# Patient Record
Sex: FEMALE | Race: WHITE | NOT HISPANIC OR LATINO | Employment: OTHER | URBAN - METROPOLITAN AREA
[De-identification: names, ages, dates, MRNs, and addresses within clinical notes are randomized per-mention and may not be internally consistent; named-entity substitution may affect disease eponyms.]

---

## 2017-10-10 ENCOUNTER — CONVERSION ENCOUNTER (OUTPATIENT)
Dept: OTHER | Facility: HOSPITAL | Age: 77
End: 2017-10-10

## 2018-01-18 ENCOUNTER — OFFICE VISIT CONVERTED (OUTPATIENT)
Dept: FAMILY MEDICINE CLINIC | Age: 78
End: 2018-01-18
Attending: FAMILY MEDICINE

## 2018-02-20 ENCOUNTER — OFFICE VISIT CONVERTED (OUTPATIENT)
Dept: FAMILY MEDICINE CLINIC | Age: 78
End: 2018-02-20
Attending: FAMILY MEDICINE

## 2018-06-21 ENCOUNTER — OFFICE VISIT CONVERTED (OUTPATIENT)
Dept: FAMILY MEDICINE CLINIC | Age: 78
End: 2018-06-21
Attending: FAMILY MEDICINE

## 2018-10-24 ENCOUNTER — CONVERSION ENCOUNTER (OUTPATIENT)
Dept: OTHER | Facility: HOSPITAL | Age: 78
End: 2018-10-24

## 2018-11-29 ENCOUNTER — OFFICE VISIT CONVERTED (OUTPATIENT)
Dept: FAMILY MEDICINE CLINIC | Age: 78
End: 2018-11-29
Attending: FAMILY MEDICINE

## 2019-04-19 ENCOUNTER — OFFICE VISIT CONVERTED (OUTPATIENT)
Dept: FAMILY MEDICINE CLINIC | Age: 79
End: 2019-04-19
Attending: FAMILY MEDICINE

## 2019-04-23 ENCOUNTER — OFFICE VISIT CONVERTED (OUTPATIENT)
Dept: FAMILY MEDICINE CLINIC | Age: 79
End: 2019-04-23
Attending: FAMILY MEDICINE

## 2019-04-25 ENCOUNTER — HOSPITAL ENCOUNTER (OUTPATIENT)
Dept: OTHER | Facility: HOSPITAL | Age: 79
Discharge: HOME | End: 2019-04-25
Attending: FAMILY MEDICINE

## 2019-04-25 LAB
ALBUMIN SERPL-MCNC: 4.1 G/DL (ref 3.5–5)
ALBUMIN/GLOB SERPL: 1.4 {RATIO} (ref 1.4–2.6)
ALP SERPL-CCNC: 66 U/L (ref 43–160)
ALT SERPL-CCNC: 11 U/L (ref 10–40)
ANION GAP SERPL CALC-SCNC: 13 MMOL/L (ref 8–19)
AST SERPL-CCNC: 25 U/L (ref 15–50)
BILIRUB SERPL-MCNC: 0.45 MG/DL (ref 0.2–1.3)
BUN SERPL-MCNC: 28 MG/DL (ref 5–25)
BUN/CREAT SERPL: 33 {RATIO} (ref 6–20)
CALCIUM SERPL-MCNC: 9.3 MG/DL (ref 8.7–10.4)
CHLORIDE SERPL-SCNC: 104 MMOL/L (ref 99–111)
CHOLEST SERPL-MCNC: 156 MG/DL (ref 107–200)
CHOLEST/HDLC SERPL: 2.4 {RATIO} (ref 3–6)
CONV CO2: 29 MMOL/L (ref 22–32)
CONV TOTAL PROTEIN: 7 G/DL (ref 6.3–8.2)
CREAT UR-MCNC: 0.84 MG/DL (ref 0.5–0.9)
GFR SERPLBLD BASED ON 1.73 SQ M-ARVRAT: >60 ML/MIN/{1.73_M2}
GLOBULIN UR ELPH-MCNC: 2.9 G/DL (ref 2–3.5)
GLUCOSE SERPL-MCNC: 98 MG/DL (ref 65–99)
HDLC SERPL-MCNC: 66 MG/DL (ref 40–60)
LDLC SERPL CALC-MCNC: 74 MG/DL (ref 70–100)
OSMOLALITY SERPL CALC.SUM OF ELEC: 299 MOSM/KG (ref 273–304)
POTASSIUM SERPL-SCNC: 4.1 MMOL/L (ref 3.5–5.3)
SODIUM SERPL-SCNC: 142 MMOL/L (ref 135–147)
TRIGL SERPL-MCNC: 80 MG/DL (ref 40–150)
VLDLC SERPL-MCNC: 16 MG/DL (ref 5–37)

## 2019-04-30 ENCOUNTER — HOSPITAL ENCOUNTER (OUTPATIENT)
Dept: OTHER | Facility: HOSPITAL | Age: 79
Discharge: HOME | End: 2019-04-30
Attending: FAMILY MEDICINE

## 2019-09-17 ENCOUNTER — OFFICE VISIT CONVERTED (OUTPATIENT)
Dept: FAMILY MEDICINE CLINIC | Age: 79
End: 2019-09-17
Attending: FAMILY MEDICINE

## 2019-09-17 ENCOUNTER — HOSPITAL ENCOUNTER (OUTPATIENT)
Dept: OTHER | Facility: HOSPITAL | Age: 79
Discharge: HOME | End: 2019-09-17
Attending: FAMILY MEDICINE

## 2019-09-17 LAB
ALBUMIN SERPL-MCNC: 4.6 G/DL (ref 3.5–5)
ALBUMIN/GLOB SERPL: 1.6 {RATIO} (ref 1.4–2.6)
ALP SERPL-CCNC: 70 U/L (ref 43–160)
ALT SERPL-CCNC: 15 U/L (ref 10–40)
AMYLASE SERPL-CCNC: 95 U/L (ref 30–150)
ANION GAP SERPL CALC-SCNC: 18 MMOL/L (ref 8–19)
AST SERPL-CCNC: 27 U/L (ref 15–50)
BILIRUB SERPL-MCNC: 0.37 MG/DL (ref 0.2–1.3)
BUN SERPL-MCNC: 29 MG/DL (ref 5–25)
BUN/CREAT SERPL: 31 {RATIO} (ref 6–20)
CALCIUM SERPL-MCNC: 9.4 MG/DL (ref 8.7–10.4)
CHLORIDE SERPL-SCNC: 104 MMOL/L (ref 99–111)
CK SERPL-CCNC: 53 U/L (ref 35–230)
CONV CO2: 26 MMOL/L (ref 22–32)
CONV TOTAL PROTEIN: 7.5 G/DL (ref 6.3–8.2)
CREAT UR-MCNC: 0.93 MG/DL (ref 0.5–0.9)
ERYTHROCYTE [DISTWIDTH] IN BLOOD BY AUTOMATED COUNT: 12.9 % (ref 11.5–14.5)
FOLATE SERPL-MCNC: >20 NG/ML (ref 4.8–20)
GFR SERPLBLD BASED ON 1.73 SQ M-ARVRAT: 59 ML/MIN/{1.73_M2}
GLOBULIN UR ELPH-MCNC: 2.9 G/DL (ref 2–3.5)
GLUCOSE SERPL-MCNC: 101 MG/DL (ref 65–99)
HBA1C MFR BLD: 12.7 G/DL (ref 12–16)
HCT VFR BLD AUTO: 39.8 % (ref 37–47)
LIPASE SERPL-CCNC: 51 U/L (ref 5–51)
MCH RBC QN AUTO: 27.5 PG (ref 27–31)
MCHC RBC AUTO-ENTMCNC: 31.9 G/DL (ref 33–37)
MCV RBC AUTO: 86.3 FL (ref 81–99)
OSMOLALITY SERPL CALC.SUM OF ELEC: 304 MOSM/KG (ref 273–304)
PLATELET # BLD AUTO: 209 10*3/UL (ref 130–400)
PMV BLD AUTO: 11.5 FL (ref 7.4–10.4)
POTASSIUM SERPL-SCNC: 4.2 MMOL/L (ref 3.5–5.3)
RBC # BLD AUTO: 4.61 10*6/UL (ref 4.2–5.4)
SODIUM SERPL-SCNC: 144 MMOL/L (ref 135–147)
TSH SERPL-ACNC: 1.59 M[IU]/L (ref 0.27–4.2)
VIT B12 SERPL-MCNC: 516 PG/ML (ref 211–911)
WBC # BLD AUTO: 5.1 10*3/UL (ref 4.8–10.8)

## 2019-09-19 LAB
ASO AB SERPL-ACNC: 79 [IU]/ML (ref 0–200)
B BURGDOR IGG+IGM SER-ACNC: <0.91 ISR (ref 0–0.9)
CONV ANTI NUCLEAR ANTIBODY WITH REFLEX: NEGATIVE
CONV RHEUMATOID FACTOR IGM: <10 [IU]/ML (ref 0–14)
CRP SERPL-MCNC: 1.5 MG/L (ref 0–5)
ERYTHROCYTE [SEDIMENTATION RATE] IN BLOOD: 8 MM/H (ref 0–30)
PHOSPHATE SERPL-MCNC: 3.2 MG/DL (ref 2.4–4.5)
URATE SERPL-MCNC: 3.7 MG/DL (ref 2.5–7.5)

## 2019-09-20 LAB — CCP IGA+IGG SERPL IA-ACNC: 5 UNITS (ref 0–19)

## 2019-10-08 ENCOUNTER — OFFICE VISIT CONVERTED (OUTPATIENT)
Dept: FAMILY MEDICINE CLINIC | Age: 79
End: 2019-10-08
Attending: FAMILY MEDICINE

## 2019-10-30 ENCOUNTER — HOSPITAL ENCOUNTER (OUTPATIENT)
Dept: OTHER | Facility: HOSPITAL | Age: 79
Discharge: HOME | End: 2019-10-30
Attending: FAMILY MEDICINE

## 2019-11-07 ENCOUNTER — HOSPITAL ENCOUNTER (OUTPATIENT)
Dept: OTHER | Facility: HOSPITAL | Age: 79
Discharge: HOME | End: 2019-11-07
Attending: FAMILY MEDICINE

## 2020-01-06 ENCOUNTER — OFFICE VISIT CONVERTED (OUTPATIENT)
Dept: FAMILY MEDICINE CLINIC | Age: 80
End: 2020-01-06
Attending: FAMILY MEDICINE

## 2020-01-30 ENCOUNTER — OFFICE VISIT CONVERTED (OUTPATIENT)
Dept: FAMILY MEDICINE CLINIC | Age: 80
End: 2020-01-30
Attending: FAMILY MEDICINE

## 2020-02-08 ENCOUNTER — OFFICE VISIT CONVERTED (OUTPATIENT)
Dept: FAMILY MEDICINE CLINIC | Age: 80
End: 2020-02-08
Attending: NURSE PRACTITIONER

## 2020-02-10 ENCOUNTER — HOSPITAL ENCOUNTER (OUTPATIENT)
Dept: OTHER | Facility: HOSPITAL | Age: 80
Discharge: HOME | End: 2020-02-10
Attending: NURSE PRACTITIONER

## 2020-02-26 ENCOUNTER — OFFICE VISIT CONVERTED (OUTPATIENT)
Dept: FAMILY MEDICINE CLINIC | Age: 80
End: 2020-02-26
Attending: NURSE PRACTITIONER

## 2020-03-02 ENCOUNTER — HOSPITAL ENCOUNTER (OUTPATIENT)
Dept: OTHER | Facility: HOSPITAL | Age: 80
Discharge: HOME | End: 2020-03-02
Attending: NURSE PRACTITIONER

## 2020-03-10 ENCOUNTER — HOSPITAL ENCOUNTER (OUTPATIENT)
Dept: OTHER | Facility: HOSPITAL | Age: 80
Discharge: HOME | End: 2020-03-10
Attending: FAMILY MEDICINE

## 2020-03-10 ENCOUNTER — OFFICE VISIT CONVERTED (OUTPATIENT)
Dept: FAMILY MEDICINE CLINIC | Age: 80
End: 2020-03-10
Attending: FAMILY MEDICINE

## 2020-03-10 LAB
ALBUMIN SERPL-MCNC: 4.2 G/DL (ref 3.5–5)
ALBUMIN/GLOB SERPL: 1.4 {RATIO} (ref 1.4–2.6)
ALP SERPL-CCNC: 110 U/L (ref 43–160)
ALT SERPL-CCNC: 20 U/L (ref 10–40)
ANION GAP SERPL CALC-SCNC: 18 MMOL/L (ref 8–19)
AST SERPL-CCNC: 34 U/L (ref 15–50)
BILIRUB SERPL-MCNC: <0.15 MG/DL (ref 0.2–1.3)
BUN SERPL-MCNC: 26 MG/DL (ref 5–25)
BUN/CREAT SERPL: 34 {RATIO} (ref 6–20)
CALCIUM SERPL-MCNC: 9.3 MG/DL (ref 8.7–10.4)
CHLORIDE SERPL-SCNC: 106 MMOL/L (ref 99–111)
CHOLEST SERPL-MCNC: 157 MG/DL (ref 107–200)
CHOLEST/HDLC SERPL: 2.4 {RATIO} (ref 3–6)
CONV CO2: 23 MMOL/L (ref 22–32)
CONV TOTAL PROTEIN: 7.2 G/DL (ref 6.3–8.2)
CREAT UR-MCNC: 0.76 MG/DL (ref 0.5–0.9)
ERYTHROCYTE [DISTWIDTH] IN BLOOD BY AUTOMATED COUNT: 12.9 % (ref 11.5–14.5)
GFR SERPLBLD BASED ON 1.73 SQ M-ARVRAT: >60 ML/MIN/{1.73_M2}
GLOBULIN UR ELPH-MCNC: 3 G/DL (ref 2–3.5)
GLUCOSE SERPL-MCNC: 126 MG/DL (ref 65–99)
HBA1C MFR BLD: 12.6 G/DL (ref 12–16)
HCT VFR BLD AUTO: 38.9 % (ref 37–47)
HDLC SERPL-MCNC: 65 MG/DL (ref 40–60)
LDLC SERPL CALC-MCNC: 71 MG/DL (ref 70–100)
MCH RBC QN AUTO: 27.8 PG (ref 27–31)
MCHC RBC AUTO-ENTMCNC: 32.4 G/DL (ref 33–37)
MCV RBC AUTO: 85.7 FL (ref 81–99)
OSMOLALITY SERPL CALC.SUM OF ELEC: 302 MOSM/KG (ref 273–304)
PLATELET # BLD AUTO: 256 10*3/UL (ref 130–400)
PMV BLD AUTO: 11 FL (ref 7.4–10.4)
POTASSIUM SERPL-SCNC: 3.8 MMOL/L (ref 3.5–5.3)
RBC # BLD AUTO: 4.54 10*6/UL (ref 4.2–5.4)
SODIUM SERPL-SCNC: 143 MMOL/L (ref 135–147)
TRIGL SERPL-MCNC: 107 MG/DL (ref 40–150)
TSH SERPL-ACNC: 1.56 M[IU]/L (ref 0.27–4.2)
VLDLC SERPL-MCNC: 21 MG/DL (ref 5–37)
WBC # BLD AUTO: 9.1 10*3/UL (ref 4.8–10.8)

## 2020-03-12 LAB — BACTERIA SPEC AEROBE CULT: NORMAL

## 2020-03-13 ENCOUNTER — OFFICE VISIT CONVERTED (OUTPATIENT)
Dept: FAMILY MEDICINE CLINIC | Age: 80
End: 2020-03-13
Attending: FAMILY MEDICINE

## 2020-03-14 ENCOUNTER — HOSPITAL ENCOUNTER (OUTPATIENT)
Dept: OTHER | Facility: HOSPITAL | Age: 80
Discharge: HOME | End: 2020-03-14
Attending: FAMILY MEDICINE

## 2020-03-16 LAB — BACTERIA SPEC AEROBE CULT: NORMAL

## 2020-03-18 ENCOUNTER — OFFICE VISIT CONVERTED (OUTPATIENT)
Dept: PULMONOLOGY | Facility: CLINIC | Age: 80
End: 2020-03-18
Attending: INTERNAL MEDICINE

## 2020-04-30 ENCOUNTER — OFFICE VISIT CONVERTED (OUTPATIENT)
Dept: PULMONOLOGY | Facility: CLINIC | Age: 80
End: 2020-04-30
Attending: PHYSICIAN ASSISTANT

## 2020-05-01 ENCOUNTER — HOSPITAL ENCOUNTER (OUTPATIENT)
Dept: OTHER | Facility: HOSPITAL | Age: 80
Discharge: HOME | End: 2020-05-01
Attending: PHYSICIAN ASSISTANT

## 2020-05-01 LAB
BASOPHILS # BLD MANUAL: 0.02 10*3/UL (ref 0–0.2)
BASOPHILS NFR BLD MANUAL: 0.4 % (ref 0–3)
DEPRECATED RDW RBC AUTO: 42.4 FL
EOSINOPHIL # BLD MANUAL: 0.13 10*3/UL (ref 0–0.7)
EOSINOPHIL NFR BLD MANUAL: 2.4 % (ref 0–7)
ERYTHROCYTE [DISTWIDTH] IN BLOOD BY AUTOMATED COUNT: 13.4 % (ref 11.5–14.5)
GRANS (ABSOLUTE): 3.62 10*3/UL (ref 2–8)
GRANS: 66 % (ref 30–85)
HBA1C MFR BLD: 12.4 G/DL (ref 12–16)
HCT VFR BLD AUTO: 38.3 % (ref 37–47)
IMM GRANULOCYTES # BLD: 0.02 10*3/UL (ref 0–0.54)
IMM GRANULOCYTES NFR BLD: 0.4 % (ref 0–0.43)
IMMUNOCAP RESULT: NORMAL (ref 0–0)
LYMPHOCYTES # BLD MANUAL: 1.23 10*3/UL (ref 1–5)
LYMPHOCYTES NFR BLD MANUAL: 8.4 % (ref 3–10)
MCH RBC QN AUTO: 27.7 PG (ref 27–31)
MCHC RBC AUTO-ENTMCNC: 32.4 G/DL (ref 33–37)
MCV RBC AUTO: 85.7 FL (ref 81–99)
MONOCYTES # BLD AUTO: 0.46 10*3/UL (ref 0.2–1.2)
PLATELET # BLD AUTO: 227 10*3/UL (ref 130–400)
PMV BLD AUTO: 10.9 FL (ref 7.4–10.4)
RBC # BLD AUTO: 4.47 10*6/UL (ref 4.2–5.4)
VARIANT LYMPHS NFR BLD MANUAL: 22.4 % (ref 20–45)
WBC # BLD AUTO: 5.48 10*3/UL (ref 4.8–10.8)

## 2020-05-03 LAB
A FUMIGATUS AB SER QL ID: <0.1 K[IU]/ML
IGE SERPL-ACNC: 2 K[IU]/ML (ref 0–24)

## 2020-05-04 ENCOUNTER — HOSPITAL ENCOUNTER (OUTPATIENT)
Dept: OTHER | Facility: HOSPITAL | Age: 80
Discharge: HOME | End: 2020-05-04
Attending: FAMILY MEDICINE

## 2020-05-04 ENCOUNTER — OFFICE VISIT CONVERTED (OUTPATIENT)
Dept: FAMILY MEDICINE CLINIC | Age: 80
End: 2020-05-04
Attending: FAMILY MEDICINE

## 2020-05-04 LAB
CONV 1,3-BETA-D-GLUCAN INTERPRETATION: NEGATIVE
CONV 1,3-BETA-D-GLUCAN: <31 PG/ML
H CAPSUL AG UR-ACNC: <0.5

## 2020-05-14 ENCOUNTER — CONVERSION ENCOUNTER (OUTPATIENT)
Dept: ORTHOPEDIC SURGERY | Facility: CLINIC | Age: 80
End: 2020-05-14

## 2020-05-14 ENCOUNTER — OFFICE VISIT CONVERTED (OUTPATIENT)
Dept: ORTHOPEDIC SURGERY | Facility: CLINIC | Age: 80
End: 2020-05-14
Attending: ORTHOPAEDIC SURGERY

## 2020-05-26 ENCOUNTER — HOSPITAL ENCOUNTER (OUTPATIENT)
Dept: PHYSICAL THERAPY | Facility: CLINIC | Age: 80
Setting detail: RECURRING SERIES
Discharge: HOME | End: 2020-06-23
Attending: ORTHOPAEDIC SURGERY

## 2020-06-15 ENCOUNTER — HOSPITAL ENCOUNTER (OUTPATIENT)
Dept: PREADMISSION TESTING | Facility: HOSPITAL | Age: 80
Discharge: HOME | End: 2020-06-15
Attending: INTERNAL MEDICINE

## 2020-06-16 LAB — SARS-COV-2 RNA SPEC QL NAA+PROBE: NOT DETECTED

## 2020-06-17 ENCOUNTER — HOSPITAL ENCOUNTER (OUTPATIENT)
Dept: CARDIOLOGY | Facility: HOSPITAL | Age: 80
Discharge: HOME | End: 2020-06-17
Attending: INTERNAL MEDICINE

## 2020-07-01 ENCOUNTER — OFFICE VISIT CONVERTED (OUTPATIENT)
Dept: PULMONOLOGY | Facility: CLINIC | Age: 80
End: 2020-07-01
Attending: NURSE PRACTITIONER

## 2020-07-13 ENCOUNTER — CONVERSION ENCOUNTER (OUTPATIENT)
Dept: FAMILY MEDICINE CLINIC | Age: 80
End: 2020-07-13

## 2020-07-13 ENCOUNTER — HOSPITAL ENCOUNTER (OUTPATIENT)
Dept: OTHER | Facility: HOSPITAL | Age: 80
Discharge: HOME | End: 2020-07-13
Attending: NURSE PRACTITIONER

## 2020-07-15 ENCOUNTER — HOSPITAL ENCOUNTER (OUTPATIENT)
Dept: GASTROENTEROLOGY | Facility: HOSPITAL | Age: 80
Setting detail: HOSPITAL OUTPATIENT SURGERY
Discharge: HOME | End: 2020-07-15
Attending: INTERNAL MEDICINE

## 2020-07-15 LAB
EPI CELLS NFR FLD: 8 %
LYMPHOCYTES NFR FLD MANUAL: 1 %
MACROPHAGE FLUID: 7 /100{WBCS}
NEUTROPHILS NFR FLD MANUAL: 84 %
SARS-COV-2 RNA SPEC QL NAA+PROBE: NOT DETECTED
VISUAL PRESENCE OF BLOOD: NEGATIVE

## 2020-07-18 LAB
BACTERIA SPEC AEROBE CULT: NORMAL
CONV BRONCHIAL WASH CULTURE: NORMAL

## 2020-07-19 LAB — CONV NOCARDIA STAIN (PARTIAL,MODIFIED ACID FAST): NORMAL

## 2020-07-26 LAB
CONV ADENOVIRUS  (BAL OR WASH): NEGATIVE
FLUAV RNA SPEC QL NAA+PROBE: NEGATIVE
FLUBV RNA ISLT QL NAA+PROBE: NEGATIVE
HMPV RNA SPEC QL NAA+PROBE: NEGATIVE
HPIV1 RNA ISLT QL NAA+PROBE: NEGATIVE
HPIV2 SPEC QL CULT: NEGATIVE
HPIV3 SPEC QL CULT: NEGATIVE
RHINOVIRUS RNA SPEC QL NAA+PROBE: NEGATIVE
RSV A: NEGATIVE
RSV B RNA SPEC QL NAA+PROBE: NEGATIVE

## 2020-07-27 LAB — CONV LEGIONELLA CULTURE: NORMAL

## 2020-07-30 ENCOUNTER — HOSPITAL ENCOUNTER (OUTPATIENT)
Dept: OTHER | Facility: HOSPITAL | Age: 80
Discharge: HOME | End: 2020-07-30
Attending: NURSE PRACTITIONER

## 2020-07-30 ENCOUNTER — OFFICE VISIT CONVERTED (OUTPATIENT)
Dept: PULMONOLOGY | Facility: CLINIC | Age: 80
End: 2020-07-30
Attending: NURSE PRACTITIONER

## 2020-07-30 LAB
ALBUMIN SERPL-MCNC: 4.3 G/DL (ref 3.5–5)
ALBUMIN/GLOB SERPL: 1.6 {RATIO} (ref 1.4–2.6)
ALP SERPL-CCNC: 80 U/L (ref 43–160)
ALT SERPL-CCNC: 23 U/L (ref 10–40)
ANION GAP SERPL CALC-SCNC: 16 MMOL/L (ref 8–19)
AST SERPL-CCNC: 42 U/L (ref 15–50)
BILIRUB SERPL-MCNC: 0.52 MG/DL (ref 0.2–1.3)
BUN SERPL-MCNC: 18 MG/DL (ref 5–25)
BUN/CREAT SERPL: 21 {RATIO} (ref 6–20)
CALCIUM SERPL-MCNC: 10.3 MG/DL (ref 8.7–10.4)
CHLORIDE SERPL-SCNC: 101 MMOL/L (ref 99–111)
CONV CO2: 27 MMOL/L (ref 22–32)
CONV TOTAL PROTEIN: 7 G/DL (ref 6.3–8.2)
CREAT UR-MCNC: 0.84 MG/DL (ref 0.5–0.9)
GFR SERPLBLD BASED ON 1.73 SQ M-ARVRAT: >60 ML/MIN/{1.73_M2}
GLOBULIN UR ELPH-MCNC: 2.7 G/DL (ref 2–3.5)
GLUCOSE SERPL-MCNC: 95 MG/DL (ref 65–99)
OSMOLALITY SERPL CALC.SUM OF ELEC: 292 MOSM/KG (ref 273–304)
POTASSIUM SERPL-SCNC: 4 MMOL/L (ref 3.5–5.3)
SODIUM SERPL-SCNC: 140 MMOL/L (ref 135–147)

## 2020-08-26 ENCOUNTER — HOSPITAL ENCOUNTER (OUTPATIENT)
Dept: OTHER | Facility: HOSPITAL | Age: 80
Discharge: HOME | End: 2020-08-26
Attending: NURSE PRACTITIONER

## 2020-08-26 LAB
ALBUMIN SERPL-MCNC: 4 G/DL (ref 3.5–5)
ALBUMIN/GLOB SERPL: 1.6 {RATIO} (ref 1.4–2.6)
ALP SERPL-CCNC: 71 U/L (ref 43–160)
ALT SERPL-CCNC: 11 U/L (ref 10–40)
ANION GAP SERPL CALC-SCNC: 12 MMOL/L (ref 8–19)
AST SERPL-CCNC: 22 U/L (ref 15–50)
BILIRUB SERPL-MCNC: 0.45 MG/DL (ref 0.2–1.3)
BUN SERPL-MCNC: 25 MG/DL (ref 5–25)
BUN/CREAT SERPL: 28 {RATIO} (ref 6–20)
CALCIUM SERPL-MCNC: 9.6 MG/DL (ref 8.7–10.4)
CHLORIDE SERPL-SCNC: 103 MMOL/L (ref 99–111)
CONV CO2: 28 MMOL/L (ref 22–32)
CONV TOTAL PROTEIN: 6.5 G/DL (ref 6.3–8.2)
CREAT UR-MCNC: 0.9 MG/DL (ref 0.5–0.9)
GFR SERPLBLD BASED ON 1.73 SQ M-ARVRAT: >60 ML/MIN/{1.73_M2}
GLOBULIN UR ELPH-MCNC: 2.5 G/DL (ref 2–3.5)
GLUCOSE SERPL-MCNC: 107 MG/DL (ref 65–99)
OSMOLALITY SERPL CALC.SUM OF ELEC: 293 MOSM/KG (ref 273–304)
POTASSIUM SERPL-SCNC: 4 MMOL/L (ref 3.5–5.3)
SODIUM SERPL-SCNC: 139 MMOL/L (ref 135–147)

## 2020-08-31 ENCOUNTER — OFFICE VISIT CONVERTED (OUTPATIENT)
Dept: PULMONOLOGY | Facility: CLINIC | Age: 80
End: 2020-08-31
Attending: INTERNAL MEDICINE

## 2020-09-14 ENCOUNTER — CONVERSION ENCOUNTER (OUTPATIENT)
Dept: PULMONOLOGY | Facility: CLINIC | Age: 80
End: 2020-09-14

## 2020-09-24 ENCOUNTER — OFFICE VISIT CONVERTED (OUTPATIENT)
Dept: PULMONOLOGY | Facility: CLINIC | Age: 80
End: 2020-09-24
Attending: INTERNAL MEDICINE

## 2020-10-06 ENCOUNTER — OFFICE VISIT CONVERTED (OUTPATIENT)
Dept: FAMILY MEDICINE CLINIC | Age: 80
End: 2020-10-06
Attending: FAMILY MEDICINE

## 2020-11-24 ENCOUNTER — HOSPITAL ENCOUNTER (OUTPATIENT)
Dept: OTHER | Facility: HOSPITAL | Age: 80
Discharge: HOME | End: 2020-11-24
Attending: FAMILY MEDICINE

## 2020-12-02 ENCOUNTER — HOSPITAL ENCOUNTER (OUTPATIENT)
Dept: OTHER | Facility: HOSPITAL | Age: 80
Discharge: HOME | End: 2020-12-02
Attending: FAMILY MEDICINE

## 2020-12-03 LAB — SARS-COV-2 AB SERPL QL IA: NEGATIVE

## 2021-03-11 ENCOUNTER — OFFICE VISIT CONVERTED (OUTPATIENT)
Dept: PULMONOLOGY | Facility: CLINIC | Age: 81
End: 2021-03-11
Attending: NURSE PRACTITIONER

## 2021-05-03 ENCOUNTER — HOSPITAL ENCOUNTER (OUTPATIENT)
Dept: OTHER | Facility: HOSPITAL | Age: 81
Discharge: HOME | End: 2021-05-03
Attending: FAMILY MEDICINE

## 2021-05-10 NOTE — H&P
"   History and Physical      Patient Name: Luma Cruz   Patient ID: [id number]   Sex: Female   YOB: 1940        Visit Date: May 14, 2020    Provider: Gil Castro MD   Location: [facility]   Location Address: [address] [place] [address]  [id number]   Location Phone: [phone]          Chief Complaint  · Left shoulder pain      History Of Present Illness  Luma Cruz is a 79 year old /White female who presents today to [facility].      The patient presents today for left shoulder pain. The pain has been present for a few months. The patient has no new injuries. She reports pain in the upper arm and shoulder area. She has a lot of pain at night and decreased ROM as well as strength in the shoulder. The patient has tried some anti-inflammatory medication without relief for this.       Allergy List  PENICILLINS         Social History  Alcohol Use (Never); .; Recreational Drug Use (Never); Tobacco (Never)         Review of Systems  · Constitutional  o Denies  o : fever, chills, weight loss  · Cardiovascular  o Denies  o : chest pain, shortness of breath  · Gastrointestinal  o Denies  o : liver disease, heartburn, nausea, blood in stools  · Genitourinary  o Denies  o : painful urination, blood in urine  · Integument  o Denies  o : rash, itching  · Neurologic  o Denies  o : headache, weakness, loss of consciousness  · Musculoskeletal  o Denies  o : painful, swollen joints  · Psychiatric  o Denies  o : drug/alcohol addiction, anxiety, depression      Vitals  Date Time BP Position Site L\R Cuff Size HR RR TEMP (F) WT  HT  BMI kg/m2 BSA m2 O2 Sat        05/14/2020 02:21 PM         115lbs 0oz 5'  1\" 21.73 1.5           Physical Examination  · Constitutional  o Appearance  o : well developed, well-nourished, no obvious deformities present  · Head and Face  o Head  o :   § Inspection  § : normocephalic  o Face  o :   § Inspection  § : no facial " lesions  · Eyes  o Conjunctivae  o : conjunctivae normal  o Sclerae  o : sclerae white  · Ears, Nose, Mouth and Throat  o Ears  o :   § External Ears  § : appearance within normal limits  § Hearing  § : intact  o Nose  o :   § External Nose  § : appearance normal  · Neck  o Inspection/Palpation  o : normal appearance  o Range of Motion  o : full range of motion  · Respiratory  o Respiratory Effort  o : breathing unlabored  o Inspection of Chest  o : normal appearance  o Auscultation of Lungs  o : no audible wheezing or rales  · Cardiovascular  o Heart  o : regular rate  · Gastrointestinal  o Abdominal Examination  o : soft and non-tender  · Skin and Subcutaneous Tissue  o General Inspection  o : intact, no rashes  · Psychiatric  o General  o : Alert and oriented x3  o Judgement and Insight  o : judgment and insight intact  o Mood and Affect  o : mood normal, affect appropriate  · Left Shoulder  o Inspection  o : Forward elevation 130. Abduction 125. internal rotation to L3 external rotation is 65. Strength is 4+ out of 5. Palpable tenderness and impingement signs. Negative labral and biceps testing.   · Injection Note/Aspiration Note  o Site  o : left shoulder  o Procedure  o : Procedure: After educating the patient, patient gave consent for procedure. After using Chloraprep, the joint space was injected. The patient tolerated the procedure well.  o Medication  o : 80 mg of DepoMedrol with 9cc of 1% Lidocaine  · Imaging  o Imaging  o : 1. Enthesopathy of the greater tuberosity, which may be associated with rotator cuff pathology. 2. Mild degenerative changes at the glenohumeral and acromioclavicular joints. 3. Probable 8 mm cyst in the posterior-lateral humeral head which may be related to enthesopathy or degenerative changes.           Assessment  · Primary osteoarthritis of left shoulder     715.11/M19.019  · Left shoulder pain, unspecified chronicity     719.41/M25.512  · Rotator cuff tear, left  shoulder     840.4/M75.100      Plan  · Orders  o Depo-Medrol injection 80mg () - - 05/14/2020   Lot [id number] Exp 05 2021 [employer] Administered by KULDEEP CESPEDES MD  o Shoulder Intra-articular Injection without US Guidance [facility] (85803) - - 05/14/2020   Lot [id number] Exp 07 01 2021 [employer] Administered by KULDEEP CESPEDES MD  · Medications  o Medications have been Reconciled  o Transition of Care or Provider Policy  · Instructions  o Dr. Cespedes saw and examined the patient and agrees with plan.   o X-rays reviewed by Dr. Cespedes.  o Reviewed the patient's Past Medical, Social, and Family history as well as the ROS at today's visit, no changes.  o Call or return if worsening symptoms.  o This note was transcribed by Mariusz Bourgeois. justice.  o Discussed plan and treatment options with patient. Plan for PT for evaluation and treatment. Plan for left shoulder injection. Follow up in 6 to 8 weeks to re-evaluate. We may consider an MRI if symptoms do not improve.             Electronically Signed by: Shiv Bourgeois - , Other -Author on May 20, 2020 02:56:36 PM  Electronically Co-signed by: Kuldeep Cespedes MD -Reviewer on May 20, 2020 08:22:49 PM

## 2021-05-11 ENCOUNTER — HOSPITAL ENCOUNTER (OUTPATIENT)
Dept: OTHER | Facility: HOSPITAL | Age: 81
Discharge: HOME | End: 2021-05-11
Attending: NURSE PRACTITIONER

## 2021-05-15 VITALS — WEIGHT: 115 LBS | HEIGHT: 61 IN | BODY MASS INDEX: 21.71 KG/M2

## 2021-05-18 NOTE — PROGRESS NOTES
Luma Cruz. 1940     Office/Outpatient Visit    Visit Date: Tue, Sep 17, 2019 01:13 pm    Provider: Joseph Walker MD (Assistant: Ankita Escobedo RN)    Location: [facility]        Electronically signed by Joseph Walker MD on  09/18/2019 07:49:55 AM                             SUBJECTIVE:        CC: 'I'm tired all the time'         HPI:     Luma Grey has been feeling fatigued over the last month.  She is not sure what may have caused this.  She says that she just feels exhausted.  She does sleep relatively well.  She does not feel all that rested when she gets up.  She thinks she may snore some.  She has been tested for sleep apnea, and she did not need treatment.  She is able to do what she needs to do without stopping.         She has also noted some hand and shoulder pain as well.  She thinks this is arthritis.  However, she is seeing some progression in this.         In addition, Luma Grey has chronic anxiety for which she takes Klonopin at bedtime.  She has been on this for a long time and feels it is helpful and necessary.  She denies side effects from it - no sedation or confusion.  Davi is reviewed and consistent.  She denies abuse, diversion, or doctor shopping.         With regard to the essential hypertension, her current cardiac medication regimen includes an angiotensin receptor blocker ( Cozaar ).  She is tolerating the medication well without side effects.  Compliance with treatment has been good; she takes her medication as directed and follows up as directed.          In regard to the hypercholesterolemia, current treatment includes Pravachol.  Compliance with treatment has been good; she takes her medication as directed and follows up as directed.  She denies experiencing any hypercholesterolemia related symptoms.      ROS:     CONSTITUTIONAL:  Negative for chills and fever.      CARDIOVASCULAR:  Negative for chest pain and palpitations.      RESPIRATORY:  Negative for  recent cough and dyspnea.      INTEGUMENTARY/BREAST:  Negative for atypical mole(s) and rash.          PMH/FMH/SH:     Last Reviewed on 9/17/2019 01:44 PM by Joseph Walker    Past Medical History:                 PAST MEDICAL HISTORY             CURRENT MEDICAL PROVIDERS:    E/N/T: Dr. Chandler             ADVANCE DIRECTIVES: Living will - She says that her daughter, Carla, would make decisions for her if needed.          PREVENTIVE HEALTH MAINTENANCE             BONE DENSITY: was last done 04/2019 with the following abnormality noted-- Osteopenia     COLORECTAL CANCER SCREENING: Up to date (colonoscopy q10y; sigmoidoscopy q5y; Cologuard q3y) was last done 06/2011, Results are in chart; colonoscopy with normal results     MAMMOGRAM: Done within last 2 years and results in are chart was last done 10/25/18 with normal results     PAP SMEAR: was last done 09/28/2006         Surgical History:         Biopsy of breast; benign    Hysterectomy: Complete;     Tonsillectomy/Adenoidectomy Procedures: colonoscopy 2002, NEG         Family History:         Positive for Cerebrovascular Accident ( mother ), Coronary Artery Disease ( father ) and Hypertension ( mother ).          Social History:     Occupation: Employed at [employer]     Marital Status:      Children: 2 children         Tobacco/Alcohol/Supplements:     Last Reviewed on 9/17/2019 01:44 PM by Joseph Walker    Tobacco: She has never smoked.  Non-drinker         Substance Abuse History:     Last Reviewed on 9/17/2019 01:44 PM by Joseph Walker    NEGATIVE         Mental Health History:     Last Reviewed on 9/17/2019 01:44 PM by Joseph Walker        Communicable Diseases (eg STDs):     Last Reviewed on 9/17/2019 01:44 PM by Joseph Walker            Current Problems:     Last Reviewed on 9/17/2019 01:44 PM by Joseph Walker    Joint pain, hand     Malaise and Fatigue     Vertigo     Essential hypertension      Irritable bowel syndrome     Use of high risk medications     Hypercholesterolemia     Allergic rhinitis     Post-menopausal HRT     Anxiety     Shingles         Immunizations:     Td adult 12/24/2007     Hep A, adult dose 1/1/2019     Hep A, adult dose 6/12/2019     zzPrevnar-13 10/29/2015     Fluzone (3 + years dose) 10/28/2008     Fluzone (3 + years dose) 9/11/2009     Fluzone (3 + years dose) 9/16/2010     Fluzone (3 + years dose) 10/20/2011     Influenza A (H1N1), IM (3+ years) Monovalent 12/26/2009     Fluzone High-Dose pf (>=65 yr) 9/25/2013     Fluzone High-Dose pf (>=65 yr) 10/16/2012     Fluzone High-Dose pf (>=65 yr) 10/1/2014     Fluzone High-Dose pf (>=65 yr) 10/14/2015     Fluzone High-Dose pf (>=65 yr) 10/19/2016     Fluzone High-Dose pf (>=65 yr) 9/28/2017     Fluzone High-Dose pf (>=65 yr) 10/11/2018     Pneumococcal, 23-valent IM/SC (adult and pt >=2yr) 2/7/2008         Allergies:     Last Reviewed on 9/17/2019 01:44 PM by Joseph Walker    Penicillins:    Fosamax: abdominal pain (Adverse Reaction)        Current Medications:     Last Reviewed on 9/17/2019 01:44 PM by Joseph Walker    Clonazepam 0.5mg Tablet Take 1 tablet(s) by mouth bid as needed for anxiety     Omeprazole 20mg Capsules, Extended Release 1 capsule daily     Estradiol 1mg Tablet Take 1 tablet(s) by mouth daily     Levocetirizine Dihydrochloride 5mg Tablet Take 1 tablet(s) by mouth each evening     Losartan 50mg Tablet Take 1 tablet(s) by mouth daily     Pravastatin 20mg Tablet Take 1 tablet(s) by mouth at bedtime     Famciclovir 500mg Tablet Take 1 tablet(s) by mouth q8h for 7 days     allergy shot once weekly     Azelastine HCl 0.1% Nasal Spray     Fluticasone Propionate 50mcg/1actuation Nasal Spray         OBJECTIVE:        Vitals:         Current: 9/17/2019 1:20:07 PM    Ht:  5 ft, 0.25 in;  Wt: 117 lbs;  BMI: 22.7    T: 98.5 F (oral);  BP: 129/59 mm Hg (right arm, sitting);  P: 72 bpm (right arm (BP Cuff),  sitting);  sCr: 0.84 mg/dL;  GFR: 49.12        Exams:     PHYSICAL EXAM:     GENERAL: vital signs recorded - well developed, well nourished;  no apparent distress;     EYES: extraocular movements intact; conjunctiva and cornea are normal; PERRL;     E/N/T:  normal EACs, TMs, nasal/oral mucosa, teeth, gingiva, and oropharynx;     NECK: range of motion is decreased with rotation;  thyroid is non-palpable;     RESPIRATORY: normal respiratory rate and pattern with no distress; normal breath sounds with no rales, rhonchi, wheezes or rubs;     CARDIOVASCULAR: normal rate; rhythm is regular;  no systolic murmur; no edema;     GASTROINTESTINAL: nontender; normal bowel sounds; no organomegaly;     LYMPHATIC: no enlargement of cervical or facial nodes; no supraclavicular nodes;     BREAST/INTEGUMENT: SKIN: no significant rashes or lesions; no suspicious moles;     MUSCULOSKELETAL: there are some joint changes in the hands - the second PIP joint is thickened with some limited ROM - no obvious involvement of the MCP joint - there is some tenderness in the L trapezius as well;     PSYCHIATRIC: appropriate affect and demeanor; normal psychomotor function;         ASSESSMENT           780.79   R53.83  Malaise and Fatigue              DDx:     719.44   M79.642   M79.641  Joint pain, hand              DDx:     V58.69   Z79.899  Use of high risk medications              DDx:     300.02   F41.8  Anxiety              DDx:     401.1   I10  Essential hypertension              DDx:     272.0   E78.2  Hypercholesterolemia              DDx:     530.81   K21.9  Gastroesophageal reflux disease              DDx:         ORDERS:         Meds Prescribed:       Refill of: Omeprazole 20mg Capsules, Extended Release Take 1 capsule(s) by mouth daily  #90 (Ninety) capsule(s) Refills: 1       Refill of: Estradiol 1mg Tablet Take 1 tablet(s) by mouth daily  #90 (Ninety) tablet(s) Refills: 1       Refill of: Levocetirizine Dihydrochloride 5mg Tablet  Take 1 tablet(s) by mouth each evening  #90 (Ninety) tablet(s) Refills: 1       Refill of: Losartan 50mg Tablet Take 1 tablet(s) by mouth daily  #90 (Ninety) tablet(s) Refills: 1       Refill of: Pravastatin 20mg Tablet Take 1 tablet(s) by mouth at bedtime  #90 (Ninety) tablet(s) Refills: 1         Radiology/Test Orders:       34573  Xray Hand, 3 Views; Bilateral  (Send-Out)         3014F  Screening mammography results documented and reviewed (PV)1  (In-House)         3017F  Colorectal CA screen results documented and reviewed (PV)  (In-House)           Lab Orders:       82458  BDCB2 - [facility] CBC w/o diff  (Send-Out)         07071  COMP - [facility] Comp. Metabolic Panel  (Send-Out)         52261  LYSCR - [facility] Lyme Ab/Western Blot Reflex  (Send-Out)         54294  B12FO - [facility] Vitamin B12 with Folate  (Send-Out)         48889  TSH - [facility] TSH  (Send-Out)         25516  RAPII - [facility] Arthritis Profile  (Send-Out)         82869  CCPA - [facility]- CCP Antibody  (Send-Out)         73954  AMYS - [facility] Amylase, Serum  (Send-Out)         18287  HPUBT - [facility] H.pylori Breath test  (Send-Out)         17408  LIP - [facility] Lipase, Serum  (Send-Out)         62506  CK - [facility]- CK total  (Send-Out)           Other Orders:       1100F  Pt screen for fall risk; document 2+ falls in the past yr or any fall w/injury in past year (ALEX)  (In-House)                   PLAN:          Malaise and Fatigue     LABORATORY:  Labs ordered to be performed today include B12 with Folate, CBC W/O DIFF, Comprehensive metabolic panel, Lyme Ab/Western Blot Reflex, and TSH.      RECOMMENDATIONS given include: Today, we have reviewed Luma Grey's care.  I'm going to refill her medications as noted below including Klonopin when needed. I am concerned by the multiple complaints that she has.  We will assess further as noted and then be back in touch with her..  LEÓN Has fallen 2+ times in the past year or had one fall with an injury in the past year Vaccines Flu and Pneumonia updated in  Shot record Screening mammomgram done within last 2 years and results in are chart Colorectal Cancer Screening is up to date and the results are in the chart           Orders:       39033  BDCB2 - [facility] CBC w/o diff  (Send-Out)         10622  COMP - [facility] Comp. Metabolic Panel  (Send-Out)         80123  LYSCR - [facility] Lyme Ab/Western Blot Reflex  (Send-Out)         39353  B12FO - [facility] Vitamin B12 with Folate  (Send-Out)         49373  TSH - [facility] TSH  (Send-Out)         1100F  Pt screen for fall risk; document 2+ falls in the past yr or any fall w/injury in past year (ALEX)  (In-House)         3014F  Screening mammography results documented and reviewed (PV)1  (In-House)         3017F  Colorectal CA screen results documented and reviewed (PV)  (In-House)            Joint pain, hand     LABORATORY:  Labs ordered to be performed today include Arthritis Profile and CCP Antibody.      RADIOLOGY:  I have ordered a Bilateral hand hand x-ray to be done today.            Orders:       17225  RAPII - [facility] Arthritis Profile  (Send-Out)         78457  CCPA - [facility]- CCP Antibody  (Send-Out)         60370  Xray Hand, 3 Views; Bilateral  (Send-Out)            Use of high risk medications As above.          Anxiety As above.          Essential hypertension As above.           Prescriptions:       Refill of: Losartan 50mg Tablet Take 1 tablet(s) by mouth daily  #90 (Ninety) tablet(s) Refills: 1          Hypercholesterolemia As above.     LABORATORY:  Labs ordered to be performed today include CK, total.            Prescriptions:       Refill of: Pravastatin 20mg Tablet Take 1 tablet(s) by mouth at bedtime  #90 (Ninety) tablet(s) Refills: 1           Orders:       96800  CK - [facility]- CK total  (Send-Out)            Gastroesophageal reflux disease As above.     LABORATORY:  Labs ordered to be performed today include amylase, H. pylori breath test, and Lipase.            Prescriptions:       Refill of: Omeprazole 20mg Capsules, Extended Release Take 1  capsule(s) by mouth daily  #90 (Ninety) capsule(s) Refills: 1           Orders:       04238  AMYS - [facility] Amylase, Serum  (Send-Out)         57183  HPUBT - [facility] H.pylori Breath test  (Send-Out)         58447  LIP - [facility] Lipase, Serum  (Send-Out)               Other Prescriptions:       Refill of: Estradiol 1mg Tablet Take 1 tablet(s) by mouth daily  #90 (Ninety) tablet(s) Refills: 1       Refill of: Levocetirizine Dihydrochloride 5mg Tablet Take 1 tablet(s) by mouth each evening  #90 (Ninety) tablet(s) Refills: 1         Patient Recommendations:        For  Joint pain, hand:     I also recommend ^.              CHARGE CAPTURE           **Please note: ICD descriptions below are intended for billing purposes only and may not represent clinical diagnoses**        Primary Diagnosis:         780.79 Malaise and Fatigue            R53.83    Other fatigue              Orders:          45932   Office/outpatient visit; established patient, level 4  (In-House)             1100F   Pt screen for fall risk; document 2+ falls in the past yr or any fall w/injury in past year (ALEX)  (In-House)             3014F   Screening mammography results documented and reviewed (PV)1  (In-House)             3017F   Colorectal CA screen results documented and reviewed (PV)  (In-House)           719.44 Joint pain, hand            M79.642    Pain in left hand           M79.641    Pain in right hand    V58.69 Use of high risk medications            Z79.899    Other long term (current) drug therapy    300.02 Anxiety            F41.8    Other specified anxiety disorders    401.1 Essential hypertension            I10    Essential (primary) hypertension    272.0 Hypercholesterolemia            E78.2    Mixed hyperlipidemia    530.81 Gastroesophageal reflux disease            K21.9    Gastro-esophageal reflux disease without esophagitis

## 2021-05-18 NOTE — PROGRESS NOTES
"Luma Cruz  1940     Office/Outpatient Visit    Visit Date: Wed, Feb 26, 2020 04:04 pm    Provider: Geraldine Baez N.P. (Assistant: Ankita Escobedo RN)    Location: [facility]        Electronically signed by Geraldine Baez N.P. on  02/27/2020 08:30:40 AM                             Subjective:        CC: Luma Grey is a 79 year old White female.  \"I still have a persistant cough\";         HPI: Patient to be evaluated for cough. Has been present for the past 8-10 weeks.  Additional symptoms include headache, sinus pain/pressure and purulent sputum production.  She has already tried to relieve the symptoms with Promethazine DM, Prednisone steroid course, Cefdinir. Normal chest xray on 2/10/20. Completed course of Doxycycline. Notes that sinus symptoms have improved. Cough is worse in AM. Hurting under shoulder blades. Feels like she is rattling. She is on allergy shots, Flonase, azestaline nasal spray daily. Also using Albuterol neb treatments.    ROS:     CONSTITUTIONAL:  Negative for chills and fever.      EYES:  Negative for eye drainage and eye pain.      E/N/T:  Positive for sinus pressure improved.   Negative for ear pain.      CARDIOVASCULAR:  Negative for chest pain and palpitations.      RESPIRATORY:  Positive for chronic cough.   Negative for frequent wheezing.      PSYCHIATRIC:  Negative for depression and suicidal thoughts.          Past Medical History / Family History / Social History:         Last Reviewed on 1/30/2020 12:59 PM by Joseph Walker    Past Medical History:                 PAST MEDICAL HISTORY             CURRENT MEDICAL PROVIDERS:    E/N/T: Dr. Chandler             ADVANCE DIRECTIVES: Living will - She says that her daughter, Carla, would make decisions for her if needed.          PREVENTIVE HEALTH MAINTENANCE             BONE DENSITY: was last done 04/2019 with the following abnormality noted-- Osteopenia     COLORECTAL CANCER SCREENING: Up to date (colonoscopy " q10y; sigmoidoscopy q5y; Cologuard q3y) was last done 06/2011, Results are in chart; colonoscopy with normal results     MAMMOGRAM: Done within last 2 years and results in are chart was last done 10/31/19 which was abnormal     PAP SMEAR: was last done 09/28/2006         Surgical History:         Biopsy of breast; benign    Hysterectomy: Complete;     Tonsillectomy/Adenoidectomy Procedures: colonoscopy 2002, NEG         Family History:         Positive for Cerebrovascular Accident ( mother ), Coronary Artery Disease ( father ) and Hypertension ( mother ).          Social History:     Occupation: Employed at [employer]     Marital Status:      Children: 2 children         Tobacco/Alcohol/Supplements:     Last Reviewed on 2/08/2020 11:44 AM by Robin Chavez    Tobacco: She has never smoked.  Non-drinker         Substance Abuse History:     Last Reviewed on 1/30/2020 12:59 PM by Joseph Walker    NEGATIVE         Mental Health History:     Last Reviewed on 1/30/2020 12:59 PM by Joseph Walker        Communicable Diseases (eg STDs):     Last Reviewed on 1/30/2020 12:59 PM by Joseph Walker        Current Problems:     Last Reviewed on 1/30/2020 12:59 PM by Joseph Walker    Allergic rhinitis, unspecified    Other specified anxiety disorders    Hormone replacement therapy (postmenopausal)    Mixed hyperlipidemia    Other long term (current) drug therapy    Essential (primary) hypertension    Dizziness and giddiness    Zoster without complications    Gastro-esophageal reflux disease without esophagitis    Pain in right hand    Pain in left hand    Other fatigue    Encounter for screening for depression    Acute bronchitis, unspecified    Cough    Allergic rhinitis    Pneumonia, unspecified organism        Immunizations:     Td adult 12/24/2007    Hep A, adult dose 1/1/2019    Hep A, adult dose 6/12/2019    zzPrevnar-13 10/29/2015    Fluzone (3 + years dose) 10/28/2008     Fluzone (3 + years dose) 9/11/2009    Fluzone (3 + years dose) 9/16/2010    Fluzone (3 + years dose) 10/20/2011    Influenza A (H1N1), IM (3+ years) Monovalent 12/26/2009    Fluzone High-Dose pf (>=65 yr) 9/25/2013    Fluzone High-Dose pf (>=65 yr) 10/16/2012    Fluzone High-Dose pf (>=65 yr) 10/1/2014    Fluzone High-Dose pf (>=65 yr) 10/14/2015    Fluzone High-Dose pf (>=65 yr) 10/19/2016    Fluzone High-Dose pf (>=65 yr) 9/28/2017    Fluzone High-Dose pf (>=65 yr) 10/11/2018    Fluzone High-Dose pf (>=65 yr) 9/30/2019    Pneumococcal, 23-valent IM/SC (adult and pt >=2yr) 2/7/2008    PNEUMOVAX 23 (Pneumococcal PPV23) 10/8/2019        Allergies:     Last Reviewed on 2/08/2020 11:44 AM by Robin Chavez    Penicillins:      Fosamax: abdominal pain  (Adverse Reaction)        Current Medications:     Last Reviewed on 2/08/2020 11:46 AM by Robin Chavez    multivitamin     Estradiol 1 mg oral tablet [Take 1 tablet(s) by mouth daily]    Fluticasone Propionate 50 mcg/actuation Intranasal Spray, Suspension    Pravastatin 20 mg oral tablet [Take 1 tablet(s) by mouth at bedtime]    clonazePAM 0.5 mg oral tablet [TAKE ONE TABLET BY MOUTH TWICE A DAY AS NEEDED FOR ANXIETY]    Losartan 50 mg oral tablet [Take 1 tablet(s) by mouth daily]    promethazine 25 mg oral tablet [Take 1/2 to 1 tablet(s) by mouth q  6 hr PRN nausea/vomiting]    levocetirizine 5 mg oral tablet [Take 1 tablet(s) by mouth each evening]    allergy shot once weekly     azelastine 137 mcg (0.1 %) Intranasal Aerosol, Spray    Omeprazole 20 mg oral capsule,delayed release (enteric coated) [Take 1 capsule(s) by mouth daily]    promethazine-DM 6.25-15 mg/5 mL oral Syrup [take 5-10 milliliters by oral route every 6 hours as needed, not to exceed 30 mL in 24 hours]    cefdinir 300 mg oral capsule [take 1 capsule (300 mg) by oral route every 12 hours]    doxycycline hyclate 100 mg oral tablet [take 1 tablet (100 mg) by oral route 2 times per day for 10 days]         Objective:        Vitals:         Historical:     2/8/2020  BP:   133/61 mm Hg ( (left arm, , sitting, );) 2/8/2020  P:   81bpm ( (left arm (BP Cuff), , sitting, );) 2/8/2020  Wt:   117.2lbs1/30/2020  Wt:   117.2lbs    Current: 2/26/2020 4:11:08 PM    Ht:  5 ft, 0.25 in;  Wt: 118.8 lbs;  BMI: 23.0T: 97.8 F (oral);  BP: 128/61 mm Hg (left arm, sitting);  P: 88 bpm (left arm (BP Cuff), sitting);  sCr: 0.93 mg/dL;  GFR: 43.98        Exams:     PHYSICAL EXAM:     GENERAL: well developed, well nourished;  no apparent distress;     EYES: PERRL, EOMI     E/N/T: EARS: external auditory canal normal;  both TMs are dull;  NOSE: normal turbinates; no sinus tenderness; OROPHARYNX: oral mucosa is normal; posterior pharynx shows no exudate;     NECK: range of motion is normal; trachea is midline;     RESPIRATORY: normal appearance and symmetric expansion of chest wall; normal respiratory rate and pattern with no distress; normal breath sounds with no rales, rhonchi, wheezes or rubs;     CARDIOVASCULAR: normal rate; rhythm is regular;     NEUROLOGIC: mental status: alert and oriented x 3; GROSSLY INTACT     PSYCHIATRIC: appropriate affect and demeanor;         Assessment:         R05   Cough           ORDERS:         Meds Prescribed:       [New Rx] Tessalon Perles 100 mg oral capsule [take 1 capsule (100 mg) by oral route every 8 hours as needed for cough], #30 (thirty) capsules, Refills: 0 (zero)         Radiology/Test Orders:       [id number]  Computed tomography, thorax; with contrast material(s)  (Send-Out)                      Plan:         Cough        RADIOLOGY:  I have ordered CT chest w/ contrast to be done today.      RECOMMENDATIONS given include: Push Fluids, Rest, Follow up if no improvement or worsening symptoms like high fevers, vomiting, weakness, or increasing shortness of air.    .      FOLLOW-UP: Chronic visit follow up           Prescriptions:       [New Rx] Tessalon Perles 100 mg oral capsule [take 1 capsule  (100 mg) by oral route every 8 hours as needed for cough], #30 (thirty) capsules, Refills: 0 (zero)           Orders:       [id number]  Computed tomography, thorax; with contrast material(s)  (Send-Out)                  Charge Capture:         Primary Diagnosis:     R05  Cough           Orders:      [id number]  Office/outpatient visit; established patient, level 3  (In-House)                  ADDENDUMS:      ____________________________________    Addendum: 02/28/2020 12:51 PM - Geraldine Baez        Change CT chest order to without contrast. AC

## 2021-05-18 NOTE — PROGRESS NOTES
Luma Cruz. 1940     Office/Outpatient Visit    Visit Date: Thu, Nov 29, 2018 03:12 pm    Provider: Joseph Walker MD (Assistant: Ankita Escobedo RN)    Location: [facility]        Electronically signed by Joseph Walker MD on  12/01/2018 08:56:43 AM                             SUBJECTIVE:        CC: blood pressure, cholesterol, anxiety, postmenopausal symptoms         HPI:         Patient presents with essential hypertension.  Her current cardiac medication regimen includes an angiotensin receptor blocker ( Cozaar ).  She is tolerating the medication well without side effects.  Compliance with treatment has been good; she takes her medication as directed and follows up as directed.          With regard to the hypercholesterolemia, current treatment includes Pravachol.  Compliance with treatment has been good; she takes her medication as directed and follows up as directed.  She denies experiencing any hypercholesterolemia related symptoms.          Luma Grey also has history of significant postmenopausal symptoms.  She says that she has been on estradiol for many years, and she does not feel she can stop it.  She is aware of the potential risks of the medication, but she feels the benefits outweigh the risks.  She does not smoke.  She is up to date on mammogram.        Luma Grey also has chronic anxiety for which she takes Klonopin at bedtime.  She has been on this for several years as well and feels it is helpful and necessary.  She denies side effects from it - no sedation or confusion.  Davi is reviewed and consistent.  She denies abuse, diversion, or doctor shopping.      ROS:     CONSTITUTIONAL:  Negative for chills and fever.      CARDIOVASCULAR:  Negative for chest pain and palpitations.      RESPIRATORY:  Negative for recent cough and dyspnea.      GASTROINTESTINAL:  Negative for abdominal pain, nausea and vomiting.          PMH/FMH/SH:     Last Reviewed on 2/20/2018 04:07 PM by  Joseph Walker    Past Medical History:                 PAST MEDICAL HISTORY             CURRENT MEDICAL PROVIDERS:    E/N/T: Dr. Chandler         PREVENTIVE HEALTH MAINTENANCE             BONE DENSITY: was last done 06/17/2010 with the following abnormality noted-- Moderate Osteopenia     COLORECTAL CANCER SCREENING: Up to date (colonoscopy q10y; sigmoidoscopy q5y; Cologuard q3y) was last done 06/2011, Results are in chart; colonoscopy with normal results     MAMMOGRAM: Done within last 2 years and results in are chart was last done 10/25/18 with normal results     PAP SMEAR: was last done 09/28/2006         Surgical History:         Biopsy of breast; benign    Hysterectomy: Complete;     Tonsillectomy/Adenoidectomy Procedures: colonoscopy 2002, NEG         Family History:         Positive for Cerebrovascular Accident ( mother ), Coronary Artery Disease ( father ) and Hypertension ( mother ).          Social History:     Occupation: Employed at [employer]     Marital Status:      Children: 2 children         Tobacco/Alcohol/Supplements:     Last Reviewed on 6/21/2018 01:47 PM by Marely Rapp    Tobacco: She has never smoked.  Non-drinker         Substance Abuse History:     Last Reviewed on 2/20/2018 04:07 PM by Joseph Walker    NEGATIVE         Mental Health History:     Last Reviewed on 2/20/2018 04:07 PM by Joseph Walker        Communicable Diseases (eg STDs):     Last Reviewed on 2/20/2018 04:07 PM by Joseph Walker            Current Problems:     Last Reviewed on 2/20/2018 04:07 PM by Joseph Walker    Vertigo     Essential hypertension     Irritable bowel syndrome     Use of high risk medications     Hypercholesterolemia     Allergic rhinitis     Post-menopausal HRT     Anxiety         Immunizations:     Td adult 12/24/2007     zzPrevnar-13 10/29/2015     Fluzone (3 + years dose) 10/28/2008     Fluzone (3 + years dose) 9/11/2009     Fluzone (3 + years  dose) 9/16/2010     Fluzone (3 + years dose) 10/20/2011     Influenza A (H1N1), IM (3+ years) Monovalent 12/26/2009     Fluzone High-Dose pf (>=65 yr) 9/25/2013     Fluzone High-Dose pf (>=65 yr) 10/16/2012     Fluzone High-Dose pf (>=65 yr) 10/1/2014     Fluzone High-Dose pf (>=65 yr) 10/14/2015     Fluzone High-Dose pf (>=65 yr) 10/19/2016     Fluzone High-Dose pf (>=65 yr) 9/28/2017     Fluzone High-Dose pf (>=65 yr) 10/11/2018     Pneumococcal, 23-valent IM/SC (adult and pt >=2yr) 2/7/2008         Allergies:     Last Reviewed on 11/29/2018 03:13 PM by Ankita Escobedo    Penicillins:    Fosamax: abdominal pain (Adverse Reaction)        Current Medications:     Last Reviewed on 11/29/2018 03:14 PM by Ankita Escobedo    Clonazepam 0.5mg Tablet Take 1 tablet(s) by mouth bid as needed for anxiety     Colestipol HCl 1gm Tablet Take 1 tablet(s) by mouth daily with plenty of water     Estradiol 1mg Tablet Take 1 tablet(s) by mouth daily     Levocetirizine Dihydrochloride 5mg Tablet Take 1 tablet(s) by mouth each evening     Losartan 50mg Tablet Take 1 tablet(s) by mouth daily     Pravastatin 20mg Tablet Take 1 tablet(s) by mouth at bedtime     Hydrocortisone 2.5% Rectal Cream Apply twice daily to anus     Nexium 40mg Capsules, Delayed Release Take 1 capsule(s) by mouth daily     allergy shot once weekly     Azelastine HCl 0.1% Nasal Spray     Fluticasone Propionate 50mcg/1actuation Nasal Spray         OBJECTIVE:        Vitals:         Current: 11/29/2018 3:16:03 PM    Ht:  5 ft, 0.25 in;  Wt: 116 lbs;  BMI: 22.5    T: 97.8 F (oral);  BP: 138/54 mm Hg (right arm, sitting);  P: 78 bpm (right arm (BP Cuff), sitting);  sCr: 0.69 mg/dL;  GFR: 60.47        Exams:     PHYSICAL EXAM:     GENERAL: vital signs recorded - well developed, well nourished;  no apparent distress;     EYES: extraocular movements intact; conjunctiva and cornea are normal; PERRLA;     E/N/T:  normal EACs, TMs, nasal/oral mucosa, teeth, gingiva, and  oropharynx;     NECK: range of motion is normal; thyroid is non-palpable;     RESPIRATORY: normal respiratory rate and pattern with no distress; normal breath sounds with no rales, rhonchi, wheezes or rubs;     CARDIOVASCULAR: normal rate; rhythm is regular;  no systolic murmur; no edema;     GASTROINTESTINAL: nontender; normal bowel sounds; no organomegaly;     LYMPHATIC: no enlargement of cervical or facial nodes; no supraclavicular nodes;     BREAST/INTEGUMENT: SKIN: no significant rashes or lesions; no suspicious moles;     PSYCHIATRIC: affect/demeanor: anxious;  normal psychomotor function;         Lab/Test Results:             Urine temperature:  confirmed (11/29/2018),     All urine drug screen levels confirmed negative:  yes (11/29/2018),     Date and time of last pill:  clonazepam 11/29/18 @ 8am/amyh (11/29/2018),     Performed by:  kg (11/29/2018),     Collection Time:  1458 (11/29/2018),             ASSESSMENT           401.1   I10  Essential hypertension              DDx:     272.0   E78.2  Hypercholesterolemia              DDx:     V07.4   Z79.890  Post-menopausal HRT              DDx:     V58.69   Z79.899  Use of high risk medications              DDx:     300.02   F41.8  Anxiety              DDx:         ORDERS:         Meds Prescribed:       Refill of: Clonazepam 0.5mg Tablet Take 1 tablet(s) by mouth bid as needed for anxiety  #90 (Ninety) tablet(s) Refills: 1       Refill of: Estradiol 1mg Tablet Take 1 tablet(s) by mouth daily  #90 (Ninety) tablet(s) Refills: 1       Refill of: Levocetirizine Dihydrochloride 5mg Tablet Take 1 tablet(s) by mouth each evening  #90 (Ninety) tablet(s) Refills: 1       Refill of: Losartan 50mg Tablet Take 1 tablet(s) by mouth daily  #90 (Ninety) tablet(s) Refills: 1       Refill of: Pravastatin 20mg Tablet Take 1 tablet(s) by mouth at bedtime  #90 (Ninety) tablet(s) Refills: 1         Radiology/Test Orders:       3014F  Screening mammography results documented and  reviewed (PV)1  (In-House)         3017F  Colorectal CA screen results documented and reviewed (PV)  (In-House)           Lab Orders:       [id number]  [facility] Comp. Metabolic Panel  (Send-Out)         84565  Drug test prsmv qual dir optical obs per day  (In-House)                   PLAN:          Essential hypertension         RECOMMENDATIONS given include: Today, we have reviewed Luma Grey's care.  I'm going to refill her medications as noted and arrange labs.  We have again reviewed the cautions with the medications - estradiol and clonazepam.  However, they are helpful for her and will be continued.  No other near term change is anticipated..  MIPS Vaccines Flu and Pneumonia updated in Shot record     MAMMOGRAM: Done within last 2 years and results in are chart     COLORECTAL CANCER SCREENING: Results are in chart           Prescriptions:       Refill of: Losartan 50mg Tablet Take 1 tablet(s) by mouth daily  #90 (Ninety) tablet(s) Refills: 1           Orders:       3014F  Screening mammography results documented and reviewed (PV)1  (In-House)         3017F  Colorectal CA screen results documented and reviewed (PV)  (In-House)             Patient Education Handouts:       [facility] Medication Compliance           Hypercholesterolemia     LABORATORY:  Labs ordered to be performed today include Comprehensive metabolic panel.            Prescriptions:       Refill of: Pravastatin 20mg Tablet Take 1 tablet(s) by mouth at bedtime  #90 (Ninety) tablet(s) Refills: 1           Orders:       [id number]  [facility] Comp. Metabolic Panel  (Send-Out)            Post-menopausal HRT           Prescriptions:       Refill of: Estradiol 1mg Tablet Take 1 tablet(s) by mouth daily  #90 (Ninety) tablet(s) Refills: 1          Use of high risk medications     LABORATORY:  Labs ordered to be performed today include Drug screen.            Orders:       [id number]  Drug test prsmv qual dir optical obs per day  (In-House)            Anxiety            Prescriptions:       Refill of: Clonazepam 0.5mg Tablet Take 1 tablet(s) by mouth bid as needed for anxiety  #90 (Ninety) tablet(s) Refills: 1             Other Prescriptions:       Refill of: Levocetirizine Dihydrochloride 5mg Tablet Take 1 tablet(s) by mouth each evening  #90 (Ninety) tablet(s) Refills: 1         CHARGE CAPTURE           **Please note: ICD descriptions below are intended for billing purposes only and may not represent clinical diagnoses**        Primary Diagnosis:         401.1 Essential hypertension            I10    Essential (primary) hypertension              Orders:          [id number]   Office/outpatient visit; established patient, level 4  (In-House)             3014F   Screening mammography results documented and reviewed (PV)1  (In-House)             3017F   Colorectal CA screen results documented and reviewed (PV)  (In-House)           272.0 Hypercholesterolemia            E78.2    Mixed hyperlipidemia    V07.4 Post-menopausal HRT            Z79.890    Hormone replacement therapy (postmenopausal)    V58.69 Use of high risk medications            Z79.899    Other long term (current) drug therapy              Orders:          [id number]   Drug test prsmv qual dir optical obs per day  (In-House)           300.02 Anxiety            F41.8    Other specified anxiety disorders

## 2021-05-18 NOTE — PROGRESS NOTES
Luma Cruz  1940     Office/Outpatient Visit    Visit Date: Fri, Mar 13, 2020 02:29 pm    Provider: Joseph Walker MD (Assistant: Ankita Escobedo RN)    Location: [facility]        Electronically signed by Joseph Walker MD on  03/14/2020 10:04:36 AM                             Subjective:        CC: bronchiectasis    HPI:       Luma Zapata is in today for follow up on bronchiectasis.  Please see her last visit.  However, she has been dealing with this produtive cough now for the last 2 months.  She initially had a more typical bronchitis.  However, at some point, she started having a more productive cough.  She did have sinus X-ray last week that showed an acute L maxillary sinusitis.  She is not having fever, but she continues to have productive cough.  She was treated with Levaquin after the last visit 3 days ago.  She has not seen much change in regard to sputum production or breathing.  She is using the nebulizer regularly at this time.  She denies significant prior breathing related issues.  She did have CT scan of the chest about 10 days ago that confirmed bronchiectasis.  She has had two previous courses of antibiotics as noted on the last visit prior to the Levaquin.    ROS:     CONSTITUTIONAL:  Negative for chills and fever.      CARDIOVASCULAR:  Negative for chest pain and palpitations.      RESPIRATORY:  Positive for chronic cough.   Negative for recent cough or dyspnea.      GASTROINTESTINAL:  Negative for abdominal pain, nausea and vomiting.      INTEGUMENTARY/BREAST:  Negative for atypical mole(s) and rash.          Past Medical History / Family History / Social History:         Last Reviewed on 3/10/2020 01:06 PM by Joseph Walker    Past Medical History:                 PAST MEDICAL HISTORY             CURRENT MEDICAL PROVIDERS:    E/N/T: Dr. Chandler             ADVANCE DIRECTIVES: Living will - She says that her daughter, Carla, would make decisions for her if  needed.          PREVENTIVE HEALTH MAINTENANCE             BONE DENSITY: was last done 04/2019 with the following abnormality noted-- Osteopenia     COLORECTAL CANCER SCREENING: Up to date (colonoscopy q10y; sigmoidoscopy q5y; Cologuard q3y) was last done 06/2011, Results are in chart; colonoscopy with normal results     MAMMOGRAM: Done within last 2 years and results in are chart was last done 10/31/19 which was abnormal     PAP SMEAR: was last done 09/28/2006         Surgical History:         Biopsy of breast; benign    Hysterectomy: Complete;     Tonsillectomy/Adenoidectomy Procedures: colonoscopy 2002, NEG         Family History:         Positive for Cerebrovascular Accident ( mother ), Coronary Artery Disease ( father ) and Hypertension ( mother ).          Social History:     Occupation: Employed at [employer]     Marital Status:      Children: 2 children         Tobacco/Alcohol/Supplements:     Last Reviewed on 3/10/2020 01:06 PM by Joseph Walker    Tobacco: She has never smoked.  Non-drinker         Substance Abuse History:     Last Reviewed on 3/10/2020 01:06 PM by Joseph Walker    NEGATIVE         Mental Health History:     Last Reviewed on 3/10/2020 01:06 PM by Joseph Walker        Communicable Diseases (eg STDs):     Last Reviewed on 3/10/2020 01:06 PM by Joseph Walker        Current Problems:     Last Reviewed on 3/10/2020 01:06 PM by Joseph Walker    Allergic rhinitis, unspecified    Other specified anxiety disorders    Hormone replacement therapy (postmenopausal)    Mixed hyperlipidemia    Other long term (current) drug therapy    Essential (primary) hypertension    Dizziness and giddiness    Zoster without complications    Gastro-esophageal reflux disease without esophagitis    Pain in right hand    Pain in left hand    Other fatigue    Encounter for screening for depression    Acute bronchitis, unspecified    Cough    Pneumonia,  unspecified organism    Headache        Immunizations:     Td adult 12/24/2007    Hep A, adult dose 1/1/2019    Hep A, adult dose 6/12/2019    zzPrevnar-13 10/29/2015    Fluzone (3 + years dose) 10/28/2008    Fluzone (3 + years dose) 9/11/2009    Fluzone (3 + years dose) 9/16/2010    Fluzone (3 + years dose) 10/20/2011    Influenza A (H1N1), IM (3+ years) Monovalent 12/26/2009    Fluzone High-Dose pf (>=65 yr) 9/25/2013    Fluzone High-Dose pf (>=65 yr) 10/16/2012    Fluzone High-Dose pf (>=65 yr) 10/1/2014    Fluzone High-Dose pf (>=65 yr) 10/14/2015    Fluzone High-Dose pf (>=65 yr) 10/19/2016    Fluzone High-Dose pf (>=65 yr) 9/28/2017    Fluzone High-Dose pf (>=65 yr) 10/11/2018    Fluzone High-Dose pf (>=65 yr) 9/30/2019    Pneumococcal, 23-valent IM/SC (adult and pt >=2yr) 2/7/2008    PNEUMOVAX 23 (Pneumococcal PPV23) 10/8/2019        Allergies:     Last Reviewed on 3/10/2020 01:06 PM by Joseph Walker    Penicillins:      Fosamax: abdominal pain  (Adverse Reaction)        Current Medications:     Last Reviewed on 3/10/2020 01:06 PM by Joseph Walker    multivitamin     Estradiol 1 mg oral tablet [Take 1 tablet(s) by mouth daily]    Fluticasone Propionate 50 mcg/actuation Intranasal Spray, Suspension    Pravastatin 20 mg oral tablet [Take 1 tablet(s) by mouth at bedtime]    clonazePAM 0.5 mg oral tablet [TAKE ONE TABLET BY MOUTH TWICE A DAY AS NEEDED FOR ANXIETY]    Losartan 50 mg oral tablet [Take 1 tablet(s) by mouth daily]    levocetirizine 5 mg oral tablet [Take 1 tablet(s) by mouth each evening]    allergy shot once weekly     azelastine 137 mcg (0.1 %) Intranasal Aerosol, Spray    Omeprazole 20 mg oral capsule,delayed release (enteric coated) [Take 1 capsule(s) by mouth daily]    Tessalon Perles 100 mg oral capsule [take 1 capsule (100 mg) by oral route every 8 hours as needed for cough]    levoFLOXacin 500 mg oral tablet [take 1 tablet (500 mg) by oral route every 24 hours]         Objective:        Vitals:         Current: 3/13/2020 2:33:52 PM    Ht:  5 ft, 0.25 in;  Wt: 117.6 lbs;  BMI: 22.8T: 97.9 F (oral);  BP: 147/72 mm Hg (left arm, sitting);  P: 103 bpm (left arm (BP Cuff), sitting);  sCr: 0.76 mg/dL;  GFR: 53.59        Repeat:     3:6:18 PM  BP:   140/77mm Hg (right arm, sitting, pulse-95)     Exams:     PHYSICAL EXAM:     GENERAL: vital signs recorded - well developed, well nourished;  no apparent distress;     EYES: extraocular movements intact; conjunctiva and cornea are normal; PERRL;     E/N/T: EARS:  normal external auditory canals and tympanic membranes;  grossly normal hearing; NOSE: bilateral maxillary sinus tenderness present; OROPHARYNX:  normal mucosa, dentition, gingiva, and posterior pharynx;     NECK: range of motion is normal; thyroid is non-palpable;     RESPIRATORY: normal respiratory rate and pattern with no distress; rales heard throughout; these are less prominent that they were on the last exam    CARDIOVASCULAR: normal rate; rhythm is regular;  no systolic murmur; no edema;     GASTROINTESTINAL: nontender; normal bowel sounds; no organomegaly;     LYMPHATIC: no enlargement of cervical or facial nodes; no supraclavicular nodes;     BREAST/INTEGUMENT: SKIN: no significant rashes or lesions; no suspicious moles;     NEUROLOGIC: mental status: alert and oriented x 3; cranial nerves II-XII grossly intact;     PSYCHIATRIC: appropriate affect and demeanor; normal psychomotor function;         Assessment:         J47.0   Bronchiectasis with acute lower respiratory infection           ORDERS:         Lab Orders:       [id number]  SPUTC - [facility] Sputum Culture  (Send-Out)            [id number]  AFBCS - [facility] Culture, tubercle or other acid-fast bacilli any source  (Send-Out)            [id number]  FUNCS - [facility] Fungal curlture with smear  (Send-Out)            [id number]  GRAM - [facility] GRAM STAIN  (Send-Out)              Procedures Ordered:       REFER  Referral to Specialist or Other Facility   (Send-Out)                      Plan:         Bronchiectasis with acute lower respiratory infection    LABORATORY:  Labs ordered to be performed today include fungal, gram stain, and sputum culture.      REFERRALS:  Referral initiated to a pulmonologist ( Dr. Enmanuel Forbes, , [facility] ).      RECOMMENDATIONS given include: She remains concerned about how she is feeling.  I am encouraged that the sinuses are feeling a little better.  She also seems to be moving air better.  We will ask her to continue the levofloxacin pending these results.  We will also see if we can help move up the pulmonary referral.  She ha not really had these kinds of respiratory issues previously and is rightly concerned.  We had some discussion about the need to be appropriately cautious during this present coronavirus outbreak.  We will follow closely.  We will tentatively have her return to work on 3/30/20..            Orders:       [id number]  SPUTC - [facility] Sputum Culture  (Send-Out)            [id number]  AFBCS - [facility] Culture, tubercle or other acid-fast bacilli any source  (Send-Out)            [id number]  FUNCS - [facility] Fungal curlture with smear  (Send-Out)            [id number]  GRAM - [facility] GRAM STAIN  (Send-Out)            REFER  Referral to Specialist or Other Facility  (Send-Out)                  Charge Capture:         Primary Diagnosis:     J47.0  Bronchiectasis with acute lower respiratory infection           Orders:      [id number]  Office/outpatient visit; established patient, level 4  (In-House)

## 2021-05-18 NOTE — PROGRESS NOTES
Luma Cruz. 1940     Office/Outpatient Visit    Visit Date: Thu, Jan 18, 2018 11:00 am    Provider: Joseph Walker MD (Assistant: Luz Maria Samson MA)    Location: [facility]        Electronically signed by Joseph Walker MD on  01/19/2018 05:29:30 AM                             SUBJECTIVE:        CC: vertigo         HPI:     Luma Grey is in today for follow up on presumed vertigo.  She says that this developed about 3 weeks ago.  She says that she felt a bit dizzy on a Friday morning.  However, she went to bed that night and 'everything went round and round and round'.  The same thing happened the next morning.  She says that this is more of a problem at night when she lays back.  She finds that it is better when she gets up.  It is very much a spinning sensation.  She denies difficulty with speech or swallowing.  She has had some nausea.  She denies lateralizing weakness.  She was seen here and was prescribed some meclizine that did not help that much.         Hypercholesterolemia details; current treatment includes Pravachol.  Compliance with treatment has been good; she takes her medication as directed and follows up as directed.  She denies experiencing any hypercholesterolemia related symptoms.          Additionally, she presents with history of essential hypertension.  her current cardiac medication regimen includes an angiotensin receptor blocker ( Cozaar ).  She is tolerating the medication well without side effects.  Compliance with treatment has been good; she takes her medication as directed and follows up as directed.          She also has anxiety for which she takes Klonopin at bedtime.  She has been on this for several years and feels it is helpful and necessary.  She denies side effects from it - no sedation or confusion.  Davi is okay.  She denies abuse, diversion, or doctor shopping.     ROS:     CONSTITUTIONAL:  Negative for chills and fever.      CARDIOVASCULAR:  Negative  for chest pain and palpitations.      RESPIRATORY:  Negative for recent cough and dyspnea.      GASTROINTESTINAL:  Positive for nausea.   Negative for abdominal pain or vomiting.          PMH/FMH/SH:     Last Reviewed on 1/18/2018 11:11 AM by Joseph Walker    Past Medical History:                 PAST MEDICAL HISTORY             CURRENT MEDICAL PROVIDERS:    E/N/T: Dr. Chandler         PREVENTIVE HEALTH MAINTENANCE             BONE DENSITY: was last done 06/17/2010 with the following abnormality noted-- Moderate Osteopenia     COLORECTAL CANCER SCREENING: Up to date (colonoscopy q10y; sigmoidoscopy q5y; Cologuard q3y) was last done 06/2011, Results are in chart; colonoscopy with normal results     MAMMOGRAM: Done within last 2 years and results in are chart was last done 10/10/17 with normal results     PAP SMEAR: was last done 09/28/2006         Surgical History:         Biopsy of breast; benign    Hysterectomy: Complete;     Tonsillectomy/Adenoidectomy Procedures: colonoscopy 2002, NEG         Family History:         Positive for Cerebrovascular Accident ( mother ), Coronary Artery Disease ( father ) and Hypertension ( mother ).          Social History:     Occupation: Employed at [employer] [place]     Marital Status:      Children: 2 children         Tobacco/Alcohol/Supplements:     Last Reviewed on 1/18/2018 11:11 AM by Joseph Walker    Tobacco: She has never smoked.  Non-drinker         Substance Abuse History:     Last Reviewed on 1/18/2018 11:11 AM by Joseph Walker    NEGATIVE         Mental Health History:     Last Reviewed on 1/18/2018 11:11 AM by Joseph Walker        Communicable Diseases (eg STDs):     Last Reviewed on 1/18/2018 11:11 AM by Joseph Walker            Current Problems:     Last Reviewed on 1/18/2018 11:11 AM by Joseph Walker    Vertigo     Essential hypertension     Irritable bowel syndrome     Use of high risk medications      Hypercholesterolemia     Allergic rhinitis     Post-menopausal HRT     Anxiety         Immunizations:     Td adult 12/24/2007     Prevnar-13 10/29/2015     Fluzone (3 + years dose) 10/28/2008     Fluzone (3 + years dose) 9/11/2009     Fluzone (3 + years dose) 9/16/2010     Fluzone (3 + years dose) 10/20/2011     Influenza A (H1N1), IM (3+ years) Monovalent 12/26/2009     Fluzone High-Dose pf (>=65 yr) 9/25/2013     Fluzone High-Dose pf (>=65 yr) 10/16/2012     Fluzone High-Dose pf (>=65 yr) 10/1/2014     Fluzone High-Dose pf (>=65 yr) 10/14/2015     Fluzone High-Dose pf (>=65 yr) 10/19/2016     Fluzone High-Dose pf (>=65 yr) 9/28/2017     Pneumococcal, 23-valent IM/SC (adult and pt >=2yr) 2/7/2008         Allergies:     Last Reviewed on 1/18/2018 11:11 AM by Joseph Walker    Penicillins:    Fosamax: abdominal pain (Adverse Reaction)        Current Medications:     Last Reviewed on 1/18/2018 11:11 AM by Joseph Walker    Clonazepam 0.5mg Tablet Take 1 tablet(s) by mouth bid as needed for anxiety     Colestipol HCl 1gm Tablet Take 1 tablet(s) by mouth daily with plenty of water     Estradiol 1mg Tablet Take 1 tablet(s) by mouth daily     Levocetirizine Dihydrochloride 5mg Tablet Take 1 tablet(s) by mouth each evening     Losartan 50mg Tablet Take 1 tablet(s) by mouth daily     Pravastatin 20mg Tablet Take 1 tablet(s) by mouth at bedtime     Hydrocortisone 2.5% Rectal Cream Apply twice daily to anus     Nexium 40mg Capsules, Delayed Release Take 1 capsule(s) by mouth daily     Meclizine HCl 25mg Tablet Take 1 to 2 tabs twice daily as needed for dizziness.     allergy shots once weekly     Azelastine HCl 0.1% Nasal Spray     Fluticasone Propionate 50mcg/1actuation Nasal Spray         OBJECTIVE:        Vitals:         Current: 1/18/2018 11:02:57 AM    Ht:  5 ft, 0.25 in;  Wt: 117.7 lbs;  BMI: 22.8    T: 97.2 F (oral);  BP: 144/73 mm Hg (left arm, standing);  P: 80 bpm (left arm (BP Cuff), standing);   sCr: 0.76 mg/dL;  GFR: 55.24        Exams:     PHYSICAL EXAM:     GENERAL: vital signs recorded - well developed, well nourished;  no apparent distress;     EYES: extraocular movements intact; conjunctiva and cornea are normal; PERRLA;     E/N/T:  normal EACs, TMs, nasal/oral mucosa, teeth, gingiva, and oropharynx;     NECK: range of motion is normal; thyroid is non-palpable;     RESPIRATORY: normal respiratory rate and pattern with no distress; normal breath sounds with no rales, rhonchi, wheezes or rubs;     CARDIOVASCULAR: normal rate; rhythm is regular;  no systolic murmur; no edema;     GASTROINTESTINAL: nontender; normal bowel sounds; no organomegaly;     BREAST/INTEGUMENT: SKIN: no significant rashes or lesions; no suspicious moles;     NEUROLOGIC: mental status: alert and oriented x 3; cranial nerves II-XII grossly intact; there is a very strong response to [place] Hallpike maneuver;         ASSESSMENT           780.4   R42  Vertigo              DDx:     272.0   E78.4  Hypercholesterolemia              DDx:     V07.4   Z79.890  Post-menopausal HRT              DDx:     401.1   I10  Essential hypertension              DDx:     V58.69   Z79.899  Use of high risk medications              DDx:     300.02   F41.8  Anxiety              DDx:         ORDERS:         Lab Orders:       [id number]  HTN - [facility] CMP AND LIPID: [id number]  (Send-Out)                   PLAN:          Vertigo         RECOMMENDATIONS given include: Luma Grey has a very classic positional vertigo.  We will arrange some follow up labs as a precaution, but there is not any finding to suggest something ominous going on.  We will ask her to do some Epley maneuvers for now.  If this does not correct, then consider ENT evaluation as a precaution.  Her medications are reviewed and will be filled when needed including Klonopin.  Black box warnings are reviewed..            Patient Education Handouts:       Benign Paroxysmal Positional Vertigo (Bppv)            Hypercholesterolemia     LABORATORY:  Labs ordered to be performed today include HTN/Lipid Panel: CMP, Lipid.            Orders:       [id number]  HTN - [facility] CMP AND LIPID: [id number]  (Send-Out)            Post-menopausal HRT As above.          Essential hypertension As above.          Use of high risk medications As above.          Anxiety As above.             CHARGE CAPTURE           **Please note: ICD descriptions below are intended for billing purposes only and may not represent clinical diagnoses**        Primary Diagnosis:         780.4 Vertigo            R42    Dizziness and giddiness              Orders:          [id number]   Office/outpatient visit; established patient, level 4  (In-House)           272.0 Hypercholesterolemia            E78.4    Other hyperlipidemia    V07.4 Post-menopausal HRT            Z79.890    Hormone replacement therapy (postmenopausal)    401.1 Essential hypertension            I10    Essential (primary) hypertension    V58.69 Use of high risk medications            Z79.899    Other long term (current) drug therapy    300.02 Anxiety            F41.8    Other specified anxiety disorders

## 2021-05-18 NOTE — PROGRESS NOTES
Luma Cruz. 1940     Office/Outpatient Visit    Visit Date: Thu, Jun 21, 2018 01:45 pm    Provider: Joseph Walker MD (Assistant: Marely Rapp MA)    Location: [facility]        Electronically signed by Joseph Walker MD on  06/21/2018 02:15:29 PM                             SUBJECTIVE:        CC: blood pressure, cholesterol, anxiety         HPI:         Patient presents with hypercholesterolemia.  Current treatment includes Pravachol.  Compliance with treatment has been good; she takes her medication as directed and follows up as directed.  She denies experiencing any hypercholesterolemia related symptoms.          Luma Grey also has history of significant postmenopausal symptoms.  She says that she has been on estradiol for some time, and she does not feel she can stop it.  She is aware of the potential risks of the medication, but she feels the benefits outweigh the risks.  She does not smoke.         With regard to the essential hypertension, her current cardiac medication regimen includes an angiotensin receptor blocker ( Cozaar ).  She is tolerating the medication well without side effects.  Compliance with treatment has been good; she takes her medication as directed and follows up as directed.          Luma Grey is also in for anxiety for which she takes Klonopin at bedtime.  She has been on this for several years and feels it is helpful and necessary.  She denies side effects from it - no sedation or confusion.  Davi is okay.  She denies abuse, diversion, or doctor shopping.      ROS:     CONSTITUTIONAL:  Negative for chills and fever.      CARDIOVASCULAR:  Negative for chest pain and palpitations.      RESPIRATORY:  Negative for recent cough and dyspnea.      GASTROINTESTINAL:  Negative for abdominal pain, nausea and vomiting.          PM/FM/:     Last Reviewed on 2/20/2018 04:07 PM by Joseph Walker    Past Medical History:                 PAST MEDICAL HISTORY              CURRENT MEDICAL PROVIDERS:    E/N/T: Dr. Chandler         PREVENTIVE HEALTH MAINTENANCE             BONE DENSITY: was last done 06/17/2010 with the following abnormality noted-- Moderate Osteopenia     COLORECTAL CANCER SCREENING: Up to date (colonoscopy q10y; sigmoidoscopy q5y; Cologuard q3y) was last done 06/2011, Results are in chart; colonoscopy with normal results     MAMMOGRAM: Done within last 2 years and results in are chart was last done 10/10/17 with normal results     PAP SMEAR: was last done 09/28/2006         Surgical History:         Biopsy of breast; benign    Hysterectomy: Complete;     Tonsillectomy/Adenoidectomy Procedures: colonoscopy 2002, NEG         Family History:         Positive for Cerebrovascular Accident ( mother ), Coronary Artery Disease ( father ) and Hypertension ( mother ).          Social History:     Occupation: Employed at [employer]     Marital Status:      Children: 2 children         Tobacco/Alcohol/Supplements:     Last Reviewed on 2/20/2018 04:07 PM by Joseph Walker    Tobacco: She has never smoked.  Non-drinker         Substance Abuse History:     Last Reviewed on 2/20/2018 04:07 PM by Joseph Walker    NEGATIVE         Mental Health History:     Last Reviewed on 2/20/2018 04:07 PM by Joseph Walker        Communicable Diseases (eg STDs):     Last Reviewed on 2/20/2018 04:07 PM by Joseph Walker            Current Problems:     Last Reviewed on 2/20/2018 04:07 PM by Joseph Walker    Vertigo     Essential hypertension     Irritable bowel syndrome     Use of high risk medications     Hypercholesterolemia     Allergic rhinitis     Post-menopausal HRT     Anxiety         Immunizations:     Td adult 12/24/2007     zzPrevnar-13 10/29/2015     Fluzone (3 + years dose) 10/28/2008     Fluzone (3 + years dose) 9/11/2009     Fluzone (3 + years dose) 9/16/2010     Fluzone (3 + years dose) 10/20/2011     Influenza A (H1N1), IM  (3+ years) Monovalent 12/26/2009     Fluzone High-Dose pf (>=65 yr) 9/25/2013     Fluzone High-Dose pf (>=65 yr) 10/16/2012     Fluzone High-Dose pf (>=65 yr) 10/1/2014     Fluzone High-Dose pf (>=65 yr) 10/14/2015     Fluzone High-Dose pf (>=65 yr) 10/19/2016     Fluzone High-Dose pf (>=65 yr) 9/28/2017     Pneumococcal, 23-valent IM/SC (adult and pt >=2yr) 2/7/2008         Allergies:     Last Reviewed on 2/20/2018 04:07 PM by Joseph Walker    Penicillins:    Fosamax: abdominal pain (Adverse Reaction)        Current Medications:     Last Reviewed on 2/20/2018 04:07 PM by Joseph Walker    Clonazepam 0.5mg Tablet Take 1 tablet(s) by mouth bid as needed for anxiety     Pravastatin 20mg Tablet Take 1 tablet(s) by mouth at bedtime     Levocetirizine Dihydrochloride 5mg Tablet Take 1 tablet(s) by mouth each evening     Colestipol HCl 1gm Tablet Take 1 tablet(s) by mouth daily with plenty of water     Estradiol 1mg Tablet Take 1 tablet(s) by mouth daily     Losartan 50mg Tablet Take 1 tablet(s) by mouth daily     Hydrocortisone 2.5% Rectal Cream Apply twice daily to anus     Nexium 40mg Capsules, Delayed Release Take 1 capsule(s) by mouth daily     allergy shot once weekly     Azelastine HCl 0.1% Nasal Spray     Fluticasone Propionate 50mcg/1actuation Nasal Spray         OBJECTIVE:        Vitals:         Current: 6/21/2018 1:46:46 PM    Ht:  5 ft, 0.25 in;  Wt: 115.5 lbs;  BMI: 22.4    T: 99 F (oral);  BP: 137/77 mm Hg (left arm, standing);  P: 76 bpm (left arm (BP Cuff), standing);  sCr: 0.72 mg/dL;  GFR: 57.85        ASSESSMENT           272.0   E78.4  Hypercholesterolemia              DDx:     V07.4   Z79.890  Post-menopausal HRT              DDx:     401.1   I10  Essential hypertension              DDx:     V58.69   Z79.899  Use of high risk medications              DDx:     300.02   F41.8  Anxiety              DDx:     786.6   D38.1  Lung mass              DDx:         ORDERS:         Meds  Prescribed:       Refill of: Clonazepam 0.5mg Tablet Take 1 tablet(s) by mouth bid as needed for anxiety  #90 (Ninety) tablet(s) Refills: 0       Refill of: Pravastatin 20mg Tablet Take 1 tablet(s) by mouth at bedtime  #90 (Ninety) tablet(s) Refills: 1       Refill of: Levocetirizine Dihydrochloride 5mg Tablet Take 1 tablet(s) by mouth each evening  #90 (Ninety) tablet(s) Refills: 1       Refill of: Colestipol HCl 1gm Tablet Take 1 tablet(s) by mouth daily with plenty of water  #90 (Ninety) tablet(s) Refills: 1       Refill of: Estradiol 1mg Tablet Take 1 tablet(s) by mouth daily  #90 (Ninety) tablet(s) Refills: 1       Refill of: Losartan 50mg Tablet Take 1 tablet(s) by mouth daily  #90 (Ninety) tablet(s) Refills: 1         Radiology/Test Orders:       [id number]  Computed tomography, thorax; with contrast material(s)  (Send-Out)           Lab Orders:       [id number]  HTN - [facility] CMP AND LIPID: [id number], [id number]  (Send-Out)                   PLAN:          Hypercholesterolemia     LABORATORY:  Labs ordered to be performed today include HTN/Lipid Panel: CMP, Lipid.      RECOMMENDATIONS given include: Today, we have reviewed Luma Grey's history.  I'm going to refill meds and arrange labs as noted.  She did ask for 90 day supply on Klonopin, and I think that is reasonable.  We will get follow up CT chest given findings from CT abdomen in the ER in February..            Prescriptions:       Refill of: Pravastatin 20mg Tablet Take 1 tablet(s) by mouth at bedtime  #90 (Ninety) tablet(s) Refills: 1           Orders:       [id number]  HTN - [facility] CMP AND LIPID: [id number], [id number]  (Send-Out)             Patient Education Handouts:       [facility] [place]           Post-menopausal HRT           Prescriptions:       Refill of: Estradiol 1mg Tablet Take 1 tablet(s) by mouth daily  #90 (Ninety) tablet(s) Refills: 1          Essential hypertension           Prescriptions:       Refill of: Losartan 50mg Tablet Take 1 tablet(s) by mouth daily   #90 (Ninety) tablet(s) Refills: 1          Use of high risk medications As above.          Anxiety           Prescriptions:       Refill of: Clonazepam 0.5mg Tablet Take 1 tablet(s) by mouth bid as needed for anxiety  #90 (Ninety) tablet(s) Refills: 0          Lung mass         RADIOLOGY:  I have ordered a chest CT to be done today.            Orders:       [id number]  Computed tomography, thorax; with contrast material(s)  (Send-Out)               Other Prescriptions:       Refill of: Levocetirizine Dihydrochloride 5mg Tablet Take 1 tablet(s) by mouth each evening  #90 (Ninety) tablet(s) Refills: 1       Refill of: Colestipol HCl 1gm Tablet Take 1 tablet(s) by mouth daily with plenty of water  #90 (Ninety) tablet(s) Refills: 1         CHARGE CAPTURE           **Please note: ICD descriptions below are intended for billing purposes only and may not represent clinical diagnoses**        Primary Diagnosis:         272.0 Hypercholesterolemia            E78.4    Other hyperlipidemia              Orders:          [id number]   Office/outpatient visit; established patient, level 4  (In-House)           V07.4 Post-menopausal HRT            Z79.890    Hormone replacement therapy (postmenopausal)    401.1 Essential hypertension            I10    Essential (primary) hypertension    V58.69 Use of high risk medications            Z79.899    Other long term (current) drug therapy    300.02 Anxiety            F41.8    Other specified anxiety disorders    786.6 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

## 2021-05-18 NOTE — PROGRESS NOTES
Luma Cruz  1940     Office/Outpatient Visit    Visit Date: Mon, Jan 6, 2020 09:53 am    Provider: Tamika Sabillon MD (Assistant: Liliya Leonard MA)    Location: [facility]        Electronically signed by Tamika Sabillon MD on  01/09/2020 02:42:23 PM                             Subjective:        CC: URI symptoms    HPI:           Acute nasopharyngitis [common cold] details; these have been present for the past 5 days.  The symptoms include productive, deep cough, ear pain,  headache, nasal congestion and colored-yellow and thick sputum production.  She denies body aches, Chills, dizziness, fever or wheezing.  She denies exposure to ill contacts.  She has already tried to relieve the symptoms with viccs and flonase NS and antihistamine allergy spray.  Medical history is significant for allergies.      ROS:     CONSTITUTIONAL:  Negative for chills and fever.      EYES:  Negative for blurred vision and eye pain.      CARDIOVASCULAR:  Negative for chest pain and palpitations.      RESPIRATORY:  Positive for recent cough.   Negative for dyspnea, pleuritic chest pain or frequent wheezing.      GASTROINTESTINAL:  Negative for abdominal pain, heartburn, constipation, diarrhea, and stool changes.      INTEGUMENTARY/BREAST:  Negative for atypical mole(s) and rash.          Past Medical History / Family History / Social History:         Last Reviewed on 1/06/2020 10:16 AM by Tamika Sabillon    Past Medical History:                 PAST MEDICAL HISTORY             CURRENT MEDICAL PROVIDERS:    E/N/T: Dr. Chandler             ADVANCE DIRECTIVES: Living will - She says that her daughter, Carla, would make decisions for her if needed.          PREVENTIVE HEALTH MAINTENANCE             BONE DENSITY: was last done 04/2019 with the following abnormality noted-- Osteopenia     COLORECTAL CANCER SCREENING: Up to date (colonoscopy q10y; sigmoidoscopy q5y; Cologuard q3y) was last done 06/2011, Results are  in chart; colonoscopy with normal results     MAMMOGRAM: Done within last 2 years and results in are chart was last done 10/31/19 which was abnormal     PAP SMEAR: was last done 09/28/2006         Surgical History:         Biopsy of breast; benign    Hysterectomy: Complete;     Tonsillectomy/Adenoidectomy Procedures: colonoscopy 2002, NEG         Family History:         Positive for Cerebrovascular Accident ( mother ), Coronary Artery Disease ( father ) and Hypertension ( mother ).          Social History:     Occupation: Employed at [employer]     Marital Status:      Children: 2 children         Tobacco/Alcohol/Supplements:     Last Reviewed on 1/06/2020 09:58 AM by Liliya Lenoard    Tobacco: She has never smoked.  Non-drinker         Substance Abuse History:     Last Reviewed on 9/17/2019 01:44 PM by Joseph Walker    NEGATIVE         Mental Health History:     Last Reviewed on 9/17/2019 01:44 PM by Joseph Walker        Communicable Diseases (eg STDs):     Last Reviewed on 9/17/2019 01:44 PM by Joseph Walker        Allergies:     Last Reviewed on 10/08/2019 09:44 AM by Neeru Henry    Penicillins:      Fosamax: abdominal pain  (Adverse Reaction)        Current Medications:     Last Reviewed on 1/06/2020 10:00 AM by Liliya Leonard    Estradiol 1 mg oral tablet [Take 1 tablet(s) by mouth daily]    Fluticasone Propionate 50 mcg/actuation Intranasal Spray, Suspension    Pravastatin 20 mg oral tablet [Take 1 tablet(s) by mouth at bedtime]    clonazePAM 0.5 mg oral tablet [TAKE ONE TABLET BY MOUTH TWICE A DAY AS NEEDED FOR ANXIETY]    Losartan 50 mg oral tablet [Take 1 tablet(s) by mouth daily]    promethazine 25 mg oral tablet [Take 1/2 to 1 tablet(s) by mouth q  6 hr PRN nausea/vomiting]    levocetirizine 5 mg oral tablet [Take 1 tablet(s) by mouth each evening]    azelastine 137 mcg (0.1 %) Intranasal Aerosol, Spray    allergy shot once weekly     Omeprazole 20 mg  oral capsule,delayed release (enteric coated) [Take 1 capsule(s) by mouth daily]        Objective:        Vitals:         Current: 1/6/2020 10:02:24 AM    Ht:  5 ft, 0.25 in;  Wt: 116 lbs;  BMI: 22.5T: 98.3 F (oral);  BP: 148/67 mm Hg (right arm, sitting);  P: 77 bpm (right arm (BP Cuff), sitting);  sCr: 0.93 mg/dL;  GFR: 43.54O2 Sat: 98 % (room air)        Exams:     PHYSICAL EXAM:     GENERAL: vital signs recorded - well developed, well nourished;  no apparent distress;     EYES: extraocular movements intact; conjunctiva and cornea are normal; PERRL;     E/N/T: EARS:  normal external auditory canals and tympanic membranes;  grossly normal hearing; NOSE: nasal mucosa is partially obscured by clear drainage, edematous, and erythematous;  no sinus tenderness; OROPHARYNX:  normal mucosa, dentition, gingiva, and posterior pharynx;     RESPIRATORY: normal respiratory rate and pattern with no distress; normal breath sounds with no rales, rhonchi, wheezes or rubs;     CARDIOVASCULAR: normal rate; rhythm is regular;  no systolic murmur; no edema;     GASTROINTESTINAL: nontender; normal bowel sounds; no organomegaly;     NEUROLOGICAL:  cranial nerves, motor and sensory function, reflexes, gait and coordination are all intact;     PSYCHIATRIC: appropriate affect and demeanor; normal psychomotor function;         Assessment:         Z13.31   Encounter for screening for depression       J20.9   Acute bronchitis, unspecified           ORDERS:         Meds Prescribed:       [Recorded] promethazine-DM 6.25-15 mg/5 mL oral Syrup [take 5-10 milliliters by oral route every 6 hours as needed, not to exceed 30 mL in 24 hours]       [Refilled] promethazine-DM 6.25-15 mg/5 mL oral Syrup [take 5-10 milliliters by oral route every 6 hours as needed, not to exceed 30 mL in 24 hours], #180 (one hundred and eighty) milliliters, Refills: 0 (zero)         Other Orders:         Depression screen negative  (In-House)            1101F  Pt  screen for fall risk; document no falls in past year or only 1 fall w/o injury in past year (ALEX)  (In-House)                      Plan:         Encounter for screening for depression    MIPS PHQ-9 Depression Screening: Completed form scanned and in chart; Total Score 1; Negative Depression Screen           Orders:         Depression screen negative  (In-House)            1101F  Pt screen for fall risk; document no falls in past year or only 1 fall w/o injury in past year (ALEX)  (In-House)              Acute bronchitis, unspecified        RECOMMENDATIONS given include: Push Fluids, Rest, Follow up if no improvement or worsening symptoms like high fevers, vomiting, weakness, or increasing shortness of air.    .            Prescriptions:       [Recorded] promethazine-DM 6.25-15 mg/5 mL oral Syrup [take 5-10 milliliters by oral route every 6 hours as needed, not to exceed 30 mL in 24 hours]       [Refilled] promethazine-DM 6.25-15 mg/5 mL oral Syrup [take 5-10 milliliters by oral route every 6 hours as needed, not to exceed 30 mL in 24 hours], #180 (one hundred and eighty) milliliters, Refills: 0 (zero)             Charge Capture:         Primary Diagnosis:     Z13.31  Encounter for screening for depression           Orders:      [id number]  Office/outpatient visit; established patient, level 3  (In-House)              Depression screen negative  (In-House)            1101F  Pt screen for fall risk; document no falls in past year or only 1 fall w/o injury in past year (ALEX)  (In-House)              J20.9  Acute bronchitis, unspecified

## 2021-05-18 NOTE — PROGRESS NOTES
Luma Cruz  1940     Office/Outpatient Visit    Visit Date: Mon, May 4, 2020 08:54 am    Provider: Joseph Walker MD (Assistant: Matilda Huffman, )    Location: [facility]        Electronically signed by Joseph Walker MD on  05/05/2020 01:25:27 PM                             Subjective:        CC: 'it's my left arm'        TELEMEDICINE VISIT:    - Luma Grey consented to this telemedicine visit.    - Persons present during the telemedicine consultation include:  Luma Grey - patient    - This visit is being conducted over FaceTime with audio and video.    HPI:       Luma Grey has been having issues with her left arm for the last 3-4 weeks.  She is not aware of a specific injury that may have caused this.  She does use her purse on that shoulder and it is sometimes kind of heavy.  She has some difficulty with sleeping on this.  She says that this will wake her up. She is currently using ibuprofen and Biofreeze for this.  The pain is worse with range of motion.    ROS:     CONSTITUTIONAL:  Negative for chills and fever.      CARDIOVASCULAR:  Negative for chest pain and palpitations.      RESPIRATORY:  Negative for recent cough and dyspnea.      GASTROINTESTINAL:  Negative for abdominal pain, nausea and vomiting.          Past Medical History / Family History / Social History:         Last Reviewed on 3/10/2020 01:06 PM by Joseph Walker    Past Medical History:                 PAST MEDICAL HISTORY             CURRENT MEDICAL PROVIDERS:    E/N/T: Dr. Chandler             ADVANCE DIRECTIVES: Living will - She says that her daughter, Carla, would make decisions for her if needed.          PREVENTIVE HEALTH MAINTENANCE             BONE DENSITY: was last done 04/2019 with the following abnormality noted-- Osteopenia     COLORECTAL CANCER SCREENING: Up to date (colonoscopy q10y; sigmoidoscopy q5y; Cologuard q3y) was last done 06/2011, Results are in chart; colonoscopy with normal results      MAMMOGRAM: Done within last 2 years and results in are chart was last done 10/31/19 which was abnormal     PAP SMEAR: was last done 09/28/2006         Surgical History:         Biopsy of breast; benign    Hysterectomy: Complete;     Tonsillectomy/Adenoidectomy Procedures: colonoscopy 2002, NEG         Family History:         Positive for Cerebrovascular Accident ( mother ), Coronary Artery Disease ( father ) and Hypertension ( mother ).          Social History:     Occupation: Employed at [employer] [place]     Marital Status:      Children: 2 children         Tobacco/Alcohol/Supplements:     Last Reviewed on 3/10/2020 01:06 PM by Joseph Walker    Tobacco: She has never smoked.  Non-drinker         Substance Abuse History:     Last Reviewed on 3/10/2020 01:06 PM by Joseph Walker    NEGATIVE         Mental Health History:     Last Reviewed on 3/10/2020 01:06 PM by Joseph Walker        Communicable Diseases (eg STDs):     Last Reviewed on 3/10/2020 01:06 PM by Joseph Walker        Current Problems:     Last Reviewed on 3/10/2020 01:06 PM by Joseph Walker    Allergic rhinitis, unspecified    Other specified anxiety disorders    Hormone replacement therapy (postmenopausal)    Mixed hyperlipidemia    Other long term (current) drug therapy    Essential (primary) hypertension    Dizziness and giddiness    Zoster without complications    Other fatigue    Gastro-esophageal reflux disease without esophagitis    Pain in right hand    Pain in left hand    Encounter for screening for depression    Acute bronchitis, unspecified    Cough    Pneumonia, unspecified organism    Headache    Bronchiectasis with acute lower respiratory infection    Allergic rhinitis        Immunizations:     Td adult 12/24/2007    Hep A, adult dose 1/1/2019    Hep A, adult dose 6/12/2019    zzPrevnar-13 10/29/2015    Fluzone (3 + years dose) 10/28/2008    Fluzone (3 + years dose) 9/11/2009    Fluzone  (3 + years dose) 9/16/2010    Fluzone (3 + years dose) 10/20/2011    Influenza A (H1N1), IM (3+ years) Monovalent 12/26/2009    Fluzone High-Dose pf (>=65 yr) 9/25/2013    Fluzone High-Dose pf (>=65 yr) 10/16/2012    Fluzone High-Dose pf (>=65 yr) 10/1/2014    Fluzone High-Dose pf (>=65 yr) 10/14/2015    Fluzone High-Dose pf (>=65 yr) 10/19/2016    Fluzone High-Dose pf (>=65 yr) 9/28/2017    Fluzone High-Dose pf (>=65 yr) 10/11/2018    Fluzone High-Dose pf (>=65 yr) 9/30/2019    Pneumococcal, 23-valent IM/SC (adult and pt >=2yr) 2/7/2008    PNEUMOVAX 23 (Pneumococcal PPV23) 10/8/2019        Allergies:     Last Reviewed on 5/04/2020 08:54 AM by Matilda Huffman    Penicillins:      Fosamax: abdominal pain  (Adverse Reaction)        Current Medications:     Last Reviewed on 3/10/2020 01:06 PM by Joseph Walker    multivitamin     albuterol sulfate 2.5 mg/0.5 mL Inhalation Solution for Nebulization [inhale 0.5 milliliter (2.5 mg) by nebulization route 3 times per day]    Estradiol 1 mg oral tablet [Take 1 tablet(s) by mouth daily]    Fluticasone Propionate 50 mcg/actuation Intranasal Spray, Suspension    Pravastatin 20 mg oral tablet [Take 1 tablet(s) by mouth at bedtime]    clonazePAM 0.5 mg oral tablet [TAKE ONE TABLET BY MOUTH TWICE A DAY AS NEEDED FOR ANXIETY]    Losartan 50 mg oral tablet [Take 1 tablet(s) by mouth daily]    levocetirizine 5 mg oral tablet [Take 1 tablet(s) by mouth each evening]    allergy shot once weekly     azelastine 137 mcg (0.1 %) Intranasal Aerosol, Spray    Omeprazole 20 mg oral capsule,delayed release (enteric coated) [Take 1 capsule(s) by mouth daily]    Advair Diskus 100-50 mcg/dose Inhalation Blister, With Inhalation Device [inhale 1 puff by inhalation route 2 times per day approximately 12 hours apart at the same times each day]        Objective:        Exams:     PHYSICAL EXAM:     GENERAL: well developed, well nourished;  no apparent distress;     EYES: extraocular movements  intact; conjunctiva and cornea are normal;     RESPIRATORY: normal respiratory rate and pattern with no distress;     MUSCULOSKELETAL: there is some diminished ROM in the L shoulder with reaching over her head and behind the back;     NEUROLOGIC: mental status: alert and oriented x 3;     PSYCHIATRIC: appropriate affect and demeanor; normal psychomotor function;         Procedures:     Allergic rhinitis, unspecified        Single Allergy Injection exp 09/30/2020             Assessment:         M79.602   Pain in left arm       M25.512   Pain in left shoulder       J30.9   Allergic rhinitis, unspecified           ORDERS:         Radiology/Test Orders:       [id number]  Left radiologic examination; humerus, minimum of two views  (Send-Out)            [id number]  Left Radiologic exam, shoulder; comp, 2 views  (Send-Out)            [id number]  Professional services for allergen immunotherapy not including provision of allergenic extracts; single injection  (In-House)              Lab Orders:       [id number]  CK - [facility]- CK total  (Send-Out)      I have taken order to do the add on for CK to Annabella in lab  and she is unable to do these as add on because they didn't draw the correct tube to do./pr      [id number]  SED - [facility] Sedimentation rate, non-automated ESR  (Send-Out)                      Plan:         Pain in left arm    LABORATORY:  Labs ordered to be performed today include CK, total and ESR.      RADIOLOGY:  I have ordered a Left Humerous x-ray to be done today.      RECOMMENDATIONS given include: Today, we have reviewed her care.  I'm going to try to add a couple of labs to her testing from Friday.  We will order X-rays. I have asked her to move forward with range of motion exercises.  If the X-rays and testing look good, I may refer her to physical therapy.  She had some steroids not that long ago. I would prefer to avoid if possible.  We will see what her testing looks like..            Orders:       [id number]  Left radiologic  examination; humerus, minimum of two views  (Send-Out)            [id number]  CK - [facility]- CK total  (Send-Out)            29461  SED - [facility] Sedimentation rate, non-automated ESR  (Send-Out)              Pain in left shoulderAs above.        RADIOLOGY:  I have ordered Shoulder x-ray: left shoulder to be done today.            Orders:       [id number]  Left Radiologic exam, shoulder; comp, 2 views  (Send-Out)              Allergic rhinitis, unspecified          Orders:       [id number]  Professional services for allergen immunotherapy not including provision of allergenic extracts; single injection  (In-House)                  Charge Capture:         Primary Diagnosis:     M79.602  Pain in left arm           Orders:      [id number]  Office/outpatient visit; established patient, level 3  (In-House)              M25.512  Pain in left shoulder     J30.9  Allergic rhinitis, unspecified           Orders:      [id number]  Professional services for allergen immunotherapy not including provision of allergenic extracts; single injection  (In-House)

## 2021-05-18 NOTE — PROGRESS NOTES
Luma CruzAncelmo 1940     Office/Outpatient Visit    Visit Date: Fri, Apr 19, 2019 09:21 am    Provider: Joseph Walker MD (Assistant: Ankita Escobedo RN)    Location: [facility]        Electronically signed by Joseph Walker MD on  04/23/2019 08:21:03 AM                             SUBJECTIVE:        CC: possible shingles         HPI:     Luma Grey is in today for possible shingles.  She was at [facility] last night and noted some itching and burning.  This morning, she noted some rash on the L shoulder anteriorly.  She has had shingles previously on the R side.  She is concerned and wants to get on some medication right away.  She has not had the shingles vaccine up to this time.  She says that the pain level is about 5/10 and is a burning pain.     ROS:     CONSTITUTIONAL:  Negative for chills and fever.      CARDIOVASCULAR:  Negative for chest pain and palpitations.      RESPIRATORY:  Negative for recent cough and dyspnea.      GASTROINTESTINAL:  Negative for abdominal pain, nausea and vomiting.          PMH/FMH/:     Last Reviewed on 4/19/2019 09:34 AM by Joseph Walker    Past Medical History:                 PAST MEDICAL HISTORY             CURRENT MEDICAL PROVIDERS:    E/N/T: Dr. Chandler         PREVENTIVE HEALTH MAINTENANCE             BONE DENSITY: was last done 06/17/2010 with the following abnormality noted-- Moderate Osteopenia     COLORECTAL CANCER SCREENING: Up to date (colonoscopy q10y; sigmoidoscopy q5y; Cologuard q3y) was last done 06/2011, Results are in chart; colonoscopy with normal results     MAMMOGRAM: Done within last 2 years and results in are chart was last done 10/25/18 with normal results     PAP SMEAR: was last done 09/28/2006         Surgical History:         Biopsy of breast; benign    Hysterectomy: Complete;     Tonsillectomy/Adenoidectomy Procedures: colonoscopy 2002, NEG         Family History:         Positive for Cerebrovascular Accident ( mother ), Coronary  Artery Disease ( father ) and Hypertension ( mother ).          Social History:     Occupation: Employed at [employer] [place]     Marital Status:      Children: 2 children         Tobacco/Alcohol/Supplements:     Last Reviewed on 4/19/2019 09:34 AM by Joseph Walker    Tobacco: She has never smoked.  Non-drinker         Substance Abuse History:     Last Reviewed on 4/19/2019 09:34 AM by Joseph Walker    NEGATIVE         Mental Health History:     Last Reviewed on 4/19/2019 09:34 AM by Joseph Walker        Communicable Diseases (eg STDs):     Last Reviewed on 4/19/2019 09:34 AM by Joseph Walker            Current Problems:     Last Reviewed on 4/19/2019 09:34 AM by Joseph Walker    Vertigo     Essential hypertension     Irritable bowel syndrome     Use of high risk medications     Hypercholesterolemia     Allergic rhinitis     Post-menopausal HRT     Anxiety     Shingles         Immunizations:     Td adult 12/24/2007     zzPrevnar-13 10/29/2015     Fluzone (3 + years dose) 10/28/2008     Fluzone (3 + years dose) 9/11/2009     Fluzone (3 + years dose) 9/16/2010     Fluzone (3 + years dose) 10/20/2011     Influenza A (H1N1), IM (3+ years) Monovalent 12/26/2009     Fluzone High-Dose pf (>=65 yr) 9/25/2013     Fluzone High-Dose pf (>=65 yr) 10/16/2012     Fluzone High-Dose pf (>=65 yr) 10/1/2014     Fluzone High-Dose pf (>=65 yr) 10/14/2015     Fluzone High-Dose pf (>=65 yr) 10/19/2016     Fluzone High-Dose pf (>=65 yr) 9/28/2017     Fluzone High-Dose pf (>=65 yr) 10/11/2018     Pneumococcal, 23-valent IM/SC (adult and pt >=2yr) 2/7/2008         Allergies:     Last Reviewed on 4/19/2019 09:34 AM by Joseph Walker    Penicillins:    Fosamax: abdominal pain (Adverse Reaction)        Current Medications:     Last Reviewed on 4/19/2019 09:34 AM by Joseph Walker    Omeprazole 20mg Capsules, Extended Release 1 capsule daily     Clonazepam 0.5mg Tablet Take  1 tablet(s) by mouth bid as needed for anxiety     Estradiol 1mg Tablet Take 1 tablet(s) by mouth daily     Levocetirizine Dihydrochloride 5mg Tablet Take 1 tablet(s) by mouth each evening     Losartan 50mg Tablet Take 1 tablet(s) by mouth daily     Pravastatin 20mg Tablet Take 1 tablet(s) by mouth at bedtime     allergy shot once weekly     Azelastine HCl 0.1% Nasal Spray     Fluticasone Propionate 50mcg/1actuation Nasal Spray         OBJECTIVE:        Vitals:         Current: 4/19/2019 9:26:29 AM    Ht:  5 ft, 0.25 in;  Wt: 118.4 lbs;  BMI: 22.9    T: 97.8 F (oral);  BP: 169/64 mm Hg (right arm, sitting);  P: 84 bpm (right arm (BP Cuff), sitting);  sCr: 0.73 mg/dL;  GFR: 56.80        Exams:     PHYSICAL EXAM:     GENERAL: vital signs recorded - well developed, well nourished;  no apparent distress;     NECK: range of motion is normal; thyroid is non-palpable;     RESPIRATORY: normal respiratory rate and pattern with no distress; normal breath sounds with no rales, rhonchi, wheezes or rubs;     CARDIOVASCULAR: normal rate; rhythm is regular;  no systolic murmur; no edema;     GASTROINTESTINAL: nontender; normal bowel sounds; no organomegaly;     BREAST/INTEGUMENT: SKIN: no significant rashes or lesions; no suspicious moles; there is a patch of mildly reddened skin on the front of the L shoulder over the clavicle - no obvious blisters are apparent;         ASSESSMENT           053.9   B02.9  Shingles              DDx:         ORDERS:         Meds Prescribed:       Famciclovir 500mg Tablet Take 1 tablet(s) by mouth q8h for 7 days  #21 (Twenty One) tablet(s) Refills: 0                 PLAN:          Shingles         RECOMMENDATIONS given include: We will cover her for presumed shingles as noted.  We will assess her progress with this at her upcoming follow up and wellness exam..            Prescriptions:       Famciclovir 500mg Tablet Take 1 tablet(s) by mouth q8h for 7 days  #21 (Twenty One) tablet(s) Refills: 0            Patient Education Handouts:       Shingles (Herpes Zoster)              CHARGE CAPTURE           **Please note: ICD descriptions below are intended for billing purposes only and may not represent clinical diagnoses**        Primary Diagnosis:         053.9 Shingles            B02.9    Zoster without complications              Orders:          [id number]   Office/outpatient visit; established patient, level 3  (In-House)

## 2021-05-18 NOTE — PROGRESS NOTES
Luma Cruz. 1940     Office/Outpatient Visit    Visit Date: Tue, Apr 23, 2019 01:51 pm    Provider: Joseph Walker MD (Assistant: Ankita Escobedo RN)    Location: [facility]        Electronically signed by Joseph Walker MD on  04/24/2019 11:44:51 AM                             SUBJECTIVE:        CC: wellness exam, shingles, other issues         HPI:         Luma Grey is here for a Medicare wellness visit.  The required HRA questions are integrated within this visit note. Family medical history and individual medical/surgical history were reviewed and updated.  A current height, weight, BMI, blood pressure, and pulse were recorded in the vitals section of the note and have been reviewed. Patient's medications, including supplements, were recorded in the chart and reviewed.  Current providers and suppliers were reviewed and updated.          Self-Assessment of Health: She rates her health as very good. She rates her confidence of being able to control/manage most of her health problems as very confident. Her physical/emotional health has limited her social activites not at all.  A review of cognitive impairment was performed, including ability to drive a car, manage finances, and any memory changes, and was found to be negative.  Falls Risk: Has fallen 2 or more times or had one fall with injury in the past year.  In regard to hearing, she reports having trouble hearing the TV/radio when others do not, having to strain to hear or understand conversations and wearing hearing aid(s).  Concerning home safety, she reports that at home she DOES have adequate lighting, a skid resistant shower/tub and functioning smoke alarms, but not grab bars in the bath or absence of throw rugs.          Immunization Status: Up to date; Age>60, no shingles vaccination; Physical Activity: She exercises but less than 20 minutes 3 days per week; Type of diet patient normally eats is described as well-balanced with  fruits and vegetables         Luma Grey is in today for follow up on shingles.  She says that she has noted some spread of the rash.  She also is in more pain which is particularly a problem at night.  She is taking famciclovir.         Dx with essential hypertension; her current cardiac medication regimen includes an angiotensin receptor blocker ( Cozaar ).  She is tolerating the medication well without side effects.  Compliance with treatment has been good; she takes her medication as directed and follows up as directed.          Dx with hypercholesterolemia; current treatment includes Pravachol.  Compliance with treatment has been good; she takes her medication as directed and follows up as directed.  She denies experiencing any hypercholesterolemia related symptoms.          Luma Grey also has history of significant postmenopausal symptoms.  She says that she has been on estradiol for many years, and she does not feel she can stop it.  She is aware of the potential risks of the medication, but she feels the benefits outweigh the risks.  She does not smoke.  She is up to date on mammogram.        Luma Grey also has chronic anxiety for which she takes Klonopin at bedtime.  She has been on this for a long time and feels it is helpful and necessary.  She denies side effects from it - no sedation or confusion.  Davi is reviewed and consistent.  She denies abuse, diversion, or doctor shopping.      ROS:     CONSTITUTIONAL:  Negative for chills and fever.      CARDIOVASCULAR:  Negative for chest pain and palpitations.      RESPIRATORY:  Negative for recent cough and dyspnea.      GASTROINTESTINAL:  Negative for abdominal pain, nausea and vomiting.          PMH/FMH/SH:     Last Reviewed on 4/23/2019 05:37 PM by Joseph Walker    Past Medical History:                 PAST MEDICAL HISTORY             CURRENT MEDICAL PROVIDERS:    E/N/T: Dr. Chandler             ADVANCE DIRECTIVES: Living will - She says that her  daughter, Carla, would make decisions for her if needed.          PREVENTIVE HEALTH MAINTENANCE             BONE DENSITY: was last done 04/2017 with the following abnormality noted-- Osteopenia     COLORECTAL CANCER SCREENING: Up to date (colonoscopy q10y; sigmoidoscopy q5y; Cologuard q3y) was last done 06/2011, Results are in chart; colonoscopy with normal results     MAMMOGRAM: Done within last 2 years and results in are chart was last done 10/25/18 with normal results     PAP SMEAR: was last done 09/28/2006         Surgical History:         Biopsy of breast; benign    Hysterectomy: Complete;     Tonsillectomy/Adenoidectomy Procedures: colonoscopy 2002, NEG         Family History:         Positive for Cerebrovascular Accident ( mother ), Coronary Artery Disease ( father ) and Hypertension ( mother ).          Social History:     Occupation: Employed at [employer]     Marital Status:      Children: 2 children         Tobacco/Alcohol/Supplements:     Last Reviewed on 4/23/2019 05:37 PM by Joseph Walker    Tobacco: She has never smoked.  Non-drinker         Substance Abuse History:     Last Reviewed on 4/23/2019 05:37 PM by Joseph Walker    NEGATIVE         Mental Health History:     Last Reviewed on 4/23/2019 05:37 PM by Joseph Walker        Communicable Diseases (eg STDs):     Last Reviewed on 4/23/2019 05:37 PM by Joseph Walker            Current Problems:     Last Reviewed on 4/23/2019 05:37 PM by Joseph Walker    Vertigo     Essential hypertension     Irritable bowel syndrome     Use of high risk medications     Hypercholesterolemia     Allergic rhinitis     Post-menopausal HRT     Anxiety     Shingles         Immunizations:     Td adult 12/24/2007     zzPrevnar-13 10/29/2015     Fluzone (3 + years dose) 10/28/2008     Fluzone (3 + years dose) 9/11/2009     Fluzone (3 + years dose) 9/16/2010     Fluzone (3 + years dose) 10/20/2011     Influenza A  (H1N1), IM (3+ years) Monovalent 12/26/2009     Fluzone High-Dose pf (>=65 yr) 9/25/2013     Fluzone High-Dose pf (>=65 yr) 10/16/2012     Fluzone High-Dose pf (>=65 yr) 10/1/2014     Fluzone High-Dose pf (>=65 yr) 10/14/2015     Fluzone High-Dose pf (>=65 yr) 10/19/2016     Fluzone High-Dose pf (>=65 yr) 9/28/2017     Fluzone High-Dose pf (>=65 yr) 10/11/2018     Pneumococcal, 23-valent IM/SC (adult and pt >=2yr) 2/7/2008         Allergies:     Last Reviewed on 4/23/2019 05:37 PM by Joseph Walker    Penicillins:    Fosamax: abdominal pain (Adverse Reaction)        Current Medications:     Last Reviewed on 4/23/2019 05:37 PM by Joseph Walker    Famciclovir 500mg Tablet Take 1 tablet(s) by mouth q8h for 7 days     Omeprazole 20mg Capsules, Extended Release 1 capsule daily     Clonazepam 0.5mg Tablet Take 1 tablet(s) by mouth bid as needed for anxiety     Estradiol 1mg Tablet Take 1 tablet(s) by mouth daily     Levocetirizine Dihydrochloride 5mg Tablet Take 1 tablet(s) by mouth each evening     Losartan 50mg Tablet Take 1 tablet(s) by mouth daily     Pravastatin 20mg Tablet Take 1 tablet(s) by mouth at bedtime     allergy shot once weekly     Azelastine HCl 0.1% Nasal Spray     Fluticasone Propionate 50mcg/1actuation Nasal Spray         OBJECTIVE:        Vitals:         Current: 4/23/2019 1:57:40 PM    Ht:  5 ft, 0.25 in;  Wt: 117.2 lbs;  BMI: 22.7    T: 98.5 F (oral);  BP: 138/56 mm Hg (right arm, sitting);  P: 92 bpm (right arm (BP Cuff), sitting);  sCr: 0.73 mg/dL;  GFR: 56.56        Exams:     PHYSICAL EXAM:     GENERAL: vital signs recorded - well developed, well nourished;  no apparent distress;     EYES: extraocular movements intact; conjunctiva and cornea are normal; PERRLA; vision is 20/20 - hearing is diminished bilaterally     E/N/T:  normal EACs, TMs, nasal/oral mucosa, teeth, gingiva, and oropharynx;     NECK: range of motion is normal; thyroid is non-palpable;     RESPIRATORY: normal  respiratory rate and pattern with no distress; normal breath sounds with no rales, rhonchi, wheezes or rubs;     CARDIOVASCULAR: normal rate; rhythm is regular;  no systolic murmur; no edema;     GASTROINTESTINAL: nontender; normal bowel sounds; no organomegaly;     LYMPHATIC: no enlargement of cervical or facial nodes; no supraclavicular nodes;     BREAST/INTEGUMENT: there is what appears to be herpetic rash over the L clavicle and L side of neck;     NEUROLOGIC: mental status: alert and oriented x 3; cranial nerves II-XII grossly intact;     PSYCHIATRIC: appropriate affect and demeanor; normal psychomotor function;         ASSESSMENT           V70.0   Z00.00  Yearly physical exam              DDx:     053.9   B02.9  Shingles              DDx:     401.1   I10  Essential hypertension              DDx:     272.0   E78.2  Hypercholesterolemia              DDx:     V07.4   Z79.890  Post-menopausal HRT              DDx:     V58.69   Z79.899  Use of high risk medications              DDx:     300.02   F41.8  Anxiety              DDx:         ORDERS:         Meds Prescribed:       Refill of: Omeprazole 20mg Capsules, Extended Release 1 capsule daily  #90 (Ninety) capsule(s) Refills: 1       Refill of: Clonazepam 0.5mg Tablet Take 1 tablet(s) by mouth bid as needed for anxiety  #90 (Ninety) tablet(s) Refills: 1       Refill of: Estradiol 1mg Tablet Take 1 tablet(s) by mouth daily  #90 (Ninety) tablet(s) Refills: 1       Refill of: Levocetirizine Dihydrochloride 5mg Tablet Take 1 tablet(s) by mouth each evening  #90 (Ninety) tablet(s) Refills: 1       Refill of: Losartan 50mg Tablet Take 1 tablet(s) by mouth daily  #90 (Ninety) tablet(s) Refills: 1       Refill of: Pravastatin 20mg Tablet Take 1 tablet(s) by mouth at bedtime  #90 (Ninety) tablet(s) Refills: 1       Gabapentin 300mg Capsules Take 1 capsule(s) by mouth qhs  #30 (Thirty) capsule(s) Refills: 0         Radiology/Test Orders:       3014F  Screening mammography  results documented and reviewed (PV)1  (In-House)         3017F  Colorectal CA screen results documented and reviewed (PV)  (In-House)           Lab Orders:       [id number]  HTNLP - [facility] CMP AND LIPID: [id number], [id number]  (Send-Out)           Other Orders:         Depression screen negative  (In-House)         1100F  Pt screen for fall risk; document 2+ falls in the past yr or any fall w/injury in past year (ALEX)  (In-House)           Negative EtOH screen  (In-House)           Subsequent Annual Well Visit Medicare (x1)                 PLAN:          Yearly physical exam         RECOMMENDATIONS given include: We have reviewed her care as noted.  See wellness recommendations.  I am going to recommend treating with gabapentin as noted below to help with rest.  We will get her consent for this.  Otherwise, her usual medications are filled as noted.  We will follow closely..  MIPS PHQ-9 Depression Screening Completed form scanned and in chart; Total Score 1 Negative alcohol screen     MAMMOGRAM: Done within last 2 years and results in are chart     COLORECTAL CANCER SCREENING: Results are in chart         ADDENDUM - Please let Luma Grey know that I did further review her chart.  I would recommend repeat DEXA scan at her convenience.  Please arrange if she is agreeable.  Additionally, I would recommend labs as below.  Forward her a copy of the preventive screening recommendations in my outbox.  Thanks. - Joseph Walker MD - 4/23/19 - 17:37           Orders:         Depression screen negative  (In-House)         1100F  Pt screen for fall risk; document 2+ falls in the past yr or any fall w/injury in past year (ALEX)  (In-House)           Negative EtOH screen  (In-House)         3014F  Screening mammography results documented and reviewed (PV)1  (In-House)         3017F  Colorectal CA screen results documented and reviewed (PV)  (In-House)             Patient Education Handouts:       Physical Exam 60+ year,  Female           Shingles           Prescriptions:       Gabapentin 300mg Capsules Take 1 capsule(s) by mouth qhs  #30 (Thirty) capsule(s) Refills: 0 - fall risk, sleepiness          Essential hypertension           Prescriptions:       Refill of: Losartan 50mg Tablet Take 1 tablet(s) by mouth daily  #90 (Ninety) tablet(s) Refills: 1          Hypercholesterolemia     LABORATORY:  Labs ordered to be performed today include HTN/Lipid Panel: CMP, Lipid.            Prescriptions:       Refill of: Pravastatin 20mg Tablet Take 1 tablet(s) by mouth at bedtime  #90 (Ninety) tablet(s) Refills: 1           Orders:       [id number]  HTN - [facility] CMP AND LIPID: [id number], [id number]  (Send-Out)            Post-menopausal HRT           Prescriptions:       Refill of: Estradiol 1mg Tablet Take 1 tablet(s) by mouth daily  #90 (Ninety) tablet(s) Refills: 1          Use of high risk medications As above.          Anxiety           Prescriptions:       Refill of: Clonazepam 0.5mg Tablet Take 1 tablet(s) by mouth bid as needed for anxiety  #90 (Ninety) tablet(s) Refills: 1             Other Prescriptions:       Refill of: Omeprazole 20mg Capsules, Extended Release 1 capsule daily  #90 (Ninety) capsule(s) Refills: 1       Refill of: Levocetirizine Dihydrochloride 5mg Tablet Take 1 tablet(s) by mouth each evening  #90 (Ninety) tablet(s) Refills: 1         CHARGE CAPTURE           **Please note: ICD descriptions below are intended for billing purposes only and may not represent clinical diagnoses**        Primary Diagnosis:         V70.0 Yearly physical exam            Z00.00    Encounter for general adult medical examination without abnormal findings              Orders:          [id number]   Preventive medicine, established patient, age 65+ years  (In-House)                                           Subsequent Annual Well Visit Medicare (x1)              Depression screen negative  (In-House)             1100F   Pt screen for fall risk; document  2+ falls in the past yr or any fall w/injury in past year (ALEX)  (In-House)                Negative EtOH screen  (In-House)             3014F   Screening mammography results documented and reviewed (PV)1  (In-House)             3017F   Colorectal CA screen results documented and reviewed (PV)  (In-House)           053.9 Shingles            B02.9    Zoster without complications              Orders:          [id number] -25  Office/outpatient visit; established patient, level 4  (In-House)           401.1 Essential hypertension            I10    Essential (primary) hypertension    272.0 Hypercholesterolemia            E78.2    Mixed hyperlipidemia    V07.4 Post-menopausal HRT            Z79.890    Hormone replacement therapy (postmenopausal)    V58.69 Use of high risk medications            Z79.899    Other long term (current) drug therapy    300.02 Anxiety            F41.8    Other specified anxiety disorders        ADDENDUMS:      ____________________________________    Addendum: 04/24/2019 12:11 PM - Four, Team        pt inf, order to lab, forwarded to [facility]/th

## 2021-05-18 NOTE — PROGRESS NOTES
Luma CruzAncelmo 1940     Office/Outpatient Visit    Visit Date: Tue, Oct 8, 2019 09:42 am    Provider: Joseph Walker MD (Assistant: Neeru Henry LPN)    Location: [facility]        Electronically signed by Joseph Walker MD on  10/08/2019 05:54:39 PM                             SUBJECTIVE:        CC: follow up on arthritis pain         HPI:     Luma Grey is in today for follow up.  She did receive a Kenalog injection after her last set of labs and testing.  She did see some improvement after about a week, and she has had some persistent effect.  She denies acute issue today.  Her testing and X-rays are again reviewed.  There was no finding to suggest RA or lupus or other inflammatory arthritis.  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  She is without other acute complaint today.     ROS:     CONSTITUTIONAL:  Negative for chills and fever.      CARDIOVASCULAR:  Negative for chest pain and palpitations.      RESPIRATORY:  Negative for recent cough and dyspnea.      GASTROINTESTINAL:  Negative for abdominal pain, nausea and vomiting.      INTEGUMENTARY/BREAST:  Negative for atypical mole(s) and rash.          PMH/FMH/SH:     Last Reviewed on 9/17/2019 01:44 PM by Joseph Walker    Past Medical History:                 PAST MEDICAL HISTORY             CURRENT MEDICAL PROVIDERS:    E/N/T: Dr. Chandler             ADVANCE DIRECTIVES: Living will - She says that her daughter, Carla, would make decisions for her if needed.          PREVENTIVE HEALTH MAINTENANCE             BONE DENSITY: was last done 04/2019 with the following abnormality noted-- Osteopenia     COLORECTAL CANCER SCREENING: Up to date (colonoscopy q10y; sigmoidoscopy q5y; Cologuard q3y) was last done 06/2011, Results are in chart; colonoscopy with normal results     MAMMOGRAM: Done within last 2 years and results in are chart was last done 10/25/18 with normal  results     PAP SMEAR: was last done 09/28/2006         Surgical History:         Biopsy of breast; benign    Hysterectomy: Complete;     Tonsillectomy/Adenoidectomy Procedures: colonoscopy 2002, NEG         Family History:         Positive for Cerebrovascular Accident ( mother ), Coronary Artery Disease ( father ) and Hypertension ( mother ).          Social History:     Occupation: Employed at [employer]     Marital Status:      Children: 2 children         Tobacco/Alcohol/Supplements:     Last Reviewed on 9/17/2019 01:44 PM by Joseph Walker    Tobacco: She has never smoked.  Non-drinker         Substance Abuse History:     Last Reviewed on 9/17/2019 01:44 PM by Joseph Walker    NEGATIVE         Mental Health History:     Last Reviewed on 9/17/2019 01:44 PM by Joseph Walker        Communicable Diseases (eg STDs):     Last Reviewed on 9/17/2019 01:44 PM by Joseph Walker            Current Problems:     Last Reviewed on 9/17/2019 01:44 PM by Joseph Walker    Gastroesophageal reflux disease     Joint pain, hand     Malaise and Fatigue     Vertigo     Essential hypertension     Irritable bowel syndrome     Use of high risk medications     Hypercholesterolemia     Allergic rhinitis     Post-menopausal HRT     Anxiety     Shingles         Immunizations:     Td adult 12/24/2007     Hep A, adult dose 1/1/2019     Hep A, adult dose 6/12/2019     zzPrevnar-13 10/29/2015     Fluzone (3 + years dose) 10/28/2008     Fluzone (3 + years dose) 9/11/2009     Fluzone (3 + years dose) 9/16/2010     Fluzone (3 + years dose) 10/20/2011     Influenza A (H1N1), IM (3+ years) Monovalent 12/26/2009     Fluzone High-Dose pf (>=65 yr) 9/25/2013     Fluzone High-Dose pf (>=65 yr) 10/16/2012     Fluzone High-Dose pf (>=65 yr) 10/1/2014     Fluzone High-Dose pf (>=65 yr) 10/14/2015     Fluzone High-Dose pf (>=65 yr) 10/19/2016     Fluzone High-Dose pf (>=65 yr) 9/28/2017     Fluzone  High-Dose pf (>=65 yr) 10/11/2018     Fluzone High-Dose pf (>=65 yr) 9/30/2019     Pneumococcal, 23-valent IM/SC (adult and pt >=2yr) 2/7/2008         Allergies:     Last Reviewed on 9/17/2019 01:44 PM by Joseph Walker    Penicillins:    Fosamax: abdominal pain (Adverse Reaction)        Current Medications:     Last Reviewed on 9/17/2019 01:44 PM by Joseph Walker    Estradiol 1mg Tablet Take 1 tablet(s) by mouth daily     Levocetirizine Dihydrochloride 5mg Tablet Take 1 tablet(s) by mouth each evening     Losartan 50mg Tablet Take 1 tablet(s) by mouth daily     Omeprazole 20mg Capsules, Extended Release Take 1 capsule(s) by mouth daily     Pravastatin 20mg Tablet Take 1 tablet(s) by mouth at bedtime     Clonazepam 0.5mg Tablet Take 1 tablet(s) by mouth bid as needed for anxiety     Famciclovir 500mg Tablet Take 1 tablet(s) by mouth q8h for 7 days     allergy shot once weekly     Azelastine HCl 0.1% Nasal Spray     Fluticasone Propionate 50mcg/1actuation Nasal Spray         OBJECTIVE:        Vitals:         Current: 10/8/2019 9:47:08 AM    Ht:  5 ft, 0.25 in;  Wt: 117.8 lbs;  BMI: 22.8    T: 98.1 F (oral);  BP: 128/62 mm Hg (right arm, sitting);  P: 69 bpm (right arm (BP Cuff), sitting);  sCr: 0.93 mg/dL;  GFR: 44.49        Exams:     PHYSICAL EXAM:     GENERAL: vital signs recorded - well developed, well nourished;  no apparent distress;     NECK: range of motion is normal; thyroid is non-palpable;     RESPIRATORY: normal respiratory rate and pattern with no distress; normal breath sounds with no rales, rhonchi, wheezes or rubs;     CARDIOVASCULAR: normal rate; rhythm is regular;  no systolic murmur; no edema;     GASTROINTESTINAL: nontender; normal bowel sounds; no organomegaly;     LYMPHATIC: no enlargement of cervical or facial nodes; no supraclavicular nodes;     BREAST/INTEGUMENT: SKIN: no significant rashes or lesions; no suspicious moles;     MUSCULOSKELETAL: stigmata of OA noted in hands  bilaterally - mostly IP joints;         Procedures:     Pneumovax administration     1. Pneumovax (pneumococcal PPSV23): 0.5 ml unit dose given IM in the right upper arm; administered by ad;  lot number [id number]; expires 6/2/21             ASSESSMENT           719.44   M79.642   M79.641  Joint pain, hand              DDx:     V03.82   Z23  Pneumovax administration              DDx:         ORDERS:         Procedures Ordered:       [id number]  PNEUMOVAX 23  (In-House)           Other Orders:         Administration of pneumococcal vaccine (x1)                 PLAN:          Joint pain, hand         RECOMMENDATIONS given include: Today, we have again reviewed her care.  There was not finding on her labs to suggest inflammatory arthritis, and she has seen some benefit from the steroid.  We will have her go ahead and use some Aleve cautiously as needed for pain.  Otherwise, no changes are anticipated.  Pneumovax to be repeated today..            Patient Education Handouts:       Arthritis           Pneumovax administration           Orders:       [id number]  PNEUMOVAX 23  (In-House)                     Administration of pneumococcal vaccine (x1)             CHARGE CAPTURE           **Please note: ICD descriptions below are intended for billing purposes only and may not represent clinical diagnoses**        Primary Diagnosis:         719.44 Joint pain, hand            M79.642    Pain in left hand           M79.641    Pain in right hand              Orders:          [id number]   Office/outpatient visit; established patient, level 3  (In-House)           V03.82 Pneumovax administration            Z23    Encounter for immunization              Orders:          [id number]   PNEUMOVAX 23  (In-House)                                           Administration of pneumococcal vaccine (x1)

## 2021-05-18 NOTE — PROGRESS NOTES
Luma Cruz  1940     Office/Outpatient Visit    Visit Date: Sat, Feb 8, 2020 11:41 am    Provider: Geraldine Baez N.P. (Assistant: Robin Chavez, )    Location: [facility]        Electronically signed by Geraldine Baez N.P. on  02/11/2020 08:55:28 AM                             Subjective:        CC: Luma Grey is a 79 year old White female.  presents today due to says she has had bronchitis for 6 weeks, headache, ears stopped up, cough (has used duoneb recently)        HPI:           Patient to be evaluated for acute upper respiratory infection, unspecified.  These have been present for the past 6 weeks.  The symptoms include cough, headache, sinus pain/pressure and purulent sputum production.  She has already tried to relieve the symptoms with Promethazine DM, Prednisone steroid course, Cefdinir.      ROS:     CONSTITUTIONAL:  Negative for chills and fever.      EYES:  Negative for eye drainage and eye pain.      E/N/T:  Positive for sinus pressure.   Negative for ear pain.      CARDIOVASCULAR:  Negative for chest pain and palpitations.      RESPIRATORY:  Positive for recent cough and frequent wheezing.          Past Medical History / Family History / Social History:         Last Reviewed on 1/30/2020 12:59 PM by Joseph Walker    Past Medical History:                 PAST MEDICAL HISTORY             CURRENT MEDICAL PROVIDERS:    E/N/T: Dr. Chandler             ADVANCE DIRECTIVES: Living will - She says that her daughter, Carla, would make decisions for her if needed.          PREVENTIVE HEALTH MAINTENANCE             BONE DENSITY: was last done 04/2019 with the following abnormality noted-- Osteopenia     COLORECTAL CANCER SCREENING: Up to date (colonoscopy q10y; sigmoidoscopy q5y; Cologuard q3y) was last done 06/2011, Results are in chart; colonoscopy with normal results     MAMMOGRAM: Done within last 2 years and results in are chart was last done 10/31/19 which was abnormal     PAP  SMEAR: was last done 09/28/2006         Surgical History:         Biopsy of breast; benign    Hysterectomy: Complete;     Tonsillectomy/Adenoidectomy Procedures: colonoscopy 2002, NEG         Family History:         Positive for Cerebrovascular Accident ( mother ), Coronary Artery Disease ( father ) and Hypertension ( mother ).          Social History:     Occupation: Employed at [employer]     Marital Status:      Children: 2 children         Tobacco/Alcohol/Supplements:     Last Reviewed on 1/30/2020 12:59 PM by Joseph Walker    Tobacco: She has never smoked.  Non-drinker         Substance Abuse History:     Last Reviewed on 1/30/2020 12:59 PM by Joseph Walker    NEGATIVE         Mental Health History:     Last Reviewed on 1/30/2020 12:59 PM by Joseph Walker        Communicable Diseases (eg STDs):     Last Reviewed on 1/30/2020 12:59 PM by Joseph Walker        Current Problems:     Last Reviewed on 1/30/2020 12:59 PM by Joseph Walker    Allergic rhinitis, unspecified    Other specified anxiety disorders    Hormone replacement therapy (postmenopausal)    Mixed hyperlipidemia    Other long term (current) drug therapy    Essential (primary) hypertension    Dizziness and giddiness    Zoster without complications    Gastro-esophageal reflux disease without esophagitis    Pain in right hand    Pain in left hand    Other fatigue    Encounter for screening for depression    Acute bronchitis, unspecified    Cough    Allergic rhinitis        Immunizations:     Td adult 12/24/2007    Hep A, adult dose 1/1/2019    Hep A, adult dose 6/12/2019    zzPrevnar-13 10/29/2015    Fluzone (3 + years dose) 10/28/2008    Fluzone (3 + years dose) 9/11/2009    Fluzone (3 + years dose) 9/16/2010    Fluzone (3 + years dose) 10/20/2011    Influenza A (H1N1), IM (3+ years) Monovalent 12/26/2009    Fluzone High-Dose pf (>=65 yr) 9/25/2013    Fluzone High-Dose pf (>=65 yr)  10/16/2012    Fluzone High-Dose pf (>=65 yr) 10/1/2014    Fluzone High-Dose pf (>=65 yr) 10/14/2015    Fluzone High-Dose pf (>=65 yr) 10/19/2016    Fluzone High-Dose pf (>=65 yr) 9/28/2017    Fluzone High-Dose pf (>=65 yr) 10/11/2018    Fluzone High-Dose pf (>=65 yr) 9/30/2019    Pneumococcal, 23-valent IM/SC (adult and pt >=2yr) 2/7/2008    PNEUMOVAX 23 (Pneumococcal PPV23) 10/8/2019        Allergies:     Last Reviewed on 1/30/2020 12:59 PM by Joseph Walker    Penicillins:      Fosamax: abdominal pain  (Adverse Reaction)        Current Medications:     Last Reviewed on 1/30/2020 12:59 PM by Joseph Wlaker    Estradiol 1 mg oral tablet [Take 1 tablet(s) by mouth daily]    Fluticasone Propionate 50 mcg/actuation Intranasal Spray, Suspension    Pravastatin 20 mg oral tablet [Take 1 tablet(s) by mouth at bedtime]    clonazePAM 0.5 mg oral tablet [TAKE ONE TABLET BY MOUTH TWICE A DAY AS NEEDED FOR ANXIETY]    Losartan 50 mg oral tablet [Take 1 tablet(s) by mouth daily]    promethazine 25 mg oral tablet [Take 1/2 to 1 tablet(s) by mouth q  6 hr PRN nausea/vomiting]    levocetirizine 5 mg oral tablet [Take 1 tablet(s) by mouth each evening]    allergy shot once weekly     azelastine 137 mcg (0.1 %) Intranasal Aerosol, Spray    Omeprazole 20 mg oral capsule,delayed release (enteric coated) [Take 1 capsule(s) by mouth daily]    promethazine-DM 6.25-15 mg/5 mL oral Syrup [take 5-10 milliliters by oral route every 6 hours as needed, not to exceed 30 mL in 24 hours]    cefdinir 300 mg oral capsule [take 1 capsule (300 mg) by oral route every 12 hours]    predniSONE 20 mg oral tablet [take 2 tablets (40 mg) by oral route once daily]        Objective:        Vitals:         Historical:     1/30/2020  BP:   133/63 mm Hg ( (right arm, , sitting, );) 1/30/2020  P:   75bpm ( (right arm (BP Cuff), , sitting, );) 1/30/2020  Wt:   117.2lbs    Current: 2/8/2020 11:48:00 AM    Ht:  5 ft, 0.25 in;  Wt: 117.2 lbs;  BMI:  22.7T: 98.3 F (oral);  BP: 133/61 mm Hg (left arm, sitting);  P: 81 bpm (left arm (BP Cuff), sitting);  sCr: 0.93 mg/dL;  GFR: 43.73O2 Sat: 98 % (room air)        Exams:     PHYSICAL EXAM:     GENERAL: well developed, well nourished;  no apparent distress;     EYES: PERRL, EOMI     E/N/T: EARS: external auditory canal normal;  both TMs are dull;  NOSE: normal turbinates; no sinus tenderness; OROPHARYNX: oral mucosa is normal; posterior pharynx shows no exudate and post nasal drip;     NECK: range of motion is normal; trachea is midline;     RESPIRATORY: normal appearance and symmetric expansion of chest wall; normal respiratory rate and pattern with no distress; rhonchi heard in the RLL;     CARDIOVASCULAR: normal rate; rhythm is regular;     MUSCULOSKELETAL: normal gait;     NEUROLOGIC: mental status: alert and oriented x 3; GROSSLY INTACT     PSYCHIATRIC: appropriate affect and demeanor;         Assessment:         J18.9   Pneumonia, unspecified organism           ORDERS:         Meds Prescribed:       [New Rx] doxycycline hyclate 100 mg oral tablet [take 1 tablet (100 mg) by oral route 2 times per day for 10 days], #20 (twenty) tablets, Refills: 0 (zero)         Radiology/Test Orders:       [id number]  Radiologic exam chest 2 views  (Send-Out)                      Plan:         Pneumonia, unspecified organismWill treat for PNA based on history and exam findings. Radiology closed today. To return Monday for chest xray.         RADIOLOGY:  I have ordered a chest x-ray (PA and lateral) to be done today.      RECOMMENDATIONS given include: Push Fluids, Rest, Follow up if no improvement or worsening symptoms like high fevers, vomiting, weakness, or increasing shortness of air.    .      FOLLOW-UP: Chronic visit follow up           Prescriptions:       [New Rx] doxycycline hyclate 100 mg oral tablet [take 1 tablet (100 mg) by oral route 2 times per day for 10 days], #20 (twenty) tablets, Refills: 0 (zero)           Orders:        [id number]  Radiologic exam chest 2 views  (Send-Out)                  Charge Capture:         Primary Diagnosis:     J18.9  Pneumonia, unspecified organism           Orders:      [id number]  Office/outpatient visit; established patient, level 3  (In-House)

## 2021-05-18 NOTE — PROGRESS NOTES
Luma Cruz  1940     Office/Outpatient Visit    Visit Date: Tue, Oct 6, 2020 12:37 pm    Provider: Joseph Walker MD (Assistant: Ankita Escobedo RN)    Location: [facility]        Electronically signed by Joseph Walker MD on  10/08/2020 11:40:22 AM                             Subjective:        CC: anxiety, blood pressure, cholesterol    HPI:       Luma Grey is in today for follow up on chronic anxiety.  This has been a long term issue for which she takes Klonopin at bedtime.  She feels it is helpful and necessary.  She denies side effects from it - no sedation or confusion.  Davi is reviewed and consistent.  She denies abuse, diversion, or doctor shopping.          With regard to the mixed hyperlipidemia, current treatment includes Pravachol.  Compliance with treatment has been good; she takes her medication as directed and follows up as directed.  She denies experiencing any hypercholesterolemia related symptoms.            Additionally, she presents with history of essential (primary) hypertension.  her current cardiac medication regimen includes an angiotensin receptor blocker ( Cozaar ).  She is tolerating the medication well without side effects.  Compliance with treatment has been good; she takes her medication as directed and follows up as directed.      ROS:     CONSTITUTIONAL:  Negative for chills and fever.      CARDIOVASCULAR:  Negative for chest pain and palpitations.      RESPIRATORY:  Positive for chronic cough.   Negative for recent cough or dyspnea.      GASTROINTESTINAL:  Negative for abdominal pain, nausea and vomiting.      INTEGUMENTARY/BREAST:  Negative for atypical mole(s) and rash.          Past Medical History / Family History / Social History:         Last Reviewed on 3/10/2020 01:06 PM by Joseph Walker    Past Medical History:                 PAST MEDICAL HISTORY             CURRENT MEDICAL PROVIDERS:    E/N/T: Dr. Geraldine MONTE  DIRECTIVES: Living will - She says that her daughter, Carla, would make decisions for her if needed.          PREVENTIVE HEALTH MAINTENANCE             BONE DENSITY: was last done 04/2019 with the following abnormality noted-- Osteopenia     COLORECTAL CANCER SCREENING: Up to date (colonoscopy q10y; sigmoidoscopy q5y; Cologuard q3y) was last done 06/2011, Results are in chart; colonoscopy with normal results     MAMMOGRAM: Done within last 2 years and results in are chart was last done 10/31/19 which was abnormal     PAP SMEAR: was last done 09/28/2006         Surgical History:         Biopsy of breast; benign    Hysterectomy: Complete;     Tonsillectomy/Adenoidectomy Procedures: colonoscopy 2002, NEG         Family History:         Positive for Cerebrovascular Accident ( mother ), Coronary Artery Disease ( father ) and Hypertension ( mother ).          Social History:     Occupation: Employed at [employer]     Marital Status:      Children: 2 children         Tobacco/Alcohol/Supplements:     Last Reviewed on 5/04/2020 08:56 AM by Matilda Huffman    Tobacco: She has never smoked.  Non-drinker         Substance Abuse History:     Last Reviewed on 3/10/2020 01:06 PM by Joseph Walker    NEGATIVE         Mental Health History:     Last Reviewed on 3/10/2020 01:06 PM by Joseph Walker        Communicable Diseases (eg STDs):     Last Reviewed on 3/10/2020 01:06 PM by Joseph Walker        Current Problems:     Last Reviewed on 3/10/2020 01:06 PM by Joseph Walker    Allergic rhinitis, unspecified    Other specified anxiety disorders    Hormone replacement therapy (postmenopausal)    Mixed hyperlipidemia    Other long term (current) drug therapy    Essential (primary) hypertension    Dizziness and giddiness    Zoster without complications    Other fatigue    Gastro-esophageal reflux disease without esophagitis    Pain in right hand    Pain in left hand    Acute bronchitis,  unspecified    Encounter for screening for depression    Cough    Pneumonia, unspecified organism    Headache    Bronchiectasis with acute lower respiratory infection    Allergic rhinitis    Pain in left shoulder    Pain in left arm    Encounter for screening for other viral diseases        Immunizations:     influenza, high-dose, quadrivalent 9/24/2020    Td adult 12/24/2007    Hep A, adult dose 1/1/2019    Hep A, adult dose 6/12/2019    zzPrevnar-13 10/29/2015    Fluzone (3 + years dose) 10/28/2008    Fluzone (3 + years dose) 9/11/2009    Fluzone (3 + years dose) 9/16/2010    Fluzone (3 + years dose) 10/20/2011    Influenza A (H1N1), IM (3+ years) Monovalent 12/26/2009    Fluzone High-Dose pf (>=65 yr) 9/25/2013    Fluzone High-Dose pf (>=65 yr) 10/16/2012    Fluzone High-Dose pf (>=65 yr) 10/1/2014    Fluzone High-Dose pf (>=65 yr) 10/14/2015    Fluzone High-Dose pf (>=65 yr) 10/19/2016    Fluzone High-Dose pf (>=65 yr) 9/28/2017    Fluzone High-Dose pf (>=65 yr) 10/11/2018    Fluzone High-Dose pf (>=65 yr) 9/30/2019    Pneumococcal, 23-valent IM/SC (adult and pt >=2yr) 2/7/2008    PNEUMOVAX 23 (Pneumococcal PPV23) 10/8/2019        Allergies:     Last Reviewed on 5/04/2020 08:54 AM by Matilda Huffman    Penicillins:      Fosamax: abdominal pain  (Adverse Reaction)        Current Medications:     Last Reviewed on 5/04/2020 08:56 AM by Matilda Huffman    multivitamin     albuterol sulfate 2.5 mg/0.5 mL Inhalation Solution for Nebulization [inhale 0.5 milliliter (2.5 mg) by nebulization route 3 times per day]    Advair Diskus 100-50 mcg/dose Inhalation Blister, With Inhalation Device [inhale 1 puff by inhalation route 2 times per day approximately 12 hours apart at the same times each day]    Estradiol 1 mg oral tablet [Take 1 tablet(s) by mouth daily]    Fluticasone Propionate 50 mcg/actuation Intranasal Spray, Suspension    Pravastatin 20 mg oral tablet [Take 1 tablet(s) by mouth at bedtime]    clonazePAM 0.5 mg oral  tablet [TAKE ONE TABLET BY MOUTH TWICE A DAY AS NEEDED FOR ANXIETY ]    losartan 50 mg oral tablet [TAKE ONE TABLET BY MOUTH ONCE DAILY ]    levocetirizine 5 mg oral tablet [Take 1 tablet(s) by mouth each evening]    azelastine 137 mcg (0.1 %) Intranasal Aerosol, Spray    allergy shot once weekly     omeprazole 20 mg oral capsule,delayed release (enteric coated) [TAKE ONE CAPSULE BY MOUTH ONCE DAILY ]        Objective:        Vitals:         Current: 10/6/2020 12:51:36 PM    Ht:  5 ft, 0.25 in;  Wt: 115.2 lbs;  BMI: 22.3T: 98 F (temporal);  BP: 168/80 mm Hg (left arm, sitting);  P: 82 bpm (left arm (BP Cuff), sitting);  sCr: 0.76 mg/dL;  GFR: 53.12        Repeat:     12:52:56 PM  BP:   160/79mm Hg (left arm, sitting, pulse-84)     Exams:     PHYSICAL EXAM:     GENERAL: vital signs recorded - well developed, well nourished;  no apparent distress;     EYES: extraocular movements intact; conjunctiva and cornea are normal; PERRL;     NECK: range of motion is normal; thyroid is non-palpable;     RESPIRATORY: normal respiratory rate and pattern with no distress; normal breath sounds with no rales, rhonchi, wheezes or rubs;     CARDIOVASCULAR: normal rate; rhythm is regular;  no systolic murmur; no edema;     GASTROINTESTINAL: nontender; normal bowel sounds; no organomegaly;     NEUROLOGIC: mental status: alert and oriented x 3; cranial nerves II-XII grossly intact;     PSYCHIATRIC: appropriate affect and demeanor; normal psychomotor function;         Lab/Test Results:         Urine temperature: confirmed (10/06/2020),     All urine drug screen levels confirmed negative: yes (10/06/2020),     Date and time of last pill: clonazepam-10/5/20 @ 9pm/radha (10/06/2020),     Performed by: [facility] (10/06/2020),     Collection Time: 1330 (10/06/2020),             Procedures:     Allergic rhinitis    1. Single Allergy Injection given SQ; administered by: Other (enter in)         Allergic rhinitis, unspecified        Single Allergy  Injection Vial 1: exp 3/10/21 Allergy shot given in the right upper arm.              Assessment:         F41.8   Other specified anxiety disorders       Z79.899   Other long term (current) drug therapy       E78.2   Mixed hyperlipidemia       I10   Essential (primary) hypertension       477.9   Allergic rhinitis       J30.9   Allergic rhinitis, unspecified       Z12.31   Encounter for screening mammogram for malignant neoplasm of breast           ORDERS:         Meds Prescribed:       [Refilled] Estradiol 1 mg oral tablet [Take 1 tablet(s) by mouth daily], #90 (ninety) tablets, Refills: 1 (one)       [Refilled] levocetirizine 5 mg oral tablet [Take 1 tablet(s) by mouth each evening], #90 (ninety) tablets, Refills: 1 (one)       [Refilled] Pravastatin 20 mg oral tablet [Take 1 tablet(s) by mouth at bedtime], #90 (ninety) tablets, Refills: 1 (one)       [Refilled] omeprazole 20 mg oral capsule,delayed release (enteric coated) [TAKE ONE CAPSULE BY MOUTH ONCE DAILY ], #90 (ninety) each, Refills: 1 (one)       [Refilled] losartan 50 mg oral tablet [TAKE ONE TABLET BY MOUTH ONCE DAILY ], #90 (ninety) each, Refills: 1 (one)       [Refilled] clonazePAM 0.5 mg oral tablet [TAKE ONE TABLET BY MOUTH TWICE A DAY AS NEEDED FOR ANXIETY ], #30 (thirty) tablets, Refills: 2 (two)         Radiology/Test Orders:       [id number]  Pro services for allergen immunotherapy not including provision of allergenic extracts; 1 injection  (In-House)            79557  Screening digital breast tomosynthesis bi  (Send-Out)              Lab Orders:       [id number]  Drug test prsmv qual dir optical obs per day  (In-House)              Other Orders:       1100F  Pt screen for fall risk; document 2+ falls in the past yr or any fall w/injury in past year (ALEX)  (In-House)              Screening mammogram results documented  (Send-Out)            1123F  Advance Care Planning discussed and doc; advance care plan or surrogate decision maker doc. in    (Send-Out)                      Plan:         Other specified anxiety disorders        RECOMMENDATIONS given include: Luma Grey is doing well at this time.  We have reviewed her care and will update refills as noted below.  I did ask her to have her blood pressure checked several times when she has her allergy injections.  We will move forward from there.  Drug screen today.  I do think ongoing use of Klonopin is reasonable..  MIPS Has fallen 2+ times in the past year or had one fall with an injury in the past year Vaccines Flu and Pneumonia updated in Shot record Screening mammomgram done within last 2 years and results in are chart ACP discussion: Advance Directive/Surrogate Decision Maker discussed and scanned into chart           Prescriptions:       [Refilled] Estradiol 1 mg oral tablet [Take 1 tablet(s) by mouth daily], #90 (ninety) tablets, Refills: 1 (one)       [Refilled] levocetirizine 5 mg oral tablet [Take 1 tablet(s) by mouth each evening], #90 (ninety) tablets, Refills: 1 (one)       [Refilled] Pravastatin 20 mg oral tablet [Take 1 tablet(s) by mouth at bedtime], #90 (ninety) tablets, Refills: 1 (one)       [Refilled] omeprazole 20 mg oral capsule,delayed release (enteric coated) [TAKE ONE CAPSULE BY MOUTH ONCE DAILY ], #90 (ninety) each, Refills: 1 (one)       [Refilled] losartan 50 mg oral tablet [TAKE ONE TABLET BY MOUTH ONCE DAILY ], #90 (ninety) each, Refills: 1 (one)       [Refilled] clonazePAM 0.5 mg oral tablet [TAKE ONE TABLET BY MOUTH TWICE A DAY AS NEEDED FOR ANXIETY ], #30 (thirty) tablets, Refills: 2 (two)           Orders:       1100F  Pt screen for fall risk; document 2+ falls in the past yr or any fall w/injury in past year (ALEX)  (In-House)              Screening mammogram results documented  (Send-Out)            1123F  Advance Care Planning discussed and doc; advance care plan or surrogate decision maker doc. in MR  (Send-Out)              Other long term (current) drug  therapyAs above.    LABORATORY:  Labs ordered to be performed today include Drug screen.            Orders:       [id number]  Drug test prsmv qual dir optical obs per day  (In-House)              Mixed hyperlipidemiaAs above.        Allergic rhinitis        MEDICATIONS: Cardiac medication reviewed and patient is not on a beta blocker.            Orders:       [id number]  Pro services for allergen immunotherapy not including provision of allergenic extracts; 1 injection  (In-House)              Allergic rhinitis, unspecifiedAs above.        Encounter for screening mammogram for malignant neoplasm of breast        RADIOLOGY:  I have ordered Mammogram Bilateral Screening 3D to be done today.            Orders:       [id number]  Screening digital breast tomosynthesis bi  (Send-Out)                  Charge Capture:         Primary Diagnosis:     F41.8  Other specified anxiety disorders           Orders:      [id number]  Office/outpatient visit; established patient, level 4  (In-House)            1100F  Pt screen for fall risk; document 2+ falls in the past yr or any fall w/injury in past year (ALEX)  (In-House)              Z79.899  Other long term (current) drug therapy           Orders:      [id number]  Drug test prsmv qual dir optical obs per day  (In-House)              E78.2  Mixed hyperlipidemia     I10  Essential (primary) hypertension     477.9  Allergic rhinitis           Orders:      [id number]  Pro services for allergen immunotherapy not including provision of allergenic extracts; 1 injection  (In-House)              J30.9  Allergic rhinitis, unspecified     Z12.31  Encounter for screening mammogram for malignant neoplasm of breast

## 2021-05-18 NOTE — PROGRESS NOTES
Luma Cruz  1940     Office/Outpatient Visit    Visit Date: Tue, Mar 10, 2020 12:46 pm    Provider: Joseph Walker MD (Assistant: Rosalba Johnson MA)    Location: [facility]        Electronically signed by Joseph Walker MD on  03/11/2020 06:37:19 AM                             Subjective:        CC: blood pressure, cholesterol, cough    HPI:           Patient to be evaluated for mixed hyperlipidemia.  Current treatment includes Pravachol.  Compliance with treatment has been good; she takes her medication as directed and follows up as directed.  She denies experiencing any hypercholesterolemia related symptoms.            In regard to the essential (primary) hypertension, her current cardiac medication regimen includes an angiotensin receptor blocker ( Cozaar ).  She is tolerating the medication well without side effects.  Compliance with treatment has been good; she takes her medication as directed and follows up as directed.        Luma Grey has been struggling with cough for about 3 months now.  She says that this is the 5th time that she has been in.  So far, she has had some antibiotics and steroids that did not seem to help.  She actually has had two different courses of antibiotics.  Recent CT scan is again reviewed as well.          Luma Grey is also on for follow up on chronic anxiety.  This has been a long term issue for which she takes Klonopin at bedtime.  She has been on this for a long time and feels it is helpful and necessary.  She denies side effects from it - no sedation or confusion.  Davi is reviewed and consistent.  She denies abuse, diversion, or doctor shopping.    ROS:     CONSTITUTIONAL:  Negative for chills and fever.      E/N/T:  Positive for sinus pressure.   Negative for sore throat.      CARDIOVASCULAR:  Negative for chest pain and palpitations.      RESPIRATORY:  Positive for chronic cough.   Negative for recent cough or dyspnea.       GASTROINTESTINAL:  Negative for abdominal pain, nausea and vomiting.      INTEGUMENTARY/BREAST:  Negative for atypical mole(s) and rash.          Past Medical History / Family History / Social History:         Last Reviewed on 3/10/2020 01:06 PM by Joseph Walker    Past Medical History:                 PAST MEDICAL HISTORY             CURRENT MEDICAL PROVIDERS:    E/N/T: Dr. Chandler             ADVANCE DIRECTIVES: Living will - She says that her daughter, Carla, would make decisions for her if needed.          PREVENTIVE HEALTH MAINTENANCE             BONE DENSITY: was last done 04/2019 with the following abnormality noted-- Osteopenia     COLORECTAL CANCER SCREENING: Up to date (colonoscopy q10y; sigmoidoscopy q5y; Cologuard q3y) was last done 06/2011, Results are in chart; colonoscopy with normal results     MAMMOGRAM: Done within last 2 years and results in are chart was last done 10/31/19 which was abnormal     PAP SMEAR: was last done 09/28/2006         Surgical History:         Biopsy of breast; benign    Hysterectomy: Complete;     Tonsillectomy/Adenoidectomy Procedures: colonoscopy 2002, NEG         Family History:         Positive for Cerebrovascular Accident ( mother ), Coronary Artery Disease ( father ) and Hypertension ( mother ).          Social History:     Occupation: Employed at [employer]     Marital Status:      Children: 2 children         Tobacco/Alcohol/Supplements:     Last Reviewed on 3/10/2020 01:06 PM by Joseph Walker    Tobacco: She has never smoked.  Non-drinker         Substance Abuse History:     Last Reviewed on 3/10/2020 01:06 PM by Joseph Walker    NEGATIVE         Mental Health History:     Last Reviewed on 3/10/2020 01:06 PM by Joseph Walker        Communicable Diseases (eg STDs):     Last Reviewed on 3/10/2020 01:06 PM by Joseph Walker        Current Problems:     Last Reviewed on 3/10/2020 01:06 PM by Joseph Walker  Pollo    Allergic rhinitis, unspecified    Other specified anxiety disorders    Hormone replacement therapy (postmenopausal)    Mixed hyperlipidemia    Other long term (current) drug therapy    Essential (primary) hypertension    Dizziness and giddiness    Zoster without complications    Gastro-esophageal reflux disease without esophagitis    Pain in right hand    Pain in left hand    Other fatigue    Encounter for screening for depression    Acute bronchitis, unspecified    Cough    Allergic rhinitis    Pneumonia, unspecified organism        Immunizations:     Td adult 12/24/2007    Hep A, adult dose 1/1/2019    Hep A, adult dose 6/12/2019    zzPrevnar-13 10/29/2015    Fluzone (3 + years dose) 10/28/2008    Fluzone (3 + years dose) 9/11/2009    Fluzone (3 + years dose) 9/16/2010    Fluzone (3 + years dose) 10/20/2011    Influenza A (H1N1), IM (3+ years) Monovalent 12/26/2009    Fluzone High-Dose pf (>=65 yr) 9/25/2013    Fluzone High-Dose pf (>=65 yr) 10/16/2012    Fluzone High-Dose pf (>=65 yr) 10/1/2014    Fluzone High-Dose pf (>=65 yr) 10/14/2015    Fluzone High-Dose pf (>=65 yr) 10/19/2016    Fluzone High-Dose pf (>=65 yr) 9/28/2017    Fluzone High-Dose pf (>=65 yr) 10/11/2018    Fluzone High-Dose pf (>=65 yr) 9/30/2019    Pneumococcal, 23-valent IM/SC (adult and pt >=2yr) 2/7/2008    PNEUMOVAX 23 (Pneumococcal PPV23) 10/8/2019        Allergies:     Last Reviewed on 3/10/2020 01:06 PM by Joseph Walker    Penicillins:      Fosamax: abdominal pain  (Adverse Reaction)        Current Medications:     Last Reviewed on 3/10/2020 01:06 PM by Joseph Walker    multivitamin     Estradiol 1 mg oral tablet [Take 1 tablet(s) by mouth daily]    Fluticasone Propionate 50 mcg/actuation Intranasal Spray, Suspension    Pravastatin 20 mg oral tablet [Take 1 tablet(s) by mouth at bedtime]    clonazePAM 0.5 mg oral tablet [TAKE ONE TABLET BY MOUTH TWICE A DAY AS NEEDED FOR ANXIETY]    Losartan 50 mg oral tablet  [Take 1 tablet(s) by mouth daily]    levocetirizine 5 mg oral tablet [Take 1 tablet(s) by mouth each evening]    allergy shot once weekly     azelastine 137 mcg (0.1 %) Intranasal Aerosol, Spray    Omeprazole 20 mg oral capsule,delayed release (enteric coated) [Take 1 capsule(s) by mouth daily]    Tessalon Perles 100 mg oral capsule [take 1 capsule (100 mg) by oral route every 8 hours as needed for cough]        Objective:        Vitals:         Current: 3/10/2020 12:50:43 PM    Ht:  5 ft, 0.25 in;  Wt: 119.4 lbs;  BMI: 23.1T: 98.5 F (oral);  BP: 148/66 mm Hg (left arm, sitting);  P: 89 bpm (left arm (BP Cuff), sitting);  sCr: 0.93 mg/dL;  GFR: 44.08        Exams:     PHYSICAL EXAM:     GENERAL: vital signs recorded - well developed, well nourished;  no apparent distress;     EYES: extraocular movements intact; conjunctiva and cornea are normal; PERRL;     E/N/T: EARS:  normal external auditory canals and tympanic membranes;  grossly normal hearing; NOSE: bilateral maxillary sinus tenderness present; OROPHARYNX:  normal mucosa, dentition, gingiva, and posterior pharynx;     NECK: range of motion is normal; thyroid is non-palpable;     RESPIRATORY: normal respiratory rate and pattern with no distress; rales heard throughout;     CARDIOVASCULAR: normal rate; rhythm is regular;  no systolic murmur; no edema;     GASTROINTESTINAL: nontender; normal bowel sounds; no organomegaly;     LYMPHATIC: no enlargement of cervical or facial nodes; no supraclavicular nodes;     BREAST/INTEGUMENT: SKIN: no significant rashes or lesions; no suspicious moles;     NEUROLOGIC: mental status: alert and oriented x 3; cranial nerves II-XII grossly intact;     PSYCHIATRIC: appropriate affect and demeanor; normal psychomotor function;         Procedures:     Cough        Nebulizer: albuterol/ipratropium 1 treatments were performed on patient. Pre-treatment Pulse: 97; O2 Sat: 95% Post-treatment Pulse: 100; O2 Sat: 98% Lot#[id number]  Expiration:2/21 Was treatment tolerated? yes; Administered by:georgina         Allergic rhinitis, unspecified        Single Allergy Injection exp 7/7/20/ pdr             Assessment:         E78.2   Mixed hyperlipidemia       I10   Essential (primary) hypertension       R05   Cough       F41.8   Other specified anxiety disorders       Z79.899   Other long term (current) drug therapy       R51   Headache       J30.9   Allergic rhinitis, unspecified           ORDERS:         Meds Prescribed:       [Refilled] Omeprazole 20 mg oral capsule,delayed release (enteric coated) [Take 1 capsule(s) by mouth daily], #90 (ninety) capsules, Refills: 1 (one)       [Refilled] Estradiol 1 mg oral tablet [Take 1 tablet(s) by mouth daily], #90 (ninety) tablets, Refills: 1 (one)       [Refilled] levocetirizine 5 mg oral tablet [Take 1 tablet(s) by mouth each evening], #90 (ninety) tablets, Refills: 1 (one)       [Refilled] Losartan 50 mg oral tablet [Take 1 tablet(s) by mouth daily], #90 (ninety) tablets, Refills: 1 (one)       [Refilled] Pravastatin 20 mg oral tablet [Take 1 tablet(s) by mouth at bedtime], #90 (ninety) tablets, Refills: 1 (one)       [Refilled] clonazePAM 0.5 mg oral tablet [TAKE ONE TABLET BY MOUTH TWICE A DAY AS NEEDED FOR ANXIETY], #90 (ninety) unspecified, Refills: 0 (zero)       [New Rx] levoFLOXacin 500 mg oral tablet [take 1 tablet (500 mg) by oral route every 24 hours], #10 (ten) tablets, Refills: 0 (zero)         Radiology/Test Orders:       [id number]  Radiologic examination, sinuses, paranasal, complete, minimum of three views  (Send-Out)            14616  Professional services for allergen immunotherapy not including provision of allergenic extracts; single injection  (In-House)              Lab Orders:       [id number]  HTNLP - [facility] CMP AND LIPID: [id number]  (Send-Out)            56281  TSH - [facility] TSH  (Send-Out)            47300  SPUTC - [facility] Sputum Culture  (Send-Out)            50228  BDCB2 - [facility] CBC w/o diff   (Send-Out)              Procedures Ordered:       [id number]  inhalation treatment for acute airway obstruction or for sputum induction for diagnostic purposes; eg, with an aerosol generator nebulizer, metered dose inhaler, or intermittent positive pressure breathing (IPPB) device  (In-House)              Other Orders:       65260  Noninvasive ear or pulse oximetry for oxygen saturation; multiple determinations   (In-House)              Albuterol, 2.5mg & ipratropium bromide, 0.5 mg, FDA final, non-compound admin DME  (x1)                  Plan:         Mixed hyperlipidemia    LABORATORY:  Labs ordered to be performed today include HTN/Lipid Panel: CMP, Lipid and TSH.      RECOMMENDATIONS given include: We will refill needed medications including Klonopin.  However, I am concerned about the findings on her exam as it pertains to cough.  We will give her a neb treatment and obtain sputum culture.  I am leaning toward covering her with Levaquin in the near term given the finding of bronchiectasis and the possibility of infection being present.  I don't have a clear handle though of what is going on.  We will also recommend she see pulmonology as scheduled.  She should continue nebulizer treatments at home for the near term as well..            Prescriptions:       [Refilled] Omeprazole 20 mg oral capsule,delayed release (enteric coated) [Take 1 capsule(s) by mouth daily], #90 (ninety) capsules, Refills: 1 (one)       [Refilled] Estradiol 1 mg oral tablet [Take 1 tablet(s) by mouth daily], #90 (ninety) tablets, Refills: 1 (one)       [Refilled] levocetirizine 5 mg oral tablet [Take 1 tablet(s) by mouth each evening], #90 (ninety) tablets, Refills: 1 (one)       [Refilled] Losartan 50 mg oral tablet [Take 1 tablet(s) by mouth daily], #90 (ninety) tablets, Refills: 1 (one)       [Refilled] Pravastatin 20 mg oral tablet [Take 1 tablet(s) by mouth at bedtime], #90 (ninety) tablets, Refills: 1 (one)       [Refilled]  clonazePAM 0.5 mg oral tablet [TAKE ONE TABLET BY MOUTH TWICE A DAY AS NEEDED FOR ANXIETY], #90 (ninety) unspecified, Refills: 0 (zero)           Orders:       75241  HTNLP - [facility] CMP AND LIPID: [id number]  (Send-Out)            11197  TSH - [facility] TSH  (Send-Out)              Essential (primary) hypertensionAs above.        Cough    LABORATORY:  Labs ordered to be performed today include CBC W/O DIFF and sputum culture.      TESTS/PROCEDURES:  Will proceed with Inhalation Treatment Albuterol 3 mg and Ipratropium Bromide 0.5mg to be performed/scheduled now.            Prescriptions:       [New Rx] levoFLOXacin 500 mg oral tablet [take 1 tablet (500 mg) by oral route every 24 hours], #10 (ten) tablets, Refills: 0 (zero)           Orders:       65278  SPUTC - [facility] Sputum Culture  (Send-Out)            84861  BDCB2 - [facility] CBC w/o diff  (Send-Out)            55234  inhalation treatment for acute airway obstruction or for sputum induction for diagnostic purposes; eg, with an aerosol generator nebulizer, metered dose inhaler, or intermittent positive pressure breathing (IPPB) device  (In-House)              Albuterol, 2.5mg & ipratropium bromide, 0.5 mg, FDA final, non-compound admin DME  (x1)        01555  Noninvasive ear or pulse oximetry for oxygen saturation; multiple determinations   (In-House)              Other specified anxiety disordersAs above.        Other long term (current) drug therapyAs above.        Headache        RADIOLOGY:  I have ordered Sinus series xray to be done today.            Orders:       98642  Radiologic examination, sinuses, paranasal, complete, minimum of three views  (Send-Out)              Allergic rhinitis, unspecified          Orders:       68623  Professional services for allergen immunotherapy not including provision of allergenic extracts; single injection  (In-House)                  Patient Recommendations:        For  Headache:    I also recommend Sinus series xray.               Charge Capture:         Primary Diagnosis:     E78.2  Mixed hyperlipidemia           Orders:      89191  Office/outpatient visit; established patient, level 4  (In-House)              I10  Essential (primary) hypertension     R05  Cough           Orders:      96495  inhalation treatment for acute airway obstruction or for sputum induction for diagnostic purposes; eg, with an aerosol generator nebulizer, metered dose inhaler, or intermittent positive pressure breathing (IPPB) device  (In-House)              Albuterol, 2.5mg & ipratropium bromide, 0.5 mg, FDA final, non-compound admin DME  (x1)        26730  Noninvasive ear or pulse oximetry for oxygen saturation; multiple determinations   (In-House)              F41.8  Other specified anxiety disorders     Z79.899  Other long term (current) drug therapy     R51  Headache     J30.9  Allergic rhinitis, unspecified           Orders:      73426  Professional services for allergen immunotherapy not including provision of allergenic extracts; single injection  (In-House)

## 2021-05-18 NOTE — PROGRESS NOTES
Luma CruzAncelmo 1940     Office/Outpatient Visit    Visit Date: Tue, Feb 20, 2018 03:55 pm    Provider: Joseph Walker MD (Assistant: Luz Maria Samson MA)    Location: [facility]        Electronically signed by Joseph Walker MD on  02/21/2018 09:02:14 AM                             SUBJECTIVE:        CC: follow up on colitis, nausea, possible pneumonia         HPI:     Luma Grey is in today for follow up on how she feels.  She has been sick for over a week now with some stomach related issues.  She started feeling bad about a week ago.  She developed nausea and vomiting.  She did this for about 6 hours and also had diarrhea.  She was weak the following day and then 'kind of got over it'.  She says that she started getting sick again on Sunday - two days ago.  She actually went to the ER then and was treated with promethazine and Flagyl.  She says that she stayed in bed most of the day yesterday.  She was given some injections.  She has continued to feel sick today.  She did have labs and imaging done in the ER.  The labs looked good overall.  The CT of the abdomen was negative.  She CXR though suggested pneumonia in the R lung.  She is coughing up some yellow phlegm.         Dx with allergic rhinitis; exp date 7/18/18     ROS:     CONSTITUTIONAL:  Negative for chills and fever.      CARDIOVASCULAR:  Negative for chest pain and palpitations.      RESPIRATORY:  Negative for recent cough and dyspnea.      INTEGUMENTARY/BREAST:  Negative for atypical mole(s) and rash.          PMH/FMH/SH:     Last Reviewed on 2/20/2018 04:07 PM by Joseph Walker    Past Medical History:                 PAST MEDICAL HISTORY             CURRENT MEDICAL PROVIDERS:    E/N/T: Dr. Chandler         PREVENTIVE HEALTH MAINTENANCE             BONE DENSITY: was last done 06/17/2010 with the following abnormality noted-- Moderate Osteopenia     COLORECTAL CANCER SCREENING: Up to date (colonoscopy q10y; sigmoidoscopy q5y;  Cologuard q3y) was last done 06/2011, Results are in chart; colonoscopy with normal results     MAMMOGRAM: Done within last 2 years and results in are chart was last done 10/10/17 with normal results     PAP SMEAR: was last done 09/28/2006         Surgical History:         Biopsy of breast; benign    Hysterectomy: Complete;     Tonsillectomy/Adenoidectomy Procedures: colonoscopy 2002, NEG         Family History:         Positive for Cerebrovascular Accident ( mother ), Coronary Artery Disease ( father ) and Hypertension ( mother ).          Social History:     Occupation: Employed at [employer]     Marital Status:      Children: 2 children         Tobacco/Alcohol/Supplements:     Last Reviewed on 2/20/2018 04:07 PM by Joseph Walker    Tobacco: She has never smoked.  Non-drinker         Substance Abuse History:     Last Reviewed on 2/20/2018 04:07 PM by Joseph Walker    NEGATIVE         Mental Health History:     Last Reviewed on 2/20/2018 04:07 PM by Joseph Walker        Communicable Diseases (eg STDs):     Last Reviewed on 2/20/2018 04:07 PM by Joseph Walker            Current Problems:     Last Reviewed on 2/20/2018 04:07 PM by Joseph Walker    Vertigo     Essential hypertension     Irritable bowel syndrome     Use of high risk medications     Hypercholesterolemia     Allergic rhinitis     Post-menopausal HRT     Anxiety     Bacterial pneumonia, NEC     colitis NOS, dietetic, or noninfectious         Immunizations:     Td adult 12/24/2007     Prevnar-13 10/29/2015     Fluzone (3 + years dose) 10/28/2008     Fluzone (3 + years dose) 9/11/2009     Fluzone (3 + years dose) 9/16/2010     Fluzone (3 + years dose) 10/20/2011     Influenza A (H1N1), IM (3+ years) Monovalent 12/26/2009     Fluzone High-Dose pf (>=65 yr) 9/25/2013     Fluzone High-Dose pf (>=65 yr) 10/16/2012     Fluzone High-Dose pf (>=65 yr) 10/1/2014     Fluzone High-Dose pf (>=65 yr)  10/14/2015     Fluzone High-Dose pf (>=65 yr) 10/19/2016     Fluzone High-Dose pf (>=65 yr) 9/28/2017     Pneumococcal, 23-valent IM/SC (adult and pt >=2yr) 2/7/2008         Allergies:     Last Reviewed on 2/20/2018 04:07 PM by Joseph Walker    Penicillins:    Fosamax: abdominal pain (Adverse Reaction)        Current Medications:     Last Reviewed on 2/20/2018 04:07 PM by Joseph Walker    Clonazepam 0.5mg Tablet Take 1 tablet(s) by mouth bid as needed for anxiety     Colestipol HCl 1gm Tablet Take 1 tablet(s) by mouth daily with plenty of water     Estradiol 1mg Tablet Take 1 tablet(s) by mouth daily     Levocetirizine Dihydrochloride 5mg Tablet Take 1 tablet(s) by mouth each evening     Losartan 50mg Tablet Take 1 tablet(s) by mouth daily     Pravastatin 20mg Tablet Take 1 tablet(s) by mouth at bedtime     Hydrocortisone 2.5% Rectal Cream Apply twice daily to anus     Nexium 40mg Capsules, Delayed Release Take 1 capsule(s) by mouth daily     Meclizine HCl 25mg Tablet Take 1 to 2 tabs twice daily as needed for dizziness.     allergy shots once weekly     Azelastine HCl 0.1% Nasal Spray     Fluticasone Propionate 50mcg/1actuation Nasal Spray         OBJECTIVE:        Vitals:         Current: 2/20/2018 3:59:16 PM    Ht:  5 ft, 0.25 in;  Wt: 115.4 lbs;  BMI: 22.4    T: 99.5 F (oral);  BP: 169/88 mm Hg (right arm, standing);  P: 100 bpm (right arm (BP Cuff), standing);  sCr: 0.75 mg/dL;  GFR: 55.51        Exams:     PHYSICAL EXAM:     GENERAL: vital signs recorded - well developed, well nourished;  no apparent distress, appears moderately ill;     EYES: extraocular movements intact; conjunctiva and cornea are normal; PERRLA;     E/N/T:  normal EACs, TMs, nasal/oral mucosa, teeth, gingiva, and oropharynx;     NECK: range of motion is normal; thyroid is non-palpable;     RESPIRATORY: normal respiratory rate and pattern with no distress; normal breath sounds with no rales, rhonchi, wheezes or rubs;      CARDIOVASCULAR: normal rate; rhythm is regular;  no systolic murmur; no edema;     GASTROINTESTINAL: nontender; normal bowel sounds; no organomegaly;     BREAST/INTEGUMENT: SKIN: no significant rashes or lesions; no suspicious moles;     PSYCHIATRIC:  appropriate affect and demeanor; normal speech pattern; grossly normal memory;         ASSESSMENT           558.9   K52.89  colitis NOS, dietetic, or noninfectious              DDx:     786.2   R05  Cough              DDx:     477.9   J30.9  Allergic rhinitis              DDx:         ORDERS:         Meds Prescribed:       Zofran (Ondansetron HCl) 4mg Tablet Take 1 tablet(s) by mouth qid prn nausea  #30 (Thirty) tablet(s) Refills: 1         Radiology/Test Orders:       44826  Radiologic exam chest 2 views  (Send-Out; Stat)  (exam is normal)       28731  Pro services for allergen immunotherapy not including provision of allergenic extracts; 1 injection  (In-House)           Lab Orders:       99684  AMYS - [facility] Amylase, Serum  (Send-Out)         32877  BDCB2 - [facility] CBC w/o diff  (Send-Out)         13021  COMP - [facility] Comp. Metabolic Panel  (Send-Out)         61281  LIP - [facility] Lipase, Serum  (Send-Out)         63643  BDUAM - [facility] Urinalysis, automated, with micro  (Send-Out)                   PLAN:          colitis NOS, dietetic, or noninfectious     LABORATORY:  Labs ordered to be performed today include amylase, CBC W/O DIFF, Comprehensive metabolic panel, Lipase, and urinalysis with micro.      RECOMMENDATIONS given include: Today, we have reviewed her care.  I'm going to recommend she take some Zofran as needed for nausea.  She will get the suppository filled tomorrow.  I have asked her to NOT take the antibiotic that she was given (metronidazole - Flagyl).  Otherwise, we will see what the testing shows and go from there.  If she feels worse, she will go back to ER for fluids and additional evaluation if needed..          ADDENDUM - CXR shows likely R upper lobe pneumonia  which was also suggested on X-ray from ER.  I have sent Levaquin to [facility] for Luma Zapata and discussed these findings with her daughter.  She should also take in ample fluids.  If she worsens in the next 24-48 hours - more dehydrated, high fever over 100.5 degrees, worsening weakness, she should go to ER.  Otherwise, I would recommend she be seen again on Friday of this week to make sure she is improving.  Please touch base with Luma Zapata tomorrow (Wednesday) to see how she is feeling.  Also, I asked them to NOT take Zofran (ondansetron) with this.  Please cancel this prescription at [facility].  She should take Pepto (liquid or tablets) as needed for nausea.  Thanks.  - Joseph Walker MD - 2/20/18 - 17:35           Prescriptions:       Zofran (Ondansetron HCl) 4mg Tablet Take 1 tablet(s) by mouth qid prn nausea  #30 (Thirty) tablet(s) Refills: 1           Orders:       [id number]  AMYS - H Amylase, Serum  (Send-Out)         96337  BDCB2 - [facility] CBC w/o diff  (Send-Out)         99797  COMP - [facility] Comp. Metabolic Panel  (Send-Out)         73900  LIP - [facility] Lipase, Serum  (Send-Out)         35285  BDUAM - H Urinalysis, automated, with micro  (Send-Out)             Patient Education Handouts:       [facility]           Cough         RADIOLOGY:  I have ordered a chest x-ray (PA and lateral) to be done today.            Orders:       31782  Radiologic exam chest 2 views  (Send-Out; Stat)  (exam is normal)          Allergic rhinitis           Orders:       59898  Pro services for allergen immunotherapy not including provision of allergenic extracts; 1 injection  (In-House)               CHARGE CAPTURE           **Please note: ICD descriptions below are intended for billing purposes only and may not represent clinical diagnoses**        Primary Diagnosis:         558.9 colitis NOS, dietetic, or noninfectious            K52.89    Other specified noninfective gastroenteritis and colitis              Orders:           82310   Office/outpatient visit; established patient, level 4  (In-House)           786.2 Cough            R05    Cough    477.9 Allergic rhinitis            J30.9    Allergic rhinitis, unspecified              Orders:          97789   Pro services for allergen immunotherapy not including provision of allergenic extracts; 1 injection  (In-House)               ADDENDUMS:      ____________________________________    Addendum: 02/21/2018 09:39 AM - Four, Team        pt states she is feeling about the same, did  atbx and took second one this am, hopeful she will start feeling better, sched for f/u-th

## 2021-05-18 NOTE — PROGRESS NOTES
Luma Cruz  1940     Office/Outpatient Visit    Visit Date: Thu, Jan 30, 2020 12:48 pm    Provider: Joseph Walker MD (Assistant: Rosalba Johnson MA)    Location: [facility]        Electronically signed by Joseph Walker MD on  01/31/2020 12:45:34 PM                             Subjective:        CC: cough    HPI:           Cough noted.  It has been present for the past 4 weeks.  Respiratory symptoms include chest congestion, productive cough and shortness of breath.  Other symptoms include chest congestion and nasal congestion.  Sputum is described as moderate in volume and yellow.      ROS:     CONSTITUTIONAL:  Negative for chills and fever.      CARDIOVASCULAR:  Negative for chest pain and palpitations.      GASTROINTESTINAL:  Negative for abdominal pain, nausea and vomiting.      INTEGUMENTARY/BREAST:  Negative for atypical mole(s) and rash.          Past Medical History / Family History / Social History:         Last Reviewed on 1/30/2020 12:59 PM by Joseph Walker    Past Medical History:                 PAST MEDICAL HISTORY             CURRENT MEDICAL PROVIDERS:    E/N/T: Dr. Chandler             ADVANCE DIRECTIVES: Living will - She says that her daughter, Carla, would make decisions for her if needed.          PREVENTIVE HEALTH MAINTENANCE             BONE DENSITY: was last done 04/2019 with the following abnormality noted-- Osteopenia     COLORECTAL CANCER SCREENING: Up to date (colonoscopy q10y; sigmoidoscopy q5y; Cologuard q3y) was last done 06/2011, Results are in chart; colonoscopy with normal results     MAMMOGRAM: Done within last 2 years and results in are chart was last done 10/31/19 which was abnormal     PAP SMEAR: was last done 09/28/2006         Surgical History:         Biopsy of breast; benign    Hysterectomy: Complete;     Tonsillectomy/Adenoidectomy Procedures: colonoscopy 2002, NEG         Family History:         Positive for Cerebrovascular Accident (  mother ), Coronary Artery Disease ( father ) and Hypertension ( mother ).          Social History:     Occupation: Employed at [employer]     Marital Status:      Children: 2 children         Tobacco/Alcohol/Supplements:     Last Reviewed on 1/30/2020 12:59 PM by Joseph Walker    Tobacco: She has never smoked.  Non-drinker         Substance Abuse History:     Last Reviewed on 1/30/2020 12:59 PM by Joseph Walker    NEGATIVE         Mental Health History:     Last Reviewed on 1/30/2020 12:59 PM by Joseph Walker        Communicable Diseases (eg STDs):     Last Reviewed on 1/30/2020 12:59 PM by Joseph Walker        Current Problems:     Last Reviewed on 1/30/2020 12:59 PM by Joseph Walker    Allergic rhinitis, unspecified    Other specified anxiety disorders    Hormone replacement therapy (postmenopausal)    Mixed hyperlipidemia    Other long term (current) drug therapy    Essential (primary) hypertension    Dizziness and giddiness    Zoster without complications    Other fatigue    Gastro-esophageal reflux disease without esophagitis    Pain in right hand    Pain in left hand    Encounter for screening for depression    Acute bronchitis, unspecified        Immunizations:     Td adult 12/24/2007    Hep A, adult dose 1/1/2019    Hep A, adult dose 6/12/2019    zzPrevnar-13 10/29/2015    Fluzone (3 + years dose) 10/28/2008    Fluzone (3 + years dose) 9/11/2009    Fluzone (3 + years dose) 9/16/2010    Fluzone (3 + years dose) 10/20/2011    Influenza A (H1N1), IM (3+ years) Monovalent 12/26/2009    Fluzone High-Dose pf (>=65 yr) 9/25/2013    Fluzone High-Dose pf (>=65 yr) 10/16/2012    Fluzone High-Dose pf (>=65 yr) 10/1/2014    Fluzone High-Dose pf (>=65 yr) 10/14/2015    Fluzone High-Dose pf (>=65 yr) 10/19/2016    Fluzone High-Dose pf (>=65 yr) 9/28/2017    Fluzone High-Dose pf (>=65 yr) 10/11/2018    Fluzone High-Dose pf (>=65 yr) 9/30/2019    Pneumococcal,  23-valent IM/SC (adult and pt >=2yr) 2/7/2008    PNEUMOVAX 23 (Pneumococcal PPV23) 10/8/2019        Allergies:     Last Reviewed on 1/30/2020 12:59 PM by Joseph Walker    Penicillins:      Fosamax: abdominal pain  (Adverse Reaction)        Current Medications:     Last Reviewed on 1/30/2020 12:59 PM by Joseph Walker    Estradiol 1 mg oral tablet [Take 1 tablet(s) by mouth daily]    Fluticasone Propionate 50 mcg/actuation Intranasal Spray, Suspension    Pravastatin 20 mg oral tablet [Take 1 tablet(s) by mouth at bedtime]    clonazePAM 0.5 mg oral tablet [TAKE ONE TABLET BY MOUTH TWICE A DAY AS NEEDED FOR ANXIETY]    Losartan 50 mg oral tablet [Take 1 tablet(s) by mouth daily]    promethazine 25 mg oral tablet [Take 1/2 to 1 tablet(s) by mouth q  6 hr PRN nausea/vomiting]    levocetirizine 5 mg oral tablet [Take 1 tablet(s) by mouth each evening]    allergy shot once weekly     azelastine 137 mcg (0.1 %) Intranasal Aerosol, Spray    Omeprazole 20 mg oral capsule,delayed release (enteric coated) [Take 1 capsule(s) by mouth daily]    promethazine-DM 6.25-15 mg/5 mL oral Syrup [take 5-10 milliliters by oral route every 6 hours as needed, not to exceed 30 mL in 24 hours]        Objective:        Vitals:         Current: 1/30/2020 12:52:32 PM    Ht:  5 ft, 0.25 in;  Wt: 117.2 lbs;  BMI: 22.7T: 98.3 F (oral);  BP: 133/63 mm Hg (right arm, sitting);  P: 75 bpm (right arm (BP Cuff), sitting);  sCr: 0.93 mg/dL;  GFR: 43.73        Exams:     PHYSICAL EXAM:     GENERAL: vital signs recorded - well developed, well nourished;  no apparent distress;     EYES: extraocular movements intact; conjunctiva and cornea are normal; PERRL;     E/N/T:  normal EACs, TMs, nasal/oral mucosa, teeth, gingiva, and oropharynx;     NECK: range of motion is normal; thyroid is non-palpable;     RESPIRATORY: normal respiratory rate and pattern with no distress; normal breath sounds with no rales, rhonchi, wheezes or rubs;      CARDIOVASCULAR: normal rate; rhythm is regular;  no systolic murmur; no edema;     GASTROINTESTINAL: nontender; normal bowel sounds; no organomegaly;     LYMPHATIC: no enlargement of cervical or facial nodes; no supraclavicular nodes;     BREAST/INTEGUMENT: SKIN: no significant rashes or lesions; no suspicious moles;     NEUROLOGIC: mental status: alert and oriented x 3; cranial nerves II-XII grossly intact;     PSYCHIATRIC: appropriate affect and demeanor; normal psychomotor function;         Assessment:         R05   Cough           ORDERS:         Radiology/Test Orders:       3017F  Colorectal CA screen results documented and reviewed (PV)  (In-House)              Other Orders:         Screening mammogram results documented  (Send-Out)            1123F  Advance Care Planning discussed and doc; advance care plan or surrogate decision maker doc. in MR  (Send-Out)                      Plan:         CoughWe will cover Luma Grey as noted in medication list.  She has been struggling for a few weeks, and I think a prednisone burst and antibiotic are reasonable options at this time.  If she does not see improvement, she will return.  No other changes today.    MIPS Vaccines Flu and Pneumonia updated in Shot record Screening mammomgram done within last 2 years and results in are chart Colorectal Cancer Screening is up to date and the results are in the chart ACP discussion: Advance Directive/Surrogate Decision Maker discussed and scanned into chart           Orders:         Screening mammogram results documented  (Send-Out)            3017F  Colorectal CA screen results documented and reviewed (PV)  (In-House)            1123F  Advance Care Planning discussed and doc; advance care plan or surrogate decision maker doc. in MR  (Send-Out)                  Charge Capture:         Primary Diagnosis:     R05  Cough           Orders:      26816  Office/outpatient visit; established patient, level 3  (In-House)             3017F  Colorectal CA screen results documented and reviewed (PV)  (In-House)

## 2021-05-28 VITALS
HEIGHT: 62 IN | DIASTOLIC BLOOD PRESSURE: 67 MMHG | SYSTOLIC BLOOD PRESSURE: 140 MMHG | OXYGEN SATURATION: 95 % | WEIGHT: 118 LBS | TEMPERATURE: 97 F | RESPIRATION RATE: 14 BRPM | BODY MASS INDEX: 21.71 KG/M2 | HEART RATE: 76 BPM

## 2021-05-28 VITALS
HEIGHT: 62 IN | RESPIRATION RATE: 16 BRPM | OXYGEN SATURATION: 99 % | SYSTOLIC BLOOD PRESSURE: 130 MMHG | WEIGHT: 120 LBS | BODY MASS INDEX: 22.08 KG/M2 | HEART RATE: 70 BPM | DIASTOLIC BLOOD PRESSURE: 57 MMHG | TEMPERATURE: 96.5 F

## 2021-05-28 VITALS
HEART RATE: 99 BPM | TEMPERATURE: 96.9 F | BODY MASS INDEX: 21.54 KG/M2 | SYSTOLIC BLOOD PRESSURE: 166 MMHG | RESPIRATION RATE: 14 BRPM | DIASTOLIC BLOOD PRESSURE: 64 MMHG | RESPIRATION RATE: 18 BRPM | OXYGEN SATURATION: 98 % | TEMPERATURE: 98.1 F | WEIGHT: 117.06 LBS | HEIGHT: 62 IN | HEART RATE: 75 BPM | DIASTOLIC BLOOD PRESSURE: 81 MMHG | WEIGHT: 115 LBS | SYSTOLIC BLOOD PRESSURE: 168 MMHG | BODY MASS INDEX: 21.16 KG/M2 | HEIGHT: 62 IN | OXYGEN SATURATION: 96 %

## 2021-05-28 VITALS
RESPIRATION RATE: 12 BRPM | HEART RATE: 90 BPM | SYSTOLIC BLOOD PRESSURE: 143 MMHG | WEIGHT: 114.37 LBS | BODY MASS INDEX: 21.05 KG/M2 | DIASTOLIC BLOOD PRESSURE: 67 MMHG | OXYGEN SATURATION: 98 % | HEIGHT: 62 IN | TEMPERATURE: 97.4 F

## 2021-05-28 VITALS
DIASTOLIC BLOOD PRESSURE: 55 MMHG | HEIGHT: 62 IN | HEART RATE: 94 BPM | WEIGHT: 114.37 LBS | TEMPERATURE: 97.2 F | RESPIRATION RATE: 16 BRPM | BODY MASS INDEX: 21.05 KG/M2 | OXYGEN SATURATION: 98 % | SYSTOLIC BLOOD PRESSURE: 127 MMHG

## 2021-05-28 NOTE — PROGRESS NOTES
Patient: ALON PENNINGTON     Acct: [id number]     Report: #[id number]  UNIT #: [id number]     : 1940    Encounter Date:2020  PRIMARY CARE: BETH LIRA  ***Signed***  --------------------------------------------------------------------------------------------------------------------  Chief Complaint      Encounter Date      2020            Primary Care Provider      BETH LIRA            Referring Provider      BETH LIRA            Patient Complaint      Patient is complaining of      Patient here today for F/U, Post Bronch, Bronchiectasis            VITALS      Height 62 in / 157.48 cm      Weight 118 lbs  / 53.646753 kg      BSA 1.53 m2      BMI 21.6 kg/m2      Temperature 97 F / 36.11 C - Temporal      Pulse 76      Respirations 14      Blood Pressure 140/67 Sitting, Right Arm      Pulse Oximetry 95%, room air            HPI      The patient is a 79 year old female patient of Dr. Forbes's who was having     symptoms consistent with chronic bronchitis since 2019. The patient     states she has been having a productive cough with yellow sputum and was treated    with multiple rounds of antibiotics by her primary care provider however was not    improving. The patient was taken for bronchoscopy with bronchalveolar lavage,     brushings and washings by Dr. Forbes on 07/15/2020. Findings showed some     diffuse erythema and friability of airways and some bibasilar mucous plugging     with thick viscus, copious purulent secretions. Pathology results came back     showing negative malignant cells and no viral inclusions or acute inflammation.     The patient's bronchalveolar lavage washings came back showing mold type fungus.    The patient states she is still having productive cough with copious amounts of     yellow sputum and has not improved after having bronchoscopy. The patient states    she does get short of air that is mild to moderate in severity, worse with      exertion, improved with rest. The patient states she is taking advair inhaler     every day and is using her albuterol nebulizer and albuterol inhaler as needed.     The patient states she is also using her flutter valve twice daily with saline     nebulizer treatments. The patient states the flutter valve is helping her be     able to cough up secretions. The patient is using flonase and  ipratropium nasal    spray for her allergies.  The patient has a history of severe allergies and is     on allergy immunotherapy. Of note, the patient did have a CT scan of the chest     completed in March 2020 that showed mild diffuse bronchiectasis as well as     patchy ground glass opacities, micronodular in the lower lobes felt to be     consistent with infection or inflammatory process. The patient states she is     able to perform her activities of daily living. The patient had pulmonary     function test completed on 06/17/2020 and there was no evidence of obstruction     or restriction and no bronchodilator response noted. Diffusion capacity was     mildly reduced.             I reviewed the Review of Systems, medical, surgical and family history and agree    with those as entered.      Copies To:   DELIA RUTHERFORD      Constitutional:  Denies: Fatigue, Fever, Weight gain, Weight loss, Chills,     Insomnia, Other      Respiratory/Breathing:  Complains of: Shortness of air, Cough; Denies: Wheezing,    Hemoptysis, Pleuritic pain, Other      Endocrine:  Denies: Polydipsia, Polyuria, Heat/cold intolerance, Abnorml     menstrual pattern, Diabetes, Other      Eyes:  Denies: Blurred vision, Vision Changes, Other      Ears, nose, mouth, throat:  Denies: Mouth lesions, Thrush, Throat pain,     Hoarseness, Allergies/Hay Fever, Post Nasal Drip, Headaches, Recent Head Injury,    Nose Bleeding, Neck Stiffness, Thyroid Mass, Hearing Loss, Ear Fullness, Dry     Mouth, Nasal or Sinus Pain, Dry Lips, Nasal discharge, Nasal  congestion, Other      Cardiovascular:  Denies: Palpitations, Syncope, Claudication, Chest Pain, Wake     up Gasping for air, Leg Swelling, Irregular Heart Rate, Cyanosis, Dyspnea on     Exertion, Other      Gastrointestinal:  Denies: Nausea, Constipation, Diarrhea, Abdominal pain,     Vomiting, Difficulty Swallowing, Reflux/Heartburn, Dysphagia, Jaundice,     Bloating, Melena, Bloody stools, Other      Genitourinary:  Denies: Urinary frequency, Incontinence, Hematuria, Urgency,     Nocturia, Dysuria, Testicular problems, Other      Musculoskeletal:  Denies: Joint Pain, Joint Stiffness, Joint Swelling, Myalgias,    Other      Hematologic/lymphatic:  DENIES: Lymphadenopathy, Bruising, Bleeding tendencies,     Other      Neurological:  Denies: Headache, Numbness, Weakness, Seizures, Other      Psychiatric:  Denies: Anxiety, Appropriate Effect, Depression, Other      Sleep:  No: Excessive daytime sleep, Morning Headache?, Snoring, Insomnia?, Stop    breathing at sleep?, Other      Integumentary:  Denies: Rash, Dry skin, Skin Warm to Touch, Other      Immunologic/Allergic:  Denies: Latex allergy, Seasonal allergies, Asthma,     Urticaria, Eczema, Other      Immunization status:  No: Up to date            FAMILY/SOCIAL/MEDICAL HX      Surgical History:  Yes: Bowel Surgery (COLONOSCOPY), Cholecystectomy      Stroke - Family Hx:  Mother      Is Father Still Living?:  No      Is Mother Still Living?:  No       Family History:  Yes      Social History:  No Tobacco Use, No Alcohol Use, No Recreational Drug use      Smoking status:  Never smoker      Anticoagulation Therapy:  No      Antibiotic Prophylaxis:  No      Medical History:  Yes: Arthritis, Deafness or Ringing Ears (EDIL), Hemorr    hoids/Rectal Prob (REFLUX), High Blood Pressure (ON MEDS), Shortness Of Breath     (COUGH); No: Blood Disease, Chemotherapy/Cancer, Seizures, Sinus Trouble,     Miscellaneous Medical/oth      Psychiatric History      none             PREVENTION      Hx Influenza Vaccination:  Yes      Date Influenza Vaccine Given:  Nov 1, 2019      2 or More Falls in Past Year?:  No      Fall Past Year with Injury?:  No      Hx Pneumococcal Vaccination:  Yes      Encouraged to follow-up with:  PCP regarding preventative exams.      Chart initiated by      Marzena Aleman CMA            ALLERGIES/MEDICATIONS      Allergies:        Coded Allergies:             ALENDRONATE SODIUM (Verified  Allergy, Intermediate, 7/30/20)           PENICILLINS (Verified  Allergy, Unknown, 7/30/20)      Medications    Last Reconciled on 7/30/20 09:53 by Rolando GRIER-NaCl 3% (Sodium Chloride 3% Neb) 4 Ml Vial.neb      4 ML INH RTBID for 30 Days, #60 NEB 2 Refills         Prov: SHRUTHI BARGER [facility]         7/30/20       Fluticasone/Salmeterol 230/21 (Advair /21 MCG) 12 Gm Hfa.aer.ad      2 PUFF INH RTBID, #1 INH 3 Refills         [facility]         7/30/20       Isavuconazonium Sulfate (Cresemba) 186 Mg Capsule      372 MG PO QDAY, #30 CAPSULE 2 Refills         Prov: SHRUTHI BARGER [facility]         7/30/20       Isavuconazonium Sulfate (Cresemba) 186 Mg Capsule      372 MG PO Q8HR, #12 CAPSULE         Prov: SHRUTHI BARGER [facility]         7/30/20       Levocetirizine (Levocetirizine) 5 Mg Tablet      10 MG PO QDAY, TAB         Reported         7/14/20       NEB-Albuterol Sulf (Albuterol) 2.5 Mg/3 Ml Vial.neb      2.5 MG INH Q6H PRN for DYSPNEA/WHEEZING, #120 NEB 3 Refills         Prov: DELIA RUTHERFORD         3/18/20       NEB-Albuterol Sulf (Albuterol) 2.5 Mg/3 Ml Vial.neb      2.5 MG INH Q6H PRN for SHORTNESS OF BREATH, #120 NEB 0 Refills         Reported         3/18/20       Omeprazole (Omeprazole*) 20 Mg Tablet.dr      20 MG PO HS, #30 CAP 0 Refills         Reported         3/18/20       Losartan Potassium (Cozaar) 50 Mg Tablet      50 MG PO QDAY, #30 TAB 0 Refills         Reported         3/18/20       Multivitamin/Iron/Folic Acid (CENTRUM  COMPLETE MULTIVIT TAB) 1 Tab Tablet      1 TAB PO QDAY, #30 TAB 0 Refills         Reported         6/23/16       Frankie-Fluticasone (Fluticasone 50 mcg) 16 Gm Naspr      1 PUFFS NARE EACH QDAY, #1 BOTTLE 0 Refills         Reported         6/23/16       clonazePAM (clonazePAM) 0.5 Mg Tablet      0.5 MG PO HS, #30 TAB 0 Refills         Reported         6/23/16       Estradiol (Estrace) 1 Mg Tablet      1 MG PO QDAY, TAB         Reported         6/23/16       Pravastatin Sodium (Pravastatin Sodium) 20 Mg Tablet      20 MG PO HS, #30 TAB 0 Refills         Reported         6/23/16      Current Medications      Current Medications Reviewed 7/30/20            EXAM      Vital Signs Reviewed      Gen: WDWN, Alert, NAD.        HEENT:  PERRL, EOMI.  OP, nares clear, no sinus tenderness.      Neck:  Supple, no JVD, no thyromegaly.      Lymph: No axillary, cervical, supraclavicular lymphadenopathy noted bilaterally.      Chest:  Good aeration, coarse rhonchi and wheezing throughout, tympanic to     percussion bilaterally normal work of breathing noted. The patient is able to     speak full sentences without difficulty.        CV:  RRR, no MGR, pulses 2+, equal.      Abd:  Soft, NT, ND, + BS, no HSM.      EXT:  No clubbing, no cyanosis, no edema, no joint tenderness.       Neuro:  A  Skin: No rashes or lesions.      Vtials      Vitals:             Height 62 in / 157.48 cm           Weight 118 lbs  / 53.753856 kg           BSA 1.53 m2           BMI 21.6 kg/m2           Temperature 97 F / 36.11 C - Temporal           Pulse 76           Respirations 14           Blood Pressure 140/67 Sitting, Right Arm           Pulse Oximetry 95%, room air            REVIEW      Results Reviewed      [facility]S Results Reviewed?:  Yes Prev Lab Results, Yes Prev Radiology Results, Yes     Previous [facility] Records      Lab Results      I personally reviewed the patient bronchalveolar lavage, washings and pathology     results from recent bronchoscopy. I also  reviewed Dr. Rutherford's last Telehealth     note.      PFT Results      Patient: ALON PENNINGTON   Acct: #[id number]   Report: #[id number]            UNIT #: [id number]    DOS:       LOCATION:[facility]     : 1940            PROVIDERS      ADMITTING:     FAMILY:  BETH LIRA         OTHER:       DICTATING:  DELIA RUTHERFORD MD            REASON FOR VISIT:  DYSPNEA            *******Signed******                                    [facility]                          [facility] Information Management Services                            [place], [place]  [address]               __________________________________________________________________________             Patient Name:                   Attending Physician:      Alon Pennington M.D.             Patient Visit # MR #            Admit Date  Disch Date     Location      [id number]    [id number]      2020                 [facility]- -             Date of Birth      1940      __________________________________________________________________________      0821 - DIAGNOSTIC REPORT             PULMONARY FUNCTION TEST:             SPIROMETRY:      No obstructive lung defect noted. No bronchodilator response was noted.      Flow volume loop was normal.             LUNG VOLUMES:      No evidence of restriction, hyperinflation or air trapping.             DIFFUSION CAPACITY:      Diffusion capacity is mildly reduced at 69% of predicted.             OVERALL IMPRESSION:      No evidence of obstruction or restriction.  No bronchodilator response noted.      Diffusion capacity is mildly reduced.             To be electronically signed in [employer]      [id number] DELIA RUTHERFORD M.D.             AM:griselda      D:  2020 16:22      T:  2020 16:40      #[id number]             Until signed, this is an unconfirmed preliminary report that may contain      errors and is subject to change.                   Until signed, this  is an unconfirmed preliminary report that may contain      errors and is subject to change.                     <Electronically signed by DELIA RUTHERFORD MD>                06/22/20 1123               DELIA RUTHERFORD MD                                                                  [facility]:TRS      D:06/19/20 1622            Assessment      Pneumonia due to fungus - B49, J17            Medication monitoring encounter - Z51.81            Bronchiectasis - J47.9            Unspecified mycosis - B49            Notes      New Medications      * ISAVUCONAZONIUM SULFATE (Cresemba) 186 MG CAPSULE: 372 MG PO Q8HR #12         Dx: Bronchiectasis - J47.9      * ISAVUCONAZONIUM SULFATE (Cresemba) 186 MG CAPSULE: 372 MG PO QDAY 90 Days #180         Dx: Bronchiectasis - J47.9      Renewed Medications      * Fluticasone/Salmeterol 230/21 (Advair /21 MCG) 12 GM HFA.AER.AD: 2 PUFF      INH RTBID #1      * Neb-NaCl 3% (Sodium Chloride 3% Neb) 4 ML VIAL.NEB: 4 ML INH RTBID 30 Days #60         Dx: Bronchiectasis - J47.9      New Diagnostics      * Comp Metabolic Panel, 1 DAY         Dx: Medication monitoring encounter - Z51.81      * Comp Metabolic Panel, Month         Dx: Medication monitoring encounter - Z51.81      * Comp Metabolic Panel, Month         Dx: Medication monitoring encounter - Z51.81      New Office Procedures      * Specialty Drug CRESEMBA, Routine         Dx: Pneumonia due to fungus - B49, J17      ASSESSMENT:      1. Chronic cough.       2. Bronchalveolar lavage growing mold type fungus, not yet speciated.       3. Mild bronchiectasis on CT scan of the chest.       4. Possible asthma.       5. Seasonal allergies on allergy immunotherapy.       6. Postnasal drip.        7. Never smoker.       8. Difficulty with airway clearance. Patient now on saline nebulizer and flutter    valve.              PLAN:      1. The patient's bronchalveolar lavage washings are growing a mold type fungus     so I will start the  patient on Cresemba. Expectations and course of treatment     were discussed with patient.  How to take medications and possible side effects     of medication discussed with patient.  The patient verbalized understanding and     compliance.        2. I will order a CMP to be done today and repeat CMP in 1 month.       3.  Continue Advair everyday as prescribed and rinse her mouth after each use.     Continue albuterol inhaler and albuterol nebulizer as needed.       4. Continue flutter valve with saline nebulizer treatments twice daily to assist    with airway clearance.       5. The patient's pulmonary function test done on 06/17/2020 shows no evidence of    obstruction or restriction and no bronchodilator response noted. Diffusion     capacity is mildly reduced.       6. For seasonal allergies, continue with allergy immunotherapy and  ipratropium     and flonase nasal spray twice daily.       7. The patient is advised to call the office, call 911 or go to the ER for any     new or worsening symptoms.       8. Alpha 1 antitrypsin testing was performed in the office today.       9. The patient reports she is up to date with flu and pneumonia vaccines. The     patient is advised to get the new flu vaccine when it comes out this August. The    patient verbalized understanding and compliance.        10. Follow up with Dr. Forbes in 4-6 weeks sooner if needed.            Patient Education      Patient Education Provided:  Acute Bronchitis      Time Spent:  > 50% /Coord Care            Electronically signed by SHRUTHI BARGER [facility]  07/31/2020 15:27       Disclaimer: Converted document may not contain table formatting or lab diagrams. Please see [employer] for the authenticated document.

## 2021-05-28 NOTE — PROGRESS NOTES
Patient: ALON PENNINGTON     Acct: [id number]     Report: #[id number]  UNIT #: [id number]     : 1940    Encounter Date:2020  PRIMARY CARE: BETH LIRA  ***Signed***  --------------------------------------------------------------------------------------------------------------------  Chief Complaint      Encounter Date      Aug 31, 2020            Primary Care Provider      BETH LIRA            Referring Provider      BETH LIRA            Patient Complaint      Patient is complaining of      6 week f/u, lab results            VITALS      Height 5 ft 2 in / 157.48 cm      Weight 117 lbs 1 oz / 53.21821 kg      BSA 1.52 m2      BMI 21.4 kg/m2      Temperature 96.9 F / 36.06 C - Tympanic      Pulse 75      Respirations 18      Blood Pressure 166/64 Sitting, Right Arm      Pulse Oximetry 98%, room air            HPI      The patient is a very pleasant 79 year old  female with chronic     bronchitis and bronchiectasis here for follow up today.             Since having her bronchoscopy she is growing mold type fungus with a couple     different mold species, she has taking 1 month of itraconazole and is having     trouble affording it so we will try another agent. She also has positive acid     fast bacilli around week 3 and both the washings and bronchalveolar lavage have     yet since speciated. She notes that since her bronchoscopy she has had zero     changes in her cough. She is coughing nonstop and coughing up copious amounts of    thick yellow and clear blood tinged sputum. She state she does not feel bad     other than being fatigued. She does get short of air and has these coughing fits    where she coughs up thick sticky sputum despite airway clearance. She is on 3%     saline and DuoNeb as well as advair. Overall she is not feeling any better and     she is very tearful today and is tired of having this cough. She denies any     nausea or vomiting, fever or chills, headaches  and hemoptysis or chest pain. She    denies any night sweats. She states she feels like the mucous is always rattling    in her chest. She is able to perform her ADL's without difficulty and denies any    swollen glands in her lymph nodes, head or neck.            I personally reviewed Review of Systems, family, social, surgical and medical     history and agree with their findings.            ROS      Constitutional:  Denies: Fatigue, Fever, Weight gain, Weight loss, Chills,     Insomnia, Other      Respiratory/Breathing:  Complains of: Cough; Denies: Shortness of air, Wheezing,    Hemoptysis, Pleuritic pain, Other      Endocrine:  Denies: Polydipsia, Polyuria, Heat/cold intolerance, Abnorml     menstrual pattern, Diabetes, Other      Eyes:  Denies: Blurred vision, Vision Changes, Other      Ears, nose, mouth, throat:  Denies: Mouth lesions, Thrush, Throat pain,     Hoarseness, Allergies/Hay Fever, Post Nasal Drip, Headaches, Recent Head Injury,    Nose Bleeding, Neck Stiffness, Thyroid Mass, Hearing Loss, Ear Fullness, Dry     Mouth, Nasal or Sinus Pain, Dry Lips, Nasal discharge, Nasal congestion, Other      Cardiovascular:  Denies: Palpitations, Syncope, Claudication, Chest Pain, Wake     up Gasping for air, Leg Swelling, Irregular Heart Rate, Cyanosis, Dyspnea on     Exertion, Other      Gastrointestinal:  Denies: Nausea, Constipation, Diarrhea, Abdominal pain,     Vomiting, Difficulty Swallowing, Reflux/Heartburn, Dysphagia, Jaundice,     Bloating, Melena, Bloody stools, Other      Genitourinary:  Denies: Urinary frequency, Incontinence, Hematuria, Urgency,     Nocturia, Dysuria, Testicular problems, Other      Musculoskeletal:  Denies: Joint Pain, Joint Stiffness, Joint Swelling, Myalgias,    Other      Hematologic/lymphatic:  DENIES: Lymphadenopathy, Bruising, Bleeding tendencies,     Other      Neurological:  Denies: Headache, Numbness, Weakness, Seizures, Other      Psychiatric:  Denies: Anxiety,  Appropriate Effect, Depression, Other      Sleep:  No: Excessive daytime sleep, Morning Headache?, Snoring, Insomnia?, Stop    breathing at sleep?, Other      Integumentary:  Denies: Rash, Dry skin, Skin Warm to Touch, Other      Immunologic/Allergic:  Denies: Latex allergy, Seasonal allergies, Asthma,     Urticaria, Eczema, Other      Immunization status:  No: Up to date            FAMILY/SOCIAL/MEDICAL HX      Surgical History:  Yes: Bowel Surgery (COLONOSCOPY), Cholecystectomy      Stroke - Family Hx:  Mother      Is Father Still Living?:  No      Is Mother Still Living?:  No       Family History:  Yes      Social History:  No Tobacco Use, No Alcohol Use, No Recreational Drug use      Smoking status:  Never smoker      Anticoagulation Therapy:  No      Antibiotic Prophylaxis:  No      Medical History:  Yes: Arthritis, Deafness or Ringing Ears (EDIL),     Hemorrhoids/Rectal Prob (REFLUX), High Blood Pressure (ON MEDS), Shortness Of     Breath (COUGH); No: Blood Disease, Chemotherapy/Cancer, Seizures, Sinus Trouble,    Miscellaneous Medical/oth      Psychiatric History      none            PREVENTION      Hx Influenza Vaccination:  Yes      Date Influenza Vaccine Given:  Nov 1, 2019      2 or More Falls in Past Year?:  No      Fall Past Year with Injury?:  No      Hx Pneumococcal Vaccination:  Yes      Encouraged to follow-up with:  PCP regarding preventative exams.      Chart initiated by      Michelle Gage CMA            ALLERGIES/MEDICATIONS      Allergies:        Coded Allergies:             ALENDRONATE SODIUM (Verified  Allergy, Intermediate, 8/31/20)           PENICILLINS (Verified  Allergy, Unknown, 8/31/20)      Medications    Last Reconciled on 8/31/20 10:19 by DELIA RUTHERFORD MD      Itraconazole (Tolsura) 65 Mg Cap.sd.dsp      65 MG PO BID, #60 CAP 1 Refill         Prov: DELIA RUTHERFORD         8/31/20       Ethambutol HCl (Myambutol *) 400 Mg Tab      1200 MG PO MOWEFR, #30 TAB 11 Refills         Prov:  DELIA RUTHERFORD         8/31/20       rifAMPin (rifAMPin) 300 Mg Cap      600 MG PO MOWEFR, #30 CAP 11 Refills         Prov: DELIA RUTHERFORD         8/31/20       Azithromycin (Azithromycin) 500 Mg Tablet      500 MG PO MOWEFR, #15 TAB 11 Refills         Prov: DELIA RUTHERFORD         8/31/20       predniSONE (Deltasone) 10 Mg Tablet      10 MG PO ASDIR, #45 TAB 0 Refills         Prov: DELIA RUTHERFORD         8/31/20       Neb-Budesonide (Pulmicort) 0.5 Mg/2 Ml Ampul.neb      0.5 MG INH RTBID, #60 NEB 6 Refills         Prov: DELIA RUTHERFORD         8/31/20       Arformoterol Tartrate (Brovana) 15 Mcg/2 Ml Vial.neb      15 MCG INH Q12HR, #60 NEB 6 Refills         Prov: DELIA RUTHERFORD         8/31/20       Neb-NaCl 3% (Sodium Chloride 3% Neb) 4 Ml Vial.neb      4 ML INH RTBID for 30 Days, #60 NEB 2 Refills         Prov: SHRUTHI BARGER [facility]         7/30/20       Levocetirizine (Levocetirizine) 5 Mg Tablet      10 MG PO QDAY, TAB         Reported         7/14/20       NEB-Albuterol Sulf (Albuterol) 2.5 Mg/3 Ml Vial.neb      2.5 MG INH Q6H PRN for DYSPNEA/WHEEZING, #120 NEB 3 Refills         Prov: DELIA RUTHERFORD         3/18/20       NEB-Albuterol Sulf (Albuterol) 2.5 Mg/3 Ml Vial.neb      2.5 MG INH Q6H PRN for SHORTNESS OF BREATH, #120 NEB 0 Refills         Reported         3/18/20       Omeprazole (Omeprazole*) 20 Mg Tablet.dr      20 MG PO HS, #30 CAP 0 Refills         Reported         3/18/20       Losartan Potassium (Cozaar) 50 Mg Tablet      50 MG PO QDAY, #30 TAB 0 Refills         Reported         3/18/20       Multivitamin/Iron/Folic Acid (CENTRUM COMPLETE MULTIVIT TAB) 1 Tab Tablet      1 TAB PO QDAY, #30 TAB 0 Refills         Reported         6/23/16       Frankie-Fluticasone (Fluticasone 50 mcg) 16 Gm Naspr      1 PUFFS NARE EACH QDAY, #1 BOTTLE 0 Refills         Reported         6/23/16       clonazePAM (clonazePAM) 0.5 Mg Tablet      0.5 MG PO HS, #30 TAB 0 Refills         Reported         6/23/16       Estradiol  (Estrace) 1 Mg Tablet      1 MG PO QDAY, TAB         Reported         16       Pravastatin Sodium (Pravastatin Sodium) 20 Mg Tablet      20 MG PO HS, #30 TAB 0 Refills         Reported         16      Current Medications      Current Medications Reviewed 20            EXAM      Vital Signs Reviewed      Gen: WDWN, Alert, NAD.        HEENT:  PERRL, EOMI.  OP, nares clear, no sinus tenderness.      Neck:  Supple, no JVD, no thyromegaly.      Lymph: No axillary, cervical, supraclavicular lymphadenopathy noted bilaterally.      Chest:  Good aeration, coarse rhonchi and wheezing throughout with rattling     breath sounds, tympanic to percussion bilaterally, no work of breathing noted.      CV:  RRR, no MGR, pulses 2+, equal.      Abd:  Soft, NT, ND, + BS, no HSM.      EXT:  No clubbing, no cyanosis, no edema, no joint tenderness.       Neuro:  A  Skin: No rashes or lesions.      Vtials      Vitals:             Height 5 ft 2 in / 157.48 cm           Weight 117 lbs 1 oz / 53.33610 kg           BSA 1.52 m2           BMI 21.4 kg/m2           Temperature 96.9 F / 36.06 C - Tympanic           Pulse 75           Respirations 18           Blood Pressure 166/64 Sitting, Right Arm           Pulse Oximetry 98%, room air            REVIEW      Results Reviewed      [facility] Results Reviewed?:  Yes Prev Lab Results, Yes Prev Radiology Results, Yes     Previous Mecial Records      Lab Results      I personally reviewed TOSHIA Good last office note.  Overnight     oximetry showed no desaturations on submaximal exertion.  Bronchalveolar lavage     was neutrophil predominant and grew alternaria species and another mold called     paecilomyces. Of note, washings were positive on week 3 for acid fast bacilli as    well as bronchalveolar lavage acid fast bacilli on week 4.      Micro Results      Patient: ALON PENNINGTON   Acct #: [id number]         : 1940   Report #: [id number]      MR #: [id number]    Location: ENDO                                ***Signed***      Procedure Note      Procedure:      Bronchoscopy with clearance of airways, bronchoalveolar lavage, bronchial     washings            Pre-Operative Diagnosis: Chronic cough, mucus plugging            Post-Operative Diagnosis: Same            Anesthesia: MAC anesthesia            Procedure Details: The patient was consented for the procedure with all risk and    benefit of the procedure explained in detail.  She was given the opportunity to     ask questions and all concerns were answered. The bronchoscope was inserted into    the main airway via the oral cavity. An anatomical survey was done of the main     airways and the subsegmental bronchus.             Findings: The vocal cords were normal in appearance and movement with abduction     and adduction without difficulty. The trachea was normal in caliber and had no     mucosal abnormalities. The left tracheobronchial tree appeared anatomically     normal with diffuse erythema and friability. The right tracheobronchial tree     appeared anatomically normal with diffuse erythema and friability.  There was     copious amounts of thick viscous purulent secretions obstructing left and right     lower lobe bronchi as well as right upper lobe bronchus and lingular bronchus.      All secretions were cleared and removed after saline irrigation.  A     bronchoalveolar lavage was performed using 2Ã—60 cc aliquots of normal saline      instilled into the left lower lobe bronchus then aspirated back. There was 30 cc    grossly purulent fluid in return.  Bronchial washings were collected.            Findings:      Diffuse erythema and friability of all airways      Bibasilar mucous plugging with thick viscous copious purulent secretions            Estimated Blood Loss: Less than 5 cc            Specimens:      Bronchoalveolar lavage left lower lobe bronchus      Bronchial washings            Complications: None;  patient tolerated the procedure well.            Disposition: Discharge home.  Follow-up test results.            Patient tolerated the procedure well.            Procedure(s) Performed      Bronchoscopy:  37104 Dx Bronch/Wash, 51563 Dx Bronch/Lavage, 26226 Bronch Clear     Airway            DELIA RUTHERFORD                  Jul 15, 2020 15:14               <Electronically signed by DELIA RUTHERFORD MD>  07/15/20 1514      Radiographic Results               [facility]                PACS RADIOLOGY REPORT            Patient: ALON PENNINGTON   Acct: #[id number]   Report: #[id number]            UNIT #: [id number]    DOS: 20 1610      INSURANCE:MEDICARE PART A   LOCATION:[facility]     : 1940            PROVIDERS      ADMITTING:     ATTENDING: NAVEEN CROW      FAMILY:  NONE,MD   ORDERING:  NAVEEN CROW         OTHER:    DICTATING:  CHRIS TONG MD            REQ #:[id number]   EXAM:CHWO - CT CHEST without CONTRAST      REASON FOR EXAM:  COUGH      REASON FOR VISIT:  COUGH            *******Signed******         PROCEDURE:   CT CHEST WITHOUT CONTRAST             COMPARISON:   None.             INDICATIONS:   PRODUCTIVE COUGH             TECHNIQUE:   CT images were created without the administration of contrast     material.               PROTOCOL:     Standard imaging protocol performed                RADIATION:     DLP: 240.1 mGy*cm          Automated exposure control was utilized to minimize radiation dose.              FINDINGS:         No dense consolidation.  Mild diffuse bronchiectasis.  There is mild bronchial     wall thickening in       the lower lobes with patchy areas of ground-glass opacity and scattered micro     nodularity.  Some       linear opacities in the lung bases may represent scarring or atelectasis.  No     adenopathy in the       chest.  No acute findings in the included upper abdomen.  No aggressive     appearing bone change.              CONCLUSION:   Mild diffuse bronchiectasis.  Mild bronchial wall thickening in     the lower lobes       suggesting bronchitis.             Patchy areas of ground-glass opacity and micro nodularity in the lower lobes,     favored to represent       infectious or inflammatory small airways disease.             No dense consolidation              CHRIS TONG MD             Electronically Signed and Approved By: CHRIS TONG MD on 3/02/2020 at 17:42                        Until signed, this is an unconfirmed preliminary report that may contain      errors and is subject to change.                                              DOWER:      D:20      PFT Results      Patient: ALON PENNINGTON   Acct: #[id number]   Report: #[id number]            UNIT #: [id number]    DOS:       LOCATION:[facility]     : 1940            PROVIDERS      ADMITTING:     FAMILY:  BETH LIRA         OTHER:       DICTATING:  DELIA RUTHERFORD MD            REASON FOR VISIT:  DYSPNEA            *******Signed******                                    [facility]                          [employer] Information Management Services                            [place], [place]  [address]               __________________________________________________________________________             Patient Name:                   Attending Physician:      Alon Pennington M.D.             Patient Visit # MR #            Admit Date  Disch Date     Location      [id number]    [id number]      2020                 [facility]- -             Date of Birth      1940      __________________________________________________________________________      0821 - DIAGNOSTIC REPORT             PULMONARY FUNCTION TEST:             SPIROMETRY:      No obstructive lung defect noted. No bronchodilator response was noted.      Flow volume loop was normal.             LUNG VOLUMES:      No evidence of  restriction, hyperinflation or air trapping.             DIFFUSION CAPACITY:      Diffusion capacity is mildly reduced at 69% of predicted.             OVERALL IMPRESSION:      No evidence of obstruction or restriction.  No bronchodilator response noted.      Diffusion capacity is mildly reduced.             To be electronically signed in [employer]      [id number] DELIA RUTHERFORD M.D.             AM:griselda      D:  06/19/2020 16:22      T:  06/19/2020 16:40      #[id number]             Until signed, this is an unconfirmed preliminary report that may contain      errors and is subject to change.                   Until signed, this is an unconfirmed preliminary report that may contain      errors and is subject to change.                     <Electronically signed by DELIA RUTHERFORD MD>                06/22/20 1123               DELIA RUTHERFORD MD:TRS      D:06/19/20 1622            Assessment      Notes      New Medications      * ARFORMOTEROL TARTRATE (Brovana) 15 MCG/2 ML VIAL.NEB: 15 MCG INH Q12HR #60         Instructions: Submit to Medicare B if applicable. Call for Diagnosis if        needed.      * Neb-Budesonide (Pulmicort) 0.5 MG/2 ML AMPUL.NEB: 0.5 MG INH RTBID #60         Instructions: DIAGNOSIS CODE REQUIRED PRIOR TO PRESCRIBING.      * predniSONE (Deltasone) 10 MG TABLET: 10 MG PO ASDIR #45         Instructions: [id number],94dux0e,68czo8k,41seq5k,05ids4y      * Azithromycin 500 MG TABLET: 500 MG PO MOWEFR #15      * rifAMPin 300 MG CAP: 600 MG PO MOWEFR #30      * Ethambutol HCl (Myambutol *) 400 MG TAB: 1,200 MG PO MOWEFR #30      * Itraconazole (Tolsura) 65 MG CAP.SD.DSP: 65 MG PO BID #60      * ARFORMOTEROL TARTRATE (Brovana) 15 MCG/2 ML VIAL.NEB          Sample - Qty 6      Discontinued Medications      * Fluticasone/Salmeterol 230/21 (Advair /21 MCG) 12 GM HFA.AER.AD: 2 PUFF      INH RTBID #1      * Itraconazole 100 MG CAPSULE: 200 MG PO  QDAY 28 Days #56         Dx: Unspecified mycosis - 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 Bronchiectasis with exacerbation.      2. 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 Also has concomitant fungal infection.       3. Fungal pneumonia.      4. Chronic cough unchanged.       5. Mucous plugging/ issues with airway clearance.       6. Chronic dyspnea.       7. Seasonal allergies on allergy immunotherapy.       8. Never smoker.             PLAN:       1. We will change itraconazole to Tolsura given cost difference. 65 mg twice     daily for the next 2 months to complete 3 months of therapy based off     bronchalveolar lavage sensitivities. We will give samples for 1 month of Tolsura    today.       2. Discontinue advair and start Brovana and Pulmicort nebulizer twice daily.     Continue DuoNeb and 3% saline with flutter valve.       3. We will start therapy for moderate nodular bronchiectasis secondary to     atypical mycobacterium infection. We will do thrice weekly Ethambutol 1200 mg,     Rifampin 600 mg and Azithromycin 500 mg.   Side effects were discussed with the     patient and she is willing to proceed.       4. Therapeutic  drug monitoring will be obtained at the next office visit with     CBC and CMP for multiple antibiotics and antifungal therapies.       5. We will get a sputum acid fast bacilli at the next office visit. She will     need 12 months of clearance from first negative culture prior to completing     antibiotics.       6. Continue allergy immunotherapy and flonase  ipratropium nasal spray.       7. Alpha 1 antitrypsin testing was unremarkable.       8. She is up to date with Prevnar and Pneumovax, she will get her flu vaccine by    her primary care provider in the next couple weeks as we do not have any     available today.       9. Follow up with me in 1 month.            Patient Education      Patient Education Provided:  How to use a Nebulizer      Other  Education:  NORMAN infection and prolonged antibiotic course            Electronically signed by DELIA RUTHERFORD  09/01/2020 08:05       Disclaimer: Converted document may not contain table formatting or lab diagrams. Please see [employer] for the authenticated document.

## 2021-05-28 NOTE — PROGRESS NOTES
Patient: ALON PENNINGTON     Acct: [id number]     Report: #[id number]  UNIT #: [id number]     : 1940    Encounter Date:2020  PRIMARY CARE: BETH LIRA  ***Signed***  --------------------------------------------------------------------------------------------------------------------  Chief Complaint      Encounter Date      Sep 24, 2020            Primary Care Provider      BETH LIRA            Referring Provider      BETH LIRA            Patient Complaint      Patient is complaining of      1 month f/u            VITALS      Height 5 ft 2 in / 157.48 cm      Weight 114 lbs 6 oz / 51.626228 kg      BSA 1.51 m2      BMI 20.9 kg/m2      Temperature 97.2 F / 36.22 C - Tympanic      Pulse 94      Respirations 16      Blood Pressure 127/55 Sitting, Right Arm      Pulse Oximetry 98%, room air            HPI      The patient is a very pleasant 79 year old  female with bronchiectasis     here for follow up.  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 She states today that she has lost 106 pounds and     she refuses to take all of these medications. She would prefer to stop all of     these and just try to clear her cough with nebulizers.  Cough is better, but is     productive of thick clear sputum.  No dyspnea, wheezing, headaches, chest pain     or hemoptysis.  Denies any nausea, vomiting, fevers or chills.  She has lost six    pounds since last office visit.  She has antibiotic associated diarrhea as well.     She is able to perform her ADLs without difficulty.  Denies any swollen glands     or lymph nodes of the head and neck.            I have personally reviewed the review of systems, past family, social, surgical     and medical histories and I agree with the findings.             ROS      Constitutional:  Complains of: Fatigue, Weight loss, Other (loss of appetite);     Denies: Fever, Weight gain, Chills, Insomnia      Respiratory/Breathing:  Complains of: Cough; Denies: Shortness of air, Wheezing,    Hemoptysis, Pleuritic pain, Other      Endocrine:  Denies: Polydipsia, Polyuria, Heat/cold intolerance, Abnorml     menstrual pattern, Diabetes, Other      Eyes:  Denies: Blurred vision, Vision Changes, Other      Ears, nose, mouth, throat:  Denies: Mouth lesions, Thrush, Throat pain,     Hoarseness, Allergies/Hay Fever, Post Nasal Drip, Headaches, Recent Head Injury,    Nose Bleeding, Neck Stiffness, Thyroid Mass, Hearing Loss, Ear Fullness, Dry     Mouth, Nasal or Sinus Pain, Dry Lips, Nasal discharge, Nasal congestion, Other      Cardiovascular:  Denies: Palpitations, Syncope, Claudication, Chest Pain, Wake     up Gasping for air, Leg Swelling, Irregular Heart Rate, Cyanosis, Dyspnea on     Exertion, Other      Gastrointestinal:  Complains of: Diarrhea, Reflux/Heartburn; Denies: Nausea,     Constipation, Abdominal pain, Vomiting, Difficulty Swallowing, Dysphagia,     Jaundice, Bloating, Melena, Bloody stools, Other      Genitourinary:  Denies: Urinary frequency, Incontinence, Hematuria, Urgency,     Nocturia, Dysuria, Testicular problems, Other      Musculoskeletal:  Denies: Joint Pain, Joint Stiffness, Joint Swelling, Myalgias,    Other      Hematologic/lymphatic:  DENIES: Lymphadenopathy, Bruising, Bleeding tendencies,     Other      Neurological:  Complains of: Weakness; Denies: Headache, Numbness, Seizures,     Other      Psychiatric:  Denies: Anxiety, Appropriate Effect, Depression, Other      Sleep:  No: Excessive daytime sleep, Morning Headache?, Snoring, Insomnia?, Stop    breathing at sleep?, Other      Integumentary:  Denies: Rash, Dry skin, Skin Warm to Touch, Other      Immunologic/Allergic:  Denies: Latex allergy, Seasonal allergies, Asthma,     Urticaria, Eczema, Other       Immunization status:  No: Up to date            FAMILY/SOCIAL/MEDICAL HX      Surgical History:  Yes: Bowel Surgery (COLONOSCOPY), Cholecystectomy      Stroke - Family Hx:  Mother      Is Father Still Living?:  No      Is Mother Still Living?:  No       Family History:  Yes      Social History:  No Tobacco Use, No Alcohol Use, No Recreational Drug use      Smoking status:  Never smoker       Section:  No      Tubal Ligation:  No      Hysterectomy:  Yes      Anticoagulation Therapy:  No      Antibiotic Prophylaxis:  No      Medical History:  Yes: Arthritis, Deafness or Ringing Ears (EDIL), Anxiety,     Hemorrhoids/Rectal Prob (REFLUX), High Blood Pressure (ON MEDS), Shortness Of     Breath (COUGH); No: Blood Disease, Chemotherapy/Cancer, Seizures, Sinus Trouble,    Miscellaneous Medical/oth      Psychiatric History      anxiety            PREVENTION      Hx Influenza Vaccination:  Yes      Date Influenza Vaccine Given:  Sep 1, 2020      Influenza Vaccine Declined:  No      2 or More Falls in Past Year?:  No      Fall Past Year with Injury?:  No      Hx Pneumococcal Vaccination:  Yes      Encouraged to follow-up with:  PCP regarding preventative exams.      Chart initiated by      Michelle Gage CMA            ALLERGIES/MEDICATIONS      Allergies:        Coded Allergies:             ALENDRONATE SODIUM (Verified  Allergy, Intermediate, 20)           PENICILLINS (Verified  Allergy, Unknown, 20)      Medications    Last Reconciled on 20 11:10 by DELIA RUTHERFORD MD      Ondansetron Hcl (ONDANSETRON HCL) 4 Mg Tablet      4 MG PO Q6H PRN for NAUSEA for 30 Days, #120 TAB 0 Refills         Prov: DELIA RUTHERFORD         20       Loperamide HCl (Imodium) 2 Mg Capsule      2 MG PO Q6H PRN for DIARRHEA for 30 Days, #120 CAP         Prov: DELIA RUTHERFORD         20       Arformoterol Tartrate (Brovana) 15 Mcg/2 Ml Vial.neb      15 MCG INH Q12HR, #60 NEB 6 Refills         Prov: DELIA RUTHERFORD          9/2/20       Neb-Budesonide (Pulmicort) 0.5 Mg/2 Ml Ampul.neb      0.5 MG INH RTBID, #60 NEB 6 Refills         Prov: DELIA RUTHERFORD         8/31/20       Neb-NaCl 3% (Sodium Chloride 3% Neb) 4 Ml Vial.neb      4 ML INH RTBID for 30 Days, #60 NEB 2 Refills         Prov: [facility]         7/30/20       Levocetirizine (Levocetirizine) 5 Mg Tablet      10 MG PO QDAY, TAB         Reported         7/14/20       NEB-Albuterol Sulf (Albuterol) 2.5 Mg/3 Ml Vial.neb      2.5 MG INH Q6H PRN for DYSPNEA/WHEEZING, #120 NEB 3 Refills         Prov: DELIA RUTHERFORD         3/18/20       NEB-Albuterol Sulf (Albuterol) 2.5 Mg/3 Ml Vial.neb      2.5 MG INH Q6H PRN for SHORTNESS OF BREATH, #120 NEB 0 Refills         Reported         3/18/20       Omeprazole (Omeprazole*) 20 Mg Tablet.dr      20 MG PO HS, #30 CAP 0 Refills         Reported         3/18/20       Losartan Potassium (Cozaar) 50 Mg Tablet      50 MG PO QDAY, #30 TAB 0 Refills         Reported         3/18/20       Multivitamin/Iron/Folic Acid (CENTRUM COMPLETE MULTIVIT TAB) 1 Tab Tablet      1 TAB PO QDAY, #30 TAB 0 Refills         Reported         6/23/16       Frankie-Fluticasone (Fluticasone 50 mcg) 16 Gm Naspr      1 PUFFS NARE EACH QDAY, #1 BOTTLE 0 Refills         Reported         6/23/16       clonazePAM (clonazePAM) 0.5 Mg Tablet      0.5 MG PO HS, #30 TAB 0 Refills         Reported         6/23/16       Estradiol (Estrace) 1 Mg Tablet      1 MG PO QDAY, TAB         Reported         6/23/16       Pravastatin Sodium (Pravastatin Sodium) 20 Mg Tablet      20 MG PO HS, #30 TAB 0 Refills         Reported         6/23/16      Current Medications      Current Medications Reviewed 9/24/20            EXAM      Vital Signs Reviewed.      General:  WDWN, Alert, NAD.      HEENT: PERRL, EOMI.  OP, nares clear, no sinus tenderness.      Lymph: No axillary, cervical, supraclavicular lymphadenopathy noted bilaterally.      Chest: Good aeration, trace bibasilar rhonchi,  tympanic to percussion     bilaterally, no work of breathing noted.      CV: RRR, no MGR, pulses 2+, equal.        Abd: Soft, NT, ND, +BS, no HSM.      EXT: No clubbing, no cyanosis, no edema.        Neuro:  A  Skin: No rashes or lesions.      Vtials      Vitals:             Height 5 ft 2 in / 157.48 cm           Weight 114 lbs 6 oz / 51.893347 kg           BSA 1.51 m2           BMI 20.9 kg/m2           Temperature 97.2 F / 36.22 C - Tympanic           Pulse 94           Respirations 16           Blood Pressure 127/55 Sitting, Right Arm           Pulse Oximetry 98%, room air            REVIEW      Results Reviewed      PCCS Results Reviewed?:  Yes Prev Lab Results, Yes Prev Radiology Results, Yes     Previous [facility] Records      Lab Results      I reviewed my last office note. I also reviewed Arden Cespedes and Henry Good last office visit notes. I reviewed labs from 08/2020 including     CBC and CMP which were unremarkable.            Assessment      Bronchiectasis         Bronchiectasis without complication - J47.9         Bronchiectasis type: uncomplicated            Notes      Discontinued Medications      * predniSONE (Deltasone) 10 MG TABLET: 10 MG PO ASDIR #45         Instructions: 08bnp1j,89wmp8s,36vcu9p,25nfv2r,06gkf9b      * Azithromycin 500 MG TABLET: 500 MG PO MOWEFR #15      * rifAMPin 300 MG CAP: 600 MG PO MOWEFR #30      * Ethambutol HCl (Myambutol *) 400 MG TAB: 1,200 MG PO MOWEFR #30      * Itraconazole (Tolsura) 65 MG CAP.SD.DSP: 65 MG PO BID #60      * ARFORMOTEROL TARTRATE (Brovana) 15 MCG/2 ML VIAL.NEB          Sample - Qty 6      * Itraconazole (Tolsura) 65 MG CAP.SD.DSP          Sample - Qty 12         Instructions: 2 tablets QD         Dx: Unspecified mycosis - B49      New Office Procedures      * Fluzone Vaccine High-Dose, Stat         Flu Vacc (65Yr Up)/Pf (Fluzone High-Dose Syr) 180 MCG/0.5 ML SYRINGE: 180        MICROGRAM INTRAMUSCULARLY Qty 1 SYRINGE         Dx:  Bronchiectasis - J47.9      IMPRESSION:      1.  Bronchiectasis without exacerbation.      2.  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      3. Fungal pneumonia, status post two months of Tulsora with antibiotic     associated issues.      4. Acid reflux.      5. Antibiotic associated diarrhea.      6. Chronic cough, improved.      7. Mucus plugging.        8.  Seasonal allergies on allergy immunotherapy.      9.  Never smoker.               PLAN:      1.  Per patient preference, I have discontinued the Tolsura, ethambutol,     Rifampin and azithromycin.      2.  We will check a noncontrast chest CT in six months.      3. Continue allergy immunotherapy with Flonase, ipratropium and nasal spray.      4. Continue Brovana, Pulmicort and albuterol.      5. Airway clearance with flutter valve and 3% saline.        6.  She is up-to-date with flu and Pneumovax.  I have given her a Fluzone today.      7. The patient can follow up with me in six months.            Electronically signed by DELIA RUTHERFORD  09/25/2020 12:57       Disclaimer: Converted document may not contain table formatting or lab diagrams. Please see [email or web address] for the authenticated document.

## 2021-05-28 NOTE — PROGRESS NOTES
Patient: ALON PENNINGTON     Acct: [id number]     Report: #[id number]  UNIT #: [id number]     : 1940    Encounter Date:2020  PRIMARY CARE: BETH LIRA  ***Signed***  --------------------------------------------------------------------------------------------------------------------  TELEHEALTH NOTE      History of Present Illness            Chief Complaint: Cough, fatigue            Alon Pennington is presenting for evaluation via Telehealth visit. Verbal consent    obtained before beginning visit.            Provider spent (12) minutes with the patient during telehealth visit.            The following staff were present during the visit: Pema Schmitz PA-C, Michelle Gage MA            The patient is pleasant 79 year old female who was seen by Dr. Forbes as new     patient about six weeks ago.  She had been having symptoms consistent with     chronic bronchitis sense about 2019.  She tells me that really everyday since     then she has been coughing up light to medium yellow sputum. She denies     dyspnea, denies wheezing, denies hemoptysis, fever or chills. She typically uses     albuterol nebulizers in the morning and then is able to cough up the yellow     phlegm. She has taken multiple rounds of antibiotics and steroids, but tells me     that none of them have really changed or affected her sputum.  She did also have     a sputum culture including AFB and fungal culture done just prior to seeing Dr. Forbes and there has been no growth to date on this with the exception of     Candida, likely mouth contaminant.  She most recently had a seven day course of     doxycycline and prednisone about one month ago by Dr. Forbes and it did not     seem to change or effect her cough and sputum.  She does have mild postnasal dr    ip despite using Flonase and ipratropium nasal sprays. She does have a history     of severe seasonal allergies and is on allergy immunotherapy. She did take     Advair  HFA as was prescribed by Dr. Forbes after Symbicort was not covered and     states she has used this for about six weeks with no improvement of her     symptoms.  She really does not feel badly, she is just wondering if it is normal     for her to cough up yellow phlegm. Of note, patient did have a chest CT done in     early 2020 that showed mild diffuse bronchiectasis as well as mild patchy     ground glass opacity and micronodularity in the lower lobes felt to be     consistent with infection or inflammatory process.              I have reviewed ROS, medical, surgical and family history and agree with those     as entered in the chart.  In reviewed most recent chest CT.                       [facility]                PACS RADIOLOGY REPORT            Patient: ALON PENNINGTON   Acct: #[id number]   Report: #[id number]            UNIT #: [id number]    DOS: 20 1610      INSURANCE:MEDICARE PART A   LOCATION:[facility]     : 1940            PROVIDERS      ADMITTING:     ATTENDING: NAVEEN CROW      FAMILY:  NONE,MD   ORDERING:  NAVEEN CROW         OTHER:    DICTATING:  CHRIS TONG MD            REQ #:[id number]   [facility]      REASON FOR EXAM:  COUGH      REASON FOR VISIT:  COUGH            *******Signed******         PROCEDURE:   CT CHEST WITHOUT CONTRAST             COMPARISON:   None.             INDICATIONS:   PRODUCTIVE COUGH             TECHNIQUE:   CT images were created without the administration of contrast     material.               PROTOCOL:     Standard imaging protocol performed                RADIATION:     DLP: 240.1 mGy*cm          Automated exposure control was utilized to minimize radiation dose.              FINDINGS:         No dense consolidation.  Mild diffuse bronchiectasis.  There is mild bronchial     wall thickening in       the lower lobes with patchy areas of ground-glass opacity and  scattered micro     nodularity.  Some       linear opacities in the lung bases may represent scarring or atelectasis.  No     adenopathy in the       chest.  No acute findings in the included upper abdomen.  No aggressive appea    ring bone change.             CONCLUSION:   Mild diffuse bronchiectasis.  Mild bronchial wall thickening in     the lower lobes       suggesting bronchitis.             Patchy areas of ground-glass opacity and micro nodularity in the lower lobes,     favored to represent       infectious or inflammatory small airways disease.             No dense consolidation              CHRIS TONG MD             Electronically Signed and Approved By: CHRIS TONG MD on 3/02/2020 at 17:42                        Until signed, this is an unconfirmed preliminary report that may contain      errors and is subject to change.                                              MAGDAER:      D:03/02/20 1740                         Past Med History      Patient has past illness of Chronic Bronchitis      She has never been a smoker      Flu-current      Pneumonia-current      Overview of Symptoms      patient is complaining of SOA, Coughing up yellow sputum, fatigue. Denies fever,     chills, headaches, body aches            Allergies/Medications      Allergies:        Coded Allergies:             PENICILLINS (Verified  Allergy, Unknown, 3/18/20)      Medications    Last Reconciled on 4/30/20 14:01 by SAVANNA CALIX      Fluticasone/Salmeterol 230/21 (Advair /21 MCG) 12 Gm Hfa.aer.ad      2 PUFF INH RTBID, #1 INH 3 Refills         Prov: DELIA RUTHERFORD         3/18/20       NEB-Albuterol Sulf (Albuterol) 2.5 Mg/3 Ml Vial.neb      2.5 MG INH Q6H PRN for DYSPNEA/WHEEZING, #120 NEB 3 Refills         Prov: DELIA RUTHERFORD         3/18/20       NEB-Albuterol Sulf (Albuterol) 2.5 Mg/3 Ml Vial.neb      2.5 MG INH Q6H PRN for SHORTNESS OF BREATH, #120 NEB 0 Refills         Reported         3/18/20       Omeprazole  (Omeprazole*) 20 Mg Tablet.dr      20 MG PO HS, #30 CAP 0 Refills         Reported         3/18/20       Losartan Potassium (Cozaar) 50 Mg Tablet      50 MG PO QDAY, #30 TAB 0 Refills         Reported         3/18/20       Ipratropium Bromide Nasal (Atrovent 0.03% Nasal) 21 Mcg [place]      2 PUFFS NARE EACH BID, #1 BOTTLE         Reported         6/23/16       Multivitamin/Iron/Folic Acid (CENTRUM COMPLETE MULTIVIT TAB) 1 Tab Tablet      1 TAB PO QDAY, #30 TAB 0 Refills         Reported         6/23/16       Frankie-Fluticasone (Fluticasone 50 mcg) 16 Gm Naspr      1 PUFFS NARE EACH QDAY, #1 BOTTLE 0 Refills         Reported         6/23/16       clonazePAM (clonazePAM) 0.5 Mg Tablet      0.5 MG PO QDAY, #30 TAB 0 Refills         Reported         6/23/16       Estradiol (Estrace) 1 Mg Tablet      1 MG PO QDAY, TAB         Reported         6/23/16       Pravastatin Sodium (Pravastatin Sodium) 20 Mg Tablet      20 MG PO QDAY, #30 TAB 0 Refills         Reported         6/23/16            Plan/Instructions      Ambulatory Assessment/Plan:        Bronchiectasis - J47.9            Cough - R05            Notes      Discontinued Medications      * Budesonide/Formoterol Fumarate (Symbicort 160/4.5 Mcg) 10.2 GM INH          Sample - Qty 1         Instructions: 2 puffs BID         Dx: Cough - R05      * Doxycycline Hyclate (Doxycycline Hyclate*) 100 MG CAPSULE: 100 MG PO BID 7       Days #14      * predniSONE 20 MG TABLET: 40 MG PO QDAY 7 Days #14      New Diagnostics      * Immunoglobulin  E (I, Week         Dx: Bronchiectasis - J47.9      * CBC, Week         Dx: Bronchiectasis - J47.9      * Rast Aspergillus Fum IGE, Week         Dx: Bronchiectasis - J47.9      * Histoplasma Antigen Urine, Routine         Dx: Bronchiectasis - J47.9      * 1 3 BETA D GLUCAN FUNGIT BDGLU, Routine         Dx: Bronchiectasis - J47.9      Plan/Instructions            * Plan Of Care: ()            * Chronic conditions reviewed and taken into  consideration for today's treatment       plan.      * Patient instructed to seek medical attention urgently for new or worsening       symptoms.      * Patient was educated/instructed on their diagnosis, treatment and medications       prior to discharge from the clinic today.            ASSESSMENT:       1. Chronic cough.      2. Mild bronchiectasis on chest CT.      3. Possible asthma.      4.  Seasonal allergies on allergy immunotherapy.      5. Postnasal drip.      6. Never smoker.            PLAN:      1. At this time, I have discussed with patient regarding current symptoms and     plans and recommendations going forward.  I have discussed with her regarding     recent chest CT and discussed that she may have chronic cough due to her mild     bronchiectasis.  I will do additional workup for this including checking CBC and     IgE level now as well as workup for possible asthma.  I will also check fungal     serologies.       2. I did discuss with patient the recent sputum culture including fungal and AFB     culture and had no growth at six weeks other than Candida, likely mouth     contaminant.      3. I will have her continue Advair as prescribed and continue albuterol for     airway clearance.        4. She is already scheduled for PFTs in 06/2020 and hopefully can have those     done then given COVID19 pandemic.      5. I have also advised patient that I would recommend doing alpha 1 antitrypsin     testing when she presents for her next in personal follow up appointment for     further workup of bronchiectasis noted on chest CT.      6. I have also discussed with patient that allergies and postnasal drip can also     be contributing to her chronic cough.      7.  I have advised her to continue Flonase and ipratropium nasal spray and     increase these to twice daily if her postnasal drip continues.      8. She verbalized understanding.        9. I will have her keep her next follow up appointment with  Dr. Forbes to     review test results and call sooner if needed.       10.  I have specifically instructed patient to call 911 or go to ER for any     worsening dyspnea, cough, wheezing or respiratory distress and she verbalized     understanding.      Codes:  Phone Eval 11-20 mi [phone]            Electronically signed by JOSIAH CONKLIN PA-C  05/19/2020 15:34       Disclaimer: Converted document may not contain table formatting or lab diagrams. Please see [employer] for the authenticated document.

## 2021-05-28 NOTE — PROGRESS NOTES
Patient: ALON PENNINGTON     Acct: [id number]     Report: #[id number]  UNIT #: [id number]     : 1940    Encounter Date:2020  PRIMARY CARE: BETH LIRA  ***Signed***  --------------------------------------------------------------------------------------------------------------------  Chief Complaint      Encounter Date      Mar 18, 2020            Primary Care Provider      BETH LIRA            Referring Provider      BETH LIRA            Patient Complaint      Patient is complaining of      bronchitis            VITALS      Height 5 ft 2 in / 157.48 cm      Weight 115 lbs 0 oz / 52.940985 kg      BSA 1.51 m2      BMI 21.0 kg/m2      Temperature 98.1 F / 36.72 C - Oral      Pulse 99      Respirations 14      Blood Pressure 168/81 Sitting, Right Arm      Pulse Oximetry 96%, roomair            HPI      The patient is a very pleasant 79 year old  female never smoker here     for three months for bronchitis symptoms. She states she was doing well prior to     and then got sick with an infection.  She states for the last 3-4     months she has had recurrent bronchitis x4 and sinus infections. She has     received four courses of antibiotics and steroids which have helped somewhat.     She was given an albuterol nebulizer which she uses four times a day.  When she     uses her nebulizer, she coughs up copious amounts of thick yellow sputum.  She     has significant improvement in respiratory symptoms as well with this.  She gets    short of breath walking about 400-500 feet, moderate in severity, worse with     exertion and relieved with rest. She denies any chest pain.  She denies any     hemoptysis.  She does have some wheezing with exertion.  Denies any nausea,     vomiting, fevers, chills, headaches or weight loss.  She does have seasonal     allergies and is on allergy injections. She denies any recent itchy-scratchy     throat, watery eyes or nasal congestion.  She is  "able to perform her ADLs     without difficulty.  Denies any swollen glands or lymph nodes of the head and     neck.            I have personally reviewed the review of systems, past family, social, surgical     and medical histories and I agree with the findings.            ROS      Constitutional:  Denies: Fatigue, Fever, Weight gain, Weight loss, Chills,     Insomnia, Other      Respiratory/Breathing:  Complains of: Shortness of air, Cough (\" thick yellow\");    Denies: Wheezing, Hemoptysis, Pleuritic pain, Other      Endocrine:  Denies: Polydipsia, Polyuria, Heat/cold intolerance, Abnorml     menstrual pattern, Diabetes, Other      Eyes:  Denies: Blurred vision, Vision Changes, Other      Ears, nose, mouth, throat:  Denies: Mouth lesions, Thrush, Throat pain,     Hoarseness, Allergies/Hay Fever, Post Nasal Drip, Headaches, Recent Head Injury,    Nose Bleeding, Neck Stiffness, Thyroid Mass, Hearing Loss, Ear Fullness, Dry     Mouth, Nasal or Sinus Pain, Dry Lips, Nasal discharge, Nasal congestion, Other      Cardiovascular:  Denies: Palpitations, Syncope, Claudication, Chest Pain, Wake     up Gasping for air, Leg Swelling, Irregular Heart Rate, Cyanosis, Dyspnea on     Exertion, Other      Gastrointestinal:  Denies: Nausea, Constipation, Diarrhea, Abdominal pain,     Vomiting, Difficulty Swallowing, Reflux/Heartburn, Dysphagia, Jaundice,     Bloating, Melena, Bloody stools, Other      Genitourinary:  Denies: Urinary frequency, Incontinence, Hematuria, Urgency,     Nocturia, Dysuria, Testicular problems, Other      Musculoskeletal:  Denies: Joint Pain, Joint Stiffness, Joint Swelling, Myalgias,    Other      Hematologic/lymphatic:  DENIES: Lymphadenopathy, Bruising, Bleeding tendencies,     Other      Neurological:  Denies: Headache, Numbness, Weakness, Seizures, Other      Psychiatric:  Denies: Anxiety, Appropriate Effect, Depression, Other      Sleep:  No: Excessive daytime sleep, Morning Headache?, Snoring, " Insomnia?, Stop    breathing at sleep?, Other      Integumentary:  Denies: Rash, Dry skin, Skin Warm to Touch, Other      Immunologic/Allergic:  Denies: Latex allergy, Seasonal allergies, Asthma,     Urticaria, Eczema, Other      Immunization status:  No: Up to date            FAMILY/SOCIAL/MEDICAL HX      Surgical History:  Yes: Cholecystectomy      Stroke - Family Hx:  Mother      Is Father Still Living?:  No      Is Mother Still Living?:  No       Family History:  Yes      Social History:  No Tobacco Use, No Alcohol Use, No Recreational Drug use      Smoking status:  Never smoker      Anticoagulation Therapy:  No      Antibiotic Prophylaxis:  No      Medical History:  Yes: Deafness or Ringing Ears (EDIL), High Blood Pressure; No:     Blood Disease, Chemotherapy/Cancer, Hemorrhoids/Rectal Prob, Shortness Of Breath      Psychiatric History      none            PREVENTION      Hx Influenza Vaccination:  Yes      Date Influenza Vaccine Given:  Nov 1, 2019      2 or More Falls Past Year?:  No      Fall Past Year with Injury?:  No      Hx Pneumococcal Vaccination:  Yes      Encouraged to follow-up with:  PCP regarding preventative exams.      Chart initiated by      brina dela cruz/ ma            ALLERGIES/MEDICATIONS      Allergies:        Coded Allergies:             PENICILLINS (Verified  Allergy, Unknown, 3/18/20)      Medications    Last Reconciled on 3/18/20 08:58 by DELIA RUTHERFORD MD      Budesonide/Formoterol Fumarate (Symbicort 160/4.5 Mcg) 10.2 Gm Inh      2 PUFF INH BID, #1 INH 5 Refills         Prov: DELIA RUTHERFORD         3/18/20       NEB-Albuterol Sulf (Albuterol) 2.5 Mg/3 Ml Vial.neb      2.5 MG INH Q6H PRN for DYSPNEA/WHEEZING, #120 NEB 0 Refills         Prov: DELIA RUTHERFORD         3/18/20       NEB-Albuterol Sulf (Albuterol) 2.5 Mg/3 Ml Vial.neb      2.5 MG INH Q6H PRN for SHORTNESS OF BREATH, #120 NEB 0 Refills         Reported         3/18/20       Omeprazole (Omeprazole*) 20 Mg Tablet.      20 MG  PO HS, #30 CAP 0 Refills         Reported         3/18/20       Losartan Potassium (Cozaar) 50 Mg Tablet      50 MG PO QDAY, #30 TAB 0 Refills         Reported         3/18/20       Ipratropium Bromide Nasal (Atrovent 0.03% Nasal) 21 Mcg [place]      2 PUFFS NARE EACH BID, #1 BOTTLE         Reported         6/23/16       Multivitamin/Iron/Folic Acid (CENTRUM COMPLETE MULTIVIT TAB) 1 Tab Tablet      1 TAB PO QDAY, #30 TAB 0 Refills         Reported         6/23/16       Frankie-Fluticasone (Fluticasone 50 mcg) 16 Gm Naspr      1 PUFFS NARE EACH QDAY, #1 BOTTLE 0 Refills         Reported         6/23/16       clonazePAM (clonazePAM) 0.5 Mg Tablet      0.5 MG PO QDAY, #30 TAB 0 Refills         Reported         6/23/16       Estradiol (Estrace) 1 Mg Tablet      1 MG PO QDAY, TAB         Reported         6/23/16       Pravastatin Sodium (Pravastatin Sodium) 20 Mg Tablet      20 MG PO QDAY, #30 TAB 0 Refills         Reported         6/23/16      Current Medications      Current Medications Reviewed 3/18/20            EXAM      Vital Signs Reviewed.      General:  WDWN, Alert, NAD.      HEENT: PERRL, EOMI.  OP, nares clear, no sinus tenderness.      Neck: Supple, no JVD, no thyromegaly.      Lymph: No axillary, cervical, supraclavicular lymphadenopathy noted bilaterally.      Chest: Good aeration, coarse rhonchi and wheezing throughout all lung fields,     tympanic to percussion bilaterally, no work of breathing noted.      CV: RRR, no MGR, pulses 2+, equal.        Abd: Soft, NT, ND, +BS, no HSM.      EXT: No clubbing, no cyanosis, no edema, no joint tenderness.        Neuro:  A  Skin: No rashes or lesions.      Vtials      Vitals:             Height 5 ft 2 in / 157.48 cm           Weight 115 lbs 0 oz / 52.622763 kg           BSA 1.51 m2           BMI 21.0 kg/m2           Temperature 98.1 F / 36.72 C - Oral           Pulse 99           Respirations 14           Blood Pressure 168/81 Sitting, Right Arm           Pulse Oximetry  96%, roomair            REVIEW      Results Reviewed      [facility]  Yes Prev Lab Results, Yes Prev Radiology Results, Yes     Previous [facility] Records      Lab Results      I reviewed office notes from referring provider. I personally reviewed chest x-    ray and chest CT both done in 2020.  Labs were reviewed showing CBC with no     peripheral eosinophils and no evidence of chronic hypercapnic respiratory ryann    lure.  Multiple sputum cultures, AFB and fungal cultures are unremarkable.      Radiographic Results               [facility] Diagnostic Imaging                PACS RADIOLOGY REPORT            Patient: ALON PENNINGTON   Acct: #[id number]   Report: #[id number]            UNIT #: [id number]    DOS: 02/10/20       INSURANCE:MEDICARE PART A   LOCATION:[facility]     : 1940            PROVIDERS      ADMITTING:     ATTENDING: NAVEEN CROW      FAMILY:  NAVEEN CROW   ORDERING:  NAVEEN CROW         OTHER: [facility]   DICTATING:  Yuriy Saavedra MD, IV            REQ #:[id number]   EXAM:CXR2 - CHEST 2V AP PA LAT      REASON FOR EXAM:  PNEUMONIA      REASON FOR VISIT:  PNEUMONIA            *******Signed******         PROCEDURE:   CHEST AP/PA AND LATERAL             COMPARISON:   [facility], , CHEST PA/AP   6:12.             INDICATIONS:   PNEUMONIA, PRODUCTIVE COUGH AND HEADACHE FOR 5 WEEKS             FINDINGS:                The lungs are well-expanded. The heart and pulmonary vasculature are within     normal limits. No       pleural effusions are identified. There are no active appearing infiltrates.             IMPRESSION: No active disease.             YURIY SAAVEDRA MD             Electronically Signed and Approved By: YURIY SAAVEDRA MD on 2/10/2020 at 10:44                           Until signed, this is an unconfirmed preliminary report that may contain      errors and is subject to change.                                               BRYJE:      D:02/10/20 1044               [facility] Diagnostic Imaging                PACS RADIOLOGY REPORT            Patient: ALON PENNINGTON   Acct: #[id number]   Report: #[id number]            UNIT #: [id number]    DOS: 20 1610      INSURANCE:MEDICARE PART A   LOCATION:[facility]     : 1940            PROVIDERS      ADMITTING:     ATTENDING: NAVEEN CROW      FAMILY:  NONE,MD   ORDERING:  NAVEEN CROW         OTHER:    DICTATING:  CHRIS TONG MD            REQ #:[id number]   EXAM:[facility] - CT CHEST without CONTRAST      REASON FOR EXAM:  COUGH      REASON FOR VISIT:  COUGH            *******Signed******         PROCEDURE:   CT CHEST WITHOUT CONTRAST             COMPARISON:   None.             INDICATIONS:   PRODUCTIVE COUGH             TECHNIQUE:   CT images were created without the administration of contrast     material.               PROTOCOL:     Standard imaging protocol performed                RADIATION:     DLP: 240.1 mGy*cm          Automated exposure control was utilized to minimize radiation dose.              FINDINGS:         No dense consolidation.  Mild diffuse bronchiectasis.  There is mild bronchial     wall thickening in       the lower lobes with patchy areas of ground-glass opacity and scattered micro     nodularity.  Some       linear opacities in the lung bases may represent scarring or atelectasis.  No     adenopathy in the       chest.  No acute findings in the included upper abdomen.  No aggressive     appearing bone change.             CONCLUSION:   Mild diffuse bronchiectasis.  Mild bronchial wall thickening in     the lower lobes       suggesting bronchitis.             Patchy areas of ground-glass opacity and micro nodularity in the lower lobes,     favored to represent       infectious or inflammatory small airways disease.             No dense consolidation              CHRIS TONG MD              Electronically Signed and Approved By: CHRIS TONG MD on 3/02/2020 at 17:42                        Until signed, this is an unconfirmed preliminary report that may contain      errors and is subject to change.                                              TERA:      D:03/02/20 1742            Assessment      ATWOOD (dyspnea on exertion) - R06.09            Cough - R05            Wheeze - R06.2            Bronchitis, mucopurulent recurrent - J41.1            Notes      New Medications      * Losartan Potassium (Cozaar) 50 MG TABLET: 50 MG PO QDAY #30      * Omeprazole (Omeprazole*) 20 MG TABLET.DR: 20 MG PO HS #30      * NEB-Albuterol Sulf (Albuterol) 2.5 MG/3 ML VIAL.NEB: 2.5 MG INH Q6H PRN       SHORTNESS OF BREATH #120         Instructions: DIAGNOSIS CODE REQUIRED PRIOR TO PRESCRIBING.      * Budesonide/Formoterol Fumarate (Symbicort 160/4.5 Mcg) 10.2 GM INH: 2 PUFF INH      BID #1         Dx: ATWOOD (dyspnea on exertion) - R06.09      * Budesonide/Formoterol Fumarate (Symbicort 160/4.5 Mcg) 10.2 GM INH          Sample - Qty 1         Instructions: 2 puffs BID         Dx: Cough - R05      * NEB-Albuterol Sulf (Albuterol) 2.5 MG/3 ML VIAL.NEB: 2.5 MG INH Q6H PRN       DYSPNEA/WHEEZING #120         Instructions: dx: copd/ j44.9 npi: [id number]         Dx: ATWOOD (dyspnea on exertion) - R06.09      * Fluticasone/Vilanterol 100-25 Mcg Inh (Breo Ellipta 100-25 Mcg Inh) 1 EACH       BLST.W.DEV: 1 PUFF INH QDAY #1         Instructions: to replace symbicort      * Fluticasone/Salmeterol 230/21 (Advair /21 MCG) 12 GM HFA.AER.AD: 2 PUFF      INH RTBID #1      New Diagnostics      * PFT-Comp, PrePost,DLCO,BodyBox, 3 Months         Dx: ATWOOD (dyspnea on exertion) - R06.09      IMPRESSION:      1.  Subacute dyspnea.      2. Subacute cough.      3.  Subacute wheezing.      4.  Question of asthma versus post infectious reactive airway disease.      5. Seasonal allergies, well-controlled.      6. Never smoker.              PLAN:       1.  I performed exhale nitric oxide testing in the office today, despite     multiple attempts, she cannot have a number to interpret degree of eosinophilic     airway inflammation.      2.  The patient likely has underlying allergies and possible asthma as well as     likely reactive airway disease. I think steroids would help her out more than     anything.      3. We will do a trial of Symbicort 160/4.5 two puffs twice a day.  Inhaler     education provided today.      4. Continue albuterol as needed.  Hopefully the Symbicort use will help this     decrease.  We will give nebulizer device today.      5. We will get PFTs in a couple of months when it is safe to have it done in a     health care setting due to COVID.      6.  Up-to-date with flu, Prevnar and Pneumovax.      7.  Follow up with us in four months.            Patient Education      Patient Education Provided:  How to use an Inhaler            Electronically signed by DELIA RUTHERFORD  04/02/2020 14:15       Disclaimer: Converted document may not contain table formatting or lab diagrams. Please see [employer] for the authenticated document.

## 2021-05-28 NOTE — PROGRESS NOTES
Patient: ALON PENNINGTON     Acct: [id number]     Report: #[id number]  UNIT #: [id number]     : 1940    Encounter Date:2020  PRIMARY CARE: BETH LIRA  ***Signed***  --------------------------------------------------------------------------------------------------------------------  TELEHEALTH NOTE      History of Present Illness      Chief Complaint: Follow up/PFT results            Alon Pennington is presenting for evaluation via Telehealth visit by phone.     Verbal consent obtained before beginning visit.            Provider spent 21 minutes with the patient during telehealth visit.            The following staff were present during the visit: Suma Copeland MA, Michelle POPE            The patient is a 79 year old female patient of Dr. Forbes's who has had     episodes of bronchitis since 2019 and bronchiectasis on CT scan of the     chest. The patient presents for Telehealth visit today. The patient states that     she is still coughing up thick amounts of yellow sputum and has had multiple     rounds of antibiotics and is short of breath. The patient denies any wheezing,     fever or chills, night sweats, hemoptysis, swollen glands in head and neck,     unintentional weight loss, chest pain or chest tightness, abdominal pain, nausea    or vomiting or diarrhea. The patient denies  any headaches, myalgias, changes in    sense of taste and smell any coronavirus or flu like symptoms. The patient had     an IgE level done on 2020 and it came with a level of 2, fungal testing     came back negative. The patient had a pulmonary function test done on 2020    and it showed no evidence of obstruction or restriction, no bronchodilator     response noted and diffusion capacity was mildly reduced. The patient states she    is currently taking advair everyday as prescribed and uses albuterol nebulizer     as needed. The patient states she is also taking flonase and Atrovent  nasal     spray for her allergies. The patient states she is able to perform her     activities of daily living. The patient had a CT scan of the chest done in 2020 that showed mild diffuse bronchiectasis as well as mild patchy ground glass    opacities and micronodularity in the lower lobes felt to be consistent with     infection or inflammatory process. The patient was treated with doxycycline and     prednisone.             I reviewed the Review of Systems, medical, surgical and family history and agree    with those as entered.               Physical exam is deferred due to Telehealth visit.            I personally reviewed SAVANNA Cespedes last office visit note.              Patient: ALON PENNINGTON   Acct: #[id number]   Report: #[id number]            UNIT #: [id number]    DOS:       LOCATION:[facility]     : 1940            PROVIDERS      ADMITTING:     FAMILY:  BETH LIRA         OTHER:       DICTATING:  DELIA RUTHERFORD MD            REASON FOR VISIT:  DYSPNEA            *******Signed******                                    [facility] [place] [facility]                          Health Information Management Services                            [place], [place]  [address]               __________________________________________________________________________             Patient Name:                   Attending Physician:      Alon Pennington M.D.             Patient Visit # MR #            Admit Date  Disch Date     Location      [id number]    [id number]      2020                 [facility]- -             Date of Birth      1940      __________________________________________________________________________      0821 - DIAGNOSTIC REPORT             PULMONARY FUNCTION TEST:             SPIROMETRY:      No obstructive lung defect noted. No bronchodilator response was noted.      Flow volume loop was normal.             LUNG VOLUMES:      No evidence  of restriction, hyperinflation or air trapping.             DIFFUSION CAPACITY:      Diffusion capacity is mildly reduced at 69% of predicted.             OVERALL IMPRESSION:      No evidence of obstruction or restriction.  No bronchodilator response noted.      Diffusion capacity is mildly reduced.             To be electronically signed in [employer]      [id number] DELIA RUTHERFORD M.D.             AM:griselda      D:  06/19/2020 16:22      T:  06/19/2020 16:40      #[id number]             Until signed, this is an unconfirmed preliminary report that may contain      errors and is subject to change.                   Until signed, this is an unconfirmed preliminary report that may contain      errors and is subject to change.                     <Electronically signed by DELIA RUTHERFORD MD>                06/22/20 1123               DELIA RUTHERFORD MD                                                                  [facility]:TRS      D:06/19/20 1622                         Past Med History      Past Med History: Cough            Former Smoker: Pt started smoking at 20 years old, pt only smoked 6 months then     quit            Flu and Pneumonia Vaccines: Current      Overview of Symptoms      Pt complains of: Cough, yellow Sputum, SOB upon exertion, fatigue            Pt denies: SOA, wheezing,fever, chills,fever,chills, nausea, vomiting            Allergies/Medications      Allergies:        Coded Allergies:             ALENDRONATE SODIUM (Verified  Allergy, Intermediate, 7/1/20)           PENICILLINS (Verified  Allergy, Unknown, 7/1/20)      Medications    Last Reconciled on 7/1/20 12:05 by Rolando GRIER-NaCl 3% (Sodium Chloride 3% Neb) 4 Ml Vial.neb      4 ML INH RTBID for 30 Days, #60 NEB 2 Refills         [facility]         7/1/20       Fluticasone/Salmeterol 230/21 (Advair /21 MCG) 12 Gm Hfa.aer.ad      2 PUFF INH RTBID, #1 INH 3 Refills         Prov: DELIA RUTHERFORD         3/18/20        NEB-Albuterol Sulf (Albuterol) 2.5 Mg/3 Ml Vial.neb      2.5 MG INH Q6H PRN for DYSPNEA/WHEEZING, #120 NEB 3 Refills         Prov: DELIA RUTHERFORD         3/18/20       NEB-Albuterol Sulf (Albuterol) 2.5 Mg/3 Ml Vial.neb      2.5 MG INH Q6H PRN for SHORTNESS OF BREATH, #120 NEB 0 Refills         Reported         3/18/20       Omeprazole (Omeprazole*) 20 Mg Tablet.dr      20 MG PO HS, #30 CAP 0 Refills         Reported         3/18/20       Losartan Potassium (Cozaar) 50 Mg Tablet      50 MG PO QDAY, #30 TAB 0 Refills         Reported         3/18/20       Ipratropium Bromide Nasal (Atrovent 0.03% Nasal) 21 Mcg [place]      2 PUFFS NARE EACH BID, #1 BOTTLE         Reported         6/23/16       Multivitamin/Iron/Folic Acid (CENTRUM COMPLETE MULTIVIT TAB) 1 Tab Tablet      1 TAB PO QDAY, #30 TAB 0 Refills         Reported         6/23/16       Frankie-Fluticasone (Fluticasone 50 mcg) 16 Gm Naspr      1 PUFFS NARE EACH QDAY, #1 BOTTLE 0 Refills         Reported         6/23/16       clonazePAM (clonazePAM) 0.5 Mg Tablet      0.5 MG PO QDAY, #30 TAB 0 Refills         Reported         6/23/16       Estradiol (Estrace) 1 Mg Tablet      1 MG PO QDAY, TAB         Reported         6/23/16       Pravastatin Sodium (Pravastatin Sodium) 20 Mg Tablet      20 MG PO QDAY, #30 TAB 0 Refills         Reported         6/23/16            Plan/Instructions      Ambulatory Assessment/Plan:        Bronchiectasis - J47.9            Notes      New Medications      * Neb-NaCl 3% (Sodium Chloride 3% Neb) 4 ML VIAL.NEB: 4 ML INH RTBID 30 Days #60         Dx: Bronchiectasis - J47.9      New Diagnostics      * [facility] Pre-Op Covid Screening, Routine         Dx: Bronchiectasis - J47.9      Plan/Instructions      * Plan Of Care: ()            * Chronic conditions reviewed and taken into consideration for today's treatment       plan.      * Patient instructed to seek medical attention urgently for new or worsening       symptoms.      * Patient was  educated/instructed on their diagnosis, treatment and medications       prior to discharge from the clinic today.            ASSESSMENT:      1. Chronic cough.       2. Mild bronchiectasis seen on CT scan of the chest.       3. Seasonal allergies on allergy immunotherapy.       4. Postnasal drip.       5. Dyspnea.       6. Never smoker.       7. Episodes of recurrent bronchitis, the patient has been on multiple rounds of     antibiotics and steroids.       8. Difficulty with airway clearance.             PLAN:      1. The patient is having bouts of recurrent bronchitis and has been on multiple     rounds of antibiotics and steroids. The patient states mucous is now thick and     has difficulty coughing up. I will send the patient for bronchoscopy with     bronchalveolar lavage, brushings and biopsies. Risks and benefits were discussed     with the patient and the patient is willing to under go the procedure. Dr. Forbes has agreed to perform bronchoscopy.       2. The patient's IgE level came back with a normal level of 2 and fungal     serologies came back negative.       3. I will start the patient on flutter valve twice daily. The patient is advised     to use albuterol nebulizer and I will start the patient on 3% sodium chloride     nebulizer treatments twice daily.       4. I recommend the patient have alpha 1 antitrypsin testing when she follows up     at her next face to face visit in the office.       5. Continue flonase and  ipratropium nasal spray as prescribed.       6. Continue advair everyday as prescribed and rinse her mouth after each use.       7. Continue albuterol inhaler and nebulizer as needed.       8. The patient is advised to call the office, call 911 or go to the ER for any     new or worsening symptoms.       9. I discussed with patient pulmonary function test results. Pulmonary function     test came back normal with just a mildly reduced diffusion capacity.       10. The patient  reports she is up to date with flu and pneumonia vaccines.       11. Follow up with Dr. Forbes in 1-2 weeks after bronchoscopy, sooner if     needed.             CONSENT:      Bronchoscopy with bronchalveolar lavage, brushings and biopsies.             I have discussed the risks of the procedure with the patient including     pneumothorax, hemothorax, bleeding, hypoxia, required mechanical ventilation and     death.  The patient recognizes these findings, acknowledges these findings and     is agreeable to the procedure.      Codes:  Phone Eval 21-30 mi [id number]            Electronically signed by SHRUTHI BARGER [facility]  07/06/2020 16:07       Disclaimer: Converted document may not contain table formatting or lab diagrams. Please see [employer] for the authenticated document.

## 2021-05-28 NOTE — PROGRESS NOTES
Patient: ALON PENNINGTON     Acct: [id number]     Report: #[id number]  UNIT #: [id number]     : 1940    Encounter Date:2021  PRIMARY CARE: BETH LIRA  ***Signed***  --------------------------------------------------------------------------------------------------------------------  Chief Complaint      Encounter Date      Mar 11, 2021            Primary Care Provider      BETH LIRA            Referring Provider      BETH LIRA            Patient Complaint      Patient is complaining of      PT here today for F/U, Bronchiectasis            VITALS      Height 62 in / 157.48 cm      Weight 120 lbs  / 54.249959 kg      BSA 1.54 m2      BMI 21.9 kg/m2      Temperature 96.5 F / 35.83 C - Temporal      Pulse 70      Respirations 16      Blood Pressure 130/57 Sitting, Left Arm      Pulse Oximetry 99%, room air            HPI      The patient is a 80 year old female patient of Dr. Forbes's with bronchiectasis    who presents for follow up visit today. The patient states that since last visit    her breathing is doing well.  The patient states that she is feeling so much     better since stopping antibiotics and starting on nebulizer treatments.  The     patient currently denies any coughing or wheezing. The patient denies any fever,    chills, night sweats, hemoptysis, purulent sputum production, chest pain, chest     tightness, swollen glands in the head and neck, abdominal pain, nausea, vomiting    or diarrhea.   The patient denies any headaches, myalgias, sore throat, changes     in sense of taste and/or smell or any other coronavirus or flu-like symptoms.      The patient states at times she has some mid back pain underneath her shoulder     blades that is worse with twisting movements and worse when standing for     prolonged periods of time.  The patient states she is going to discuss this with    her PCP.  The patient states that since stopping antibiotic therapy she has     gained 6  pounds, she was 114 and now she is 120 pounds.  The patient states she     is able to perform her ADLs without difficulty.  The patient is needing a refill    on her sodium chloride nebulizers. The patient states she is using those twice a    day with a flutter valve to assist with airway clearance.              I have personally reviewed the review of systems, past family, social, surgical     and medical histories and I agree with those as entered in the chart.      Copies To:   DELIA RUTHERFORD      Constitutional:  Denies: Fatigue, Fever, Weight gain, Weight loss, Chills,     Insomnia, Other      Respiratory/Breathing:  Denies: Shortness of air, Wheezing, Cough, Hemoptysis,     Pleuritic pain, Other      Endocrine:  Denies: Polydipsia, Polyuria, Heat/cold intolerance, Abnorml     menstrual pattern, Diabetes, Other      Eyes:  Denies: Blurred vision, Vision Changes, Other      Ears, nose, mouth, throat:  Denies: Mouth lesions, Thrush, Throat pain,     Hoarseness, Allergies/Hay Fever, Post Nasal Drip, Headaches, Recent Head Injury,    Nose Bleeding, Neck Stiffness, Thyroid Mass, Hearing Loss, Ear Fullness, Dry     Mouth, Nasal or Sinus Pain, Dry Lips, Nasal discharge, Nasal congestion, Other      Cardiovascular:  Denies: Palpitations, Syncope, Claudication, Chest Pain, Wake     up Gasping for air, Leg Swelling, Irregular Heart Rate, Cyanosis, Dyspnea on     Exertion, Other      Gastrointestinal:  Denies: Nausea, Constipation, Diarrhea, Abdominal pain,     Vomiting, Difficulty Swallowing, Reflux/Heartburn, Dysphagia, Jaundice,     Bloating, Melena, Bloody stools, Other      Genitourinary:  Denies: Urinary frequency, Incontinence, Hematuria, Urgency,     Nocturia, Dysuria, Testicular problems, Other      Musculoskeletal:  Denies: Joint Pain, Joint Stiffness, Joint Swelling, Myalgias,    Other      Hematologic/lymphatic:  DENIES: Lymphadenopathy, Bruising, Bleeding tendencies,     Other      Neurological:   Denies: Headache, Numbness, Weakness, Seizures, Other      Psychiatric:  Denies: Anxiety, Appropriate Effect, Depression, Other      Sleep:  No: Excessive daytime sleep, Morning Headache?, Snoring, Insomnia?, Stop    breathing at sleep?, Other      Integumentary:  Denies: Rash, Dry skin, Skin Warm to Touch, Other      Immunologic/Allergic:  Denies: Latex allergy, Seasonal allergies, Asthma,     Urticaria, Eczema, Other      Immunization status:  No: Up to date            FAMILY/SOCIAL/MEDICAL HX      Surgical History:  Yes: Bowel Surgery (COLONOSCOPY), Cholecystectomy      Stroke - Family Hx:  Mother      Is Father Still Living?:  No      Is Mother Still Living?:  No       Family History:  Yes      Social History:  No Tobacco Use, No Alcohol Use, No Recreational Drug use      Smoking status:  Never smoker       Section:  No      Tubal Ligation:  No      Hysterectomy:  Yes      Anticoagulation Therapy:  No      Antibiotic Prophylaxis:  No      Medical History:  Yes: Arthritis, Deafness or Ringing Ears (EDIL), Anxiety,     Hemorrhoids/Rectal Prob (REFLUX), High Blood Pressure (ON MEDS), Shortness Of     Breath (COUGH); No: Blood Disease, Chemotherapy/Cancer, Seizures, Sinus Trouble,    Miscellaneous Medical/oth      Psychiatric History      ANXIETY            PREVENTION      Hx Influenza Vaccination:  Yes      Date Influenza Vaccine Given:  Sep 1, 2020      Influenza Vaccine Declined:  No      2 or More Falls in Past Year?:  No      Fall Past Year with Injury?:  No      Hx Pneumococcal Vaccination:  Yes      Encouraged to follow-up with:  PCP regarding preventative exams.      Chart initiated by      Marzena Aleman CMA            ALLERGIES/MEDICATIONS      Allergies:        Coded Allergies:             ALENDRONATE SODIUM (Verified  Allergy, Intermediate, 3/11/21)           PENICILLINS (Verified  Allergy, Unknown, 3/11/21)      Medications    Last Reconciled on 3/11/21 09:46 by SHRUTHI BARGER,       [facility]-NaCl  3% (Sodium Chloride 3% Neb) 4 Ml Vial.neb      4 ML INH BID for 30 Days, #60 NEB 11 Refills         [facility]         3/11/21       Ondansetron Hcl (ONDANSETRON HCL) 4 Mg Tablet      4 MG PO Q6H PRN for NAUSEA for 30 Days, #120 TAB 0 Refills         Prov: ECHOZACARIASDELIA ESPARZA         9/14/20       Arformoterol Tartrate (Brovana) 15 Mcg/2 Ml Vial.neb      15 MCG INH Q12HR, #60 NEB 6 Refills         Prov: PATSYVILMADELIA         9/2/20       Neb-Budesonide (Pulmicort) 0.5 Mg/2 Ml Ampul.neb      0.5 MG INH RTBID, #60 NEB 6 Refills         Prov: ECHOJAMESDELIA         8/31/20       Levocetirizine (Levocetirizine) 5 Mg Tablet      10 MG PO QDAY, TAB         Reported         7/14/20       NEB-Albuterol Sulf (Albuterol) 2.5 Mg/3 Ml Vial.neb      2.5 MG INH Q6H PRN for DYSPNEA/WHEEZING, #120 NEB 3 Refills         Prov: DELIA RUTHERFORD         3/18/20       NEB-Albuterol Sulf (Albuterol) 2.5 Mg/3 Ml Vial.neb      2.5 MG INH Q6H PRN for SHORTNESS OF BREATH, #120 NEB 0 Refills         Reported         3/18/20       Omeprazole (Omeprazole*) 20 Mg Tablet.dr      20 MG PO HS, #30 CAP 0 Refills         Reported         3/18/20       Losartan Potassium (Cozaar) 50 Mg Tablet      50 MG PO QDAY, #30 TAB 0 Refills         Reported         3/18/20       Multivitamin/Iron/Folic Acid (CENTRUM COMPLETE MULTIVIT TAB) 1 Tab Tablet      1 TAB PO QDAY, #30 TAB 0 Refills         Reported         6/23/16       Frankie-Fluticasone (Fluticasone 50 mcg) 16 Gm Naspr      1 PUFFS NARE EACH QDAY, #1 BOTTLE 0 Refills         Reported         6/23/16       clonazePAM (clonazePAM) 0.5 Mg Tablet      0.5 MG PO HS, #30 TAB 0 Refills         Reported         6/23/16       Estradiol (Estrace) 1 Mg Tablet      1 MG PO QDAY, TAB         Reported         6/23/16       Pravastatin Sodium (Pravastatin Sodium) 20 Mg Tablet      20 MG PO HS, #30 TAB 0 Refills         Reported         6/23/16      Current Medications      Current Medications Reviewed 3/11/21             EXAM      Vital Signs Reviewed.      General:  WDWN, Alert, NAD.      HEENT: PERRL, EOMI.  OP, nares clear, no sinus tenderness.      Neck: Supple, no JVD, no thyromegaly.      Lymph: No axillary, cervical, supraclavicular lymphadenopathy noted bilaterally.      Chest: Good aeration, lungs are clear to auscultation bilaterally, no wheezes,     rales or rhonchi appreciated, normal work of breathing noted.  The patient is     able to speak full sentences without difficulty.        CV: RRR, no MGR, pulses 2+, equal.        Abd: Soft, NT, ND, +BS, no HSM.      EXT: No clubbing, no cyanosis, no edema, no joint tenderness.        Neuro:  A  Skin: No rashes or lesions.      Vtials      Vitals:             Height 62 in / 157.48 cm           Weight 120 lbs  / 54.682549 kg           BSA 1.54 m2           BMI 21.9 kg/m2           Temperature 96.5 F / 35.83 C - Temporal           Pulse 70           Respirations 16           Blood Pressure 130/57 Sitting, Left Arm           Pulse Oximetry 99%, room air            REVIEW      Results Reviewed      PCCS Results Reviewed?:  Yes Prev Lab Results, Yes Prev Radiology Results, Yes     Previous [facility] Records      Lab Results      I reviewed Dr. Forbes's last office visit note.            Assessment      Notes      Renewed Medications      * Neb-NaCl 3% (Sodium Chloride 3% Neb) 4 ML VIAL.NEB: 4 ML INH BID 30 Days #60         Dx: Bronchiectasis - J47.9      Discontinued Medications      * Loperamide HCl (Imodium) 2 MG CAPSULE: 2 MG PO Q6H PRN DIARRHEA 30 Days #120      New Diagnostics      * Chest W/O Cont CT, 2 Months         Dx: Cough - R05      ASSESSMENT:       1. Bronchiectasis without acute exacerbation.      2.  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  The patient reports she is feeling better.      3. Fungal pneumonia, status post two months of  [place] with antibiotic     associated issues.      4. Acid reflux, patient on proton pump inhibitor.      5. Antibiotic associated diarrhea, resolved per patient report.      6. Chronic cough, improved.      7. Mucus plugging, patient on flutter valve and chloride nebulizers twice a day.      8. Seasonal allergies on allergy immunotherapy.      9. Never smoker.      10. Back pain.            PLAN:      1. The patient to continue Brovana and Pulmicort everyday as prescribed and     rinse her mouth out after each use.      2. The patient to cojntinue albuterol inhaler as needed.       3. We will recheck a noncontrast chest CT scan.      4. The patient to continue allergy immunotherapy along with Flonase and to     follow up with allergist as scheduled.      5. The patient to continue airway clearance with flutter valve and 3% saline     nebulizers.      6. Patient is advised to call the office, 911 or go to the ER with any new or     worsening symptoms.      7.  Up-to-date with flu, Prevnar and Pneumovax and has also received both     COVID19 vaccines.  The patient is advised to continue to follow CDC     recommendations of social distancing, wearing a mask and washing hands for at     least 20 seconds.        8. Follow up with PCP for back pain.      9. Follow up with Dr. Forbes in 2-3 months, sooner if needed.            Patient Education      Patient Education Provided:  Acute Bronchitis      Time Spent:  > 50% /Coord Care            Electronically signed by SHRUTHI BARGER [facility]  03/12/2021 12:58       Disclaimer: Converted document may not contain table formatting or lab diagrams. Please see [employer] for the authenticated document.

## 2021-06-07 ENCOUNTER — CLINICAL SUPPORT (OUTPATIENT)
Dept: FAMILY MEDICINE CLINIC | Age: 81
End: 2021-06-07

## 2021-06-07 DIAGNOSIS — J30.9 ALLERGIC RHINITIS, UNSPECIFIED SEASONALITY, UNSPECIFIED TRIGGER: Primary | ICD-10-CM

## 2021-06-07 PROCEDURE — 95115 IMMUNOTHERAPY ONE INJECTION: CPT | Performed by: FAMILY MEDICINE

## 2021-06-14 PROBLEM — I10 HYPERTENSION: Status: ACTIVE | Noted: 2021-06-14

## 2021-06-14 PROBLEM — J30.2 SEASONAL ALLERGIC RHINITIS: Status: ACTIVE | Noted: 2021-06-14

## 2021-06-14 RX ORDER — CLONAZEPAM 0.5 MG/1
0.5 TABLET ORAL 2 TIMES DAILY PRN
COMMUNITY
End: 2021-06-15 | Stop reason: SDUPTHER

## 2021-06-14 RX ORDER — ESTRADIOL 1 MG/1
1 TABLET ORAL DAILY
COMMUNITY
End: 2021-06-15 | Stop reason: SDUPTHER

## 2021-06-14 RX ORDER — OMEPRAZOLE 20 MG/1
20 CAPSULE, DELAYED RELEASE ORAL DAILY
COMMUNITY
End: 2021-06-15

## 2021-06-14 RX ORDER — FLUTICASONE PROPIONATE 50 MCG
2 SPRAY, SUSPENSION (ML) NASAL DAILY
COMMUNITY

## 2021-06-14 RX ORDER — LOSARTAN POTASSIUM 50 MG/1
50 TABLET ORAL DAILY
COMMUNITY
End: 2021-06-15 | Stop reason: SDUPTHER

## 2021-06-14 RX ORDER — LEVOCETIRIZINE DIHYDROCHLORIDE 5 MG/1
5 TABLET, FILM COATED ORAL EVERY EVENING
COMMUNITY
End: 2021-06-18 | Stop reason: SDUPTHER

## 2021-06-14 RX ORDER — PRAVASTATIN SODIUM 20 MG
20 TABLET ORAL DAILY
COMMUNITY
End: 2021-06-15 | Stop reason: SDUPTHER

## 2021-06-15 ENCOUNTER — LAB (OUTPATIENT)
Dept: LAB | Facility: HOSPITAL | Age: 81
End: 2021-06-15

## 2021-06-15 ENCOUNTER — OFFICE VISIT (OUTPATIENT)
Dept: FAMILY MEDICINE CLINIC | Age: 81
End: 2021-06-15

## 2021-06-15 VITALS
DIASTOLIC BLOOD PRESSURE: 69 MMHG | BODY MASS INDEX: 21.6 KG/M2 | WEIGHT: 117.4 LBS | SYSTOLIC BLOOD PRESSURE: 133 MMHG | HEART RATE: 76 BPM | TEMPERATURE: 98.3 F | HEIGHT: 62 IN

## 2021-06-15 DIAGNOSIS — F41.1 GENERALIZED ANXIETY DISORDER: ICD-10-CM

## 2021-06-15 DIAGNOSIS — Z00.00 PHYSICAL EXAM: Primary | ICD-10-CM

## 2021-06-15 DIAGNOSIS — N95.9 MENOPAUSAL AND POSTMENOPAUSAL DISORDER: ICD-10-CM

## 2021-06-15 DIAGNOSIS — R05.9 COUGH: ICD-10-CM

## 2021-06-15 DIAGNOSIS — I10 ESSENTIAL HYPERTENSION: ICD-10-CM

## 2021-06-15 DIAGNOSIS — K21.9 GERD WITHOUT ESOPHAGITIS: ICD-10-CM

## 2021-06-15 DIAGNOSIS — E78.2 MIXED HYPERLIPIDEMIA: ICD-10-CM

## 2021-06-15 LAB
ALBUMIN SERPL-MCNC: 4.3 G/DL (ref 3.5–5.2)
ALBUMIN/GLOB SERPL: 1.7 G/DL
ALP SERPL-CCNC: 108 U/L (ref 39–117)
ALT SERPL W P-5'-P-CCNC: 26 U/L (ref 1–33)
ANION GAP SERPL CALCULATED.3IONS-SCNC: 11.7 MMOL/L (ref 5–15)
AST SERPL-CCNC: 40 U/L (ref 1–32)
BILIRUB SERPL-MCNC: 0.3 MG/DL (ref 0–1.2)
BUN SERPL-MCNC: 21 MG/DL (ref 8–23)
BUN/CREAT SERPL: 25.6 (ref 7–25)
CALCIUM SPEC-SCNC: 10.3 MG/DL (ref 8.6–10.5)
CHLORIDE SERPL-SCNC: 102 MMOL/L (ref 98–107)
CHOLEST SERPL-MCNC: 158 MG/DL (ref 0–200)
CO2 SERPL-SCNC: 26.3 MMOL/L (ref 22–29)
CREAT SERPL-MCNC: 0.82 MG/DL (ref 0.57–1)
EXPIRATION DATE: NORMAL
GFR SERPL CREATININE-BSD FRML MDRD: 67 ML/MIN/1.73
GLOBULIN UR ELPH-MCNC: 2.6 GM/DL
GLUCOSE SERPL-MCNC: 86 MG/DL (ref 65–99)
HDLC SERPL-MCNC: 65 MG/DL (ref 40–60)
HOLD SPECIMEN: NORMAL
INTERNAL CONTROL: NORMAL
LDLC SERPL CALC-MCNC: 75 MG/DL (ref 0–100)
LDLC/HDLC SERPL: 1.12 {RATIO}
Lab: NORMAL
POTASSIUM SERPL-SCNC: 4.3 MMOL/L (ref 3.5–5.2)
PROT SERPL-MCNC: 6.9 G/DL (ref 6–8.5)
SARS-COV-2 AG UPPER RESP QL IA.RAPID: NOT DETECTED
SODIUM SERPL-SCNC: 140 MMOL/L (ref 136–145)
TRIGL SERPL-MCNC: 101 MG/DL (ref 0–150)
TSH SERPL DL<=0.05 MIU/L-ACNC: 2.04 UIU/ML (ref 0.27–4.2)
VLDLC SERPL-MCNC: 18 MG/DL (ref 5–40)

## 2021-06-15 PROCEDURE — G0439 PPPS, SUBSEQ VISIT: HCPCS | Performed by: FAMILY MEDICINE

## 2021-06-15 PROCEDURE — 36415 COLL VENOUS BLD VENIPUNCTURE: CPT

## 2021-06-15 PROCEDURE — 84443 ASSAY THYROID STIM HORMONE: CPT

## 2021-06-15 PROCEDURE — 80061 LIPID PANEL: CPT

## 2021-06-15 PROCEDURE — 87426 SARSCOV CORONAVIRUS AG IA: CPT | Performed by: FAMILY MEDICINE

## 2021-06-15 PROCEDURE — 99214 OFFICE O/P EST MOD 30 MIN: CPT | Performed by: FAMILY MEDICINE

## 2021-06-15 PROCEDURE — 80053 COMPREHEN METABOLIC PANEL: CPT

## 2021-06-15 RX ORDER — PANTOPRAZOLE SODIUM 40 MG/1
40 TABLET, DELAYED RELEASE ORAL DAILY
Qty: 90 TABLET | Refills: 1 | Status: SHIPPED | OUTPATIENT
Start: 2021-06-15 | End: 2021-11-30

## 2021-06-15 RX ORDER — CLONAZEPAM 0.5 MG/1
0.5 TABLET ORAL 2 TIMES DAILY PRN
Qty: 90 TABLET | Refills: 0 | Status: SHIPPED | OUTPATIENT
Start: 2021-06-15 | End: 2021-10-19

## 2021-06-15 RX ORDER — ESTRADIOL 1 MG/1
1 TABLET ORAL DAILY
Qty: 90 TABLET | Refills: 1 | Status: SHIPPED | OUTPATIENT
Start: 2021-06-15 | End: 2021-12-02

## 2021-06-15 RX ORDER — SODIUM CHLORIDE FOR INHALATION 3 %
VIAL, NEBULIZER (ML) INHALATION
COMMUNITY
Start: 2021-05-30 | End: 2022-01-13 | Stop reason: SDUPTHER

## 2021-06-15 RX ORDER — PANTOPRAZOLE SODIUM 40 MG/1
TABLET, DELAYED RELEASE ORAL
COMMUNITY
Start: 2021-05-18 | End: 2021-06-15 | Stop reason: SDUPTHER

## 2021-06-15 RX ORDER — ARFORMOTEROL TARTRATE 15 UG/2ML
15 SOLUTION RESPIRATORY (INHALATION)
COMMUNITY
End: 2021-08-17 | Stop reason: ALTCHOICE

## 2021-06-15 RX ORDER — AZELASTINE 1 MG/ML
1 SPRAY, METERED NASAL 2 TIMES DAILY
COMMUNITY
Start: 2021-05-17

## 2021-06-15 RX ORDER — BUDESONIDE 0.25 MG/2ML
0.25 INHALANT ORAL
COMMUNITY
End: 2022-06-24

## 2021-06-15 RX ORDER — LOSARTAN POTASSIUM 50 MG/1
50 TABLET ORAL DAILY
Qty: 90 TABLET | Refills: 1 | Status: SHIPPED | OUTPATIENT
Start: 2021-06-15 | End: 2021-12-02

## 2021-06-15 RX ORDER — PRAVASTATIN SODIUM 20 MG
20 TABLET ORAL DAILY
Qty: 90 TABLET | Refills: 1 | Status: SHIPPED | OUTPATIENT
Start: 2021-06-15 | End: 2021-12-02

## 2021-06-15 NOTE — PATIENT INSTRUCTIONS

## 2021-06-15 NOTE — PROGRESS NOTES
Subsequent Medicare Wellness Visit  The ABC's of Medicare Wellness Visit    Chief Complaint:  Medicare wellness exam, follow-up unusual problems    Subjective   History of Present Illness      Luma Cruz is a 80 y.o. female who presents   for a Subsequent Medicare Wellness Visit.    Does the patient have evidence of cognitive impairment?   No    Asprin use counseling:Does not need ASA (and currently is not on it)    Recent Hospitalizations:  No hospitalization(s) within the last year.    Advanced Care Planning:  ACP discussion was held with the patient during this visit. Patient has an advance directive (not in EMR), copy requested.    The following portions of the patient's history were reviewed   and updated as appropriate: allergies, current medications, past family history, past medical history, past social history, past surgical history and problem list.    Compared to one year ago, the patient feels her   physical health is better.  Compared to one year ago, the patient feels her   mental health is better.    Reviewed chart for potential of high risk medication in the elderly:  yes  Reviewed chart for potential of harmful drug interactions in the elderly:  yes    Patient's Body mass index is 21.47 kg/m². indicating that she is within normal range (BMI 18.5-24.9). No BMI management plan needed..       HEALTH RISK ASSESSMENT BEGIN  Fall Risk Screen:  STEADI Fall Risk Assessment was completed, and patient is at HIGH risk for falls. Assessment completed on:6/15/2021    Depression Screen:   PHQ-2/PHQ-9 Depression Screening 6/15/2021   Little interest or pleasure in doing things 0   Feeling down, depressed, or hopeless 0   Total Score 0       Current Medical Providers:  Patient Care Team:  Joseph Walker MD as PCP - General (Family Medicine)    Smoking Status:  Social History     Tobacco Use   Smoking Status Never Smoker       Alcohol Consumption:  Social History     Substance and Sexual Activity    Alcohol Use Not Currently       Health Habits and Functional and Cognitive Screening:  Functional & Cognitive Status 6/15/2021   Do you have difficulty preparing food and eating? No   Do you have difficulty bathing yourself, getting dressed or grooming yourself? No   Do you have difficulty using the toilet? No   Do you have difficulty moving around from place to place? No   Do you have trouble with steps or getting out of a bed or a chair? Yes   Current Diet Well Balanced Diet   Dental Exam Up to date        Dental Exam Comment 6/18/21   Eye Exam Not up to date        Eye Exam Comment 3-4 years   Exercise (times per week) 0 times per week   Current Exercise Activities Include None   Do you need help using the phone?  No   Are you deaf or do you have serious difficulty hearing?  Yes   Do you need help with transportation? No   Do you need help shopping? No   Do you need help preparing meals?  No   Do you need help with housework?  No   Do you need help with laundry? No   Do you need help taking your medications? No   Do you need help managing money? No   Do you ever drive or ride in a car without wearing a seat belt? No   Have you felt unusual stress, anger or loneliness in the last month? No   Who do you live with? Spouse   If you need help, do you have trouble finding someone available to you? Yes   Have you been bothered in the last four weeks by sexual problems? No   Do you have difficulty concentrating, remembering or making decisions? No       Age-appropriate Screening Schedule:  Refer to the list below for future screening recommendations based on patient's age, sex and/or medical conditions. Orders for these recommended tests are listed in the plan section. The patient has been provided with a written plan.    Health Maintenance   Topic Date Due   • TDAP/TD VACCINES (1 - Tdap) Never done   • ZOSTER VACCINE (1 of 2) Never done   • LIPID PANEL  06/15/2021   • INFLUENZA VACCINE  08/01/2021   • DXA SCAN   05/03/2023     HEALTH RISK ASSESSMENT END    Outpatient Medications Prior to Visit   Medication Sig Dispense Refill   • arformoterol (BROVANA) 15 MCG/2ML nebulizer solution Take 15 mcg by nebulization 2 (Two) Times a Day.     • azelastine (ASTELIN) 0.1 % nasal spray 1 spray into the nostril(s) as directed by provider 2 (Two) Times a Day.     • budesonide (PULMICORT) 0.25 MG/2ML nebulizer solution Take 0.25 mg by nebulization Daily. Pt unsure of exact dosage     • calcium citrate-vitamin d (CITRACAL) 200-250 MG-UNIT tablet tablet Take 1 tablet by mouth Daily. 1200mg tablet     • fluticasone (FLONASE) 50 MCG/ACT nasal spray 2 sprays into the nostril(s) as directed by provider Daily.     • levocetirizine (XYZAL) 5 MG tablet Take 5 mg by mouth Every Evening.     • sodium chloride 3 % nebulizer solution INHALE 1 VIAL VIA NEBULIZER TWICE DAILY     • clonazePAM (KlonoPIN) 0.5 MG tablet Take 0.5 mg by mouth 2 (Two) Times a Day As Needed.     • estradiol (ESTRACE) 1 MG tablet Take 1 mg by mouth Daily.     • losartan (COZAAR) 50 MG tablet Take 50 mg by mouth Daily.     • pantoprazole (PROTONIX) 40 MG EC tablet      • pravastatin (PRAVACHOL) 20 MG tablet Take 20 mg by mouth Daily.     • omeprazole (priLOSEC) 20 MG capsule Take 20 mg by mouth Daily.       No facility-administered medications prior to visit.       Patient Active Problem List   Diagnosis   • Seasonal allergic rhinitis   • Mixed hyperlipidemia   • Menopausal and postmenopausal disorder   • GERD without esophagitis   • Essential hypertension   • Generalized anxiety disorder       In addition to the above issues, Emily Grey is also in for follow-up on her usual care.  She has a history of hypertension for which she remains on losartan.  She checks her blood pressure intermittently at home and it is reasonable there as well.    She also has a history of elevated cholesterol and remains on pravastatin.  She denies any adverse reactions to it and does take it  "regularly.    She also has a history of postmenopausal disorder for which she remains on estradiol.  She has some degree of osteopenia as well.  We have previously discussed the potential risks of hormone treatment including rare risk of breast cancer and blood clots.    She also has some chronic issues with anxiety that we previously discussed.  She continues to take clonazepam on a regular basis for this.  Davi is reviewed.  We have again discussed the potential risk of this medication including altered mental status and fall risk.  I do not think dosage decrease is appropriate for the near term.  She has had some increased stress recently with her 's health issues      Review of Systems:  Review of Systems   Constitutional: Negative for chills and fever.   Respiratory: Negative for cough and shortness of breath.    Cardiovascular: Negative for chest pain and palpitations.   Gastrointestinal: Negative for abdominal pain, nausea and vomiting.        Objective      Vitals:    06/15/21 1045   BP: 133/69   BP Location: Right arm   Patient Position: Sitting   Pulse: 76   Temp: 98.3 °F (36.8 °C)   TempSrc: Oral   Weight: 53.3 kg (117 lb 6.4 oz)   Height: 157.5 cm (62.01\")       Physical Exam  Vitals and nursing note reviewed.   Constitutional:       General: She is not in acute distress.     Appearance: She is not ill-appearing.   HENT:      Right Ear: Tympanic membrane and ear canal normal.      Left Ear: Tympanic membrane and ear canal normal.      Ears:      Comments: She has decreased hearing in both ears     Mouth/Throat:      Mouth: Mucous membranes are moist.      Comments: Pharynx appears normal  Eyes:      Extraocular Movements: Extraocular movements intact.      Pupils: Pupils are equal, round, and reactive to light.      Comments: Binocular vision is 20/25   Neck:      Thyroid: No thyromegaly.   Cardiovascular:      Rate and Rhythm: Normal rate and regular rhythm.      Heart sounds: No murmur heard. "     Pulmonary:      Effort: Pulmonary effort is normal.      Breath sounds: Normal breath sounds.   Abdominal:      General: There is no distension.      Palpations: Abdomen is soft. There is no mass.      Tenderness: There is no abdominal tenderness.   Musculoskeletal:      Cervical back: Normal range of motion.   Skin:     Findings: No lesion or rash.   Neurological:      General: No focal deficit present.      Mental Status: She is oriented to person, place, and time.      Cranial Nerves: No cranial nerve deficit.   Psychiatric:         Mood and Affect: Mood normal.       Result Review :               The following data was reviewed by: Joseph Walker MD on 06/15/2021:  Lab Results   Component Value Date    WBC 5.48 05/01/2020    HGB 12.40 05/01/2020    HCT 38.3 05/01/2020    MCV 85.7 05/01/2020    .00 05/01/2020     Lab Results   Component Value Date    BUN 25 08/26/2020    CREATININE 0.90 08/26/2020    BCR 28 (H) 08/26/2020    K 4.0 08/26/2020    CO2 28 08/26/2020    CALCIUM 9.6 08/26/2020    ALBUMIN 4.0 08/26/2020    LABIL2 1.6 08/26/2020    AST 22 08/26/2020    ALT 11 08/26/2020     Lab Results   Component Value Date    CHLPL 157 03/10/2020    TRIG 107 03/10/2020    HDL 65 (H) 03/10/2020    LDL 71 03/10/2020     Lab Results   Component Value Date    TSH 1.560 03/10/2020           Assessment/Plan   Assessment and Plan:  Luma Grey seems well today.  We have reviewed her care in detail.  I am going to update labs and refills as noted.  We reviewed recommendations for general health.  I do not anticipate specific changes today.  At the end of the visit she asked that we go ahead and screen her for Covid as a precaution.  She has had a little bit of cough over the weekend but she seems to be improving.          Diagnoses and all orders for this visit:    1. Physical exam (Primary)    2. Mixed hyperlipidemia  -     pravastatin (PRAVACHOL) 20 MG tablet; Take 1 tablet by mouth Daily.  Dispense: 90  tablet; Refill: 1  -     Comprehensive Metabolic Panel; Future  -     Lipid Panel; Future  -     TSH; Future    3. Menopausal and postmenopausal disorder  -     estradiol (ESTRACE) 1 MG tablet; Take 1 tablet by mouth Daily.  Dispense: 90 tablet; Refill: 1    4. GERD without esophagitis  -     pantoprazole (PROTONIX) 40 MG EC tablet; Take 1 tablet by mouth Daily.  Dispense: 90 tablet; Refill: 1    5. Essential hypertension  -     losartan (COZAAR) 50 MG tablet; Take 1 tablet by mouth Daily.  Dispense: 90 tablet; Refill: 1    6. Generalized anxiety disorder  -     clonazePAM (KlonoPIN) 0.5 MG tablet; Take 1 tablet by mouth 2 (Two) Times a Day As Needed for Anxiety.  Dispense: 90 tablet; Refill: 0    7. Cough  -     POCT SARS-CoV-2 Antigen AMBER        Medicare Risks and Personalized Health Plan  CMS Preventative Services Quick Reference  We reviewed her risks including hearing issues, cardiovascular risk, and potential fall risk.    The above risks/problems have been discussed with the patient.  Pertinent information has been shared with the patient in the   After Visit Summary. Follow up plans and orders are seen below   in the Assessment/Plan Section.    Follow Up    No follow-ups on file.     An After Visit Summary and PPPS were given to the patient.

## 2021-06-18 ENCOUNTER — CLINICAL SUPPORT (OUTPATIENT)
Dept: FAMILY MEDICINE CLINIC | Age: 81
End: 2021-06-18

## 2021-06-18 DIAGNOSIS — J30.9 ALLERGIC RHINITIS, UNSPECIFIED SEASONALITY, UNSPECIFIED TRIGGER: Primary | ICD-10-CM

## 2021-06-18 PROCEDURE — 95115 IMMUNOTHERAPY ONE INJECTION: CPT | Performed by: FAMILY MEDICINE

## 2021-06-18 RX ORDER — LEVOCETIRIZINE DIHYDROCHLORIDE 5 MG/1
5 TABLET, FILM COATED ORAL EVERY EVENING
Qty: 90 TABLET | Refills: 3 | Status: SHIPPED | OUTPATIENT
Start: 2021-06-18 | End: 2021-12-28 | Stop reason: SDUPTHER

## 2021-06-18 NOTE — TELEPHONE ENCOUNTER
Caller: Luma Cruz MADAN    Relationship: Self    Best call back number: [phone]    Medication needed:   Requested Prescriptions     Pending Prescriptions Disp Refills   • levocetirizine (XYZAL) 5 MG tablet       Sig: Take 1 tablet by mouth Every Evening.       When do you need the refill by: ASAP    Does the patient have less than a 3 day supply:  [] Yes  [x] No    What is the patient's preferred pharmacy:      [facility] - [place], [place] - [address] [facility] [phone] [facility] [phone]   [phone]

## 2021-06-24 ENCOUNTER — CLINICAL SUPPORT (OUTPATIENT)
Dept: FAMILY MEDICINE CLINIC | Age: 81
End: 2021-06-24

## 2021-06-24 DIAGNOSIS — J30.9 ALLERGIC RHINITIS, UNSPECIFIED SEASONALITY, UNSPECIFIED TRIGGER: Primary | ICD-10-CM

## 2021-06-24 PROCEDURE — 95115 IMMUNOTHERAPY ONE INJECTION: CPT | Performed by: FAMILY MEDICINE

## 2021-06-28 ENCOUNTER — CLINICAL SUPPORT (OUTPATIENT)
Dept: FAMILY MEDICINE CLINIC | Age: 81
End: 2021-06-28

## 2021-06-28 DIAGNOSIS — J30.9 ALLERGIC RHINITIS, UNSPECIFIED SEASONALITY, UNSPECIFIED TRIGGER: Primary | ICD-10-CM

## 2021-06-28 PROCEDURE — 95115 IMMUNOTHERAPY ONE INJECTION: CPT | Performed by: FAMILY MEDICINE

## 2021-07-01 VITALS
HEART RATE: 72 BPM | BODY MASS INDEX: 22.97 KG/M2 | SYSTOLIC BLOOD PRESSURE: 129 MMHG | DIASTOLIC BLOOD PRESSURE: 59 MMHG | TEMPERATURE: 98.5 F | WEIGHT: 117 LBS | HEIGHT: 60 IN

## 2021-07-01 VITALS
HEIGHT: 60 IN | WEIGHT: 116 LBS | TEMPERATURE: 97.8 F | HEART RATE: 78 BPM | SYSTOLIC BLOOD PRESSURE: 138 MMHG | DIASTOLIC BLOOD PRESSURE: 54 MMHG | BODY MASS INDEX: 22.78 KG/M2

## 2021-07-01 VITALS
BODY MASS INDEX: 22.68 KG/M2 | HEART RATE: 76 BPM | DIASTOLIC BLOOD PRESSURE: 77 MMHG | SYSTOLIC BLOOD PRESSURE: 137 MMHG | TEMPERATURE: 99 F | WEIGHT: 115.5 LBS | HEIGHT: 60 IN

## 2021-07-01 VITALS
WEIGHT: 117.7 LBS | HEART RATE: 80 BPM | TEMPERATURE: 97.2 F | DIASTOLIC BLOOD PRESSURE: 73 MMHG | HEIGHT: 60 IN | SYSTOLIC BLOOD PRESSURE: 144 MMHG | BODY MASS INDEX: 23.11 KG/M2

## 2021-07-01 VITALS
SYSTOLIC BLOOD PRESSURE: 128 MMHG | DIASTOLIC BLOOD PRESSURE: 62 MMHG | HEIGHT: 60 IN | TEMPERATURE: 98.1 F | WEIGHT: 117.8 LBS | BODY MASS INDEX: 23.13 KG/M2 | HEART RATE: 69 BPM

## 2021-07-01 VITALS
WEIGHT: 115.4 LBS | SYSTOLIC BLOOD PRESSURE: 169 MMHG | BODY MASS INDEX: 22.65 KG/M2 | DIASTOLIC BLOOD PRESSURE: 88 MMHG | TEMPERATURE: 99.5 F | HEIGHT: 60 IN | HEART RATE: 100 BPM

## 2021-07-01 VITALS
TEMPERATURE: 97.8 F | HEIGHT: 60 IN | WEIGHT: 118.4 LBS | DIASTOLIC BLOOD PRESSURE: 64 MMHG | HEART RATE: 84 BPM | BODY MASS INDEX: 23.25 KG/M2 | SYSTOLIC BLOOD PRESSURE: 169 MMHG

## 2021-07-01 VITALS
DIASTOLIC BLOOD PRESSURE: 56 MMHG | SYSTOLIC BLOOD PRESSURE: 138 MMHG | TEMPERATURE: 98.5 F | WEIGHT: 117.2 LBS | BODY MASS INDEX: 23.01 KG/M2 | HEART RATE: 92 BPM | HEIGHT: 60 IN

## 2021-07-02 VITALS
BODY MASS INDEX: 23.01 KG/M2 | HEIGHT: 60 IN | TEMPERATURE: 98.3 F | WEIGHT: 117.2 LBS | SYSTOLIC BLOOD PRESSURE: 133 MMHG | HEART RATE: 75 BPM | DIASTOLIC BLOOD PRESSURE: 63 MMHG

## 2021-07-02 VITALS
HEART RATE: 82 BPM | DIASTOLIC BLOOD PRESSURE: 79 MMHG | BODY MASS INDEX: 22.62 KG/M2 | WEIGHT: 115.2 LBS | HEIGHT: 60 IN | SYSTOLIC BLOOD PRESSURE: 160 MMHG | TEMPERATURE: 98 F

## 2021-07-02 VITALS
HEART RATE: 77 BPM | WEIGHT: 116 LBS | SYSTOLIC BLOOD PRESSURE: 148 MMHG | DIASTOLIC BLOOD PRESSURE: 67 MMHG | OXYGEN SATURATION: 98 % | HEIGHT: 60 IN | TEMPERATURE: 98.3 F | BODY MASS INDEX: 22.78 KG/M2

## 2021-07-02 VITALS
HEIGHT: 60 IN | BODY MASS INDEX: 23.09 KG/M2 | SYSTOLIC BLOOD PRESSURE: 140 MMHG | DIASTOLIC BLOOD PRESSURE: 77 MMHG | HEART RATE: 103 BPM | WEIGHT: 117.6 LBS | TEMPERATURE: 97.9 F

## 2021-07-02 VITALS
TEMPERATURE: 97.8 F | SYSTOLIC BLOOD PRESSURE: 128 MMHG | BODY MASS INDEX: 23.32 KG/M2 | DIASTOLIC BLOOD PRESSURE: 61 MMHG | HEART RATE: 88 BPM | HEIGHT: 60 IN | WEIGHT: 118.8 LBS

## 2021-07-02 VITALS — BODY MASS INDEX: 22.27 KG/M2 | HEIGHT: 60 IN | TEMPERATURE: 98.2 F

## 2021-07-02 VITALS
TEMPERATURE: 98.5 F | HEART RATE: 89 BPM | SYSTOLIC BLOOD PRESSURE: 148 MMHG | BODY MASS INDEX: 23.44 KG/M2 | HEIGHT: 60 IN | DIASTOLIC BLOOD PRESSURE: 66 MMHG | WEIGHT: 119.4 LBS

## 2021-07-02 VITALS
BODY MASS INDEX: 23.01 KG/M2 | DIASTOLIC BLOOD PRESSURE: 61 MMHG | TEMPERATURE: 98.3 F | HEIGHT: 60 IN | HEART RATE: 81 BPM | OXYGEN SATURATION: 98 % | SYSTOLIC BLOOD PRESSURE: 133 MMHG | WEIGHT: 117.2 LBS

## 2021-07-14 ENCOUNTER — CLINICAL SUPPORT (OUTPATIENT)
Dept: FAMILY MEDICINE CLINIC | Age: 81
End: 2021-07-14

## 2021-07-14 DIAGNOSIS — J30.9 ALLERGIC RHINITIS, UNSPECIFIED SEASONALITY, UNSPECIFIED TRIGGER: Primary | ICD-10-CM

## 2021-07-14 PROCEDURE — 95115 IMMUNOTHERAPY ONE INJECTION: CPT | Performed by: FAMILY MEDICINE

## 2021-07-20 ENCOUNTER — CLINICAL SUPPORT (OUTPATIENT)
Dept: FAMILY MEDICINE CLINIC | Age: 81
End: 2021-07-20

## 2021-07-20 DIAGNOSIS — J30.9 ALLERGIC RHINITIS, UNSPECIFIED SEASONALITY, UNSPECIFIED TRIGGER: Primary | ICD-10-CM

## 2021-07-20 PROCEDURE — 95115 IMMUNOTHERAPY ONE INJECTION: CPT | Performed by: FAMILY MEDICINE

## 2021-07-20 NOTE — PROGRESS NOTES
I have reviewed the notes, assessments, and/or procedures performed by Sandie Bryant LPN, and I concur with her documentation of Luma Cruz.

## 2021-07-28 ENCOUNTER — CLINICAL SUPPORT (OUTPATIENT)
Dept: FAMILY MEDICINE CLINIC | Age: 81
End: 2021-07-28

## 2021-07-28 DIAGNOSIS — Z00.00 PHYSICAL EXAM: Primary | ICD-10-CM

## 2021-07-28 DIAGNOSIS — J30.9 ALLERGIC RHINITIS, UNSPECIFIED SEASONALITY, UNSPECIFIED TRIGGER: ICD-10-CM

## 2021-07-28 PROCEDURE — 95115 IMMUNOTHERAPY ONE INJECTION: CPT | Performed by: FAMILY MEDICINE

## 2021-07-28 NOTE — PROGRESS NOTES
I have reviewed the notes, assessments, and/or procedures performed by the MA/LPN and I concur with his/her documentation of the patient's care.     Hilario Dorado MD   7/28/2021 14:26 EDT

## 2021-08-02 ENCOUNTER — CLINICAL SUPPORT (OUTPATIENT)
Dept: FAMILY MEDICINE CLINIC | Age: 81
End: 2021-08-02

## 2021-08-02 DIAGNOSIS — J30.9 ALLERGIC RHINITIS, UNSPECIFIED SEASONALITY, UNSPECIFIED TRIGGER: Primary | ICD-10-CM

## 2021-08-02 PROCEDURE — 95115 IMMUNOTHERAPY ONE INJECTION: CPT | Performed by: FAMILY MEDICINE

## 2021-08-03 NOTE — PROGRESS NOTES
I have reviewed the notes, assessments and or procedures performed by Sandie Bryant and I concur with her documentation of Luma Cruz. [facility]

## 2021-08-10 ENCOUNTER — CLINICAL SUPPORT (OUTPATIENT)
Dept: FAMILY MEDICINE CLINIC | Age: 81
End: 2021-08-10

## 2021-08-10 DIAGNOSIS — J30.9 ALLERGIC RHINITIS, UNSPECIFIED SEASONALITY, UNSPECIFIED TRIGGER: Primary | ICD-10-CM

## 2021-08-10 PROCEDURE — 95115 IMMUNOTHERAPY ONE INJECTION: CPT | Performed by: FAMILY MEDICINE

## 2021-08-10 NOTE — PROGRESS NOTES
I have reviewed the notes, assessments, and/or procedures performed by the MA/LPN and I concur with his/her documentation of the patient's care.     Hilario Dorado MD   8/10/2021 18:21 EDT

## 2021-08-13 ENCOUNTER — TELEPHONE (OUTPATIENT)
Dept: PULMONOLOGY | Facility: CLINIC | Age: 81
End: 2021-08-13

## 2021-08-13 NOTE — TELEPHONE ENCOUNTER
Patient wants samples on Brovanna, which we don't normally carry, so please assists with something else until she can get a refill.

## 2021-08-16 ENCOUNTER — CLINICAL SUPPORT (OUTPATIENT)
Dept: FAMILY MEDICINE CLINIC | Age: 81
End: 2021-08-16

## 2021-08-16 DIAGNOSIS — J30.9 ALLERGIC RHINITIS, UNSPECIFIED SEASONALITY, UNSPECIFIED TRIGGER: Primary | ICD-10-CM

## 2021-08-16 PROCEDURE — 95115 IMMUNOTHERAPY ONE INJECTION: CPT | Performed by: FAMILY MEDICINE

## 2021-08-16 NOTE — PROGRESS NOTES
I have reviewed the notes, assessments, and/or procedures performed by Sandra Boyer LPN, and I concur with her documentation of Luma Cruz.

## 2021-08-17 ENCOUNTER — TELEPHONE (OUTPATIENT)
Dept: PULMONOLOGY | Facility: CLINIC | Age: 81
End: 2021-08-17

## 2021-08-17 RX ORDER — ARFORMOTEROL TARTRATE 15 UG/2ML
15 SOLUTION RESPIRATORY (INHALATION)
Qty: 120 ML | Refills: 11 | Status: SHIPPED | OUTPATIENT
Start: 2021-08-17 | End: 2022-06-13

## 2021-08-24 ENCOUNTER — CLINICAL SUPPORT (OUTPATIENT)
Dept: FAMILY MEDICINE CLINIC | Age: 81
End: 2021-08-24

## 2021-08-24 DIAGNOSIS — J30.9 ALLERGIC RHINITIS, UNSPECIFIED SEASONALITY, UNSPECIFIED TRIGGER: Primary | ICD-10-CM

## 2021-08-24 PROCEDURE — 95115 IMMUNOTHERAPY ONE INJECTION: CPT | Performed by: FAMILY MEDICINE

## 2021-08-24 NOTE — PROGRESS NOTES
I have reviewed the notes, assessments, and/or procedures performed by the MA/LPN and I concur with his/her documentation of the patient's care.     Hilario Dorado MD   8/24/2021 19:08 EDT

## 2021-08-26 NOTE — PATIENT INSTRUCTIONS
Bronchiectasis    Bronchiectasis is a condition in which the airways in the lungs (bronchi) are damaged and widened. The condition makes it hard for the lungs to get rid of mucus, and it causes mucus to gather in the bronchi. This condition often leads to lung infections, which can make the condition worse.  What are the causes?  You can be born with this condition or you can develop it later in life. Common causes of this condition include:  · Cystic fibrosis.  · Repeated lung infections, such as pneumonia or tuberculosis.  · An object or other blockage in the lungs.  · Breathing in fluid, food, or other objects (aspiration).  · A problem with the immune system and lung structure that is present at birth (congenital).  Sometimes the cause is not known.  What are the signs or symptoms?  Common symptoms of this condition include:  · A daily cough that brings up mucus and lasts for more than 3 weeks.  · Lung infections that happen often.  · Shortness of breath and wheezing.  · Weakness and fatigue.  How is this diagnosed?  This condition is diagnosed with tests, such as:  · Chest X-rays or CT scans. These are done to check for changes in the lungs.  · Breathing tests. These are done to check how well your lungs are working.  · A test of a sample of your saliva (sputum culture). This test is done to check for infection.  · Blood tests and other tests. These are done to check for related diseases or causes.  How is this treated?  Treatment for this condition depends on the severity of the illness and its cause. Treatment may include:  · Medicines that loosen mucus so it can be coughed up (expectorants).  · Medicines that relax the muscles of the bronchi (bronchodilators).  · Antibiotic medicines to prevent or treat infection.  · Physical therapy to help clear mucus from the lungs. Techniques may include:  ? Postural drainage. This is when you sit or lie in certain positions so that mucus can drain by gravity.  ? Chest  percussion. This involves tapping the chest or back with a cupped hand.  ? Chest vibration. For this therapy, a hand or special equipment vibrates your chest and back.  · Surgery to remove the affected part of the lung. This may be done in severe cases.  Follow these instructions at home:  Medicines  · Take over-the-counter and prescription medicines only as told by your health care provider.  · If you were prescribed an antibiotic medicine, take it as told by your health care provider. Do not stop taking the antibiotic even if you start to feel better.  · Avoid taking sedatives and antihistamines unless your health care provider tells you to take them. These medicines tend to thicken the mucus in the lungs.  Managing symptoms  · Perform breathing exercises or techniques to clear your lungs as told by your health care provider.  · Consider using a cold steam vaporizer or humidifier in your room or home to help loosen secretions.  · If you have a cough that gets worse at night, try sleeping in a semi-upright position.  General instructions  · Get plenty of rest.  · Drink enough fluid to keep your urine clear or pale yellow.  · Stay inside when pollution and ozone levels are high.  · Stay up to date with vaccinations and immunizations.  · Avoid cigarette smoke and other lung irritants.  · Do not use any products that contain nicotine or tobacco, such as cigarettes and e-cigarettes. If you need help quitting, ask your health care provider.  · Keep all follow-up visits as told by your health care provider. This is important.  Contact a health care provider if:  · You cough up more sputum than before and the sputum is yellow or green in color.  · You have a fever.  · You cannot control your cough and are losing sleep.  Get help right away if:  · You cough up blood.  · You have chest pain.  · You have increasing shortness of breath.  · You have pain that gets worse or is not controlled with medicines.  · You have a fever  and your symptoms suddenly get worse.  Summary  · Bronchiectasis is a condition in which the airways in the lungs (bronchi) are damaged and widened. The condition makes it hard for the lungs to get rid of mucus, and it causes mucus to gather in the bronchi.  · Treatment usually includes therapy to help clear mucus from the lungs.  · Stay up to date with vaccinations and immunizations.  This information is not intended to replace advice given to you by your health care provider. Make sure you discuss any questions you have with your health care provider.  Document Revised: 11/30/2018 Document Reviewed: 01/22/2018  [employer]vier Patient Education © 2021 [employer]

## 2021-08-26 NOTE — PROGRESS NOTES
Primary Care Provider  Joseph Walker MD     Referring Provider  No ref. provider found     Chief Complaint  Follow-up (6 month )    Subjective          History of Presenting Illness  Patient is a 80-year-old  female, patient Dr. Forbes's with bronchiectasis who presents for follow-up visit today.  Patient states that since last visit her breathing is doing well.  Patient states however at times she does get short of breath that is worse with exertion, moderate severity, and improved with rest.  Patient states that she is taking Brovana and Pulmicort every day as prescribed and does use her flutter valve along with her sodium chloride nebs once daily.  Patient states she has not had use her albuterol nebulizer treatment at all since last visit.  Patient states has not had take any antibiotics or steroids since last visit and denies any hospitalizations since last visit.  Patient denies fever, chills, night sweats, swollen glands in the head and neck, unintentional weight loss, hemoptysis, purulent sputum production, dysphagia, chest pain, palpitations, chest tightness, abdominal pain, nausea, vomiting, and diarrhea.  Patient also denies any myalgias, changes in sense of taste and/or smell, sore throat, any other coronavirus or flu-like symptoms.  Patient denies any leg swelling, orthopnea, paroxysmal nocturnal dyspnea. Patient had a repeat chest CT scan completed on 5/11/2021.  Chest CT report showed mild bilateral lower lobe bronchiectasis.  No dense consolidation.  Previously demonstrated patchy ground-glass opacity in the lower lobes has resolved, and likely represented mild infectious or inflammatory change.  Patient is able to perform her activities of daily living without difficulty.  Patient reports she is receiving allergy shots once a week.    Review of Systems   Constitutional: Negative for activity change, appetite change, chills, diaphoresis, fatigue, fever, unexpected weight gain and  unexpected weight loss.        Negative for Insomnia   HENT: Negative for congestion (Nasal), mouth sores, nosebleeds, postnasal drip, sore throat, swollen glands and trouble swallowing.         Negative for Thrush  Negative for Hoarseness  Negative for Allergies/Hay Fever  Negative for Recent Head injury  Negative for Ear Fullness  Negative for Nasal or Sinus pain  Negative for Dry lips  Negative for Nasal discharge   Respiratory: Positive for cough (intermittent) and shortness of breath (with exertion). Negative for apnea, chest tightness and wheezing.         Negative for Hemoptysis  Negative for Pleuritic pain   Cardiovascular: Negative for chest pain, palpitations and leg swelling.        Negative for Claudication  Negative for Cyanosis  Negative for Dyspnea on exertion   Gastrointestinal: Negative for abdominal pain, diarrhea, nausea, vomiting and GERD.   Musculoskeletal: Negative for joint swelling and myalgias.        Negative for Joint pain  Negative for Joint stiffness   Skin: Negative for color change, dry skin, pallor and rash.   Neurological: Negative for syncope, weakness and headache.   Hematological: Negative for adenopathy. Does not bruise/bleed easily.        Family History   Problem Relation Age of Onset   • Stroke Mother    • Hypertension Mother    • Coronary artery disease Father         Social History     Socioeconomic History   • Marital status:      Spouse name: Not on file   • Number of children: 2   • Years of education: Not on file   • Highest education level: Not on file   Tobacco Use   • Smoking status: Never Smoker   • Smokeless tobacco: Never Used   Vaping Use   • Vaping Use: Never used   Substance and Sexual Activity   • Alcohol use: Not Currently   • Drug use: Never   • Sexual activity: Defer        Past Medical History:   Diagnosis Date   • Essential hypertension    • Generalized anxiety disorder    • GERD without esophagitis    • Menopausal and postmenopausal disorder    •  Mixed hyperlipidemia         Immunization History   Administered Date(s) Administered   • COVID-19 (MODERNA) 01/27/2021, 02/24/2021   • Fluzone High Dose =>65 Years ([facility] ONLY) 10/16/2012, 09/25/2013, 09/28/2017, 09/24/2020   • Hepatitis A 12/06/2018       Allergies   Allergen Reactions   • Penicillins Other (See Comments)   • Fosamax [Alendronate] GI Intolerance          Current Outpatient Medications:   •  albuterol (2.5 MG/3ML) 0.083% nebulizer solution 3 mL, albuterol (5 MG/ML) 0.5% nebulizer solution 0.5 mL, Inhale Every 4 (Four) Hours., Disp: , Rfl:   •  arformoterol (BROVANA) 15 MCG/2ML nebulizer solution, Take 2 mL by nebulization 2 (Two) Times a Day., Disp: 120 mL, Rfl: 11  •  azelastine (ASTELIN) 0.1 % nasal spray, 1 spray into the nostril(s) as directed by provider 2 (Two) Times a Day., Disp: , Rfl:   •  budesonide (PULMICORT) 0.25 MG/2ML nebulizer solution, Take 0.25 mg by nebulization Daily. Pt unsure of exact dosage, Disp: , Rfl:   •  clonazePAM (KlonoPIN) 0.5 MG tablet, Take 1 tablet by mouth 2 (Two) Times a Day As Needed for Anxiety., Disp: 90 tablet, Rfl: 0  •  estradiol (ESTRACE) 1 MG tablet, Take 1 tablet by mouth Daily., Disp: 90 tablet, Rfl: 1  •  fluticasone (FLONASE) 50 MCG/ACT nasal spray, 2 sprays into the nostril(s) as directed by provider Daily., Disp: , Rfl:   •  levocetirizine (XYZAL) 5 MG tablet, Take 1 tablet by mouth Every Evening., Disp: 90 tablet, Rfl: 3  •  losartan (COZAAR) 50 MG tablet, Take 1 tablet by mouth Daily., Disp: 90 tablet, Rfl: 1  •  pantoprazole (PROTONIX) 40 MG EC tablet, Take 1 tablet by mouth Daily., Disp: 90 tablet, Rfl: 1  •  pravastatin (PRAVACHOL) 20 MG tablet, Take 1 tablet by mouth Daily., Disp: 90 tablet, Rfl: 1  •  sodium chloride 3 % nebulizer solution, INHALE 1 VIAL VIA NEBULIZER TWICE DAILY, Disp: , Rfl:      Objective     Physical Exam  Vital Signs Reviewed  WDWN, Alert, NAD.    HEENT:  PERRL, EOMI.  OP, nares clear, no sinus tenderness  Neck:   "Supple, no JVD, no thyromegaly  Lymph: no axillary, cervical, supraclavicular lymphadenopathy noted bilaterally  Chest:  good aeration, clear to auscultation bilaterally, tympanic to percussion bilaterally, no work of breathing noted  CV: RRR, no MGR, pulses 2+, equal.  Abd:  Soft, NT, ND, + BS, no HSM  EXT:  no clubbing, no cyanosis, no edema, no joint tenderness  Neuro:  A&Ox3, CN grossly intact, no focal deficits.  Skin: No rashes or lesions noted.    Vital Signs:   /62 (BP Location: Left arm, Patient Position: Sitting, Cuff Size: Adult)   Pulse 75   Temp 96.5 °F (35.8 °C) (Temporal)   Resp 16   Ht 157.5 cm (62\")   Wt 52.6 kg (116 lb)   SpO2 97% Comment: room air  BMI 21.22 kg/m²         Result Review :   I have personally reviewed the last office visit note.  I also reviewed patient's chest CT report dated from 5/11/2021.  See scanned document.         Assessment and Plan      Assessment:  1. Bronchiectasis without acute exacerbation.      2.  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  The patient reports she is feeling better.  Recent chest CT scan showed patchy groundglass basilar lower lobes has resolved.   3. Fungal pneumonia, status post two months of Tolsura with antibiotic associated issues.      4. Acid reflux, patient on proton pump inhibitor.        5. Chronic cough, improved.      6. Mucus plugging, patient on flutter valve and chloride nebulizers twice a day.      7. Seasonal allergies on allergy immunotherapy.      8. Never smoker.          Plan:  1. The patient to continue Brovana and Pulmicort everyday as prescribed and rinse her mouth out after each use.      2. The patient to continue albuterol nebulizers as needed.  3.  Patient to continue Astelin, Flonase, and Xyzal as prescribed.  4. The patient to continue allergy immunotherapy and to  follow up with allergist as " scheduled.      5. The patient to continue airway clearance with flutter valve and 3% saline nebulizers.      6. Patient reports they are up-to-date with flu, pneumonia, and Covid vaccines.  Patient is advised to continue to follow CDC recommendations such as social distancing wearing a mask and washing hands for at least 20 seconds.  7.  Patient to call the office, 911, or go to the ER with new or worsening symptoms.         8. Follow up with Dr. Forbes in 3-4 months, sooner if needed.            Follow Up   Return in about 4 months (around 12/30/2021) for with Dr. Forbes.  Patient was given instructions and counseling regarding her condition or for health maintenance advice. Please see specific information pulled into the AVS if appropriate.

## 2021-08-30 ENCOUNTER — OFFICE VISIT (OUTPATIENT)
Dept: PULMONOLOGY | Facility: CLINIC | Age: 81
End: 2021-08-30

## 2021-08-30 ENCOUNTER — CLINICAL SUPPORT (OUTPATIENT)
Dept: FAMILY MEDICINE CLINIC | Age: 81
End: 2021-08-30

## 2021-08-30 VITALS
DIASTOLIC BLOOD PRESSURE: 62 MMHG | HEIGHT: 62 IN | SYSTOLIC BLOOD PRESSURE: 137 MMHG | BODY MASS INDEX: 21.35 KG/M2 | OXYGEN SATURATION: 97 % | TEMPERATURE: 96.5 F | WEIGHT: 116 LBS | HEART RATE: 75 BPM | RESPIRATION RATE: 16 BRPM

## 2021-08-30 DIAGNOSIS — K21.9 GASTROESOPHAGEAL REFLUX DISEASE, UNSPECIFIED WHETHER ESOPHAGITIS PRESENT: ICD-10-CM

## 2021-08-30 DIAGNOSIS — T17.500A MUCUS PLUGGING OF BRONCHI: ICD-10-CM

## 2021-08-30 DIAGNOSIS — J47.9 BRONCHIECTASIS WITHOUT COMPLICATION (HCC): Primary | ICD-10-CM

## 2021-08-30 DIAGNOSIS — B49 FUNGAL PNEUMONIA: ICD-10-CM

## 2021-08-30 DIAGNOSIS — J16.8 FUNGAL PNEUMONIA: ICD-10-CM

## 2021-08-30 DIAGNOSIS — J30.2 SEASONAL ALLERGIES: ICD-10-CM

## 2021-08-30 DIAGNOSIS — J30.9 ALLERGIC RHINITIS, UNSPECIFIED SEASONALITY, UNSPECIFIED TRIGGER: Primary | ICD-10-CM

## 2021-08-30 DIAGNOSIS — R05.3 CHRONIC COUGH: ICD-10-CM

## 2021-08-30 DIAGNOSIS — R06.89 AIRWAY CLEARANCE IMPAIRMENT: ICD-10-CM

## 2021-08-30 PROCEDURE — 99214 OFFICE O/P EST MOD 30 MIN: CPT | Performed by: NURSE PRACTITIONER

## 2021-08-30 PROCEDURE — 95115 IMMUNOTHERAPY ONE INJECTION: CPT | Performed by: FAMILY MEDICINE

## 2021-08-30 NOTE — PROGRESS NOTES
I have reviewed the notes, assessments, and/or procedures performed by Sandra Westbrook LPN, and I concur with her documentation of Luma Cruz.

## 2021-09-07 ENCOUNTER — CLINICAL SUPPORT (OUTPATIENT)
Dept: FAMILY MEDICINE CLINIC | Age: 81
End: 2021-09-07

## 2021-09-07 DIAGNOSIS — J30.9 ALLERGIC RHINITIS, UNSPECIFIED SEASONALITY, UNSPECIFIED TRIGGER: Primary | ICD-10-CM

## 2021-09-07 PROCEDURE — 95115 IMMUNOTHERAPY ONE INJECTION: CPT | Performed by: FAMILY MEDICINE

## 2021-09-07 NOTE — PROGRESS NOTES
I have reviewed the notes, assessments, and/or procedures performed by Sandra Westbrook LPN, and I concur with her documentation of Luma Cruz.       Pt currently being treated for uti

## 2021-09-14 ENCOUNTER — CLINICAL SUPPORT (OUTPATIENT)
Dept: FAMILY MEDICINE CLINIC | Age: 81
End: 2021-09-14

## 2021-09-14 DIAGNOSIS — J30.9 ALLERGIC RHINITIS, UNSPECIFIED SEASONALITY, UNSPECIFIED TRIGGER: Primary | ICD-10-CM

## 2021-09-14 PROCEDURE — 95115 IMMUNOTHERAPY ONE INJECTION: CPT | Performed by: FAMILY MEDICINE

## 2021-09-23 ENCOUNTER — CLINICAL SUPPORT (OUTPATIENT)
Dept: FAMILY MEDICINE CLINIC | Age: 81
End: 2021-09-23

## 2021-09-23 DIAGNOSIS — J30.9 ALLERGIC RHINITIS, UNSPECIFIED SEASONALITY, UNSPECIFIED TRIGGER: Primary | ICD-10-CM

## 2021-09-23 PROCEDURE — 95115 IMMUNOTHERAPY ONE INJECTION: CPT | Performed by: FAMILY MEDICINE

## 2021-09-27 ENCOUNTER — CLINICAL SUPPORT (OUTPATIENT)
Dept: FAMILY MEDICINE CLINIC | Age: 81
End: 2021-09-27

## 2021-09-27 DIAGNOSIS — J30.9 ALLERGIC RHINITIS, UNSPECIFIED SEASONALITY, UNSPECIFIED TRIGGER: Primary | ICD-10-CM

## 2021-09-27 PROCEDURE — 95115 IMMUNOTHERAPY ONE INJECTION: CPT | Performed by: FAMILY MEDICINE

## 2021-10-02 ENCOUNTER — FLU SHOT (OUTPATIENT)
Dept: FAMILY MEDICINE CLINIC | Age: 81
End: 2021-10-02

## 2021-10-02 DIAGNOSIS — Z23 NEED FOR INFLUENZA VACCINATION: Primary | ICD-10-CM

## 2021-10-02 PROCEDURE — G0008 ADMIN INFLUENZA VIRUS VAC: HCPCS | Performed by: FAMILY MEDICINE

## 2021-10-02 PROCEDURE — 90662 IIV NO PRSV INCREASED AG IM: CPT | Performed by: FAMILY MEDICINE

## 2021-10-04 ENCOUNTER — CLINICAL SUPPORT (OUTPATIENT)
Dept: FAMILY MEDICINE CLINIC | Age: 81
End: 2021-10-04

## 2021-10-04 DIAGNOSIS — J30.9 ALLERGIC RHINITIS, UNSPECIFIED SEASONALITY, UNSPECIFIED TRIGGER: Primary | ICD-10-CM

## 2021-10-04 PROCEDURE — 95115 IMMUNOTHERAPY ONE INJECTION: CPT | Performed by: FAMILY MEDICINE

## 2021-10-12 ENCOUNTER — CLINICAL SUPPORT (OUTPATIENT)
Dept: FAMILY MEDICINE CLINIC | Age: 81
End: 2021-10-12

## 2021-10-12 DIAGNOSIS — J30.9 ALLERGIC RHINITIS, UNSPECIFIED SEASONALITY, UNSPECIFIED TRIGGER: Primary | ICD-10-CM

## 2021-10-12 PROCEDURE — 95115 IMMUNOTHERAPY ONE INJECTION: CPT | Performed by: FAMILY MEDICINE

## 2021-10-18 ENCOUNTER — CLINICAL SUPPORT (OUTPATIENT)
Dept: FAMILY MEDICINE CLINIC | Age: 81
End: 2021-10-18

## 2021-10-18 DIAGNOSIS — J30.9 ALLERGIC RHINITIS, UNSPECIFIED SEASONALITY, UNSPECIFIED TRIGGER: Primary | ICD-10-CM

## 2021-10-18 PROCEDURE — 95115 IMMUNOTHERAPY ONE INJECTION: CPT | Performed by: FAMILY MEDICINE

## 2021-10-19 ENCOUNTER — TELEPHONE (OUTPATIENT)
Dept: FAMILY MEDICINE CLINIC | Age: 81
End: 2021-10-19

## 2021-10-19 ENCOUNTER — TRANSCRIBE ORDERS (OUTPATIENT)
Dept: ADMINISTRATIVE | Facility: HOSPITAL | Age: 81
End: 2021-10-19

## 2021-10-19 DIAGNOSIS — F41.1 GENERALIZED ANXIETY DISORDER: ICD-10-CM

## 2021-10-19 DIAGNOSIS — Z12.31 SCREENING MAMMOGRAM, ENCOUNTER FOR: Primary | ICD-10-CM

## 2021-10-19 RX ORDER — CLONAZEPAM 0.5 MG/1
TABLET ORAL
Qty: 90 TABLET | Refills: 0 | Status: SHIPPED | OUTPATIENT
Start: 2021-10-19 | End: 2021-11-30

## 2021-10-19 NOTE — TELEPHONE ENCOUNTER
Pharmacy Name:  [facility]     Pharmacy representative name: MADONNA     Pharmacy representative phone number:   [phone]    What medication are you calling in regards to:clonazePAM (KlonoPIN) 0.5 MG tablet    What question does the pharmacy have: PHARMACIST STATES THAT THE E SCRIPT DOES NOT MEET CAREY REQUIREMENTS AND WILL NOT ALLOW THE PHARMACY TO FILL THE PRESCRIPTION. STATES THAT IT DOES NOT CONFORM TO CAREY REQUIREMENTS FOR CONTROLLED SUBSTANCES.     Who is the provider that prescribed the medication:     Additional notes: PHARMACY STATE THAT THIS MEDICATION CAN BE FILLED WITH A VERBAL ORDER IF PCP CAN NOT RESUBMIT IT.     PLEASE ADVISE. THANKS.

## 2021-10-25 ENCOUNTER — CLINICAL SUPPORT (OUTPATIENT)
Dept: FAMILY MEDICINE CLINIC | Age: 81
End: 2021-10-25

## 2021-10-25 DIAGNOSIS — J30.9 ALLERGIC RHINITIS, UNSPECIFIED SEASONALITY, UNSPECIFIED TRIGGER: Primary | ICD-10-CM

## 2021-10-25 PROCEDURE — 95115 IMMUNOTHERAPY ONE INJECTION: CPT | Performed by: FAMILY MEDICINE

## 2021-11-01 ENCOUNTER — CLINICAL SUPPORT (OUTPATIENT)
Dept: FAMILY MEDICINE CLINIC | Age: 81
End: 2021-11-01

## 2021-11-01 DIAGNOSIS — J30.9 ALLERGIC RHINITIS, UNSPECIFIED SEASONALITY, UNSPECIFIED TRIGGER: Primary | ICD-10-CM

## 2021-11-01 PROCEDURE — 95115 IMMUNOTHERAPY ONE INJECTION: CPT | Performed by: FAMILY MEDICINE

## 2021-11-08 ENCOUNTER — CLINICAL SUPPORT (OUTPATIENT)
Dept: FAMILY MEDICINE CLINIC | Age: 81
End: 2021-11-08

## 2021-11-08 DIAGNOSIS — J30.9 ALLERGIC RHINITIS, UNSPECIFIED SEASONALITY, UNSPECIFIED TRIGGER: Primary | ICD-10-CM

## 2021-11-08 PROCEDURE — 95115 IMMUNOTHERAPY ONE INJECTION: CPT | Performed by: FAMILY MEDICINE

## 2021-11-08 NOTE — PROGRESS NOTES
I have reviewed the notes, assessments, and/or procedures performed by Zaida Westbrook LPN, and I concur with her documentation of Luma Cruz.

## 2021-11-15 ENCOUNTER — CLINICAL SUPPORT (OUTPATIENT)
Dept: FAMILY MEDICINE CLINIC | Age: 81
End: 2021-11-15

## 2021-11-15 DIAGNOSIS — J30.9 ALLERGIC RHINITIS, UNSPECIFIED SEASONALITY, UNSPECIFIED TRIGGER: Primary | ICD-10-CM

## 2021-11-15 PROCEDURE — 95115 IMMUNOTHERAPY ONE INJECTION: CPT | Performed by: FAMILY MEDICINE

## 2021-11-23 ENCOUNTER — CLINICAL SUPPORT (OUTPATIENT)
Dept: FAMILY MEDICINE CLINIC | Age: 81
End: 2021-11-23

## 2021-11-23 ENCOUNTER — HOSPITAL ENCOUNTER (OUTPATIENT)
Dept: MAMMOGRAPHY | Facility: HOSPITAL | Age: 81
Discharge: HOME | End: 2021-11-23
Admitting: FAMILY MEDICINE

## 2021-11-23 DIAGNOSIS — Z12.31 SCREENING MAMMOGRAM, ENCOUNTER FOR: ICD-10-CM

## 2021-11-23 DIAGNOSIS — J30.9 ALLERGIC RHINITIS, UNSPECIFIED SEASONALITY, UNSPECIFIED TRIGGER: Primary | ICD-10-CM

## 2021-11-23 PROCEDURE — 77067 SCR MAMMO BI INCL CAD: CPT

## 2021-11-23 PROCEDURE — 95115 IMMUNOTHERAPY ONE INJECTION: CPT | Performed by: FAMILY MEDICINE

## 2021-11-23 PROCEDURE — 77063 BREAST TOMOSYNTHESIS BI: CPT

## 2021-11-29 ENCOUNTER — CLINICAL SUPPORT (OUTPATIENT)
Dept: FAMILY MEDICINE CLINIC | Age: 81
End: 2021-11-29

## 2021-11-29 DIAGNOSIS — J30.9 ALLERGIC RHINITIS, UNSPECIFIED SEASONALITY, UNSPECIFIED TRIGGER: Primary | ICD-10-CM

## 2021-11-29 PROCEDURE — 95115 IMMUNOTHERAPY ONE INJECTION: CPT | Performed by: FAMILY MEDICINE

## 2021-11-30 DIAGNOSIS — F41.1 GENERALIZED ANXIETY DISORDER: ICD-10-CM

## 2021-11-30 DIAGNOSIS — K21.9 GERD WITHOUT ESOPHAGITIS: ICD-10-CM

## 2021-11-30 RX ORDER — PANTOPRAZOLE SODIUM 40 MG/1
40 TABLET, DELAYED RELEASE ORAL DAILY
Qty: 90 TABLET | Refills: 0 | Status: SHIPPED | OUTPATIENT
Start: 2021-11-30 | End: 2021-12-28 | Stop reason: SDUPTHER

## 2021-11-30 RX ORDER — CLONAZEPAM 0.5 MG/1
0.5 TABLET ORAL 2 TIMES DAILY PRN
Qty: 45 TABLET | Refills: 0 | Status: SHIPPED | OUTPATIENT
Start: 2021-11-30 | End: 2021-12-28 | Stop reason: SDUPTHER

## 2021-12-02 DIAGNOSIS — N95.9 MENOPAUSAL AND POSTMENOPAUSAL DISORDER: ICD-10-CM

## 2021-12-02 DIAGNOSIS — E78.2 MIXED HYPERLIPIDEMIA: ICD-10-CM

## 2021-12-02 DIAGNOSIS — I10 ESSENTIAL HYPERTENSION: ICD-10-CM

## 2021-12-02 RX ORDER — ESTRADIOL 1 MG/1
1 TABLET ORAL DAILY
Qty: 90 TABLET | Refills: 0 | Status: SHIPPED | OUTPATIENT
Start: 2021-12-02 | End: 2021-12-28 | Stop reason: SDUPTHER

## 2021-12-02 RX ORDER — PRAVASTATIN SODIUM 20 MG
20 TABLET ORAL DAILY
Qty: 90 TABLET | Refills: 0 | Status: SHIPPED | OUTPATIENT
Start: 2021-12-02 | End: 2021-12-28 | Stop reason: SDUPTHER

## 2021-12-02 RX ORDER — LOSARTAN POTASSIUM 50 MG/1
50 TABLET ORAL DAILY
Qty: 90 TABLET | Refills: 0 | Status: SHIPPED | OUTPATIENT
Start: 2021-12-02 | End: 2021-12-28 | Stop reason: SDUPTHER

## 2021-12-14 ENCOUNTER — CLINICAL SUPPORT (OUTPATIENT)
Dept: FAMILY MEDICINE CLINIC | Age: 81
End: 2021-12-14

## 2021-12-14 DIAGNOSIS — J30.9 ALLERGIC RHINITIS, UNSPECIFIED SEASONALITY, UNSPECIFIED TRIGGER: Primary | ICD-10-CM

## 2021-12-14 PROCEDURE — 95115 IMMUNOTHERAPY ONE INJECTION: CPT | Performed by: FAMILY MEDICINE

## 2021-12-17 ENCOUNTER — TELEPHONE (OUTPATIENT)
Dept: PULMONOLOGY | Facility: CLINIC | Age: 81
End: 2021-12-17

## 2021-12-17 NOTE — TELEPHONE ENCOUNTER
pt called saying her nebulizer stopped working, she got it from our office a couple years ago and she takes breathing treatments 4 times a day

## 2021-12-17 NOTE — TELEPHONE ENCOUNTER
Patient states that it has been about 2years since we gave her a machine. I advised her that insurance only covers one every 5years. Patient states that she is going to buy one from [employer]

## 2021-12-20 ENCOUNTER — CLINICAL SUPPORT (OUTPATIENT)
Dept: FAMILY MEDICINE CLINIC | Age: 81
End: 2021-12-20

## 2021-12-20 DIAGNOSIS — J30.9 ALLERGIC RHINITIS, UNSPECIFIED SEASONALITY, UNSPECIFIED TRIGGER: Primary | ICD-10-CM

## 2021-12-20 PROCEDURE — 95115 IMMUNOTHERAPY ONE INJECTION: CPT | Performed by: FAMILY MEDICINE

## 2021-12-22 ENCOUNTER — TELEPHONE (OUTPATIENT)
Dept: FAMILY MEDICINE CLINIC | Age: 81
End: 2021-12-22

## 2021-12-22 NOTE — TELEPHONE ENCOUNTER
Caller: Luma Cruz    Relationship to patient: Self    Best call back number: [phone]    Chief complaint:COUGHING UP YELLOW PHLEGM, HEADACHES, BODY ACHES,  NAUSEA     Type of visit: SAME DAY APPOINTMENT     Requested date: TODAY, 12/22/2021        Additional notes: PATIENT IS CONCERNED AND UNCERTAIN IF IT IS ONLY A SINUS INFECTION OR  A POSSIBLE ISSUE WITH HER LUNGS. PATIENT IS ALSO REQUESTING A COVID TEST.       [facility] ATTEMPTED TO SCHEDULE SAME DAY APT AND HAD NO AVAILABILITY.     [facility] WAS UNABLE TO WARM TRANSFER TO THE [facility] CLINICAL NUMBER FOR ASSISTANCE WITH SAME DAY SCHEDULING.

## 2021-12-22 NOTE — TELEPHONE ENCOUNTER
HUB TO READ    CALLED PT BACK, PT STATES SHE ALREADY HAS APT FOR DR LIRA ON THE [address] WE HAVE NO OPENINGS TODAY, OFFERED HER TO TALK TO NURSE SHE STATED SHE ONLY WANTED TO TALK TO MARCO.

## 2021-12-28 ENCOUNTER — OFFICE VISIT (OUTPATIENT)
Dept: FAMILY MEDICINE CLINIC | Age: 81
End: 2021-12-28

## 2021-12-28 ENCOUNTER — LAB (OUTPATIENT)
Dept: LAB | Facility: HOSPITAL | Age: 81
End: 2021-12-28

## 2021-12-28 VITALS
WEIGHT: 118 LBS | HEART RATE: 72 BPM | DIASTOLIC BLOOD PRESSURE: 74 MMHG | SYSTOLIC BLOOD PRESSURE: 180 MMHG | TEMPERATURE: 97.6 F | BODY MASS INDEX: 21.71 KG/M2 | HEIGHT: 62 IN

## 2021-12-28 DIAGNOSIS — F41.1 GENERALIZED ANXIETY DISORDER: ICD-10-CM

## 2021-12-28 DIAGNOSIS — R10.13 EPIGASTRIC PAIN: ICD-10-CM

## 2021-12-28 DIAGNOSIS — J30.9 ALLERGIC RHINITIS, UNSPECIFIED SEASONALITY, UNSPECIFIED TRIGGER: ICD-10-CM

## 2021-12-28 DIAGNOSIS — I10 ESSENTIAL HYPERTENSION: Primary | ICD-10-CM

## 2021-12-28 DIAGNOSIS — E78.2 MIXED HYPERLIPIDEMIA: ICD-10-CM

## 2021-12-28 DIAGNOSIS — Z11.59 NEED FOR HEPATITIS C SCREENING TEST: ICD-10-CM

## 2021-12-28 DIAGNOSIS — Z79.899 OTHER LONG TERM (CURRENT) DRUG THERAPY: ICD-10-CM

## 2021-12-28 DIAGNOSIS — K21.9 GERD WITHOUT ESOPHAGITIS: ICD-10-CM

## 2021-12-28 DIAGNOSIS — N95.9 MENOPAUSAL AND POSTMENOPAUSAL DISORDER: ICD-10-CM

## 2021-12-28 LAB
ALBUMIN SERPL-MCNC: 4.5 G/DL (ref 3.5–5.2)
ALBUMIN/GLOB SERPL: 1.7 G/DL
ALP SERPL-CCNC: 64 U/L (ref 39–117)
ALT SERPL W P-5'-P-CCNC: 17 U/L (ref 1–33)
AMPHET+METHAMPHET UR QL: NEGATIVE
AMPHETAMINES UR QL: NEGATIVE
AMYLASE SERPL-CCNC: 96 U/L (ref 28–100)
ANION GAP SERPL CALCULATED.3IONS-SCNC: 9.9 MMOL/L (ref 5–15)
AST SERPL-CCNC: 27 U/L (ref 1–32)
BARBITURATES UR QL SCN: NEGATIVE
BASOPHILS # BLD AUTO: 0.02 10*3/MM3 (ref 0–0.2)
BASOPHILS NFR BLD AUTO: 0.5 % (ref 0–1.5)
BENZODIAZ UR QL SCN: NEGATIVE
BILIRUB SERPL-MCNC: 0.5 MG/DL (ref 0–1.2)
BUN SERPL-MCNC: 27 MG/DL (ref 8–23)
BUN/CREAT SERPL: 30.7 (ref 7–25)
BUPRENORPHINE SERPL-MCNC: NEGATIVE NG/ML
CALCIUM SPEC-SCNC: 10 MG/DL (ref 8.6–10.5)
CANNABINOIDS SERPL QL: NEGATIVE
CHLORIDE SERPL-SCNC: 105 MMOL/L (ref 98–107)
CO2 SERPL-SCNC: 25.1 MMOL/L (ref 22–29)
COCAINE UR QL: NEGATIVE
CREAT SERPL-MCNC: 0.88 MG/DL (ref 0.57–1)
DEPRECATED RDW RBC AUTO: 43.6 FL (ref 37–54)
EOSINOPHIL # BLD AUTO: 0.19 10*3/MM3 (ref 0–0.4)
EOSINOPHIL NFR BLD AUTO: 4.3 % (ref 0.3–6.2)
ERYTHROCYTE [DISTWIDTH] IN BLOOD BY AUTOMATED COUNT: 13.5 % (ref 12.3–15.4)
EXPIRATION DATE: NORMAL
GFR SERPL CREATININE-BSD FRML MDRD: 62 ML/MIN/1.73
GLOBULIN UR ELPH-MCNC: 2.7 GM/DL
GLUCOSE SERPL-MCNC: 97 MG/DL (ref 65–99)
HCT VFR BLD AUTO: 40.3 % (ref 34–46.6)
HCV AB SER DONR QL: NORMAL
HGB BLD-MCNC: 13.1 G/DL (ref 12–15.9)
IMM GRANULOCYTES # BLD AUTO: 0 10*3/MM3 (ref 0–0.05)
IMM GRANULOCYTES NFR BLD AUTO: 0 % (ref 0–0.5)
LIPASE SERPL-CCNC: 51 U/L (ref 13–60)
LYMPHOCYTES # BLD AUTO: 1.41 10*3/MM3 (ref 0.7–3.1)
LYMPHOCYTES NFR BLD AUTO: 32 % (ref 19.6–45.3)
Lab: NORMAL
MCH RBC QN AUTO: 28.5 PG (ref 26.6–33)
MCHC RBC AUTO-ENTMCNC: 32.5 G/DL (ref 31.5–35.7)
MCV RBC AUTO: 87.8 FL (ref 79–97)
MDMA UR QL SCN: NEGATIVE
METHADONE UR QL SCN: NEGATIVE
MONOCYTES # BLD AUTO: 0.34 10*3/MM3 (ref 0.1–0.9)
MONOCYTES NFR BLD AUTO: 7.7 % (ref 5–12)
NEUTROPHILS NFR BLD AUTO: 2.45 10*3/MM3 (ref 1.7–7)
NEUTROPHILS NFR BLD AUTO: 55.5 % (ref 42.7–76)
OPIATES UR QL: NEGATIVE
OXYCODONE UR QL SCN: NEGATIVE
PCP UR QL SCN: NEGATIVE
PLATELET # BLD AUTO: 197 10*3/MM3 (ref 140–450)
PMV BLD AUTO: 10.7 FL (ref 6–12)
POTASSIUM SERPL-SCNC: 3.8 MMOL/L (ref 3.5–5.2)
PROT SERPL-MCNC: 7.2 G/DL (ref 6–8.5)
RBC # BLD AUTO: 4.59 10*6/MM3 (ref 3.77–5.28)
SODIUM SERPL-SCNC: 140 MMOL/L (ref 136–145)
UREA BREATH TEST QL: NEGATIVE
WBC NRBC COR # BLD: 4.41 10*3/MM3 (ref 3.4–10.8)

## 2021-12-28 PROCEDURE — 99214 OFFICE O/P EST MOD 30 MIN: CPT | Performed by: FAMILY MEDICINE

## 2021-12-28 PROCEDURE — 83013 H PYLORI (C-13) BREATH: CPT

## 2021-12-28 PROCEDURE — 80053 COMPREHEN METABOLIC PANEL: CPT

## 2021-12-28 PROCEDURE — 36415 COLL VENOUS BLD VENIPUNCTURE: CPT

## 2021-12-28 PROCEDURE — 85025 COMPLETE CBC W/AUTO DIFF WBC: CPT

## 2021-12-28 PROCEDURE — 83690 ASSAY OF LIPASE: CPT

## 2021-12-28 PROCEDURE — 95115 IMMUNOTHERAPY ONE INJECTION: CPT | Performed by: FAMILY MEDICINE

## 2021-12-28 PROCEDURE — 80305 DRUG TEST PRSMV DIR OPT OBS: CPT | Performed by: FAMILY MEDICINE

## 2021-12-28 PROCEDURE — 86803 HEPATITIS C AB TEST: CPT

## 2021-12-28 PROCEDURE — 82150 ASSAY OF AMYLASE: CPT

## 2021-12-28 PROCEDURE — G0480 DRUG TEST DEF 1-7 CLASSES: HCPCS | Performed by: FAMILY MEDICINE

## 2021-12-28 RX ORDER — LOSARTAN POTASSIUM 50 MG/1
50 TABLET ORAL DAILY
Qty: 90 TABLET | Refills: 1 | Status: SHIPPED | OUTPATIENT
Start: 2021-12-28 | End: 2022-06-24 | Stop reason: SDUPTHER

## 2021-12-28 RX ORDER — PANTOPRAZOLE SODIUM 40 MG/1
40 TABLET, DELAYED RELEASE ORAL DAILY
Qty: 90 TABLET | Refills: 1 | Status: SHIPPED | OUTPATIENT
Start: 2021-12-28 | End: 2022-06-24 | Stop reason: SDUPTHER

## 2021-12-28 RX ORDER — CLONAZEPAM 0.5 MG/1
0.5 TABLET ORAL 2 TIMES DAILY PRN
Qty: 90 TABLET | Refills: 1 | Status: SHIPPED | OUTPATIENT
Start: 2021-12-28 | End: 2022-06-24 | Stop reason: SDUPTHER

## 2021-12-28 RX ORDER — AMLODIPINE BESYLATE 2.5 MG/1
2.5 TABLET ORAL NIGHTLY
Qty: 30 TABLET | Refills: 1 | Status: SHIPPED | OUTPATIENT
Start: 2021-12-28 | End: 2022-01-12 | Stop reason: SDUPTHER

## 2021-12-28 RX ORDER — LEVOCETIRIZINE DIHYDROCHLORIDE 5 MG/1
5 TABLET, FILM COATED ORAL EVERY EVENING
Qty: 90 TABLET | Refills: 3 | Status: SHIPPED | OUTPATIENT
Start: 2021-12-28 | End: 2022-12-20 | Stop reason: SDUPTHER

## 2021-12-28 RX ORDER — ESTRADIOL 1 MG/1
1 TABLET ORAL DAILY
Qty: 90 TABLET | Refills: 1 | Status: SHIPPED | OUTPATIENT
Start: 2021-12-28 | End: 2022-06-24 | Stop reason: SDUPTHER

## 2021-12-28 RX ORDER — PRAVASTATIN SODIUM 20 MG
20 TABLET ORAL DAILY
Qty: 90 TABLET | Refills: 1 | Status: SHIPPED | OUTPATIENT
Start: 2021-12-28 | End: 2022-06-24 | Stop reason: SDUPTHER

## 2021-12-28 RX ORDER — DOXYCYCLINE 100 MG/1
100 CAPSULE ORAL 2 TIMES DAILY
COMMUNITY
Start: 2021-12-23 | End: 2021-12-30

## 2021-12-28 NOTE — PROGRESS NOTES
Please let Luma Grey know that her blood pressure persisted in being elevated.  I have sent an additional blood pressure medicine, amlodipine, to [facility] for [facility].  I would recommend she start that this evening at bedtime.  I am hopeful that her blood pressure will come down gradually.  She should continue her current blood pressure medicine as is.  We will be in touch again tomorrow after her labs come back.  Thanks.

## 2021-12-29 DIAGNOSIS — R10.13 EPIGASTRIC PAIN: Primary | ICD-10-CM

## 2022-01-03 ENCOUNTER — CLINICAL SUPPORT (OUTPATIENT)
Dept: FAMILY MEDICINE CLINIC | Age: 82
End: 2022-01-03

## 2022-01-03 VITALS — DIASTOLIC BLOOD PRESSURE: 68 MMHG | HEART RATE: 72 BPM | SYSTOLIC BLOOD PRESSURE: 156 MMHG

## 2022-01-03 DIAGNOSIS — J30.9 ALLERGIC RHINITIS, UNSPECIFIED SEASONALITY, UNSPECIFIED TRIGGER: Primary | ICD-10-CM

## 2022-01-03 PROCEDURE — 95115 IMMUNOTHERAPY ONE INJECTION: CPT | Performed by: FAMILY MEDICINE

## 2022-01-04 NOTE — PROGRESS NOTES
Please let Luma Grey know that I have reviewed her blood pressure from yesterday.  It is somewhat better than it was on the recent check at the office.  I would like her to continue the amlodipine along with her other medicines.  She should have her blood pressure checked at the time of her allergy injection for the next couple of weeks so that we can monitor.  Let me know if she has other concerns.  Thanks.

## 2022-01-10 ENCOUNTER — CLINICAL SUPPORT (OUTPATIENT)
Dept: FAMILY MEDICINE CLINIC | Age: 82
End: 2022-01-10

## 2022-01-10 VITALS — DIASTOLIC BLOOD PRESSURE: 66 MMHG | SYSTOLIC BLOOD PRESSURE: 135 MMHG | HEART RATE: 75 BPM

## 2022-01-10 DIAGNOSIS — J30.9 ALLERGIC RHINITIS, UNSPECIFIED SEASONALITY, UNSPECIFIED TRIGGER: Primary | ICD-10-CM

## 2022-01-10 DIAGNOSIS — I10 ESSENTIAL HYPERTENSION: ICD-10-CM

## 2022-01-10 PROCEDURE — 95115 IMMUNOTHERAPY ONE INJECTION: CPT | Performed by: FAMILY MEDICINE

## 2022-01-12 RX ORDER — AMLODIPINE BESYLATE 2.5 MG/1
2.5 TABLET ORAL NIGHTLY
Qty: 90 TABLET | Refills: 1 | Status: SHIPPED | OUTPATIENT
Start: 2022-01-12 | End: 2022-06-24 | Stop reason: SDUPTHER

## 2022-01-12 NOTE — PATIENT INSTRUCTIONS
Things to Know about the COVID-19 Pandemic  Things to Know about the COVID-19 Pandemic  Important Ways to Slow the Spread  · Wear a mask that covers your nose and mouth to help protect yourself and others.  · Stay 6 feet apart from others who don't live with you.  · Get a COVID-19 vaccine when it is available to you.  · Avoid crowds and poorly ventilated indoor spaces.  · Wash your hands often with soap and water. Use hand  if soap and water aren't available.  If you are at risk of getting very sick  · People of any age, even healthy young adults and children, can get COVID-19.  · People who are older or have certain underlying medical conditions are at higher risk of getting very sick from COVID-19.  · Other groups may be at higher risk for getting COVID-19 or having more severe illness.  Getting a COVID-19 Vaccine  · Authorized COVID-19 vaccines can help protect you from COVID-19.  · You should get a COVID-19 vaccine when it is available to you.  · Once you are fully vaccinated, you may be able to start doing some things that you had stopped doing because of the pandemic.  What to do if you're sick  · Stay home except to get medical care. If you have symptoms of COVID-19, contact your healthcare provider and get tested.  · Isolate yourself from others, including those living in your household, to prevent spread to them and the people that they may have contact with, like grandparents.  · Call 911 if you are having emergency warning signs, like trouble breathing, pain or pressure in chest.  How to get a test for current infection  · Visit your state, Tribe, local, and territorial health department'swebsite to look for the latest local information on testing.  · Talk to your healthcare provider about getting tested. You and your healthcare provider might consider either in-person testing, an at-home collection kit, or an at-home test.  · If you have symptoms of COVID-19, or if you have not been vaccinated  and have been in close contact with someone with COVID-19, it is still important to stay home even if you are not tested.  What symptoms to watch for  The most common symptoms of COVID-19 are  · Fever  · Cough  · Headaches  · Fatigue  · Muscle or body aches  · Loss of taste or smell  · Sore throat  · Nausea  · Diarrhea  Other symptoms are signs of serious illness. If someone has trouble breathing, chest pain or pressure, or difficulty staying awake, get medical care immediately.  I wear a mask because...  CDC staff give their reasons for wearing a mask.  Wear a mask because  Content source: National Center for Immunization and Respiratory Diseases (NCIRD), Division of Viral Diseases  03/17/2021  This information is not intended to replace advice given to you by your health care provider. Make sure you discuss any questions you have with your health care provider.  Document Revised: 04/19/2021 Document Reviewed: 04/19/2021  Elsevier Patient Education © 2021 [employer] Inc[employer]

## 2022-01-12 NOTE — PROGRESS NOTES
Primary Care Provider  Joseph Walker MD     Referring Provider  No ref. provider found     Chief Complaint  COPD    Subjective          History of Presenting Illness  Patient is a 81-year-old  female, patient Dr. Forbes's with bronchiectasis who presents for follow-up visit today.  Patient states that since last visit her breathing is doing well.  Patient states at times she will get short of breath that is worse with heavy exertion climbing stairs, mild to moderate in severity, and improved with rest.  Patient states she is taking Brovana and Pulmicort every day as prescribed and uses albuterol nebulizer treatments as needed.  Patient states she is also taking sodium chloride nebulizer treatments to assist with airway clearance.  Patient states that she is needing a new flutter valve as her current flutter valve is no longer working. Patient denies fever, chills, night sweats, swollen glands in the head and neck, unintentional weight loss, hemoptysis, purulent sputum production, dysphagia, chest pain, palpitations, chest tightness, abdominal pain, nausea, vomiting, and diarrhea.  Patient also denies any myalgias, changes in sense of taste and/or smell, sore throat, any other coronavirus or flu-like symptoms.  Patient denies any leg swelling, orthopnea, paroxysmal nocturnal dyspnea.  Patient is able to perform activities of daily living.      Review of Systems   Constitutional: Negative for activity change, appetite change, chills, diaphoresis, fatigue, fever, unexpected weight gain and unexpected weight loss.        Negative for Insomnia   HENT: Negative for congestion (Nasal), mouth sores, nosebleeds, postnasal drip, sore throat, swollen glands and trouble swallowing.         Negative for Thrush  Negative for Hoarseness  Negative for Allergies/Hay Fever  Negative for Recent Head injury  Negative for Ear Fullness  Negative for Nasal or Sinus pain  Negative for Dry lips  Negative for Nasal  discharge   Respiratory: Positive for cough (intermittent) and shortness of breath (with heavy exertion or going up stairs). Negative for apnea, chest tightness and wheezing.         Negative for Hemoptysis  Negative for Pleuritic pain   Cardiovascular: Negative for chest pain, palpitations and leg swelling.        Negative for Claudication  Negative for Cyanosis  Negative for Dyspnea on exertion   Gastrointestinal: Negative for abdominal pain, diarrhea, nausea, vomiting and GERD.   Musculoskeletal: Negative for joint swelling and myalgias.        Negative for Joint pain  Negative for Joint stiffness   Skin: Negative for color change, dry skin, pallor and rash.   Neurological: Negative for syncope, weakness and headache.   Hematological: Negative for adenopathy. Does not bruise/bleed easily.        Family History   Problem Relation Age of Onset   • Stroke Mother    • Hypertension Mother    • Coronary artery disease Father         Social History     Socioeconomic History   • Marital status:    • Number of children: 2   Tobacco Use   • Smoking status: Never Smoker   • Smokeless tobacco: Never Used   Vaping Use   • Vaping Use: Never used   Substance and Sexual Activity   • Alcohol use: Not Currently   • Drug use: Never   • Sexual activity: Defer        Past Medical History:   Diagnosis Date   • Essential hypertension    • Generalized anxiety disorder    • GERD without esophagitis    • Menopausal and postmenopausal disorder    • Mixed hyperlipidemia         Immunization History   Administered Date(s) Administered   • COVID-19 (MODERNA) 1st, 2nd, 3rd Dose Only 01/27/2021, 02/24/2021   • Fluzone High Dose =>65 Years ([facility] ONLY) 10/16/2012, 09/25/2013, 09/28/2017, 09/24/2020   • Fluzone High-Dose 65+yrs 10/02/2021   • Hepatitis A 12/06/2018       Allergies   Allergen Reactions   • Fosamax [Alendronate] GI Intolerance   • Penicillins Unknown - Low Severity          Current Outpatient Medications:   •  albuterol  (2.5 MG/3ML) 0.083% nebulizer solution 3 mL, albuterol (5 MG/ML) 0.5% nebulizer solution 0.5 mL, Inhale Every 4 (Four) Hours., Disp: , Rfl:   •  amLODIPine (NORVASC) 2.5 MG tablet, Take 1 tablet by mouth Every Night., Disp: 90 tablet, Rfl: 1  •  arformoterol (BROVANA) 15 MCG/2ML nebulizer solution, Take 2 mL by nebulization 2 (Two) Times a Day., Disp: 120 mL, Rfl: 11  •  azelastine (ASTELIN) 0.1 % nasal spray, 1 spray into the nostril(s) as directed by provider 2 (Two) Times a Day., Disp: , Rfl:   •  budesonide (PULMICORT) 0.25 MG/2ML nebulizer solution, Take 0.25 mg by nebulization Daily. Pt unsure of exact dosage, Disp: , Rfl:   •  clonazePAM (KlonoPIN) 0.5 MG tablet, Take 1 tablet by mouth 2 (Two) Times a Day As Needed for Anxiety., Disp: 90 tablet, Rfl: 1  •  estradiol (ESTRACE) 1 MG tablet, Take 1 tablet by mouth Daily., Disp: 90 tablet, Rfl: 1  •  fluticasone (FLONASE) 50 MCG/ACT nasal spray, 2 sprays into the nostril(s) as directed by provider Daily., Disp: , Rfl:   •  levocetirizine (XYZAL) 5 MG tablet, Take 1 tablet by mouth Every Evening., Disp: 90 tablet, Rfl: 3  •  losartan (COZAAR) 50 MG tablet, Take 1 tablet by mouth Daily., Disp: 90 tablet, Rfl: 1  •  multivitamin with minerals (CENTRUM ADULTS PO), Take 1 tablet by mouth Daily., Disp: , Rfl:   •  pantoprazole (PROTONIX) 40 MG EC tablet, Take 1 tablet by mouth Daily., Disp: 90 tablet, Rfl: 1  •  pravastatin (PRAVACHOL) 20 MG tablet, Take 1 tablet by mouth Daily., Disp: 90 tablet, Rfl: 1  •  sodium chloride 3 % nebulizer solution, Take  by nebulization 2 (Two) Times a Day for 30 days., Disp: 240 mL, Rfl: 5     Objective     Physical Exam  Vital Signs Reviewed  WDWN, Alert, NAD.    HEENT:  PERRL, EOMI.  OP, nares clear, no sinus tenderness  Neck:  Supple, no JVD, no thyromegaly.  Lymph: no axillary, cervical, supraclavicular lymphadenopathy noted bilaterally  Chest:  good aeration, clear to auscultation bilaterally, tympanic to percussion bilaterally, no  "work of breathing noted  CV: RRR, no MGR, pulses 2+, equal.  Abd:  Soft, NT, ND, + BS, no HSM  EXT:  no clubbing, no cyanosis, no edema, no joint tenderness  Neuro:  A&Ox3, CN grossly intact, no focal deficits.  Skin: No rashes or lesions noted.    Vital Signs:   /59   Pulse 81   Resp 14   Ht 157.5 cm (62\")   Wt 53.1 kg (117 lb)   SpO2 98% Comment: room air  BMI 21.40 kg/m²         Result Review :   I have personally reviewed my last office visit note.         Assessment and Plan      Assessment:  1.  Bronchiectasis without acute exacerbation.      2.  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  The patient reports she is feeling better.  Last chest CT scan showed patchy groundglass basilar lower lobes has resolved.   3. Fungal pneumonia, status post two months of Tolsura with antibiotic associated issues.      4. GERD, patient on proton pump inhibitor.        5. Chronic cough, improved.      6. Mucus plugging, patient on flutter valve and chloride nebulizers twice a day.     7.  Airway clearance impairment.  8. Seasonal allergies on allergy immunotherapy.      9. Never smoker.       Plan:  1. Continue Brovana and Pulmicort everyday as prescribed and rinse her mouth out after each use.      2. The patient to continue albuterol nebulizers as needed.  3.  Patient to continue Astelin, Flonase, and Xyzal as prescribed.  4. The patient to continue allergy immunotherapy and to  follow up with allergist as scheduled.      5. Patient states she is also taking sodium chloride nebulizer treatments to assist with airway clearance.  Patient states that she is needing a new flutter valve as her current flutter valve is no longer working.  I will have our clinical coordinator see if we can set patient up with a new flutter valve.  6. Patient reports they are up-to-date with flu, pneumonia, and Covid vaccines. "  Patient is advised to continue to follow CDC recommendations such as social distancing wearing a mask and washing hands for at least 20 seconds.  7.  Patient to call the office, 911, or go to the ER with new or worsening symptoms.         8. Follow up in 6 months, sooner if needed.        Follow Up   Return in about 6 months (around 7/13/2022).  Patient was given instructions and counseling regarding her condition or for health maintenance advice. Please see specific information pulled into the AVS if appropriate.

## 2022-01-12 NOTE — PROGRESS NOTES
Please let Luma Grey know that her blood pressure was in a good range on this check.  I would recommend she continue her current doses of medications for blood pressure.  Please confirm those and see if she needs refills.  Let me know if she has other concerns.  Thanks

## 2022-01-12 NOTE — PROGRESS NOTES
Pt inf, confirmed she is taking amlodipine and losartan. States she has refills on losartan, but not amlodipine, so she would like that one sent in.

## 2022-01-13 ENCOUNTER — OFFICE VISIT (OUTPATIENT)
Dept: PULMONOLOGY | Facility: CLINIC | Age: 82
End: 2022-01-13

## 2022-01-13 VITALS
OXYGEN SATURATION: 98 % | HEIGHT: 62 IN | HEART RATE: 81 BPM | RESPIRATION RATE: 14 BRPM | SYSTOLIC BLOOD PRESSURE: 122 MMHG | WEIGHT: 117 LBS | DIASTOLIC BLOOD PRESSURE: 59 MMHG | BODY MASS INDEX: 21.53 KG/M2

## 2022-01-13 DIAGNOSIS — J47.9 BRONCHIECTASIS WITHOUT COMPLICATION: Primary | ICD-10-CM

## 2022-01-13 DIAGNOSIS — R05.3 CHRONIC COUGH: ICD-10-CM

## 2022-01-13 DIAGNOSIS — R06.89 AIRWAY CLEARANCE IMPAIRMENT: ICD-10-CM

## 2022-01-13 DIAGNOSIS — K21.9 GASTROESOPHAGEAL REFLUX DISEASE, UNSPECIFIED WHETHER ESOPHAGITIS PRESENT: ICD-10-CM

## 2022-01-13 DIAGNOSIS — J16.8 FUNGAL PNEUMONIA: ICD-10-CM

## 2022-01-13 DIAGNOSIS — B49 FUNGAL PNEUMONIA: ICD-10-CM

## 2022-01-13 DIAGNOSIS — T17.500A MUCUS PLUGGING OF BRONCHI: ICD-10-CM

## 2022-01-13 PROCEDURE — 99214 OFFICE O/P EST MOD 30 MIN: CPT | Performed by: NURSE PRACTITIONER

## 2022-01-13 RX ORDER — MULTIPLE VITAMINS W/ MINERALS TAB 9MG-400MCG
1 TAB ORAL DAILY
COMMUNITY

## 2022-01-13 RX ORDER — SODIUM CHLORIDE FOR INHALATION 3 %
VIAL, NEBULIZER (ML) INHALATION 2 TIMES DAILY
Qty: 240 ML | Refills: 5 | Status: SHIPPED | OUTPATIENT
Start: 2022-01-13 | End: 2022-02-12

## 2022-01-14 ENCOUNTER — HOSPITAL ENCOUNTER (OUTPATIENT)
Dept: ULTRASOUND IMAGING | Facility: HOSPITAL | Age: 82
Discharge: HOME | End: 2022-01-14
Admitting: FAMILY MEDICINE

## 2022-01-14 DIAGNOSIS — R10.13 EPIGASTRIC PAIN: ICD-10-CM

## 2022-01-14 LAB — BENZODIAZ UR QL: NEGATIVE

## 2022-01-14 PROCEDURE — 76705 ECHO EXAM OF ABDOMEN: CPT

## 2022-01-18 ENCOUNTER — CLINICAL SUPPORT (OUTPATIENT)
Dept: FAMILY MEDICINE CLINIC | Age: 82
End: 2022-01-18

## 2022-01-18 VITALS — SYSTOLIC BLOOD PRESSURE: 141 MMHG | DIASTOLIC BLOOD PRESSURE: 64 MMHG | HEART RATE: 73 BPM

## 2022-01-18 DIAGNOSIS — J30.9 ALLERGIC RHINITIS, UNSPECIFIED SEASONALITY, UNSPECIFIED TRIGGER: Primary | ICD-10-CM

## 2022-01-18 PROCEDURE — 95115 IMMUNOTHERAPY ONE INJECTION: CPT | Performed by: FAMILY MEDICINE

## 2022-01-24 ENCOUNTER — CLINICAL SUPPORT (OUTPATIENT)
Dept: FAMILY MEDICINE CLINIC | Age: 82
End: 2022-01-24

## 2022-01-24 ENCOUNTER — TELEPHONE (OUTPATIENT)
Dept: PULMONOLOGY | Facility: CLINIC | Age: 82
End: 2022-01-24

## 2022-01-24 VITALS — DIASTOLIC BLOOD PRESSURE: 71 MMHG | HEART RATE: 68 BPM | SYSTOLIC BLOOD PRESSURE: 140 MMHG

## 2022-01-24 DIAGNOSIS — J30.9 ALLERGIC RHINITIS, UNSPECIFIED SEASONALITY, UNSPECIFIED TRIGGER: Primary | ICD-10-CM

## 2022-01-24 DIAGNOSIS — I10 ESSENTIAL HYPERTENSION: ICD-10-CM

## 2022-01-24 PROCEDURE — 95115 IMMUNOTHERAPY ONE INJECTION: CPT | Performed by: FAMILY MEDICINE

## 2022-01-31 ENCOUNTER — CLINICAL SUPPORT (OUTPATIENT)
Dept: FAMILY MEDICINE CLINIC | Age: 82
End: 2022-01-31

## 2022-01-31 DIAGNOSIS — J30.9 ALLERGIC RHINITIS, UNSPECIFIED SEASONALITY, UNSPECIFIED TRIGGER: Primary | ICD-10-CM

## 2022-01-31 PROCEDURE — 95115 IMMUNOTHERAPY ONE INJECTION: CPT | Performed by: FAMILY MEDICINE

## 2022-01-31 NOTE — PROGRESS NOTES
I have reviewed the notes, assessments, and/or procedures performed by Zaida Westbrook LPN, and I concur with her documentation of Luma rCuz.

## 2022-02-08 ENCOUNTER — CLINICAL SUPPORT (OUTPATIENT)
Dept: FAMILY MEDICINE CLINIC | Age: 82
End: 2022-02-08

## 2022-02-08 DIAGNOSIS — J30.9 ALLERGIC RHINITIS, UNSPECIFIED SEASONALITY, UNSPECIFIED TRIGGER: Primary | ICD-10-CM

## 2022-02-08 PROCEDURE — 95115 IMMUNOTHERAPY ONE INJECTION: CPT | Performed by: FAMILY MEDICINE

## 2022-02-14 ENCOUNTER — CLINICAL SUPPORT (OUTPATIENT)
Dept: FAMILY MEDICINE CLINIC | Age: 82
End: 2022-02-14

## 2022-02-14 DIAGNOSIS — J30.9 ALLERGIC RHINITIS, UNSPECIFIED SEASONALITY, UNSPECIFIED TRIGGER: Primary | ICD-10-CM

## 2022-02-14 PROCEDURE — 95115 IMMUNOTHERAPY ONE INJECTION: CPT | Performed by: FAMILY MEDICINE

## 2022-02-24 ENCOUNTER — CLINICAL SUPPORT (OUTPATIENT)
Dept: FAMILY MEDICINE CLINIC | Age: 82
End: 2022-02-24

## 2022-02-24 DIAGNOSIS — J47.9 BRONCHIECTASIS WITHOUT COMPLICATION: Primary | ICD-10-CM

## 2022-02-24 DIAGNOSIS — T17.500A MUCUS PLUGGING OF BRONCHI: ICD-10-CM

## 2022-02-24 DIAGNOSIS — J30.9 ALLERGIC RHINITIS, UNSPECIFIED SEASONALITY, UNSPECIFIED TRIGGER: Primary | ICD-10-CM

## 2022-02-24 PROCEDURE — 95115 IMMUNOTHERAPY ONE INJECTION: CPT | Performed by: FAMILY MEDICINE

## 2022-02-24 RX ORDER — SODIUM CHLORIDE SOLN NEBU 3% 3 %
NEBU SOLN INHALATION
Qty: 240 ML | Refills: 4 | Status: SHIPPED | OUTPATIENT
Start: 2022-02-24

## 2022-03-01 ENCOUNTER — CLINICAL SUPPORT (OUTPATIENT)
Dept: FAMILY MEDICINE CLINIC | Age: 82
End: 2022-03-01

## 2022-03-01 DIAGNOSIS — J30.9 ALLERGIC RHINITIS, UNSPECIFIED SEASONALITY, UNSPECIFIED TRIGGER: Primary | ICD-10-CM

## 2022-03-01 PROCEDURE — 95115 IMMUNOTHERAPY ONE INJECTION: CPT | Performed by: FAMILY MEDICINE

## 2022-03-09 ENCOUNTER — CLINICAL SUPPORT (OUTPATIENT)
Dept: FAMILY MEDICINE CLINIC | Age: 82
End: 2022-03-09

## 2022-03-09 DIAGNOSIS — J30.9 ALLERGIC RHINITIS, UNSPECIFIED SEASONALITY, UNSPECIFIED TRIGGER: Primary | ICD-10-CM

## 2022-03-09 PROCEDURE — 95115 IMMUNOTHERAPY ONE INJECTION: CPT | Performed by: FAMILY MEDICINE

## 2022-03-14 ENCOUNTER — CLINICAL SUPPORT (OUTPATIENT)
Dept: FAMILY MEDICINE CLINIC | Age: 82
End: 2022-03-14

## 2022-03-14 DIAGNOSIS — J30.9 ALLERGIC RHINITIS, UNSPECIFIED SEASONALITY, UNSPECIFIED TRIGGER: Primary | ICD-10-CM

## 2022-03-14 PROCEDURE — 95115 IMMUNOTHERAPY ONE INJECTION: CPT | Performed by: FAMILY MEDICINE

## 2022-03-21 ENCOUNTER — CLINICAL SUPPORT (OUTPATIENT)
Dept: FAMILY MEDICINE CLINIC | Age: 82
End: 2022-03-21

## 2022-03-21 DIAGNOSIS — J30.9 ALLERGIC RHINITIS, UNSPECIFIED SEASONALITY, UNSPECIFIED TRIGGER: Primary | ICD-10-CM

## 2022-03-21 PROCEDURE — 95115 IMMUNOTHERAPY ONE INJECTION: CPT | Performed by: FAMILY MEDICINE

## 2022-03-28 ENCOUNTER — CLINICAL SUPPORT (OUTPATIENT)
Dept: FAMILY MEDICINE CLINIC | Age: 82
End: 2022-03-28

## 2022-03-28 DIAGNOSIS — J30.9 ALLERGIC RHINITIS, UNSPECIFIED SEASONALITY, UNSPECIFIED TRIGGER: Primary | ICD-10-CM

## 2022-03-28 PROCEDURE — 95115 IMMUNOTHERAPY ONE INJECTION: CPT | Performed by: FAMILY MEDICINE

## 2022-04-04 ENCOUNTER — CLINICAL SUPPORT (OUTPATIENT)
Dept: FAMILY MEDICINE CLINIC | Age: 82
End: 2022-04-04

## 2022-04-04 DIAGNOSIS — J30.9 ALLERGIC RHINITIS, UNSPECIFIED SEASONALITY, UNSPECIFIED TRIGGER: Primary | ICD-10-CM

## 2022-04-04 PROCEDURE — 95115 IMMUNOTHERAPY ONE INJECTION: CPT | Performed by: FAMILY MEDICINE

## 2022-04-11 ENCOUNTER — CLINICAL SUPPORT (OUTPATIENT)
Dept: FAMILY MEDICINE CLINIC | Age: 82
End: 2022-04-11

## 2022-04-11 DIAGNOSIS — J30.9 ALLERGIC RHINITIS, UNSPECIFIED SEASONALITY, UNSPECIFIED TRIGGER: Primary | ICD-10-CM

## 2022-04-11 PROCEDURE — 95115 IMMUNOTHERAPY ONE INJECTION: CPT | Performed by: FAMILY MEDICINE

## 2022-04-19 ENCOUNTER — CLINICAL SUPPORT (OUTPATIENT)
Dept: FAMILY MEDICINE CLINIC | Age: 82
End: 2022-04-19

## 2022-04-19 DIAGNOSIS — J30.9 ALLERGIC RHINITIS, UNSPECIFIED SEASONALITY, UNSPECIFIED TRIGGER: Primary | ICD-10-CM

## 2022-04-19 PROCEDURE — 95115 IMMUNOTHERAPY ONE INJECTION: CPT | Performed by: FAMILY MEDICINE

## 2022-04-25 ENCOUNTER — CLINICAL SUPPORT (OUTPATIENT)
Dept: FAMILY MEDICINE CLINIC | Age: 82
End: 2022-04-25

## 2022-04-25 DIAGNOSIS — J30.9 ALLERGIC RHINITIS, UNSPECIFIED SEASONALITY, UNSPECIFIED TRIGGER: Primary | ICD-10-CM

## 2022-04-25 PROCEDURE — 95115 IMMUNOTHERAPY ONE INJECTION: CPT | Performed by: FAMILY MEDICINE

## 2022-05-03 ENCOUNTER — CLINICAL SUPPORT (OUTPATIENT)
Dept: FAMILY MEDICINE CLINIC | Age: 82
End: 2022-05-03

## 2022-05-03 DIAGNOSIS — J30.9 ALLERGIC RHINITIS, UNSPECIFIED SEASONALITY, UNSPECIFIED TRIGGER: Primary | ICD-10-CM

## 2022-05-03 PROCEDURE — 95115 IMMUNOTHERAPY ONE INJECTION: CPT | Performed by: FAMILY MEDICINE

## 2022-05-09 ENCOUNTER — CLINICAL SUPPORT (OUTPATIENT)
Dept: FAMILY MEDICINE CLINIC | Age: 82
End: 2022-05-09

## 2022-05-16 ENCOUNTER — CLINICAL SUPPORT (OUTPATIENT)
Dept: FAMILY MEDICINE CLINIC | Age: 82
End: 2022-05-16

## 2022-05-16 DIAGNOSIS — J30.9 ALLERGIC RHINITIS, UNSPECIFIED SEASONALITY, UNSPECIFIED TRIGGER: Primary | ICD-10-CM

## 2022-05-16 PROCEDURE — 95115 IMMUNOTHERAPY ONE INJECTION: CPT | Performed by: FAMILY MEDICINE

## 2022-05-23 ENCOUNTER — CLINICAL SUPPORT (OUTPATIENT)
Dept: FAMILY MEDICINE CLINIC | Age: 82
End: 2022-05-23

## 2022-05-23 DIAGNOSIS — J30.9 ALLERGIC RHINITIS, UNSPECIFIED SEASONALITY, UNSPECIFIED TRIGGER: Primary | ICD-10-CM

## 2022-05-23 PROCEDURE — 95115 IMMUNOTHERAPY ONE INJECTION: CPT | Performed by: FAMILY MEDICINE

## 2022-05-31 ENCOUNTER — CLINICAL SUPPORT (OUTPATIENT)
Dept: FAMILY MEDICINE CLINIC | Age: 82
End: 2022-05-31

## 2022-05-31 DIAGNOSIS — J30.9 ALLERGIC RHINITIS, UNSPECIFIED SEASONALITY, UNSPECIFIED TRIGGER: Primary | ICD-10-CM

## 2022-05-31 PROCEDURE — 95115 IMMUNOTHERAPY ONE INJECTION: CPT | Performed by: FAMILY MEDICINE

## 2022-06-06 ENCOUNTER — CLINICAL SUPPORT (OUTPATIENT)
Dept: FAMILY MEDICINE CLINIC | Age: 82
End: 2022-06-06

## 2022-06-06 DIAGNOSIS — J30.9 ALLERGIC RHINITIS, UNSPECIFIED SEASONALITY, UNSPECIFIED TRIGGER: Primary | ICD-10-CM

## 2022-06-06 PROCEDURE — 95115 IMMUNOTHERAPY ONE INJECTION: CPT | Performed by: FAMILY MEDICINE

## 2022-06-09 ENCOUNTER — LAB (OUTPATIENT)
Dept: LAB | Facility: HOSPITAL | Age: 82
End: 2022-06-09

## 2022-06-09 ENCOUNTER — HOSPITAL ENCOUNTER (OUTPATIENT)
Dept: GENERAL RADIOLOGY | Facility: HOSPITAL | Age: 82
Discharge: HOME | End: 2022-06-09

## 2022-06-09 ENCOUNTER — OFFICE VISIT (OUTPATIENT)
Dept: FAMILY MEDICINE CLINIC | Age: 82
End: 2022-06-09

## 2022-06-09 VITALS
SYSTOLIC BLOOD PRESSURE: 141 MMHG | HEART RATE: 83 BPM | TEMPERATURE: 98.1 F | BODY MASS INDEX: 21.05 KG/M2 | DIASTOLIC BLOOD PRESSURE: 54 MMHG | HEIGHT: 62 IN | WEIGHT: 114.4 LBS

## 2022-06-09 DIAGNOSIS — G89.29 CHRONIC LEFT-SIDED LOW BACK PAIN WITHOUT SCIATICA: ICD-10-CM

## 2022-06-09 DIAGNOSIS — G89.29 CHRONIC LEFT-SIDED LOW BACK PAIN WITHOUT SCIATICA: Primary | ICD-10-CM

## 2022-06-09 DIAGNOSIS — M54.50 CHRONIC LEFT-SIDED LOW BACK PAIN WITHOUT SCIATICA: ICD-10-CM

## 2022-06-09 DIAGNOSIS — E78.2 MIXED HYPERLIPIDEMIA: ICD-10-CM

## 2022-06-09 DIAGNOSIS — I10 ESSENTIAL HYPERTENSION: ICD-10-CM

## 2022-06-09 DIAGNOSIS — M54.2 NECK PAIN: ICD-10-CM

## 2022-06-09 DIAGNOSIS — Z79.899 OTHER LONG TERM (CURRENT) DRUG THERAPY: ICD-10-CM

## 2022-06-09 DIAGNOSIS — K21.9 GERD WITHOUT ESOPHAGITIS: ICD-10-CM

## 2022-06-09 DIAGNOSIS — M54.50 CHRONIC LEFT-SIDED LOW BACK PAIN WITHOUT SCIATICA: Primary | ICD-10-CM

## 2022-06-09 LAB
ALBUMIN SERPL-MCNC: 4.6 G/DL (ref 3.5–5.2)
ALBUMIN/GLOB SERPL: 1.9 G/DL
ALP SERPL-CCNC: 66 U/L (ref 39–117)
ALT SERPL W P-5'-P-CCNC: 12 U/L (ref 1–33)
ANION GAP SERPL CALCULATED.3IONS-SCNC: 10 MMOL/L (ref 5–15)
AST SERPL-CCNC: 23 U/L (ref 1–32)
BACTERIA UR QL AUTO: ABNORMAL /HPF
BILIRUB SERPL-MCNC: 0.2 MG/DL (ref 0–1.2)
BILIRUB UR QL STRIP: NEGATIVE
BUN SERPL-MCNC: 33 MG/DL (ref 8–23)
BUN/CREAT SERPL: 36.3 (ref 7–25)
CALCIUM SPEC-SCNC: 9.7 MG/DL (ref 8.6–10.5)
CHLORIDE SERPL-SCNC: 106 MMOL/L (ref 98–107)
CHOLEST SERPL-MCNC: 168 MG/DL (ref 0–200)
CK SERPL-CCNC: 66 U/L (ref 20–180)
CLARITY UR: CLEAR
CO2 SERPL-SCNC: 26 MMOL/L (ref 22–29)
COLOR UR: YELLOW
CREAT SERPL-MCNC: 0.91 MG/DL (ref 0.57–1)
DEPRECATED RDW RBC AUTO: 43 FL (ref 37–54)
EGFRCR SERPLBLD CKD-EPI 2021: 64.3 ML/MIN/1.73
ERYTHROCYTE [DISTWIDTH] IN BLOOD BY AUTOMATED COUNT: 12.7 % (ref 12.3–15.4)
ERYTHROCYTE [SEDIMENTATION RATE] IN BLOOD: 12 MM/HR (ref 0–30)
GLOBULIN UR ELPH-MCNC: 2.4 GM/DL
GLUCOSE SERPL-MCNC: 88 MG/DL (ref 65–99)
GLUCOSE UR STRIP-MCNC: NEGATIVE MG/DL
HCT VFR BLD AUTO: 40.2 % (ref 34–46.6)
HDLC SERPL-MCNC: 76 MG/DL (ref 40–60)
HGB BLD-MCNC: 12.5 G/DL (ref 12–15.9)
HGB UR QL STRIP.AUTO: ABNORMAL
KETONES UR QL STRIP: NEGATIVE
LDLC SERPL CALC-MCNC: 76 MG/DL (ref 0–100)
LDLC/HDLC SERPL: 0.98 {RATIO}
LEUKOCYTE ESTERASE UR QL STRIP.AUTO: NEGATIVE
MCH RBC QN AUTO: 28.3 PG (ref 26.6–33)
MCHC RBC AUTO-ENTMCNC: 31.1 G/DL (ref 31.5–35.7)
MCV RBC AUTO: 91.2 FL (ref 79–97)
NITRITE UR QL STRIP: NEGATIVE
PH UR STRIP.AUTO: 5.5 [PH] (ref 5–8)
PLATELET # BLD AUTO: 221 10*3/MM3 (ref 140–450)
PMV BLD AUTO: 11.4 FL (ref 6–12)
POTASSIUM SERPL-SCNC: 4.3 MMOL/L (ref 3.5–5.2)
PROT SERPL-MCNC: 7 G/DL (ref 6–8.5)
PROT UR QL STRIP: NEGATIVE
RBC # BLD AUTO: 4.41 10*6/MM3 (ref 3.77–5.28)
RBC # UR STRIP: ABNORMAL /HPF
REF LAB TEST METHOD: ABNORMAL
SODIUM SERPL-SCNC: 142 MMOL/L (ref 136–145)
SP GR UR STRIP: >=1.03 (ref 1–1.03)
SQUAMOUS #/AREA URNS HPF: ABNORMAL /HPF
TRIGL SERPL-MCNC: 89 MG/DL (ref 0–150)
TSH SERPL DL<=0.05 MIU/L-ACNC: 1.37 UIU/ML (ref 0.27–4.2)
UROBILINOGEN UR QL STRIP: ABNORMAL
VLDLC SERPL-MCNC: 16 MG/DL (ref 5–40)
WBC # UR STRIP: ABNORMAL /HPF
WBC NRBC COR # BLD: 5.89 10*3/MM3 (ref 3.4–10.8)

## 2022-06-09 PROCEDURE — 80053 COMPREHEN METABOLIC PANEL: CPT

## 2022-06-09 PROCEDURE — 99214 OFFICE O/P EST MOD 30 MIN: CPT | Performed by: FAMILY MEDICINE

## 2022-06-09 PROCEDURE — 84443 ASSAY THYROID STIM HORMONE: CPT

## 2022-06-09 PROCEDURE — 80061 LIPID PANEL: CPT

## 2022-06-09 PROCEDURE — 81001 URINALYSIS AUTO W/SCOPE: CPT

## 2022-06-09 PROCEDURE — 85027 COMPLETE CBC AUTOMATED: CPT

## 2022-06-09 PROCEDURE — 72040 X-RAY EXAM NECK SPINE 2-3 VW: CPT

## 2022-06-09 PROCEDURE — 72100 X-RAY EXAM L-S SPINE 2/3 VWS: CPT

## 2022-06-09 PROCEDURE — 85652 RBC SED RATE AUTOMATED: CPT

## 2022-06-09 PROCEDURE — 82550 ASSAY OF CK (CPK): CPT

## 2022-06-09 PROCEDURE — 36415 COLL VENOUS BLD VENIPUNCTURE: CPT

## 2022-06-09 RX ORDER — SUCRALFATE 1 G/1
1 TABLET ORAL 3 TIMES DAILY
Qty: 90 TABLET | Refills: 0 | Status: SHIPPED | OUTPATIENT
Start: 2022-06-09 | End: 2022-06-24

## 2022-06-09 NOTE — PROGRESS NOTES
Luma Cruz presents to [facility].    Chief Complaint:  'it's my back'    Subjective       History of Present Illness:  Luma Grey is in today for evaluation of low back pain.  She has been struggling for a long period of time.  She has daily pain in the L lower back.  She also describes some associated pain that radiates from the neck to the lower back.  She also has some pain under the shoulder blade on both sides.  She has been taking Tylenol as needed for this.  She is not sure how to move forward.  She has not had any recent X-ray evaluation.  The pain in the low back does not really radiate.  She is not able to stand straight because of this.      Review of Systems:  Review of Systems   Constitutional: Positive for fatigue. Negative for chills and fever.   Respiratory: Negative for cough and shortness of breath.    Cardiovascular: Negative for chest pain and palpitations.   Gastrointestinal: Positive for indigestion. Negative for abdominal pain, nausea and vomiting.        Objective   Medical History:  Past Medical History:   • Essential hypertension   • Generalized anxiety disorder   • GERD without esophagitis   • Menopausal and postmenopausal disorder   • Mixed hyperlipidemia     Past Surgical History:   • COLONOSCOPY   • HYSTERECTOMY    complete   • TONSILLECTOMY AND ADENOIDECTOMY      Family History   Problem Relation Age of Onset   • Stroke Mother    • Hypertension Mother    • Coronary artery disease Father      Social History     Tobacco Use   • Smoking status: Never Smoker   • Smokeless tobacco: Never Used   Substance Use Topics   • Alcohol use: Not Currently       Health Maintenance Due   Topic Date Due   • TDAP/TD VACCINES (1 - Tdap) Never done   • ZOSTER VACCINE (1 of 2) Never done        Immunization History   Administered Date(s) Administered   • COVID-19 (MODERNA) 1st, 2nd, 3rd Dose Only 01/27/2021, 02/24/2021, 10/30/2021, 04/13/2022   • Fluzone High Dose =>65 Years  "([facility] ONLY) 10/16/2012, 09/25/2013, 09/28/2017, 09/24/2020   • Fluzone High-Dose 65+yrs 10/02/2021   • Hepatitis A 12/06/2018       Allergies   Allergen Reactions   • Fosamax [Alendronate] GI Intolerance   • Penicillins Unknown - Low Severity        Medications:  Current Outpatient Medications on File Prior to Visit   Medication Sig   • albuterol (2.5 MG/3ML) 0.083% nebulizer solution 3 mL, albuterol (5 MG/ML) 0.5% nebulizer solution 0.5 mL Inhale Every 4 (Four) Hours.   • amLODIPine (NORVASC) 2.5 MG tablet Take 1 tablet by mouth Every Night.   • arformoterol (BROVANA) 15 MCG/2ML nebulizer solution Take 2 mL by nebulization 2 (Two) Times a Day.   • azelastine (ASTELIN) 0.1 % nasal spray 1 spray into the nostril(s) as directed by provider 2 (Two) Times a Day.   • budesonide (PULMICORT) 0.25 MG/2ML nebulizer solution Take 0.25 mg by nebulization Daily. Pt unsure of exact dosage   • clonazePAM (KlonoPIN) 0.5 MG tablet Take 1 tablet by mouth 2 (Two) Times a Day As Needed for Anxiety.   • estradiol (ESTRACE) 1 MG tablet Take 1 tablet by mouth Daily.   • fluticasone (FLONASE) 50 MCG/ACT nasal spray 2 sprays into the nostril(s) as directed by provider Daily.   • levocetirizine (XYZAL) 5 MG tablet Take 1 tablet by mouth Every Evening.   • losartan (COZAAR) 50 MG tablet Take 1 tablet by mouth Daily.   • multivitamin with minerals tablet tablet Take 1 tablet by mouth Daily.   • Nebusal 3 % nebulizer solution inhale 1 vial by nebulizer TWICE DAILY   • pantoprazole (PROTONIX) 40 MG EC tablet Take 1 tablet by mouth Daily.   • pravastatin (PRAVACHOL) 20 MG tablet Take 1 tablet by mouth Daily.     No current facility-administered medications on file prior to visit.       Vital Signs:   /54 (BP Location: Right arm, Patient Position: Sitting)   Pulse 83   Temp 98.1 °F (36.7 °C) (Oral)   Ht 157.5 cm (62.01\")   Wt 51.9 kg (114 lb 6.4 oz)   BMI 20.92 kg/m²       Physical Exam:  Physical Exam  Vitals reviewed. "   Constitutional:       General: She is not in acute distress.     Appearance: She is not ill-appearing.   Eyes:      Pupils: Pupils are equal, round, and reactive to light.   Neck:      Comments: No thyromegaly  Cardiovascular:      Rate and Rhythm: Normal rate and regular rhythm.   Pulmonary:      Effort: Pulmonary effort is normal.      Breath sounds: Normal breath sounds.   Abdominal:      General: There is no distension.      Palpations: Abdomen is soft.      Tenderness: There is no abdominal tenderness.   Musculoskeletal:      Cervical back: Neck supple.      Comments: There is decreased range of motion in the cervical spine at the extremes of motion.  She has preserved range of motion of the shoulders.  There is no focal tenderness in the mid back.  She does have some discomfort in the left lower back.   Lymphadenopathy:      Cervical: No cervical adenopathy.   Skin:     Findings: No lesion or rash.   Neurological:      General: No focal deficit present.      Mental Status: She is alert.      Cranial Nerves: No cranial nerve deficit.      Motor: No weakness.      Deep Tendon Reflexes: Reflexes normal.         Result Review      The following data was reviewed by Joseph Walker MD on 06/09/2022.  Lab Results   Component Value Date    WBC 4.41 12/28/2021    HGB 13.1 12/28/2021    HCT 40.3 12/28/2021    MCV 87.8 12/28/2021     12/28/2021     Lab Results   Component Value Date    GLUCOSE 97 12/28/2021    BUN 27 (H) 12/28/2021    CREATININE 0.88 12/28/2021     12/28/2021    K 3.8 12/28/2021     12/28/2021    CO2 25.1 12/28/2021    CALCIUM 10.0 12/28/2021    PROTEINTOT 7.2 12/28/2021    ALBUMIN 4.50 12/28/2021    ALT 17 12/28/2021    AST 27 12/28/2021    ALKPHOS 64 12/28/2021    BILITOT 0.5 12/28/2021    EGFRIFNONA 62 12/28/2021    GLOB 2.7 12/28/2021    AGRATIO 1.7 12/28/2021    BCR 30.7 (H) 12/28/2021    ANIONGAP 9.9 12/28/2021      Lab Results   Component Value Date    CHOL 158  06/15/2021    CHLPL 157 03/10/2020    TRIG 101 06/15/2021    HDL 65 (H) 06/15/2021    LDL 75 06/15/2021     Lab Results   Component Value Date    TSH 2.040 06/15/2021     No results found for: HGBA1C         Assessment and Plan:   Today, we have reviewed her care.  The pain issues have been ongoing for some time.  Initially, we will move ahead with x-rays and lab testing as below.  Depending on what those things show, I may consider additional evaluation.  She is also having ongoing difficulty with her stomach.  She says she had a recent EGD.  We will give her a trial of sucralfate for the near term and see if that is helpful.       Diagnoses and all orders for this visit:    1. Chronic left-sided low back pain without sciatica (Primary)  -     XR Spine Lumbar 2 or 3 View; Future  -     Sedimentation Rate; Future  -     Urinalysis With Microscopic - Urine, Clean Catch; Future    2. Neck pain  -     XR Spine Cervical 2 or 3 View; Future  -     Sedimentation Rate; Future    3. Mixed hyperlipidemia  -     Comprehensive Metabolic Panel; Future  -     Lipid Panel; Future  -     TSH; Future  -     CK; Future    4. Essential hypertension  -     Comprehensive Metabolic Panel; Future  -     Urinalysis With Microscopic - Urine, Clean Catch; Future    5. Other long term (current) drug therapy  -     CBC (No Diff); Future    6. GERD without esophagitis  -     sucralfate (CARAFATE) 1 g tablet; Take 1 tablet by mouth 3 (Three) Times a Day.  Dispense: 90 tablet; Refill: 0          Follow Up   Return in about 15 days (around 6/24/2022) for Recheck, as scheduled.  Patient was given instructions and counseling regarding her condition or for health maintenance advice. Please see specific information pulled into the AVS if appropriate.

## 2022-06-13 ENCOUNTER — CLINICAL SUPPORT (OUTPATIENT)
Dept: FAMILY MEDICINE CLINIC | Age: 82
End: 2022-06-13

## 2022-06-13 DIAGNOSIS — M54.50 CHRONIC LEFT-SIDED LOW BACK PAIN WITHOUT SCIATICA: Primary | ICD-10-CM

## 2022-06-13 DIAGNOSIS — G89.29 CHRONIC LEFT-SIDED LOW BACK PAIN WITHOUT SCIATICA: Primary | ICD-10-CM

## 2022-06-13 PROCEDURE — 96372 THER/PROPH/DIAG INJ SC/IM: CPT | Performed by: FAMILY MEDICINE

## 2022-06-13 RX ORDER — TRIAMCINOLONE ACETONIDE 40 MG/ML
40 INJECTION, SUSPENSION INTRA-ARTICULAR; INTRAMUSCULAR ONCE
Status: COMPLETED | OUTPATIENT
Start: 2022-06-13 | End: 2022-06-13

## 2022-06-13 RX ORDER — BUDESONIDE 0.5 MG/2ML
INHALANT ORAL
Qty: 60 EACH | Refills: 11 | Status: SHIPPED | OUTPATIENT
Start: 2022-06-13 | End: 2023-01-12 | Stop reason: SDUPTHER

## 2022-06-13 RX ORDER — ARFORMOTEROL TARTRATE 15 UG/2ML
SOLUTION RESPIRATORY (INHALATION)
Qty: 120 ML | Refills: 11 | Status: SHIPPED | OUTPATIENT
Start: 2022-06-13 | End: 2023-01-12 | Stop reason: SDUPTHER

## 2022-06-13 RX ADMIN — TRIAMCINOLONE ACETONIDE 40 MG: 40 INJECTION, SUSPENSION INTRA-ARTICULAR; INTRAMUSCULAR at 12:25

## 2022-06-14 ENCOUNTER — CLINICAL SUPPORT (OUTPATIENT)
Dept: FAMILY MEDICINE CLINIC | Age: 82
End: 2022-06-14

## 2022-06-14 DIAGNOSIS — J30.9 ALLERGIC RHINITIS, UNSPECIFIED SEASONALITY, UNSPECIFIED TRIGGER: Primary | ICD-10-CM

## 2022-06-14 PROCEDURE — 95115 IMMUNOTHERAPY ONE INJECTION: CPT | Performed by: FAMILY MEDICINE

## 2022-06-16 ENCOUNTER — TELEPHONE (OUTPATIENT)
Dept: FAMILY MEDICINE CLINIC | Age: 82
End: 2022-06-16

## 2022-06-16 NOTE — TELEPHONE ENCOUNTER
Her  has tested positive for COVID-19.  Please reach out to her on Friday and, if she is not symptomatic, arrange COVID-19 PCR test.  If she is having symptoms, then I would recommend rapid test to be followed by PCR test if that is negative.  Let me know if she has other concerns.  Thanks.

## 2022-06-17 ENCOUNTER — CLINICAL SUPPORT (OUTPATIENT)
Dept: FAMILY MEDICINE CLINIC | Age: 82
End: 2022-06-17

## 2022-06-17 DIAGNOSIS — Z20.822 EXPOSURE TO COVID-19 VIRUS: Primary | ICD-10-CM

## 2022-06-17 LAB — SARS-COV-2 RNA PNL SPEC NAA+PROBE: NOT DETECTED

## 2022-06-17 PROCEDURE — U0005 INFEC AGEN DETEC AMPLI PROBE: HCPCS | Performed by: FAMILY MEDICINE

## 2022-06-17 PROCEDURE — 99211 OFF/OP EST MAY X REQ PHY/QHP: CPT | Performed by: FAMILY MEDICINE

## 2022-06-17 PROCEDURE — U0004 COV-19 TEST NON-CDC HGH THRU: HCPCS | Performed by: FAMILY MEDICINE

## 2022-06-20 ENCOUNTER — CLINICAL SUPPORT (OUTPATIENT)
Dept: FAMILY MEDICINE CLINIC | Age: 82
End: 2022-06-20

## 2022-06-20 DIAGNOSIS — J30.9 ALLERGIC RHINITIS, UNSPECIFIED SEASONALITY, UNSPECIFIED TRIGGER: Primary | ICD-10-CM

## 2022-06-20 PROCEDURE — 95115 IMMUNOTHERAPY ONE INJECTION: CPT | Performed by: FAMILY MEDICINE

## 2022-06-24 ENCOUNTER — OFFICE VISIT (OUTPATIENT)
Dept: FAMILY MEDICINE CLINIC | Age: 82
End: 2022-06-24

## 2022-06-24 VITALS
WEIGHT: 113.6 LBS | HEIGHT: 62 IN | BODY MASS INDEX: 20.91 KG/M2 | HEART RATE: 76 BPM | DIASTOLIC BLOOD PRESSURE: 74 MMHG | SYSTOLIC BLOOD PRESSURE: 119 MMHG | TEMPERATURE: 98.4 F

## 2022-06-24 DIAGNOSIS — F41.1 GENERALIZED ANXIETY DISORDER: ICD-10-CM

## 2022-06-24 DIAGNOSIS — Z12.11 SCREEN FOR COLON CANCER: ICD-10-CM

## 2022-06-24 DIAGNOSIS — Z00.00 PHYSICAL EXAM: Primary | ICD-10-CM

## 2022-06-24 DIAGNOSIS — I10 ESSENTIAL HYPERTENSION: ICD-10-CM

## 2022-06-24 DIAGNOSIS — K21.9 GERD WITHOUT ESOPHAGITIS: ICD-10-CM

## 2022-06-24 DIAGNOSIS — E78.2 MIXED HYPERLIPIDEMIA: ICD-10-CM

## 2022-06-24 DIAGNOSIS — N95.9 MENOPAUSAL AND POSTMENOPAUSAL DISORDER: ICD-10-CM

## 2022-06-24 PROCEDURE — G0439 PPPS, SUBSEQ VISIT: HCPCS | Performed by: FAMILY MEDICINE

## 2022-06-24 PROCEDURE — 1170F FXNL STATUS ASSESSED: CPT | Performed by: FAMILY MEDICINE

## 2022-06-24 PROCEDURE — 1125F AMNT PAIN NOTED PAIN PRSNT: CPT | Performed by: FAMILY MEDICINE

## 2022-06-24 PROCEDURE — 1159F MED LIST DOCD IN RCRD: CPT | Performed by: FAMILY MEDICINE

## 2022-06-24 RX ORDER — ESTRADIOL 1 MG/1
1 TABLET ORAL DAILY
Qty: 90 TABLET | Refills: 1 | Status: SHIPPED | OUTPATIENT
Start: 2022-06-24 | End: 2022-12-20 | Stop reason: SDUPTHER

## 2022-06-24 RX ORDER — LOSARTAN POTASSIUM 50 MG/1
50 TABLET ORAL DAILY
Qty: 90 TABLET | Refills: 1 | Status: SHIPPED | OUTPATIENT
Start: 2022-06-24 | End: 2022-12-20 | Stop reason: SDUPTHER

## 2022-06-24 RX ORDER — CLONAZEPAM 0.5 MG/1
0.5 TABLET ORAL 2 TIMES DAILY PRN
Qty: 90 TABLET | Refills: 1 | Status: SHIPPED | OUTPATIENT
Start: 2022-06-24 | End: 2022-12-20 | Stop reason: SDUPTHER

## 2022-06-24 RX ORDER — PRAVASTATIN SODIUM 20 MG
20 TABLET ORAL DAILY
Qty: 90 TABLET | Refills: 1 | Status: SHIPPED | OUTPATIENT
Start: 2022-06-24 | End: 2022-12-20 | Stop reason: SDUPTHER

## 2022-06-24 RX ORDER — AMLODIPINE BESYLATE 2.5 MG/1
2.5 TABLET ORAL NIGHTLY
Qty: 90 TABLET | Refills: 1 | Status: SHIPPED | OUTPATIENT
Start: 2022-06-24 | End: 2022-12-20 | Stop reason: SDUPTHER

## 2022-06-24 RX ORDER — PANTOPRAZOLE SODIUM 40 MG/1
40 TABLET, DELAYED RELEASE ORAL DAILY
Qty: 90 TABLET | Refills: 1 | Status: SHIPPED | OUTPATIENT
Start: 2022-06-24 | End: 2022-12-20 | Stop reason: SDUPTHER

## 2022-06-27 ENCOUNTER — CLINICAL SUPPORT (OUTPATIENT)
Dept: FAMILY MEDICINE CLINIC | Age: 82
End: 2022-06-27

## 2022-06-27 DIAGNOSIS — J30.9 ALLERGIC RHINITIS, UNSPECIFIED SEASONALITY, UNSPECIFIED TRIGGER: Primary | ICD-10-CM

## 2022-06-27 PROCEDURE — 95115 IMMUNOTHERAPY ONE INJECTION: CPT | Performed by: FAMILY MEDICINE

## 2022-06-27 NOTE — PROGRESS NOTES
I have reviewed the notes, assessments, and/or procedures performed by Matilda Huffman LPN, and I concur with her documentation of Luma Cruz.

## 2022-07-01 ENCOUNTER — TELEPHONE (OUTPATIENT)
Dept: FAMILY MEDICINE CLINIC | Age: 82
End: 2022-07-01

## 2022-07-01 ENCOUNTER — TELEMEDICINE (OUTPATIENT)
Dept: FAMILY MEDICINE CLINIC | Age: 82
End: 2022-07-01

## 2022-07-01 DIAGNOSIS — U07.1 COVID-19: Primary | ICD-10-CM

## 2022-07-01 DIAGNOSIS — R11.0 NAUSEA: ICD-10-CM

## 2022-07-01 PROCEDURE — 99213 OFFICE O/P EST LOW 20 MIN: CPT | Performed by: FAMILY MEDICINE

## 2022-07-01 RX ORDER — ONDANSETRON 4 MG/1
4 TABLET, ORALLY DISINTEGRATING ORAL EVERY 6 HOURS PRN
Qty: 30 TABLET | Refills: 0 | Status: SHIPPED | OUTPATIENT
Start: 2022-07-01 | End: 2023-01-30 | Stop reason: SDUPTHER

## 2022-07-01 NOTE — TELEPHONE ENCOUNTER
Caller: Luma Cruz    Relationship: Self    Best call back number: [phone]    What medication are you requesting: REQUESTING SOMETHING FOR COVID     What are your current symptoms: HEADACHE, COUGHING. SORE THROAT, UPSET STOMACH     How long have you been experiencing symptoms: 24 HOURS     Have you had these symptoms before:    [] Yes  [x] No    Have you been treated for these symptoms before:   [] Yes  [x] No    If a prescription is needed, what is your preferred pharmacy and phone number:      [facility] - [place], [place] - [address] [place] [address]. - [phone] [facility] [phone]   [phone]

## 2022-07-01 NOTE — PROGRESS NOTES
Luma Cruz presents to [facility].    Chief Complaint:  COVID-19    TELEHEALTH VISIT:   - Luma Grey consented to this telehealth visit.   - Persons on the call include:  patient - Luma Grey, Dr. Walker   - The patient is at home.  I am at the office.   - This visit is being conducted over Zoom with audio and video capability.   - This visit is being done remotely due to the ongoing COVID-19 pandemic.    Subjective   {Problem List  Visit Diagnosis   Encounters  Notes  Medications  Labs  Result Review Imaging  Media :23}     History of Present Illness:  Luma Grey is being seen today for follow up on COVID-19.  She states that her symptoms started last night.  She has had some scratchy throat, sinus pressure, significant headache, and cough.  She also does not feel right in her stomach.  She has had some associated nausea.  She denies vomiting with this.  She initially tested negative with rapid testing last night, but she tested positive earlier today.      Review of Systems:  Review of Systems   Constitutional: Positive for chills and fever.   HENT: Positive for congestion and sore throat.    Respiratory: Positive for cough. Negative for shortness of breath.    Cardiovascular: Negative for chest pain and palpitations.   Gastrointestinal: Positive for nausea. Negative for abdominal pain and vomiting.        Objective   Medical History:  Past Medical History:   • Essential hypertension   • Generalized anxiety disorder   • GERD without esophagitis   • Menopausal and postmenopausal disorder   • Mixed hyperlipidemia     Past Surgical History:   • COLONOSCOPY   • HYSTERECTOMY    complete   • TONSILLECTOMY AND ADENOIDECTOMY      Family History   Problem Relation Age of Onset   • Stroke Mother    • Hypertension Mother    • Coronary artery disease Father      Social History     Tobacco Use   • Smoking status: Never Smoker   • Smokeless tobacco: Never Used   Substance Use Topics   •  Alcohol use: Not Currently       Health Maintenance Due   Topic Date Due   • TDAP/TD VACCINES (1 - Tdap) Never done        Immunization History   Administered Date(s) Administered   • COVID-19 (MODERNA) 1st, 2nd, 3rd Dose Only 01/27/2021, 02/24/2021, 10/30/2021, 04/13/2022   • Fluzone High Dose =>65 Years ([facility] ONLY) 10/16/2012, 09/25/2013, 09/28/2017, 09/24/2020   • Fluzone High-Dose 65+yrs 10/02/2021   • Hepatitis A 12/06/2018       Allergies   Allergen Reactions   • Fosamax [Alendronate] GI Intolerance   • Penicillins Unknown - Low Severity        Medications:  Current Outpatient Medications on File Prior to Visit   Medication Sig   • albuterol (2.5 MG/3ML) 0.083% nebulizer solution 3 mL, albuterol (5 MG/ML) 0.5% nebulizer solution 0.5 mL Inhale Every 4 (Four) Hours.   • amLODIPine (NORVASC) 2.5 MG tablet Take 1 tablet by mouth Every Night.   • azelastine (ASTELIN) 0.1 % nasal spray 1 spray into the nostril(s) as directed by provider 2 (Two) Times a Day.   • Brovana 15 MCG/2ML nebulizer solution USE 1 VIAL IN NEBULIZER EVERY 12 HOURS - Morning And Evening   • budesonide (PULMICORT) 0.5 MG/2ML nebulizer solution USE 1 VIAL  IN  NEBULIZER TWICE  DAILY - Rinse Mouth After Treatment   • clonazePAM (KlonoPIN) 0.5 MG tablet Take 1 tablet by mouth 2 (Two) Times a Day As Needed for Anxiety.   • estradiol (ESTRACE) 1 MG tablet Take 1 tablet by mouth Daily.   • fluticasone (FLONASE) 50 MCG/ACT nasal spray 2 sprays into the nostril(s) as directed by provider Daily.   • levocetirizine (XYZAL) 5 MG tablet Take 1 tablet by mouth Every Evening.   • losartan (COZAAR) 50 MG tablet Take 1 tablet by mouth Daily.   • multivitamin with minerals tablet tablet Take 1 tablet by mouth Daily.   • Nebusal 3 % nebulizer solution inhale 1 vial by nebulizer TWICE DAILY   • pantoprazole (PROTONIX) 40 MG EC tablet Take 1 tablet by mouth Daily.   • pravastatin (PRAVACHOL) 20 MG tablet Take 1 tablet by mouth Daily.     No current  facility-administered medications on file prior to visit.       Vital Signs:   There were no vitals taken for this visit.      Physical Exam:  Physical Exam  Vitals reviewed.   Constitutional:       General: She is not in acute distress.     Appearance: She is ill-appearing (tired appearing).   Eyes:      Pupils: Pupils are equal, round, and reactive to light.   Neck:      Comments: No thyromegaly  Pulmonary:      Effort: Pulmonary effort is normal.   Lymphadenopathy:      Cervical: No cervical adenopathy (no visible).   Skin:     Findings: No lesion or rash.   Neurological:      General: No focal deficit present.      Mental Status: She is alert. Mental status is at baseline.         Result Review      The following data was reviewed by Joseph Walker MD on 07/01/2022.  Lab Results   Component Value Date    WBC 5.89 06/09/2022    HGB 12.5 06/09/2022    HCT 40.2 06/09/2022    MCV 91.2 06/09/2022     06/09/2022     Lab Results   Component Value Date    GLUCOSE 88 06/09/2022    BUN 33 (H) 06/09/2022    CREATININE 0.91 06/09/2022     06/09/2022    K 4.3 06/09/2022     06/09/2022    CO2 26.0 06/09/2022    CALCIUM 9.7 06/09/2022    PROTEINTOT 7.0 06/09/2022    ALBUMIN 4.60 06/09/2022    ALT 12 06/09/2022    AST 23 06/09/2022    ALKPHOS 66 06/09/2022    BILITOT 0.2 06/09/2022    EGFRIFNONA 62 12/28/2021    GLOB 2.4 06/09/2022    AGRATIO 1.9 06/09/2022    BCR 36.3 (H) 06/09/2022    ANIONGAP 10.0 06/09/2022      Lab Results   Component Value Date    CHOL 168 06/09/2022    CHLPL 157 03/10/2020    TRIG 89 06/09/2022    HDL 76 (H) 06/09/2022    LDL 76 06/09/2022     Lab Results   Component Value Date    TSH 1.370 06/09/2022     No results found for: HGBA1C         Assessment and Plan:   Today, we have reviewed her care.  She is early in her course of COVID-19, and I do think it is reasonable to treat her with Paxlovid given her symptoms and age.  We have sent that to the pharmacy here for her or her  family to .  We also reviewed her medications in detail and have made appropriate recommendations regarding them.  I have recommended that she go ahead and isolate for 5 full days.  She could release from isolation on Wednesday morning as long as she is seeing improvement in how she feels and she does not have fever on Tuesday.  If she does release from isolation on Wednesday, then she should wear a mask when around others for at least 5 additional days.  We also discussed red flag symptoms for which she might need to consider seeking medical attention including shortness of breath, chest pain, high fever that does not respond to treatment, or significant weakness.  Hopefully, she will have a relatively mild course though.  We will see how things progress.       Diagnoses and all orders for this visit:    1. COVID-19 (Primary)  -     Nirmatrelvir & Ritonavir (PAXLOVID) 20 x 150 MG & 10 x 100MG tablet therapy pack tablet; Take 3 tablets by mouth 2 (Two) Times a Day for 5 days.  Dispense: 30 tablet; Refill: 0    2. Nausea  -     ondansetron ODT (ZOFRAN-ODT) 4 MG disintegrating tablet; Place 1 tablet on the tongue Every 6 (Six) Hours As Needed for Nausea or Vomiting.  Dispense: 30 tablet; Refill: 0          Follow Up   No follow-ups on file.  Patient was given instructions and counseling regarding her condition or for health maintenance advice. Please see specific information pulled into the AVS if appropriate.

## 2022-07-12 ENCOUNTER — TREATMENT (OUTPATIENT)
Dept: PHYSICAL THERAPY | Facility: CLINIC | Age: 82
End: 2022-07-12

## 2022-07-12 ENCOUNTER — CLINICAL SUPPORT (OUTPATIENT)
Dept: FAMILY MEDICINE CLINIC | Age: 82
End: 2022-07-12

## 2022-07-12 DIAGNOSIS — J30.9 ALLERGIC RHINITIS, UNSPECIFIED SEASONALITY, UNSPECIFIED TRIGGER: Primary | ICD-10-CM

## 2022-07-12 DIAGNOSIS — M54.9 MID BACK PAIN: ICD-10-CM

## 2022-07-12 DIAGNOSIS — M54.2 PAIN, NECK: ICD-10-CM

## 2022-07-12 DIAGNOSIS — M54.50 CHRONIC LEFT-SIDED LOW BACK PAIN WITHOUT SCIATICA: Primary | ICD-10-CM

## 2022-07-12 DIAGNOSIS — M62.81 WEAKNESS OF TRUNK MUSCULATURE: ICD-10-CM

## 2022-07-12 DIAGNOSIS — G89.29 CHRONIC LEFT-SIDED LOW BACK PAIN WITHOUT SCIATICA: Primary | ICD-10-CM

## 2022-07-12 PROCEDURE — 95115 IMMUNOTHERAPY ONE INJECTION: CPT | Performed by: FAMILY MEDICINE

## 2022-07-12 PROCEDURE — 97161 PT EVAL LOW COMPLEX 20 MIN: CPT | Performed by: PHYSICAL THERAPIST

## 2022-07-12 PROCEDURE — 97110 THERAPEUTIC EXERCISES: CPT | Performed by: PHYSICAL THERAPIST

## 2022-07-12 NOTE — PROGRESS NOTES
Physical Therapy Initial Evaluation and Plan of Care      Patient: Luma Cruz   : 1940  Diagnosis/ICD-10 Code:  Chronic left-sided low back pain without sciatica [M54.50, G89.29]  Referring practitioner: Joseph Walker, *  Date of Initial Visit: 2022  Today's Date: 2022  Patient seen for 1 sessions         Visit Diagnoses:    ICD-10-CM ICD-9-CM   1. Chronic left-sided low back pain without sciatica  M54.50 724.2    G89.29 338.29   2. Pain, neck  M54.2 723.1   3. Mid back pain  M54.9 724.5   4. Weakness of trunk musculature  M62.81 728.87         Subjective Evaluation    History of Present Illness  Mechanism of injury: Luma Grey states that she has been having pain under shoulder blades and mid/lower back if she stands up for too long. She has to sit and take Tylenol to get relief.  A few months ago, her lower back on the left side would start hurting, especially when she got out of bed.   It is intermittent now.  She hasn't had that pain in the last few weeks, but she has had pain across the lower back and continues to have pain in the shoulder blades.  More pain on the L side of the lumbar, but does have it on both sides.     For a while, when she turned her neck, it was hurting, but that seems to have improved.  When she turns the neck, she can feel the pain down the middle of the back.     No numbness and tingling.      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     Pain  Current pain ratin  At worst pain ratin  Quality: dull ache  Relieving factors: medications, rest and ice  Aggravating factors: standing, repetitive movement, prolonged positioning and ambulation    Social Support  Lives in: one-story house  Lives with: spouse             Objective          Active Range of Motion     Additional Active Range of Motion Details  LUMBAR ROM  Flexion: to knees, pulling lumbar pain  Extension:  to neutral, lumbar center and L pain  R lateral flexion:  to mid lateral thigh, L lumbar pain  L lateral flexion:  to mid lateral thigh, center band lumbar pain  R rotation:  to 30 degrees, pulling on the L QL, lumbar pain  L rotation:  to 35 degrees, pain on the L lumbar      POSTURE:  Forwards shoulders and neck  Flexed trunk and lateral trunk lean to the L in standing    TENDERNESS: L QL, B paraspinals, rhomboids, lower/mid trap      XRAY LUMBAR    INDICATIONS:  LEFT SIDE LOW BACK PAIN X 6 MONTHS     FINDINGS:          9 mm of anterolisthesis of L4 on L5.  Moderate disc space height loss at L4-5 with osteophytosis.    There is moderate facet arthropathy at L4-5 and L5-S1.  There is multilevel disc space height loss   and osteophytosis throughout the mid and upper lumbar spine.     IMPRESSION:                 1. 9 mm of anterolisthesis of L4 on L5  2. Multilevel degenerative disc and facet disease as described above.      Strength/Myotome Testing     Left Hip   Planes of Motion   Flexion: 3+  Abduction: 4-  Adduction: 4-    Right Hip   Planes of Motion   Flexion: 4-  Abduction: 4-  Adduction: 4-    Tests     Lumbar     Left   Positive crossed SLR.      General Comments     Cervical/Thoracic Comments  Cervical rotation 50 degrees B, pain down the center of the thoracic and cervical region             Assessment & Plan     Assessment  Impairments: abnormal or restricted ROM, activity intolerance, impaired physical strength, lacks appropriate home exercise program and pain with function  Functional Limitations: lifting, sleeping, uncomfortable because of pain, sitting, standing, stooping and unable to perform repetitive tasks  Assessment details: Pt presents with limitations, noted below, that impede her ability to lift heavy items, walk long distances, stand or sit periods of time, sleeping, getting out of bed. The skills of a therapist will be required to safely and effectively implement the following treatment  plan to restore maximal level of function.    Luma Grey is having pain in both cervical and lumbar areas, but the mid to lower back seems to be most limited and causing her the most pain.     9mm anterolisthesis      Goals  Plan Goals: LOW BACK PROBLEMS:    1. The patient complains of low back pain.  LTG 1: 12 weeks:  The patient will report a pain rating of 1/10 or better in order to improve tolerance to activities of daily living and improve sleep quality.  STATUS:  New  STG 1a: 4 weeks:  The patient will report a pain rating of 2/10 or better.  STATUS:  New  TREATMENT:  Therapeutic exercises, manual therapy, aquatic therapy, home exercise instruction, and modalities as needed for pain to include:  electrical stimulation, moist heat, ice, ultrasound, and diathermy.      2. The patient demonstrates weakness of the bilateral hip.  LTG 2: 12 weeks:  The patient will demonstrate 4+ /5 strength for bilateral hip flexion, abduction, and extension in order to improve hip stability.  STATUS:  New  STG 2a: 4 weeks:  The patient will demonstrate 4 /5 strength for bilateral hip flexion, abduction, and extension.  STATUS:  New  STG2b:  4 weeks:  The patient will be independent with home exercises.     STATUS:  New  TREATMENT: Therapeutic exercises, manual therapy, aquatic therapy, home exercise instruction, and modalities as needed for pain to include:  electrical stimulation, moist heat, ice, ultrasound, and diathermy.    3. Mobility: Walking/Moving Around Functional Limitation    LTG 3: 12 weeks:  The patient will demonstrate 6 % limitation by achieving a score of 3/50 on the ANTONIETA.  STATUS:  New  STG 3 a: 4 weeks:  The patient will demonstrate 10 % limitation by achieving a score of 5/50 on the ANTONIETA.    STATUS:  New  TREATMENT:  Manual therapy, therapeutic exercise, home exercise instruction, and modalities as needed.      CERVICAL PROBLEMS    1. The patient has limited ROM.   LTG 1: 12 weeks:  The patient will demonstrate  70° of bilateral cervical spine rotation in order to adequately monitor blind spots while driving and improve ability to perform ADLs.    STATUS:  New  STG 1a: 4 weeks:  The patient will demonstrate 60° of bilateral cervical spine rotation, in order to adequately monitor blind spots while driving and improve ability to perform ADLs.      STATUS:  New   TREATMENT:  Manual therapy, therapeutic exercise, home exercise instruction, cervical traction, and modalities as needed to include: moist heat, electrical stimulation, and ultrasound.     2. The patient has complaints of pain.   LTG 2: 12 weeks:  The patient will report 0/10 pain in order to more easily tolerate activities of daily living and improve sleep quality.    STATUS:  New   STG 2a: 4 weeks:  The patient will report 1/10 pain.    STATUS:  New  STG2b:  4 weeks:  The patient will be independent with home exercises.     STATUS:  New   TREATMENT: Manual therapy, therapeutic exercise, home exercise instruction, cervical traction, and modalities as needed to include: moist heat, electrical stimulation, and ultrasound.      Plan  Therapy options: will be seen for skilled therapy services  Planned modality interventions: cryotherapy, thermotherapy (hydrocollator packs), TENS, dry needling, traction and ultrasound  Planned therapy interventions: abdominal trunk stabilization, manual therapy, flexibility, functional ROM exercises, gait training, home exercise program, therapeutic activities, stretching, strengthening, spinal/joint mobilization, soft tissue mobilization, postural training, neuromuscular re-education, ADL retraining, balance/weight-bearing training, body mechanics training and joint mobilization  Frequency: 2x week  Duration in weeks: 12  Treatment plan discussed with: patient  Plan details: Review HEP, update as needed.    Progress with lower back/core strength, trunk control/postural awareness, increased stamina, decreased tightness, improved  ROM/flexibility, education as needed.      Cervical ROM, stretching, postural control and strengthening, improve ROM as needed.            History # of Personal Factors and/or Comorbidities: MODERATE (1-2)  Examination of Body System(s): # of elements: MODERATE (3)  Clinical Presentation: STABLE   Clinical Decision Making: LOW     Timed:  Manual Therapy:         mins  99113;  Therapeutic Exercise:    10     mins  87997;     Neuromuscular Julee:        mins  24198;    Therapeutic Activity:          mins  38973;     Gait Training:           mins  02200;     Ultrasound:          mins  93235;    Canalith Repos           ___  mins  84087      Untimed:  Electrical Stimulation:         mins  34629 ( );  Mechanical Traction:         mins  29135;   Dry Needling:                     mins self pay  Low Eval:                      33     mins  36695;  Medium Eval:                     mins  88021;   High Eval:                          mins  77912       Timed Treatment:   10   mins   Total Treatment:     43   mins    PT SIGNATURE: Luma Valdez PT, DPT  [place] License: [id number]  Electronically signed by Luma Valdez PT, 07/12/22, 11:24 AM EDT      Initial Certification    Certification Period: 7/12/2022 thru 10/9/2022  I certify that the therapy services are furnished while this patient is under my care.  The services outlined above are required by this patient, and will be reviewed every 90 days.     PHYSICIAN: Joseph Walker MD  NPI: [id number]            PHYSICIAN PRINT NAME: ______________________________________________      PHYSICIAN SIGNATURE: ______________________________________________         DATE:________________________________        Please sign and return via fax to [phone].  Thank you, [facility].

## 2022-07-12 NOTE — PROGRESS NOTES
I have reviewed the notes, assessments, and/or procedures performed by Bernice Serrano MA, and I concur with her documentation of Luma Cruz.

## 2022-07-15 ENCOUNTER — TREATMENT (OUTPATIENT)
Dept: PHYSICAL THERAPY | Facility: CLINIC | Age: 82
End: 2022-07-15

## 2022-07-15 DIAGNOSIS — G89.29 CHRONIC LEFT-SIDED LOW BACK PAIN WITHOUT SCIATICA: Primary | ICD-10-CM

## 2022-07-15 DIAGNOSIS — M54.50 CHRONIC LEFT-SIDED LOW BACK PAIN WITHOUT SCIATICA: Primary | ICD-10-CM

## 2022-07-15 DIAGNOSIS — M62.81 WEAKNESS OF TRUNK MUSCULATURE: ICD-10-CM

## 2022-07-15 DIAGNOSIS — M54.2 PAIN, NECK: ICD-10-CM

## 2022-07-15 DIAGNOSIS — M54.9 MID BACK PAIN: ICD-10-CM

## 2022-07-15 PROCEDURE — 97140 MANUAL THERAPY 1/> REGIONS: CPT | Performed by: PHYSICAL THERAPIST

## 2022-07-15 PROCEDURE — 97110 THERAPEUTIC EXERCISES: CPT | Performed by: PHYSICAL THERAPIST

## 2022-07-15 NOTE — PROGRESS NOTES
Physical Therapy Daily Treatment Note      Patient: Luma Cruz   : 1940  Referring practitioner: Joseph Walker, *  Date of Initial Visit: Type: THERAPY  Noted: 2022  Today's Date: 7/15/2022  Patient seen for 2 sessions           Visit Diagnoses:    ICD-10-CM ICD-9-CM   1. Chronic left-sided low back pain without sciatica  M54.50 724.2    G89.29 338.29   2. Pain, neck  M54.2 723.1   3. Mid back pain  M54.9 724.5   4. Weakness of trunk musculature  M62.81 728.87       Subjective Evaluation    History of Present Illness    Subjective comment: Mrs. Cruz is fatigued today.  She thinks since COVID, she hasn't had a lot of energy.  Back pain is ok today.          Objective           General Comments     Lumbar Comments  Guarded, slow motion     See Exercise, Manual, and Modality Logs for complete treatment.       Assessment & Plan     Assessment    Assessment details: Progressed today with some gentle manual work along the spinal region.  Decreased tightness in the back and around the lower part of the shoulder blades.  She was able to tolerate the exercises and strengthening, lumbar flexibility.     Goals  Plan Goals:   Plan Goals: LOW BACK PROBLEMS:    1. The patient complains of low back pain.  LTG 1: 12 weeks:  The patient will report a pain rating of 1/10 or better in order to improve tolerance to activities of daily living and improve sleep quality.  STATUS:  Progressing  STG 1a: 4 weeks:  The patient will report a pain rating of 2/10 or better.  STATUS:  Progressing  TREATMENT:  Therapeutic exercises, manual therapy, aquatic therapy, home exercise instruction, and modalities as needed for pain to include:  electrical stimulation, moist heat, ice, ultrasound, and diathermy.      2. The patient demonstrates weakness of the bilateral hip.  LTG 2: 12 weeks:  The patient will demonstrate 4+ /5 strength for bilateral hip flexion, abduction, and extension in order to improve hip stability.  STATUS:   Progressing  STG 2a: 4 weeks:  The patient will demonstrate 4 /5 strength for bilateral hip flexion, abduction, and extension.  STATUS:  Progressing  STG2b:  4 weeks:  The patient will be independent with home exercises.     STATUS: Progressing  TREATMENT: Therapeutic exercises, manual therapy, aquatic therapy, home exercise instruction, and modalities as needed for pain to include:  electrical stimulation, moist heat, ice, ultrasound, and diathermy.    3. Mobility: Walking/Moving Around Functional Limitation    LTG 3: 12 weeks:  The patient will demonstrate 6 % limitation by achieving a score of 3/50 on the ANTONIETA.  STATUS:  Progressing  STG 3 a: 4 weeks:  The patient will demonstrate 10 % limitation by achieving a score of 5/50 on the ANTONIETA.    STATUS:  Progressing  TREATMENT:  Manual therapy, therapeutic exercise, home exercise instruction, and modalities as needed.      CERVICAL PROBLEMS    1. The patient has limited ROM.   LTG 1: 12 weeks:  The patient will demonstrate 70° of bilateral cervical spine rotation in order to adequately monitor blind spots while driving and improve ability to perform ADLs.    STATUS:  Progressing  STG 1a: 4 weeks:  The patient will demonstrate 60° of bilateral cervical spine rotation, in order to adequately monitor blind spots while driving and improve ability to perform ADLs.      STATUS:  Progressing   TREATMENT:  Manual therapy, therapeutic exercise, home exercise instruction, cervical traction, and modalities as needed to include: moist heat, electrical stimulation, and ultrasound.     2. The patient has complaints of pain.   LTG 2: 12 weeks:  The patient will report 0/10 pain in order to more easily tolerate activities of daily living and improve sleep quality.    STATUS: Progressing   STG 2a: 4 weeks:  The patient will report 1/10 pain.    STATUS: Progressing  STG2b:  4 weeks:  The patient will be independent with home exercises.     STATUS:  Progressing   TREATMENT: Manual  therapy, therapeutic exercise, home exercise instruction, cervical traction, and modalities as needed to include: moist heat, electrical stimulation, and ultrasound.      Plan  Plan details: Review exercises, progress with HEP if tolerated, and continue to improve lumbar flexiblity and strength.           Progress per Plan of Care and Progress strengthening /stabilization /functional activity           Timed:  Manual Therapy:    10     mins  15462;  Therapeutic Exercise:    18     mins  04582;     Neuromuscular Julee:        mins  66413;    Therapeutic Activity:          mins  44195;     Gait Training:           mins  50224;     Ultrasound:          mins  89793;    Canalith Repos           ___  mins  38586      Untimed:  Electrical Stimulation:         mins  75243 ( );  Mechanical Traction:         mins  53185;   Dry Needling:                     mins self pay       Timed Treatment:   28   mins   Total Treatment:     32   mins      Luma Valdez PT, DPT  [place] License #: [id number]

## 2022-07-17 NOTE — PATIENT INSTRUCTIONS
Bronchiectasis    Bronchiectasis is a condition in which the airways in the lungs (bronchi) are damaged and widened. The condition makes it hard for the lungs to get rid of mucus, and it causes mucus to gather in the bronchi. This condition often leads to lung infections, which can make the condition worse.  What are the causes?  You can be born with this condition or you can develop it later in life. Common causes of this condition include:  Cystic fibrosis.  Repeated lung infections, such as pneumonia or tuberculosis.  An object or other blockage in the lungs.  Breathing in fluid, food, or other objects (aspiration).  A problem with the immune system and lung structure that is present at birth (congenital).  Sometimes the cause is not known.  What are the signs or symptoms?  Common symptoms of this condition include:  A daily cough that brings up mucus and lasts for more than 3 weeks.  Lung infections that happen often.  Shortness of breath and wheezing.  Weakness and fatigue.  How is this diagnosed?  This condition is diagnosed with tests, such as:  Chest X-rays or CT scans. These are done to check for changes in the lungs.  Breathing tests. These are done to check how well your lungs are working.  A test of a sample of your saliva (sputum culture). This test is done to check for infection.  Blood tests and other tests. These are done to check for related diseases or causes.  How is this treated?  Treatment for this condition depends on the severity of the illness and its cause. Treatment may include:  Medicines that loosen mucus so it can be coughed up (mucolytics).  Medicines that relax the muscles of the bronchi (bronchodilators).  Antibiotic medicines to prevent or treat infection.  Physical therapy to help clear mucus from the lungs. Techniques may include:  Postural drainage. This is when you sit or lie in certain positions so that mucus can drain by gravity.  Chest percussion. This involves tapping the  chest or back with a cupped hand.  Chest vibration. For this therapy, a hand or special equipment vibrates your chest and back.  Surgery to remove the affected part of the lung. This may be done in severe cases.  Follow these instructions at home:  Medicines  Take over-the-counter and prescription medicines only as told by your health care provider.  If you were prescribed an antibiotic medicine, take it as told by your health care provider. Do not stop taking the antibiotic even if you start to feel better.  Avoid taking sedatives and antihistamines unless your health care provider tells you to take them. These medicines tend to thicken the mucus in the lungs.  Managing symptoms  Perform breathing exercises or techniques to clear your lungs as told by your health care provider.  Consider using a cold steam vaporizer or humidifier in your room or home to help loosen secretions.  If you have a cough that gets worse at night, try sleeping in a semi-upright position.  General instructions  Get plenty of rest.  Drink enough fluid to keep your urine clear or pale yellow.  Stay inside when pollution and ozone levels are high.  Stay up to date with vaccinations and immunizations.  Avoid cigarette smoke and other lung irritants.  Do not use any products that contain nicotine or tobacco, such as cigarettes and e-cigarettes. If you need help quitting, ask your health care provider.  Keep all follow-up visits as told by your health care provider. This is important.  Contact a health care provider if:  You cough up more sputum than before and the sputum is yellow or green in color.  You have a fever.  You cannot control your cough and are losing sleep.  Get help right away if:  You cough up blood.  You have chest pain.  You have increasing shortness of breath.  You have pain that gets worse or is not controlled with medicines.  You have a fever and your symptoms suddenly get worse.  Summary  Bronchiectasis is a condition in  which the airways in the lungs (bronchi) are damaged and widened. The condition makes it hard for the lungs to get rid of mucus, and it causes mucus to gather in the bronchi.  Treatment usually includes therapy to help clear mucus from the lungs.  Avoid cigarette smoke and other lung irritants.  Stay up to date with vaccinations and immunizations.  This information is not intended to replace advice given to you by your health care provider. Make sure you discuss any questions you have with your health care provider.  Document Revised: 08/19/2021 Document Reviewed: 08/19/2021  [employer]vier Patient Education © 2021 [employer]

## 2022-07-17 NOTE — PROGRESS NOTES
Primary Care Provider  Joseph Walker MD     Referring Provider  No ref. provider found     Chief Complaint  bronchiectasis (Follow up)    Subjective          History of Presenting Illness  Patient is a 81-year-old  female, patient Dr. Forbes's who presents for management of bronchiectasis who presents for follow-up visit today.   Patient states that since last visit her breathing is at baseline.  Patient states that she does get short of breath that is worse with heavy exertion and climbing stairs, mild to moderate in severity, and improved with rest.  Patient states that she is taking Pulmicort and Brovana every day as prescribed and uses albuterol inhaler as needed.  Patient states that she also has a flutter valve and sodium chloride nebulizer treatments to use to assist with airway clearance.  Patient states that since last office visit she was diagnosed with COVID on 7/1/2022 and her primary care provider did prescribe her Paxlovid.  Patient states that she is feeling much better her cough has improved.  Patient states that she still does have some fatigue since having COVID.  Patient states that she is taking Xyzal, Flonase nasal spray, and Astelin nasal spray for allergies.  Patient is taking Protonix for reflux. Patient denies fever, chills, night sweats, swollen glands in the head and neck, unintentional weight loss, hemoptysis, purulent sputum production, dysphagia, chest pain, palpitations, chest tightness, abdominal pain, nausea, vomiting, and diarrhea.  Patient also denies any myalgias, changes in sense of taste and/or smell, sore throat, any other coronavirus or flu-like symptoms.  Patient denies any leg swelling, orthopnea, paroxysmal nocturnal dyspnea.  Patient is able to perform activities of daily living.        Review of Systems   Constitutional: Positive for fatigue. Negative for activity change, appetite change, chills, diaphoresis, fever, unexpected weight gain and  unexpected weight loss.        Negative for Insomnia   HENT: Negative for congestion (Nasal), mouth sores, nosebleeds, postnasal drip, sore throat, swollen glands and trouble swallowing.         Negative for Thrush  Negative for Hoarseness  Negative for Allergies/Hay Fever  Negative for Recent Head injury  Negative for Ear Fullness  Negative for Nasal or Sinus pain  Negative for Dry lips  Negative for Nasal discharge   Respiratory: Positive for cough (dry, improved since having Covid per pt report) and shortness of breath (With heavy exertion). Negative for apnea, chest tightness and wheezing.         Negative for Hemoptysis  Negative for Pleuritic pain   Cardiovascular: Negative for chest pain, palpitations and leg swelling.        Negative for Claudication  Negative for Cyanosis  Negative for Dyspnea on exertion   Gastrointestinal: Negative for abdominal pain, diarrhea, nausea, vomiting and GERD.   Musculoskeletal: Negative for joint swelling and myalgias.        Negative for Joint pain  Negative for Joint stiffness   Skin: Negative for color change, dry skin, pallor and rash.   Neurological: Negative for syncope, weakness and headache.   Hematological: Negative for adenopathy. Does not bruise/bleed easily.        Family History   Problem Relation Age of Onset   • Stroke Mother    • Hypertension Mother    • Coronary artery disease Father         Social History     Socioeconomic History   • Marital status:    • Number of children: 2   Tobacco Use   • Smoking status: Never Smoker   • Smokeless tobacco: Never Used   Vaping Use   • Vaping Use: Never used   Substance and Sexual Activity   • Alcohol use: Not Currently   • Drug use: Never   • Sexual activity: Defer        Past Medical History:   Diagnosis Date   • COVID-19 07/01/2022   • Essential hypertension    • Generalized anxiety disorder    • GERD without esophagitis    • Menopausal and postmenopausal disorder    • Mixed hyperlipidemia         Immunization  History   Administered Date(s) Administered   • COVID-19 (MODERNA) 1st, 2nd, 3rd Dose Only 01/27/2021, 02/24/2021, 10/30/2021, 04/13/2022   • Fluzone High Dose =>65 Years ([facility] ONLY) 10/16/2012, 09/25/2013, 09/28/2017, 09/24/2020   • Fluzone High-Dose 65+yrs 10/02/2021   • Hepatitis A 12/06/2018       Allergies   Allergen Reactions   • Fosamax [Alendronate] GI Intolerance   • Penicillins Unknown - Low Severity          Current Outpatient Medications:   •  albuterol (2.5 MG/3ML) 0.083% nebulizer solution 3 mL, albuterol (5 MG/ML) 0.5% nebulizer solution 0.5 mL, Inhale Every 4 (Four) Hours., Disp: , Rfl:   •  amLODIPine (NORVASC) 2.5 MG tablet, Take 1 tablet by mouth Every Night., Disp: 90 tablet, Rfl: 1  •  azelastine (ASTELIN) 0.1 % nasal spray, 1 spray into the nostril(s) as directed by provider 2 (Two) Times a Day., Disp: , Rfl:   •  Brovana 15 MCG/2ML nebulizer solution, USE 1 VIAL IN NEBULIZER EVERY 12 HOURS - Morning And Evening, Disp: 120 mL, Rfl: 11  •  budesonide (PULMICORT) 0.5 MG/2ML nebulizer solution, USE 1 VIAL  IN  NEBULIZER TWICE  DAILY - Rinse Mouth After Treatment, Disp: 60 each, Rfl: 11  •  clonazePAM (KlonoPIN) 0.5 MG tablet, Take 1 tablet by mouth 2 (Two) Times a Day As Needed for Anxiety., Disp: 90 tablet, Rfl: 1  •  estradiol (ESTRACE) 1 MG tablet, Take 1 tablet by mouth Daily., Disp: 90 tablet, Rfl: 1  •  fluticasone (FLONASE) 50 MCG/ACT nasal spray, 2 sprays into the nostril(s) as directed by provider Daily., Disp: , Rfl:   •  levocetirizine (XYZAL) 5 MG tablet, Take 1 tablet by mouth Every Evening., Disp: 90 tablet, Rfl: 3  •  losartan (COZAAR) 50 MG tablet, Take 1 tablet by mouth Daily., Disp: 90 tablet, Rfl: 1  •  multivitamin with minerals tablet tablet, Take 1 tablet by mouth Daily., Disp: , Rfl:   •  Nebusal 3 % nebulizer solution, inhale 1 vial by nebulizer TWICE DAILY, Disp: 240 mL, Rfl: 4  •  ondansetron ODT (ZOFRAN-ODT) 4 MG disintegrating tablet, Place 1 tablet on the tongue  "Every 6 (Six) Hours As Needed for Nausea or Vomiting., Disp: 30 tablet, Rfl: 0  •  pantoprazole (PROTONIX) 40 MG EC tablet, Take 1 tablet by mouth Daily., Disp: 90 tablet, Rfl: 1  •  pravastatin (PRAVACHOL) 20 MG tablet, Take 1 tablet by mouth Daily., Disp: 90 tablet, Rfl: 1     Objective     Physical Exam  Vital Signs:   WDWN, Alert, NAD.    HEENT:  PERRL, EOMI.  OP, nares clear, no sinus tenderness  Neck:  Supple, no JVD, no thyromegaly.  Lymph: no axillary, cervical, supraclavicular lymphadenopathy noted bilaterally  Chest:  good aeration, clear to auscultation bilaterally, tympanic to percussion bilaterally, no work of breathing noted  CV: RRR, no MGR, pulses 2+, equal.  Abd:  Soft, NT, ND, + BS, no HSM  EXT:  no clubbing, no cyanosis, no edema, no joint tenderness  Neuro:  A&Ox3, CN grossly intact, no focal deficits.  Skin: No rashes or lesions noted.    /56 (BP Location: Left arm, Patient Position: Sitting)   Temp 97.9 °F (36.6 °C)   Resp 18   Ht 157.5 cm (62.01\")   Wt 50.8 kg (112 lb)   SpO2 97% Comment: room air  BMI 20.48 kg/m²         Result Review :   I have reviewed my last office visit note.      Procedures:         Assessment and Plan      Assessment:  1.  Bronchiectasis without acute exacerbation.      2.  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  The patient reports she is feeling better.  Last chest CT scan showed patchy groundglass basilar lower lobes has resolved.   3. History of fungal pneumonia, treated for two months of T[place]ura with antibiotic associated issues.      4. GERD, patient on proton pump inhibitor.        5. Chronic cough, improved.      6. Mucus plugging, patient on flutter valve and chloride nebulizers twice a day.     7.  Airway clearance impairment.  8. Seasonal allergies on allergy immunotherapy.      9. Never smoker.         Plan:  1. Continue Brovana and " Pulmicort everyday as prescribed and rinse her mouth out after each use.      2. Continue albuterol nebulizers as needed.  3.  Continue Astelin, Flonase, and Xyzal as prescribed.  4. The patient to continue allergy immunotherapy and to follow up with allergist as scheduled.      5. Continue flutter valve with nebulizer treatments to assist with airway clearance.  6.  For GERD,  patient to continue PPI.   Patient is also advised to sleep with the head of the bed elevated and do not eat 3-4 hours prior to bedtime.  7.  Vaccination status: patient reports they are up-to-date with flu, pneumonia, and Covid vaccines.  Patient is advised to continue to follow CDC recommendations such as social distancing wearing a mask and washing hands for at least 20 seconds.  8.  Smoking status: never smoker.  9.  Patient to call the office, 911, or go to the ER with new or worsening symptoms.  10.  Follow-up in 6 months, sooner if needed.          Follow Up   Return in about 6 months (around 1/28/2023) for in [place].  Patient was given instructions and counseling regarding her condition or for health maintenance advice. Please see specific information pulled into the AVS if appropriate.

## 2022-07-20 ENCOUNTER — TREATMENT (OUTPATIENT)
Dept: PHYSICAL THERAPY | Facility: CLINIC | Age: 82
End: 2022-07-20

## 2022-07-20 ENCOUNTER — CLINICAL SUPPORT (OUTPATIENT)
Dept: FAMILY MEDICINE CLINIC | Age: 82
End: 2022-07-20

## 2022-07-20 DIAGNOSIS — M62.81 WEAKNESS OF TRUNK MUSCULATURE: ICD-10-CM

## 2022-07-20 DIAGNOSIS — M54.2 PAIN, NECK: ICD-10-CM

## 2022-07-20 DIAGNOSIS — G89.29 CHRONIC LEFT-SIDED LOW BACK PAIN WITHOUT SCIATICA: Primary | ICD-10-CM

## 2022-07-20 DIAGNOSIS — M54.9 MID BACK PAIN: ICD-10-CM

## 2022-07-20 DIAGNOSIS — M54.50 CHRONIC LEFT-SIDED LOW BACK PAIN WITHOUT SCIATICA: Primary | ICD-10-CM

## 2022-07-20 DIAGNOSIS — J30.9 ALLERGIC RHINITIS, UNSPECIFIED SEASONALITY, UNSPECIFIED TRIGGER: Primary | ICD-10-CM

## 2022-07-20 PROCEDURE — 97110 THERAPEUTIC EXERCISES: CPT | Performed by: PHYSICAL THERAPIST

## 2022-07-20 PROCEDURE — 95115 IMMUNOTHERAPY ONE INJECTION: CPT | Performed by: FAMILY MEDICINE

## 2022-07-20 PROCEDURE — 97112 NEUROMUSCULAR REEDUCATION: CPT | Performed by: PHYSICAL THERAPIST

## 2022-07-20 PROCEDURE — 97140 MANUAL THERAPY 1/> REGIONS: CPT | Performed by: PHYSICAL THERAPIST

## 2022-07-20 NOTE — PROGRESS NOTES
Physical Therapy Daily Treatment Note      Patient: Luma Cruz   : 1940  Referring practitioner: Joseph Walker, *  Date of Initial Visit: Type: THERAPY  Noted: 2022  Today's Date: 2022  Patient seen for 3 sessions           Subjective Evaluation    History of Present Illness    Subjective comment: 5/10 in the low back and a 4-5/10 in between SHLDs       Objective           General Comments     Lumbar Comments  Pt has a R pelvic rotation.     See Exercise, Manual, and Modality Logs for complete treatment.       Assessment & Plan     Assessment    Assessment details: Pt is showing a R pelvic rotation with tight and tender L piriformis and R hip flexors. There was coaching needed for all exercises to insure that they were being performed correctly. Pt was educated on the pelvic position and LBP. There was some correction of the pelvis with LAT. Pt advised to hold stretches for extended periods of time instead of releasing quickly.    Goals  Plan Goals:   Plan Goals: LOW BACK PROBLEMS:    1. The patient complains of low back pain.  LTG 1: 12 weeks:  The patient will report a pain rating of 1/10 or better in order to improve tolerance to activities of daily living and improve sleep quality.  STATUS:  Progressing  STG 1a: 4 weeks:  The patient will report a pain rating of 2/10 or better.  STATUS:  Progressing  TREATMENT:  Therapeutic exercises, manual therapy, aquatic therapy, home exercise instruction, and modalities as needed for pain to include:  electrical stimulation, moist heat, ice, ultrasound, and diathermy.      2. The patient demonstrates weakness of the bilateral hip.  LTG 2: 12 weeks:  The patient will demonstrate 4+ /5 strength for bilateral hip flexion, abduction, and extension in order to improve hip stability.  STATUS:  Progressing  STG 2a: 4 weeks:  The patient will demonstrate 4 /5 strength for bilateral hip flexion, abduction, and extension.  STATUS:  Progressing  STG2b:  4  weeks:  The patient will be independent with home exercises.     STATUS: Progressing  TREATMENT: Therapeutic exercises, manual therapy, aquatic therapy, home exercise instruction, and modalities as needed for pain to include:  electrical stimulation, moist heat, ice, ultrasound, and diathermy.    3. Mobility: Walking/Moving Around Functional Limitation    LTG 3: 12 weeks:  The patient will demonstrate 6 % limitation by achieving a score of 3/50 on the ANTONIETA.  STATUS:  Progressing  STG 3 a: 4 weeks:  The patient will demonstrate 10 % limitation by achieving a score of 5/50 on the ANTONIETA.    STATUS:  Progressing  TREATMENT:  Manual therapy, therapeutic exercise, home exercise instruction, and modalities as needed.      CERVICAL PROBLEMS    1. The patient has limited ROM.   LTG 1: 12 weeks:  The patient will demonstrate 70° of bilateral cervical spine rotation in order to adequately monitor blind spots while driving and improve ability to perform ADLs.    STATUS:  Progressing  STG 1a: 4 weeks:  The patient will demonstrate 60° of bilateral cervical spine rotation, in order to adequately monitor blind spots while driving and improve ability to perform ADLs.      STATUS:  Progressing   TREATMENT:  Manual therapy, therapeutic exercise, home exercise instruction, cervical traction, and modalities as needed to include: moist heat, electrical stimulation, and ultrasound.     2. The patient has complaints of pain.   LTG 2: 12 weeks:  The patient will report 0/10 pain in order to more easily tolerate activities of daily living and improve sleep quality.    STATUS: Progressing   STG 2a: 4 weeks:  The patient will report 1/10 pain.    STATUS: Progressing  STG2b:  4 weeks:  The patient will be independent with home exercises.     STATUS:  Progressing   TREATMENT: Manual therapy, therapeutic exercise, home exercise instruction, cervical traction, and modalities as needed to include: moist heat, electrical stimulation, and  ultrasound.      Plan  Plan details: Cont with strengthening and stretching activities to allow for better range without pain. Add more pelvic stability exercises to ensure stabilization during function activities.          Visit Diagnoses:    ICD-10-CM ICD-9-CM   1. Chronic left-sided low back pain without sciatica  M54.50 724.2    G89.29 338.29   2. Pain, neck  M54.2 723.1   3. Mid back pain  M54.9 724.5   4. Weakness of trunk musculature  M62.81 728.87       Progress per Plan of Care    Timed:    Therapeutic Exercise:    18     mins  58589;  Manual Therapy:    14     mins  07957;     Neuromuscular Julee:   9    mins  81150;    Therapeutic Activity:          mins  04919;     Gait Training:           mins  76353;     Ultrasound:          mins  37375;      Untimed:  Electrical Stimulation:         mins  13472 ( );  Mechanical Traction:         mins  92597;     Timed Treatment:   41   mins   Total Treatment:     45   mins      Neeru Sterling PTA  Physical Therapist Assistant

## 2022-07-22 ENCOUNTER — TREATMENT (OUTPATIENT)
Dept: PHYSICAL THERAPY | Facility: CLINIC | Age: 82
End: 2022-07-22

## 2022-07-22 DIAGNOSIS — M54.50 CHRONIC LEFT-SIDED LOW BACK PAIN WITHOUT SCIATICA: Primary | ICD-10-CM

## 2022-07-22 DIAGNOSIS — M54.2 PAIN, NECK: ICD-10-CM

## 2022-07-22 DIAGNOSIS — M54.9 MID BACK PAIN: ICD-10-CM

## 2022-07-22 DIAGNOSIS — M62.81 WEAKNESS OF TRUNK MUSCULATURE: ICD-10-CM

## 2022-07-22 DIAGNOSIS — G89.29 CHRONIC LEFT-SIDED LOW BACK PAIN WITHOUT SCIATICA: Primary | ICD-10-CM

## 2022-07-22 PROCEDURE — 97112 NEUROMUSCULAR REEDUCATION: CPT | Performed by: PHYSICAL THERAPIST

## 2022-07-22 PROCEDURE — 97110 THERAPEUTIC EXERCISES: CPT | Performed by: PHYSICAL THERAPIST

## 2022-07-22 NOTE — PROGRESS NOTES
Physical Therapy Daily Treatment Note      Patient: Luma Cruz   : 1940  Referring practitioner: Joseph Walker, *  Date of Initial Visit: Type: THERAPY  Noted: 2022  Today's Date: 2022  Patient seen for 4 sessions           Subjective Evaluation    History of Present Illness    Subjective comment: 5-6/10 ealier and 2/10 at tx time after taking tylenol       Objective          Static Posture     Comments  Pt cont to stand with very rounded SHLDs and forward head.      See Exercise, Manual, and Modality Logs for complete treatment.       Assessment & Plan     Assessment    Assessment details: Pt had more pain this morning with ADLs but took tylenol and feel less at tx time. Pt has tightness and tenderness in the B piriformis with the R being very tender in several areas.  Pt moved easier after manual therapy. PPT was corrected with cuing when improper performance was shown.  Bridges still hurt pt in the Low back.    Goals  Plan Goals:   Plan Goals: LOW BACK PROBLEMS:    1. The patient complains of low back pain.  LTG 1: 12 weeks:  The patient will report a pain rating of 1/10 or better in order to improve tolerance to activities of daily living and improve sleep quality.  STATUS:  Progressing  STG 1a: 4 weeks:  The patient will report a pain rating of 2/10 or better.  STATUS:  Progressing  TREATMENT:  Therapeutic exercises, manual therapy, aquatic therapy, home exercise instruction, and modalities as needed for pain to include:  electrical stimulation, moist heat, ice, ultrasound, and diathermy.      2. The patient demonstrates weakness of the bilateral hip.  LTG 2: 12 weeks:  The patient will demonstrate 4+ /5 strength for bilateral hip flexion, abduction, and extension in order to improve hip stability.  STATUS:  Progressing  STG 2a: 4 weeks:  The patient will demonstrate 4 /5 strength for bilateral hip flexion, abduction, and extension.  STATUS:  Progressing  STG2b:  4 weeks:  The  patient will be independent with home exercises.     STATUS: Progressing  TREATMENT: Therapeutic exercises, manual therapy, aquatic therapy, home exercise instruction, and modalities as needed for pain to include:  electrical stimulation, moist heat, ice, ultrasound, and diathermy.    3. Mobility: Walking/Moving Around Functional Limitation    LTG 3: 12 weeks:  The patient will demonstrate 6 % limitation by achieving a score of 3/50 on the ANTONIETA.  STATUS:  Progressing  STG 3 a: 4 weeks:  The patient will demonstrate 10 % limitation by achieving a score of 5/50 on the ANTONIETA.    STATUS:  Progressing  TREATMENT:  Manual therapy, therapeutic exercise, home exercise instruction, and modalities as needed.      CERVICAL PROBLEMS    1. The patient has limited ROM.   LTG 1: 12 weeks:  The patient will demonstrate 70° of bilateral cervical spine rotation in order to adequately monitor blind spots while driving and improve ability to perform ADLs.    STATUS:  Progressing  STG 1a: 4 weeks:  The patient will demonstrate 60° of bilateral cervical spine rotation, in order to adequately monitor blind spots while driving and improve ability to perform ADLs.      STATUS:  Progressing   TREATMENT:  Manual therapy, therapeutic exercise, home exercise instruction, cervical traction, and modalities as needed to include: moist heat, electrical stimulation, and ultrasound.     2. The patient has complaints of pain.   LTG 2: 12 weeks:  The patient will report 0/10 pain in order to more easily tolerate activities of daily living and improve sleep quality.    STATUS: Progressing   STG 2a: 4 weeks:  The patient will report 1/10 pain.    STATUS: Progressing  STG2b:  4 weeks:  The patient will be independent with home exercises.     STATUS:  Progressing   TREATMENT: Manual therapy, therapeutic exercise, home exercise instruction, cervical traction, and modalities as needed to include: moist heat, electrical stimulation, and  ultrasound.      Plan  Plan details: Cont to educate pt on posturing and pelvic stability. Increase level of difficulty with exercises as pt tolerates and add manual as needed.          Visit Diagnoses:    ICD-10-CM ICD-9-CM   1. Chronic left-sided low back pain without sciatica  M54.50 724.2    G89.29 338.29   2. Pain, neck  M54.2 723.1   3. Mid back pain  M54.9 724.5   4. Weakness of trunk musculature  M62.81 728.87       Progress per Plan of Care    Timed:    Therapeutic Exercise:    12     mins  97086;  Manual Therapy:    6     mins  13497;     Neuromuscular Julee:   10    mins  75522;    Therapeutic Activity:          mins  19224;     Gait Training:           mins  65429;     Ultrasound:          mins  75940;      Untimed:  Electrical Stimulation:         mins  66031 ( );  Mechanical Traction:         mins  13731;     Timed Treatment:   28   mins   Total Treatment:     30   mins      Neeru Sterling PTA  Physical Therapist Assistant

## 2022-07-25 ENCOUNTER — CLINICAL SUPPORT (OUTPATIENT)
Dept: FAMILY MEDICINE CLINIC | Age: 82
End: 2022-07-25

## 2022-07-25 DIAGNOSIS — J30.9 ALLERGIC RHINITIS, UNSPECIFIED SEASONALITY, UNSPECIFIED TRIGGER: Primary | ICD-10-CM

## 2022-07-25 PROCEDURE — 95117 IMMUNOTHERAPY INJECTIONS: CPT | Performed by: FAMILY MEDICINE

## 2022-07-27 ENCOUNTER — TREATMENT (OUTPATIENT)
Dept: PHYSICAL THERAPY | Facility: CLINIC | Age: 82
End: 2022-07-27

## 2022-07-27 DIAGNOSIS — M54.50 CHRONIC LEFT-SIDED LOW BACK PAIN WITHOUT SCIATICA: Primary | ICD-10-CM

## 2022-07-27 DIAGNOSIS — M54.2 PAIN, NECK: ICD-10-CM

## 2022-07-27 DIAGNOSIS — M62.81 WEAKNESS OF TRUNK MUSCULATURE: ICD-10-CM

## 2022-07-27 DIAGNOSIS — G89.29 CHRONIC LEFT-SIDED LOW BACK PAIN WITHOUT SCIATICA: Primary | ICD-10-CM

## 2022-07-27 DIAGNOSIS — M54.9 MID BACK PAIN: ICD-10-CM

## 2022-07-27 PROCEDURE — 97012 MECHANICAL TRACTION THERAPY: CPT | Performed by: PHYSICAL THERAPIST

## 2022-07-27 NOTE — PROGRESS NOTES
Physical Therapy Daily Treatment Note      Patient: Luma Cruz   : 1940  Referring practitioner: Joseph Walker, *  Date of Initial Visit: Type: THERAPY  Noted: 2022  Today's Date: 2022  Patient seen for 5 sessions           Subjective Evaluation    History of Present Illness    Subjective comment: 2/10       Objective           General Comments     Lumbar Comments  Traction too painful for pt to have in future.     See Exercise, Manual, and Modality Logs for complete treatment.       Assessment & Plan     Assessment    Assessment details: Pt had trouble getting through 5 minutes on the bike at this visit. Pt reports that she doesn't feel that anything we have done so far has helped. Pt was given a trial of very light traction at this visit. Pt was fine while on traction but when getting off traction she had severe upper back and thoracic pain. Pt had to be sat up by therapist X2 and she felt faint and was in tears. Pt took several minutes to get to where she was okay. Once she was sat up a while she felt better and was able to move around without an issue. Therapy is going to be put on hold for now until further notice.    Goals  Plan Goals:   Plan Goals: LOW BACK PROBLEMS:    1. The patient complains of low back pain.  LTG 1: 12 weeks:  The patient will report a pain rating of 1/10 or better in order to improve tolerance to activities of daily living and improve sleep quality.  STATUS:  Progressing  STG 1a: 4 weeks:  The patient will report a pain rating of 2/10 or better.  STATUS:  Progressing  TREATMENT:  Therapeutic exercises, manual therapy, aquatic therapy, home exercise instruction, and modalities as needed for pain to include:  electrical stimulation, moist heat, ice, ultrasound, and diathermy.      2. The patient demonstrates weakness of the bilateral hip.  LTG 2: 12 weeks:  The patient will demonstrate 4+ /5 strength for bilateral hip flexion, abduction, and extension in order  to improve hip stability.  STATUS:  Progressing  STG 2a: 4 weeks:  The patient will demonstrate 4 /5 strength for bilateral hip flexion, abduction, and extension.  STATUS:  Progressing  STG2b:  4 weeks:  The patient will be independent with home exercises.     STATUS: Progressing  TREATMENT: Therapeutic exercises, manual therapy, aquatic therapy, home exercise instruction, and modalities as needed for pain to include:  electrical stimulation, moist heat, ice, ultrasound, and diathermy.    3. Mobility: Walking/Moving Around Functional Limitation    LTG 3: 12 weeks:  The patient will demonstrate 6 % limitation by achieving a score of 3/50 on the ANTONIETA.  STATUS:  Progressing  STG 3 a: 4 weeks:  The patient will demonstrate 10 % limitation by achieving a score of 5/50 on the ANTONIETA.    STATUS:  Progressing  TREATMENT:  Manual therapy, therapeutic exercise, home exercise instruction, and modalities as needed.      CERVICAL PROBLEMS    1. The patient has limited ROM.   LTG 1: 12 weeks:  The patient will demonstrate 70° of bilateral cervical spine rotation in order to adequately monitor blind spots while driving and improve ability to perform ADLs.    STATUS:  Progressing  STG 1a: 4 weeks:  The patient will demonstrate 60° of bilateral cervical spine rotation, in order to adequately monitor blind spots while driving and improve ability to perform ADLs.      STATUS:  Progressing   TREATMENT:  Manual therapy, therapeutic exercise, home exercise instruction, cervical traction, and modalities as needed to include: moist heat, electrical stimulation, and ultrasound.     2. The patient has complaints of pain.   LTG 2: 12 weeks:  The patient will report 0/10 pain in order to more easily tolerate activities of daily living and improve sleep quality.    STATUS: Progressing   STG 2a: 4 weeks:  The patient will report 1/10 pain.    STATUS: Progressing  STG2b:  4 weeks:  The patient will be independent with home exercises.     STATUS:   Progressing   TREATMENT: Manual therapy, therapeutic exercise, home exercise instruction, cervical traction, and modalities as needed to include: moist heat, electrical stimulation, and ultrasound.      Plan  Plan details: Therapy is on hold at this time.          Visit Diagnoses:    ICD-10-CM ICD-9-CM   1. Chronic left-sided low back pain without sciatica  M54.50 724.2    G89.29 338.29   2. Pain, neck  M54.2 723.1   3. Mid back pain  M54.9 724.5   4. Weakness of trunk musculature  M62.81 728.87       Progress per Plan of Care    Timed:    Therapeutic Exercise:    5     mins  39652;  Manual Therapy:         mins  96251;     Neuromuscular Julee:       mins  84470;    Therapeutic Activity:          mins  34503;     Gait Training:           mins  71783;     Ultrasound:          mins  98745;      Untimed:  Electrical Stimulation:         mins  54836 ( );  Mechanical Traction:    15     mins  52998;     Timed Treatment:   5   mins   Total Treatment:     40   mins      Neeru Sterling PTA  Physical Therapist Assistant

## 2022-07-28 ENCOUNTER — OFFICE VISIT (OUTPATIENT)
Dept: PULMONOLOGY | Facility: CLINIC | Age: 82
End: 2022-07-28

## 2022-07-28 VITALS
OXYGEN SATURATION: 97 % | SYSTOLIC BLOOD PRESSURE: 111 MMHG | TEMPERATURE: 97.9 F | BODY MASS INDEX: 20.61 KG/M2 | HEIGHT: 62 IN | DIASTOLIC BLOOD PRESSURE: 56 MMHG | RESPIRATION RATE: 18 BRPM | WEIGHT: 112 LBS

## 2022-07-28 DIAGNOSIS — J30.2 SEASONAL ALLERGIES: ICD-10-CM

## 2022-07-28 DIAGNOSIS — R05.3 CHRONIC COUGH: ICD-10-CM

## 2022-07-28 DIAGNOSIS — B49 FUNGAL PNEUMONIA: ICD-10-CM

## 2022-07-28 DIAGNOSIS — J16.8 FUNGAL PNEUMONIA: ICD-10-CM

## 2022-07-28 DIAGNOSIS — J47.9 BRONCHIECTASIS WITHOUT COMPLICATION: ICD-10-CM

## 2022-07-28 DIAGNOSIS — K21.9 GERD WITHOUT ESOPHAGITIS: ICD-10-CM

## 2022-07-28 DIAGNOSIS — T17.500A MUCUS PLUGGING OF BRONCHI: Primary | ICD-10-CM

## 2022-07-28 DIAGNOSIS — R06.89 AIRWAY CLEARANCE IMPAIRMENT: ICD-10-CM

## 2022-07-28 PROCEDURE — 99213 OFFICE O/P EST LOW 20 MIN: CPT | Performed by: NURSE PRACTITIONER

## 2022-08-01 ENCOUNTER — TELEPHONE (OUTPATIENT)
Dept: FAMILY MEDICINE CLINIC | Age: 82
End: 2022-08-01

## 2022-08-01 ENCOUNTER — CLINICAL SUPPORT (OUTPATIENT)
Dept: FAMILY MEDICINE CLINIC | Age: 82
End: 2022-08-01

## 2022-08-01 DIAGNOSIS — J30.9 ALLERGIC RHINITIS, UNSPECIFIED SEASONALITY, UNSPECIFIED TRIGGER: Primary | ICD-10-CM

## 2022-08-01 PROCEDURE — 95115 IMMUNOTHERAPY ONE INJECTION: CPT | Performed by: FAMILY MEDICINE

## 2022-08-01 NOTE — TELEPHONE ENCOUNTER
Pod A, I received the message below from physical therapy.  Please reach out to Luma Grey and see if she would like to schedule follow-up evaluation as a precaution.  Thanks.    [I have been seeing Mrs Cruz for her mid an low back. She does not feel anything we are doing has helped. Today she was put on a trial of traction and it did not go well. She was put on a very low pull of 45lbs.  Oddly when afterward her upper back was in so much pain that she was unable to move on her own. It is unclear why this region was affected unless it was due to laying on the mat for 15 minutes. I had to physically lift her into a seated position due to her being unable to move herself. She felt faint and was in tears. We are putting therapy on hold at this time. It was suggested that she follow up with her provider.  Please reach out with any question or concerns.  Thank you!]

## 2022-08-04 ENCOUNTER — OFFICE VISIT (OUTPATIENT)
Dept: FAMILY MEDICINE CLINIC | Age: 82
End: 2022-08-04

## 2022-08-04 VITALS
HEART RATE: 86 BPM | DIASTOLIC BLOOD PRESSURE: 64 MMHG | BODY MASS INDEX: 20.5 KG/M2 | WEIGHT: 111.4 LBS | TEMPERATURE: 98.4 F | HEIGHT: 62 IN | SYSTOLIC BLOOD PRESSURE: 102 MMHG

## 2022-08-04 DIAGNOSIS — M54.50 CHRONIC LEFT-SIDED LOW BACK PAIN WITHOUT SCIATICA: Primary | ICD-10-CM

## 2022-08-04 DIAGNOSIS — M43.16 ANTEROLISTHESIS OF LUMBAR SPINE: ICD-10-CM

## 2022-08-04 DIAGNOSIS — G89.29 CHRONIC LEFT-SIDED LOW BACK PAIN WITHOUT SCIATICA: Primary | ICD-10-CM

## 2022-08-04 PROCEDURE — 96372 THER/PROPH/DIAG INJ SC/IM: CPT | Performed by: FAMILY MEDICINE

## 2022-08-04 PROCEDURE — 99213 OFFICE O/P EST LOW 20 MIN: CPT | Performed by: FAMILY MEDICINE

## 2022-08-04 RX ORDER — TRIAMCINOLONE ACETONIDE 40 MG/ML
40 INJECTION, SUSPENSION INTRA-ARTICULAR; INTRAMUSCULAR ONCE
Status: COMPLETED | OUTPATIENT
Start: 2022-08-04 | End: 2022-08-04

## 2022-08-04 RX ADMIN — TRIAMCINOLONE ACETONIDE 40 MG: 40 INJECTION, SUSPENSION INTRA-ARTICULAR; INTRAMUSCULAR at 13:57

## 2022-08-04 NOTE — PROGRESS NOTES
Luma Cruz presents to [facility].    Chief Complaint:  Low back pain    Subjective       History of Present Illness:  Luma Grey is in today for evaluation of low back pain.  She has been struggling for a long period of time - for 6 months or longer.  However, the lower back has been more of an issue in the last 6 weeks.  She was seen before for this - in early June.  She was referred to physical therapy, but she has not had improvement with that.  Please see the recent telephone note regarding the contact that I had with the physical therapist.  She does not have radiating pain to the legs.  She says that the pain can be severe when it hits her.  She is currently taking acetaminophen for pain.  It is of mixed benefit.      Review of Systems:  Review of Systems   Constitutional: Positive for fatigue. Negative for chills and fever.   Respiratory: Negative for cough and shortness of breath.    Cardiovascular: Negative for chest pain and palpitations.   Gastrointestinal: Negative for abdominal pain, nausea and vomiting.        Objective   Medical History:  Past Medical History:   • COVID-19   • Essential hypertension   • Generalized anxiety disorder   • GERD without esophagitis   • Menopausal and postmenopausal disorder   • Mixed hyperlipidemia     Past Surgical History:   • COLONOSCOPY   • HYSTERECTOMY    complete   • TONSILLECTOMY AND ADENOIDECTOMY      Family History   Problem Relation Age of Onset   • Stroke Mother    • Hypertension Mother    • Coronary artery disease Father      Social History     Tobacco Use   • Smoking status: Never Smoker   • Smokeless tobacco: Never Used   Substance Use Topics   • Alcohol use: Not Currently       Health Maintenance Due   Topic Date Due   • TDAP/TD VACCINES (1 - Tdap) Never done        Immunization History   Administered Date(s) Administered   • COVID-19 (MODERNA) 1st, 2nd, 3rd Dose Only 01/27/2021, 02/24/2021, 10/30/2021, 04/13/2022   • Fluzone  High Dose =>65 Years ([facility] ONLY) 10/16/2012, 09/25/2013, 09/28/2017, 09/24/2020   • Fluzone High-Dose 65+yrs 10/02/2021   • Hepatitis A 12/06/2018       Allergies   Allergen Reactions   • Fosamax [Alendronate] GI Intolerance   • Penicillins Unknown - Low Severity        Medications:  Current Outpatient Medications on File Prior to Visit   Medication Sig   • albuterol (2.5 MG/3ML) 0.083% nebulizer solution 3 mL, albuterol (5 MG/ML) 0.5% nebulizer solution 0.5 mL Inhale Every 4 (Four) Hours.   • amLODIPine (NORVASC) 2.5 MG tablet Take 1 tablet by mouth Every Night.   • azelastine (ASTELIN) 0.1 % nasal spray 1 spray into the nostril(s) as directed by provider 2 (Two) Times a Day.   • Brovana 15 MCG/2ML nebulizer solution USE 1 VIAL IN NEBULIZER EVERY 12 HOURS - Morning And Evening   • budesonide (PULMICORT) 0.5 MG/2ML nebulizer solution USE 1 VIAL  IN  NEBULIZER TWICE  DAILY - Rinse Mouth After Treatment   • clonazePAM (KlonoPIN) 0.5 MG tablet Take 1 tablet by mouth 2 (Two) Times a Day As Needed for Anxiety.   • estradiol (ESTRACE) 1 MG tablet Take 1 tablet by mouth Daily.   • fluticasone (FLONASE) 50 MCG/ACT nasal spray 2 sprays into the nostril(s) as directed by provider Daily.   • levocetirizine (XYZAL) 5 MG tablet Take 1 tablet by mouth Every Evening.   • losartan (COZAAR) 50 MG tablet Take 1 tablet by mouth Daily.   • multivitamin with minerals tablet tablet Take 1 tablet by mouth Daily.   • Nebusal 3 % nebulizer solution inhale 1 vial by nebulizer TWICE DAILY   • ondansetron ODT (ZOFRAN-ODT) 4 MG disintegrating tablet Place 1 tablet on the tongue Every 6 (Six) Hours As Needed for Nausea or Vomiting.   • pantoprazole (PROTONIX) 40 MG EC tablet Take 1 tablet by mouth Daily.   • pravastatin (PRAVACHOL) 20 MG tablet Take 1 tablet by mouth Daily.     No current facility-administered medications on file prior to visit.       Vital Signs:   /64 (BP Location: Left arm, Patient Position: Sitting)   Pulse 86    "Temp 98.4 °F (36.9 °C) (Oral)   Ht 157.5 cm (62.01\")   Wt 50.5 kg (111 lb 6.4 oz)   BMI 20.37 kg/m²       Physical Exam:  Physical Exam  Vitals reviewed.   Constitutional:       General: She is not in acute distress.     Appearance: She is not ill-appearing.   Eyes:      Pupils: Pupils are equal, round, and reactive to light.   Neck:      Comments: No thyromegaly  Cardiovascular:      Rate and Rhythm: Normal rate and regular rhythm.   Pulmonary:      Effort: Pulmonary effort is normal.      Breath sounds: Normal breath sounds.   Abdominal:      General: There is no distension.      Palpations: Abdomen is soft.      Tenderness: There is no abdominal tenderness.   Musculoskeletal:      Cervical back: Neck supple.      Comments: There is some tenderness in the low back on each side near the SI region.  This is more significant in the left lower back.   Lymphadenopathy:      Cervical: No cervical adenopathy.   Skin:     Findings: No lesion or rash.   Neurological:      General: No focal deficit present.      Mental Status: She is alert.      Cranial Nerves: No cranial nerve deficit.      Motor: No weakness.      Deep Tendon Reflexes: Reflexes normal.         Result Review      The following data was reviewed by Joseph Walker MD on 08/04/2022.  Lab Results   Component Value Date    WBC 5.89 06/09/2022    HGB 12.5 06/09/2022    HCT 40.2 06/09/2022    MCV 91.2 06/09/2022     06/09/2022     Lab Results   Component Value Date    GLUCOSE 88 06/09/2022    BUN 33 (H) 06/09/2022    CREATININE 0.91 06/09/2022     06/09/2022    K 4.3 06/09/2022     06/09/2022    CO2 26.0 06/09/2022    CALCIUM 9.7 06/09/2022    PROTEINTOT 7.0 06/09/2022    ALBUMIN 4.60 06/09/2022    ALT 12 06/09/2022    AST 23 06/09/2022    ALKPHOS 66 06/09/2022    BILITOT 0.2 06/09/2022    EGFRIFNONA 62 12/28/2021    GLOB 2.4 06/09/2022    AGRATIO 1.9 06/09/2022    BCR 36.3 (H) 06/09/2022    ANIONGAP 10.0 06/09/2022      Lab Results "   Component Value Date    CHOL 168 06/09/2022    CHLPL 157 03/10/2020    TRIG 89 06/09/2022    HDL 76 (H) 06/09/2022    LDL 76 06/09/2022     Lab Results   Component Value Date    TSH 1.370 06/09/2022     No results found for: HGBA1C     Lab Results   Component Value Date    SEDRATE 12 06/09/2022      XR Spine Lumbar 2 or 3 View (06/09/2022 15:21)         Assessment and Plan:   Today, we have reviewed her care.  Luma Grey is not seeing improvement with her low back.  If anything, it is worse.  She has been on medication for this for longer than 6 weeks now in the form of acetaminophen.  She also did a trial of physical therapy, but her symptoms were worse with that.  At this point, we will move ahead with MRI of the low back.  I would like to quantify if there is an obvious issue there.  Sed rate was normal on recent blood work.  Because of ongoing pain, we will give her a Kenalog injection today to see if that is helpful in the near term.  She has not had this recently, but she did see some benefit from it in the past.       Diagnoses and all orders for this visit:    1. Chronic left-sided low back pain without sciatica (Primary)  -     MRI Lumbar Spine Without Contrast; Future  -     triamcinolone acetonide (KENALOG-40) injection 40 mg    2. Anterolisthesis of lumbar spine  -     MRI Lumbar Spine Without Contrast; Future  -     triamcinolone acetonide (KENALOG-40) injection 40 mg          Follow Up   Return in about 5 months (around 12/20/2022) for Recheck, as scheduled.  Patient was given instructions and counseling regarding her condition or for health maintenance advice. Please see specific information pulled into the AVS if appropriate.

## 2022-08-08 ENCOUNTER — CLINICAL SUPPORT (OUTPATIENT)
Dept: FAMILY MEDICINE CLINIC | Age: 82
End: 2022-08-08

## 2022-08-08 DIAGNOSIS — J30.9 ALLERGIC RHINITIS, UNSPECIFIED SEASONALITY, UNSPECIFIED TRIGGER: Primary | ICD-10-CM

## 2022-08-08 PROCEDURE — 95115 IMMUNOTHERAPY ONE INJECTION: CPT | Performed by: FAMILY MEDICINE

## 2022-08-15 ENCOUNTER — CLINICAL SUPPORT (OUTPATIENT)
Dept: FAMILY MEDICINE CLINIC | Age: 82
End: 2022-08-15

## 2022-08-15 DIAGNOSIS — J30.2 SEASONAL ALLERGIES: Primary | ICD-10-CM

## 2022-08-15 PROCEDURE — 95115 IMMUNOTHERAPY ONE INJECTION: CPT | Performed by: FAMILY MEDICINE

## 2022-08-22 ENCOUNTER — CLINICAL SUPPORT (OUTPATIENT)
Dept: FAMILY MEDICINE CLINIC | Age: 82
End: 2022-08-22

## 2022-08-22 DIAGNOSIS — J30.9 ALLERGIC RHINITIS, UNSPECIFIED SEASONALITY, UNSPECIFIED TRIGGER: Primary | ICD-10-CM

## 2022-08-22 PROCEDURE — 95115 IMMUNOTHERAPY ONE INJECTION: CPT | Performed by: FAMILY MEDICINE

## 2022-08-25 ENCOUNTER — HOSPITAL ENCOUNTER (OUTPATIENT)
Dept: MRI IMAGING | Facility: HOSPITAL | Age: 82
Discharge: HOME | End: 2022-08-25
Admitting: FAMILY MEDICINE

## 2022-08-25 DIAGNOSIS — M54.50 CHRONIC LEFT-SIDED LOW BACK PAIN WITHOUT SCIATICA: ICD-10-CM

## 2022-08-25 DIAGNOSIS — G89.29 CHRONIC LEFT-SIDED LOW BACK PAIN WITHOUT SCIATICA: ICD-10-CM

## 2022-08-25 DIAGNOSIS — M43.16 ANTEROLISTHESIS OF LUMBAR SPINE: ICD-10-CM

## 2022-08-25 DIAGNOSIS — M43.16 ANTEROLISTHESIS OF LUMBAR SPINE: Primary | ICD-10-CM

## 2022-08-25 PROCEDURE — 72148 MRI LUMBAR SPINE W/O DYE: CPT

## 2022-08-29 ENCOUNTER — CLINICAL SUPPORT (OUTPATIENT)
Dept: FAMILY MEDICINE CLINIC | Age: 82
End: 2022-08-29

## 2022-08-29 DIAGNOSIS — J30.2 SEASONAL ALLERGIES: Primary | ICD-10-CM

## 2022-08-29 PROCEDURE — 95117 IMMUNOTHERAPY INJECTIONS: CPT | Performed by: FAMILY MEDICINE

## 2022-09-06 ENCOUNTER — CLINICAL SUPPORT (OUTPATIENT)
Dept: FAMILY MEDICINE CLINIC | Age: 82
End: 2022-09-06

## 2022-09-06 DIAGNOSIS — J30.2 SEASONAL ALLERGIES: Primary | ICD-10-CM

## 2022-09-06 PROCEDURE — 95115 IMMUNOTHERAPY ONE INJECTION: CPT | Performed by: FAMILY MEDICINE

## 2022-09-12 ENCOUNTER — CLINICAL SUPPORT (OUTPATIENT)
Dept: FAMILY MEDICINE CLINIC | Age: 82
End: 2022-09-12

## 2022-09-12 DIAGNOSIS — J30.2 SEASONAL ALLERGIES: ICD-10-CM

## 2022-09-12 PROCEDURE — 95115 IMMUNOTHERAPY ONE INJECTION: CPT | Performed by: FAMILY MEDICINE

## 2022-09-19 ENCOUNTER — CLINICAL SUPPORT (OUTPATIENT)
Dept: FAMILY MEDICINE CLINIC | Age: 82
End: 2022-09-19

## 2022-09-19 DIAGNOSIS — J30.2 SEASONAL ALLERGIES: Primary | ICD-10-CM

## 2022-09-19 PROCEDURE — 95115 IMMUNOTHERAPY ONE INJECTION: CPT | Performed by: FAMILY MEDICINE

## 2022-09-26 ENCOUNTER — CLINICAL SUPPORT (OUTPATIENT)
Dept: FAMILY MEDICINE CLINIC | Age: 82
End: 2022-09-26

## 2022-09-26 DIAGNOSIS — J30.2 SEASONAL ALLERGIES: Primary | ICD-10-CM

## 2022-09-26 PROCEDURE — 95115 IMMUNOTHERAPY ONE INJECTION: CPT | Performed by: FAMILY MEDICINE

## 2022-10-03 ENCOUNTER — CLINICAL SUPPORT (OUTPATIENT)
Dept: FAMILY MEDICINE CLINIC | Age: 82
End: 2022-10-03

## 2022-10-03 DIAGNOSIS — J30.2 SEASONAL ALLERGIES: Primary | ICD-10-CM

## 2022-10-03 PROCEDURE — 95115 IMMUNOTHERAPY ONE INJECTION: CPT | Performed by: FAMILY MEDICINE

## 2022-10-10 ENCOUNTER — CLINICAL SUPPORT (OUTPATIENT)
Dept: FAMILY MEDICINE CLINIC | Age: 82
End: 2022-10-10

## 2022-10-10 DIAGNOSIS — J30.9 ALLERGIC RHINITIS, UNSPECIFIED SEASONALITY, UNSPECIFIED TRIGGER: Primary | ICD-10-CM

## 2022-10-10 PROCEDURE — 95115 IMMUNOTHERAPY ONE INJECTION: CPT | Performed by: FAMILY MEDICINE

## 2022-10-17 ENCOUNTER — CLINICAL SUPPORT (OUTPATIENT)
Dept: FAMILY MEDICINE CLINIC | Age: 82
End: 2022-10-17

## 2022-10-17 DIAGNOSIS — J30.9 ALLERGIC RHINITIS, UNSPECIFIED SEASONALITY, UNSPECIFIED TRIGGER: Primary | ICD-10-CM

## 2022-10-17 PROCEDURE — 95115 IMMUNOTHERAPY ONE INJECTION: CPT | Performed by: FAMILY MEDICINE

## 2022-10-24 ENCOUNTER — CLINICAL SUPPORT (OUTPATIENT)
Dept: FAMILY MEDICINE CLINIC | Age: 82
End: 2022-10-24

## 2022-10-24 DIAGNOSIS — J30.9 ALLERGIC RHINITIS, UNSPECIFIED SEASONALITY, UNSPECIFIED TRIGGER: Primary | ICD-10-CM

## 2022-10-24 PROCEDURE — 99211 OFF/OP EST MAY X REQ PHY/QHP: CPT | Performed by: FAMILY MEDICINE

## 2022-10-31 ENCOUNTER — CLINICAL SUPPORT (OUTPATIENT)
Dept: FAMILY MEDICINE CLINIC | Age: 82
End: 2022-10-31

## 2022-10-31 DIAGNOSIS — J30.9 ALLERGIC RHINITIS, UNSPECIFIED SEASONALITY, UNSPECIFIED TRIGGER: Primary | ICD-10-CM

## 2022-10-31 PROCEDURE — 95115 IMMUNOTHERAPY ONE INJECTION: CPT | Performed by: FAMILY MEDICINE

## 2022-11-04 ENCOUNTER — TELEPHONE (OUTPATIENT)
Dept: FAMILY MEDICINE CLINIC | Age: 82
End: 2022-11-04

## 2022-11-04 DIAGNOSIS — L65.9 ALOPECIA: Primary | ICD-10-CM

## 2022-11-04 NOTE — TELEPHONE ENCOUNTER
Caller: Anthony Luma MADAN    Relationship: Self    Best call back number: [phone]    What is the medical concern/diagnosis: HAIR LOSS     What specialty or service is being requested: DERMATOLOGY    What is the provider, practice or medical service name: [facility]    What is the office location: [address] [place] [address]    What is the office phone number: [phone]    Any additional details: THE PATIENT STATED SHE HAD COVID BACK IN JULY. SHE STATED SHE IS STILL FATIGUED AND SHE IS HAVING HAIR LOSS. SHE WOULD LIKE TO KNOW IF PCP CAN PLACE A REFERRAL FOR HER AND A CALL BACK TO CONFIRM

## 2022-11-07 ENCOUNTER — CLINICAL SUPPORT (OUTPATIENT)
Dept: FAMILY MEDICINE CLINIC | Age: 82
End: 2022-11-07

## 2022-11-07 DIAGNOSIS — J30.9 ALLERGIC RHINITIS, UNSPECIFIED SEASONALITY, UNSPECIFIED TRIGGER: Primary | ICD-10-CM

## 2022-11-07 PROCEDURE — 95115 IMMUNOTHERAPY ONE INJECTION: CPT | Performed by: FAMILY MEDICINE

## 2022-11-09 ENCOUNTER — TRANSCRIBE ORDERS (OUTPATIENT)
Dept: ADMINISTRATIVE | Facility: HOSPITAL | Age: 82
End: 2022-11-09

## 2022-11-09 DIAGNOSIS — Z12.31 SCREENING MAMMOGRAM FOR BREAST CANCER: Primary | ICD-10-CM

## 2022-11-14 ENCOUNTER — CLINICAL SUPPORT (OUTPATIENT)
Dept: FAMILY MEDICINE CLINIC | Age: 82
End: 2022-11-14

## 2022-11-14 DIAGNOSIS — J30.9 ALLERGIC RHINITIS, UNSPECIFIED SEASONALITY, UNSPECIFIED TRIGGER: Primary | ICD-10-CM

## 2022-11-14 PROCEDURE — 95115 IMMUNOTHERAPY ONE INJECTION: CPT | Performed by: FAMILY MEDICINE

## 2022-11-21 ENCOUNTER — CLINICAL SUPPORT (OUTPATIENT)
Dept: FAMILY MEDICINE CLINIC | Age: 82
End: 2022-11-21

## 2022-11-21 DIAGNOSIS — J30.9 ALLERGIC RHINITIS, UNSPECIFIED SEASONALITY, UNSPECIFIED TRIGGER: Primary | ICD-10-CM

## 2022-11-21 PROCEDURE — 95115 IMMUNOTHERAPY ONE INJECTION: CPT | Performed by: FAMILY MEDICINE

## 2022-11-28 ENCOUNTER — CLINICAL SUPPORT (OUTPATIENT)
Dept: FAMILY MEDICINE CLINIC | Age: 82
End: 2022-11-28

## 2022-11-28 DIAGNOSIS — J30.9 ALLERGIC RHINITIS, UNSPECIFIED SEASONALITY, UNSPECIFIED TRIGGER: Primary | ICD-10-CM

## 2022-11-28 PROCEDURE — 95115 IMMUNOTHERAPY ONE INJECTION: CPT | Performed by: FAMILY MEDICINE

## 2022-12-16 ENCOUNTER — OFFICE VISIT (OUTPATIENT)
Dept: FAMILY MEDICINE CLINIC | Age: 82
End: 2022-12-16

## 2022-12-16 VITALS
RESPIRATION RATE: 16 BRPM | WEIGHT: 110 LBS | DIASTOLIC BLOOD PRESSURE: 64 MMHG | HEIGHT: 62 IN | SYSTOLIC BLOOD PRESSURE: 147 MMHG | OXYGEN SATURATION: 98 % | HEART RATE: 73 BPM | BODY MASS INDEX: 20.24 KG/M2 | TEMPERATURE: 98.2 F

## 2022-12-16 DIAGNOSIS — R05.1 ACUTE COUGH: ICD-10-CM

## 2022-12-16 DIAGNOSIS — J47.1 BRONCHIECTASIS WITH ACUTE EXACERBATION: Primary | ICD-10-CM

## 2022-12-16 DIAGNOSIS — J02.9 SORE THROAT: ICD-10-CM

## 2022-12-16 LAB
EXPIRATION DATE: NORMAL
EXPIRATION DATE: NORMAL
FLUAV AG UPPER RESP QL IA.RAPID: NOT DETECTED
FLUBV AG UPPER RESP QL IA.RAPID: NOT DETECTED
INTERNAL CONTROL: NORMAL
INTERNAL CONTROL: NORMAL
Lab: NORMAL
Lab: NORMAL
S PYO AG THROAT QL: NEGATIVE
SARS-COV-2 AG UPPER RESP QL IA.RAPID: NOT DETECTED
SARS-COV-2 RNA PNL SPEC NAA+PROBE: NOT DETECTED

## 2022-12-16 PROCEDURE — 99214 OFFICE O/P EST MOD 30 MIN: CPT

## 2022-12-16 PROCEDURE — 87880 STREP A ASSAY W/OPTIC: CPT

## 2022-12-16 PROCEDURE — U0005 INFEC AGEN DETEC AMPLI PROBE: HCPCS

## 2022-12-16 PROCEDURE — U0004 COV-19 TEST NON-CDC HGH THRU: HCPCS

## 2022-12-16 PROCEDURE — 87081 CULTURE SCREEN ONLY: CPT

## 2022-12-16 PROCEDURE — 87428 SARSCOV & INF VIR A&B AG IA: CPT

## 2022-12-16 RX ORDER — LEVOFLOXACIN 500 MG/1
500 TABLET, FILM COATED ORAL DAILY
Qty: 10 TABLET | Refills: 0 | Status: SHIPPED | OUTPATIENT
Start: 2022-12-16 | End: 2022-12-17

## 2022-12-16 RX ORDER — LEVOFLOXACIN 500 MG/1
500 TABLET, FILM COATED ORAL DAILY
Qty: 10 TABLET | Refills: 0 | Status: CANCELLED | OUTPATIENT
Start: 2022-12-16 | End: 2022-12-26

## 2022-12-16 NOTE — PROGRESS NOTES
Subjective     CHIEF COMPLAINT    Chief Complaint   Patient presents with   • Cough     Productive cough, fatigue, sore throat, headache, diarrhea, nausea. Symptoms started 3-4 days      History of Present Illness  Patient is an 82 year old female with a history of bronchiectasis who presents to the clinic today with complaints of cough. Her cough has been present 3-4 days, is productive of sputum. She also endorses fatigue, irritated throat, intermittent nausea. She denies fever, chills, wheezing, shortness of breath or chest pain. No known exposures to any illnesses. She follows with pulmonology. No recent antibiotic use. She is taking albuterol nebulizer, brovana, budesonide and nebusol as directed.       Review of Systems   Constitutional: Positive for fatigue. Negative for chills and fever.   HENT: Positive for congestion and sore throat (irritated ).    Respiratory: Positive for cough. Negative for shortness of breath and wheezing.    Cardiovascular: Negative for chest pain.   Gastrointestinal: Positive for nausea. Negative for vomiting.        Allergies   Allergen Reactions   • Fosamax [Alendronate] GI Intolerance   • Penicillins Unknown - Low Severity       Current Outpatient Medications on File Prior to Visit   Medication Sig Dispense Refill   • albuterol (2.5 MG/3ML) 0.083% nebulizer solution 3 mL, albuterol (5 MG/ML) 0.5% nebulizer solution 0.5 mL Inhale Every 4 (Four) Hours.     • amLODIPine (NORVASC) 2.5 MG tablet Take 1 tablet by mouth Every Night. 90 tablet 1   • azelastine (ASTELIN) 0.1 % nasal spray 1 spray into the nostril(s) as directed by provider 2 (Two) Times a Day.     • Brovana 15 MCG/2ML nebulizer solution USE 1 VIAL IN NEBULIZER EVERY 12 HOURS - Morning And Evening 120 mL 11   • budesonide (PULMICORT) 0.5 MG/2ML nebulizer solution USE 1 VIAL  IN  NEBULIZER TWICE  DAILY - Rinse Mouth After Treatment 60 each 11   • clonazePAM (KlonoPIN) 0.5 MG tablet Take 1 tablet by mouth 2 (Two) Times a Day  "As Needed for Anxiety. 90 tablet 1   • estradiol (ESTRACE) 1 MG tablet Take 1 tablet by mouth Daily. 90 tablet 1   • fluticasone (FLONASE) 50 MCG/ACT nasal spray 2 sprays into the nostril(s) as directed by provider Daily.     • levocetirizine (XYZAL) 5 MG tablet Take 1 tablet by mouth Every Evening. 90 tablet 3   • losartan (COZAAR) 50 MG tablet Take 1 tablet by mouth Daily. 90 tablet 1   • multivitamin with minerals tablet tablet Take 1 tablet by mouth Daily.     • Nebusal 3 % nebulizer solution inhale 1 vial by nebulizer TWICE DAILY 240 mL 4   • ondansetron ODT (ZOFRAN-ODT) 4 MG disintegrating tablet Place 1 tablet on the tongue Every 6 (Six) Hours As Needed for Nausea or Vomiting. 30 tablet 0   • pantoprazole (PROTONIX) 40 MG EC tablet Take 1 tablet by mouth Daily. 90 tablet 1   • pravastatin (PRAVACHOL) 20 MG tablet Take 1 tablet by mouth Daily. 90 tablet 1     No current facility-administered medications on file prior to visit.     /64 (BP Location: Right arm, Patient Position: Sitting, Cuff Size: Adult)   Pulse 73   Temp 98.2 °F (36.8 °C) (Oral)   Resp 16   Ht 157.5 cm (62.01\")   Wt 49.9 kg (110 lb)   SpO2 98% Comment: room air  BMI 20.11 kg/m²     Objective     Physical Exam  Vitals and nursing note reviewed.   Constitutional:       General: She is not in acute distress.     Appearance: Normal appearance. She is not ill-appearing.   HENT:      Head: Normocephalic and atraumatic.      Right Ear: Hearing, tympanic membrane, ear canal and external ear normal.      Left Ear: Hearing, tympanic membrane, ear canal and external ear normal.      Nose: Nose normal.      Right Sinus: No maxillary sinus tenderness or frontal sinus tenderness.      Left Sinus: No maxillary sinus tenderness or frontal sinus tenderness.      Mouth/Throat:      Lips: Pink.      Mouth: Mucous membranes are moist.      Pharynx: Oropharynx is clear. No posterior oropharyngeal erythema.   Eyes:      Extraocular Movements: " Extraocular movements intact.      Pupils: Pupils are equal, round, and reactive to light.   Cardiovascular:      Rate and Rhythm: Normal rate and regular rhythm.      Heart sounds: Normal heart sounds. No murmur heard.  Pulmonary:      Effort: Pulmonary effort is normal. No accessory muscle usage or respiratory distress.      Breath sounds: Normal breath sounds. No wheezing or rhonchi.   Musculoskeletal:      Cervical back: Normal range of motion.   Lymphadenopathy:      Cervical: No cervical adenopathy.   Skin:     General: Skin is warm and dry.   Neurological:      General: No focal deficit present.      Mental Status: She is alert and oriented to person, place, and time.   Psychiatric:         Mood and Affect: Mood and affect normal.         Speech: Speech normal.            Lab Results (last 24 hours)     Procedure Component Value Units Date/Time    POCT rapid strep A [[id number]] Collected: 12/16/22 1554    Specimen: Swab Updated: 12/16/22 1554     Rapid Strep A Screen Negative     Internal Control Passed     Lot Number [id number]     Expiration Date 02/28/2024    Beta Strep Culture, Throat - Swab, Throat [[id number]]  (Normal) Collected: 12/16/22 1555    Specimen: Swab from Throat Updated: 12/17/22 1301     Throat Culture, Beta Strep No Beta Hemolytic Streptococcus Isolated    Narrative:      Group A Strep incidence is low in adults. Positive culture for Beta hemolytic Streptococcus species can reflect colonization and not true infection. Please correlate clinically.    POCT SARS-CoV-2 Antigen AMBER + Flu [[id number]] Collected: 12/16/22 1601    Specimen: Swab Updated: 12/16/22 1602     SARS Antigen Not Detected     Influenza A Antigen AMBER Not Detected     Influenza B Antigen AMBER Not Detected     Internal Control Passed     Lot Number [id number]     Expiration Date 11/02/23    COVID-19,APTIMA PANTHER(FRANKLYN),[facility], NP/OP SWAB IN UTM/VTM/SALINE TRANSPORT MEDIA,24 HR TAT - Swab, Nasopharynx [[id number]]  (Normal)  Collected: 12/16/22 1713    Specimen: Swab from Nasopharynx Updated: 12/16/22 2351     COVID19 Not Detected    Narrative:      Fact sheet for providers: https://www.fda.gov/media/642609/download     Fact sheet for patients: https://www.fda.gov/media/118926/download    Test performed by RT PCR.           Diagnoses and all orders for this visit:    1. Bronchiectasis with acute exacerbation (HCC) (Primary)  -     Discontinue: levoFLOXacin (Levaquin) 500 MG tablet; Take 1 tablet by mouth Daily for 10 days. Hold multivitamin while on this medication  Dispense: 10 tablet; Refill: 0  -     levoFLOXacin (Levaquin) 250 MG tablet; Take 1 tablet by mouth Daily for 8 days. Do not take your multivitamin while on this medication  Dispense: 8 tablet; Refill: 0    2. Acute cough  Comments:  Negative rapid COVID and flu, PCR sent.   Orders:  -     POCT SARS-CoV-2 Antigen AMBER + Flu  -     COVID-19,APTIMA PANTHER(FRANKLYN),[facility], NP/OP SWAB IN UTM/VTM/SALINE TRANSPORT MEDIA,24 HR TAT - Swab, Nasopharynx; Future  -     COVID-19,APTIMA PANTHER(FRANKLYN),[facility], NP/OP SWAB IN UTM/VTM/SALINE TRANSPORT MEDIA,24 HR TAT - Swab, Nasopharynx    3. Sore throat  Comments:  Negative rapid strep, culture sent and pending. Will treat accordingly.  Orders:  -     POCT rapid strep A  -     Beta Strep Culture, Throat - , Throat; Future  -     Beta Strep Culture, Throat - Swab, Throat      Symptoms are consistent with a bronchiectasis exacerbation. Patient to start levaquin. Patient does not think she can produce a sputum specimen today.  She was advised to continue her meds per pulmonology. She was advised on strict ER precautions. Patient voiced understanding of all instructions and had no further questions at this time. A COVID PCR swab was also obtained and we will notify her of the results.  Chart was routed to pulmonologist for further recommendations    Addendum 12- at 9 AM: COVID PCR results negative.  Due to patient's kidney  function, will decrease dose of Levaquin to 250 mg daily after initial dose of 500 mg.  Called patient and notified her of COVID PCR results.  Also notified her of adjustment to Levaquin dosing.  Patient stated she already took 500 mg on 12/17/22.  Advised to increase fluid intake, start 250 mg dose tomorrow.  Follow-up with PCP on Tuesday as scheduled.      Follow up:  Return if symptoms worsen or fail to improve.  Patient was given instructions and counseling regarding her condition or for health maintenance advice. Please see specific information pulled into the AVS if appropriate.

## 2022-12-17 RX ORDER — LEVOFLOXACIN 250 MG/1
250 TABLET ORAL EVERY 24 HOURS
Qty: 8 TABLET | Refills: 0 | Status: SHIPPED | OUTPATIENT
Start: 2022-12-18 | End: 2022-12-26

## 2022-12-18 LAB — BACTERIA SPEC AEROBE CULT: NORMAL

## 2022-12-20 ENCOUNTER — LAB (OUTPATIENT)
Dept: LAB | Facility: HOSPITAL | Age: 82
End: 2022-12-20

## 2022-12-20 ENCOUNTER — OFFICE VISIT (OUTPATIENT)
Dept: FAMILY MEDICINE CLINIC | Age: 82
End: 2022-12-20

## 2022-12-20 VITALS
OXYGEN SATURATION: 100 % | DIASTOLIC BLOOD PRESSURE: 65 MMHG | HEIGHT: 62 IN | HEART RATE: 85 BPM | SYSTOLIC BLOOD PRESSURE: 139 MMHG | BODY MASS INDEX: 20.24 KG/M2 | WEIGHT: 110 LBS | RESPIRATION RATE: 16 BRPM

## 2022-12-20 DIAGNOSIS — R06.09 DYSPNEA ON EXERTION: ICD-10-CM

## 2022-12-20 DIAGNOSIS — R73.9 HYPERGLYCEMIA: ICD-10-CM

## 2022-12-20 DIAGNOSIS — J30.9 ALLERGIC RHINITIS, UNSPECIFIED SEASONALITY, UNSPECIFIED TRIGGER: ICD-10-CM

## 2022-12-20 DIAGNOSIS — R53.83 FATIGUE, UNSPECIFIED TYPE: ICD-10-CM

## 2022-12-20 DIAGNOSIS — I10 ESSENTIAL HYPERTENSION: ICD-10-CM

## 2022-12-20 DIAGNOSIS — N95.9 MENOPAUSAL AND POSTMENOPAUSAL DISORDER: ICD-10-CM

## 2022-12-20 DIAGNOSIS — K21.9 GERD WITHOUT ESOPHAGITIS: ICD-10-CM

## 2022-12-20 DIAGNOSIS — F41.1 GENERALIZED ANXIETY DISORDER: ICD-10-CM

## 2022-12-20 DIAGNOSIS — E78.2 MIXED HYPERLIPIDEMIA: ICD-10-CM

## 2022-12-20 DIAGNOSIS — I10 ESSENTIAL HYPERTENSION: Primary | ICD-10-CM

## 2022-12-20 LAB
ALBUMIN SERPL-MCNC: 4.3 G/DL (ref 3.5–5.2)
ALBUMIN/GLOB SERPL: 1.8 G/DL
ALP SERPL-CCNC: 57 U/L (ref 39–117)
ALT SERPL W P-5'-P-CCNC: 14 U/L (ref 1–33)
ANION GAP SERPL CALCULATED.3IONS-SCNC: 7 MMOL/L (ref 5–15)
AST SERPL-CCNC: 19 U/L (ref 1–32)
BILIRUB SERPL-MCNC: 0.4 MG/DL (ref 0–1.2)
BUN SERPL-MCNC: 38 MG/DL (ref 8–23)
BUN/CREAT SERPL: 39.2 (ref 7–25)
CALCIUM SPEC-SCNC: 9.6 MG/DL (ref 8.6–10.5)
CHLORIDE SERPL-SCNC: 107 MMOL/L (ref 98–107)
CK SERPL-CCNC: 34 U/L (ref 20–180)
CO2 SERPL-SCNC: 28 MMOL/L (ref 22–29)
CREAT SERPL-MCNC: 0.97 MG/DL (ref 0.57–1)
DEPRECATED RDW RBC AUTO: 42.9 FL (ref 37–54)
EGFRCR SERPLBLD CKD-EPI 2021: 59.2 ML/MIN/1.73
ERYTHROCYTE [DISTWIDTH] IN BLOOD BY AUTOMATED COUNT: 13 % (ref 12.3–15.4)
FOLATE SERPL-MCNC: >20 NG/ML (ref 4.78–24.2)
GLOBULIN UR ELPH-MCNC: 2.4 GM/DL
GLUCOSE SERPL-MCNC: 112 MG/DL (ref 65–99)
HCT VFR BLD AUTO: 38.2 % (ref 34–46.6)
HGB BLD-MCNC: 12.3 G/DL (ref 12–15.9)
MCH RBC QN AUTO: 28.9 PG (ref 26.6–33)
MCHC RBC AUTO-ENTMCNC: 32.2 G/DL (ref 31.5–35.7)
MCV RBC AUTO: 89.9 FL (ref 79–97)
PLATELET # BLD AUTO: 254 10*3/MM3 (ref 140–450)
PMV BLD AUTO: 9.8 FL (ref 6–12)
POTASSIUM SERPL-SCNC: 3.7 MMOL/L (ref 3.5–5.2)
PROT SERPL-MCNC: 6.7 G/DL (ref 6–8.5)
RBC # BLD AUTO: 4.25 10*6/MM3 (ref 3.77–5.28)
SODIUM SERPL-SCNC: 142 MMOL/L (ref 136–145)
TSH SERPL DL<=0.05 MIU/L-ACNC: 1.35 UIU/ML (ref 0.27–4.2)
VIT B12 BLD-MCNC: 498 PG/ML (ref 211–946)
WBC NRBC COR # BLD: 6.06 10*3/MM3 (ref 3.4–10.8)

## 2022-12-20 PROCEDURE — 82746 ASSAY OF FOLIC ACID SERUM: CPT

## 2022-12-20 PROCEDURE — 82550 ASSAY OF CK (CPK): CPT

## 2022-12-20 PROCEDURE — 36415 COLL VENOUS BLD VENIPUNCTURE: CPT

## 2022-12-20 PROCEDURE — 83036 HEMOGLOBIN GLYCOSYLATED A1C: CPT | Performed by: FAMILY MEDICINE

## 2022-12-20 PROCEDURE — 99214 OFFICE O/P EST MOD 30 MIN: CPT | Performed by: FAMILY MEDICINE

## 2022-12-20 PROCEDURE — 82607 VITAMIN B-12: CPT

## 2022-12-20 PROCEDURE — 85027 COMPLETE CBC AUTOMATED: CPT

## 2022-12-20 PROCEDURE — 84443 ASSAY THYROID STIM HORMONE: CPT

## 2022-12-20 PROCEDURE — 80053 COMPREHEN METABOLIC PANEL: CPT

## 2022-12-20 PROCEDURE — 93000 ELECTROCARDIOGRAM COMPLETE: CPT | Performed by: FAMILY MEDICINE

## 2022-12-20 RX ORDER — CLONAZEPAM 0.5 MG/1
0.5 TABLET ORAL 2 TIMES DAILY PRN
Qty: 90 TABLET | Refills: 1 | Status: SHIPPED | OUTPATIENT
Start: 2022-12-20

## 2022-12-20 RX ORDER — LOSARTAN POTASSIUM 50 MG/1
50 TABLET ORAL DAILY
Qty: 90 TABLET | Refills: 1 | Status: SHIPPED | OUTPATIENT
Start: 2022-12-20

## 2022-12-20 RX ORDER — AMLODIPINE BESYLATE 2.5 MG/1
2.5 TABLET ORAL NIGHTLY
Qty: 90 TABLET | Refills: 1 | Status: SHIPPED | OUTPATIENT
Start: 2022-12-20

## 2022-12-20 RX ORDER — ESTRADIOL 1 MG/1
1 TABLET ORAL DAILY
Qty: 90 TABLET | Refills: 1 | Status: SHIPPED | OUTPATIENT
Start: 2022-12-20

## 2022-12-20 RX ORDER — PANTOPRAZOLE SODIUM 40 MG/1
40 TABLET, DELAYED RELEASE ORAL DAILY
Qty: 90 TABLET | Refills: 1 | Status: SHIPPED | OUTPATIENT
Start: 2022-12-20

## 2022-12-20 RX ORDER — LEVOCETIRIZINE DIHYDROCHLORIDE 5 MG/1
5 TABLET, FILM COATED ORAL EVERY EVENING
Qty: 90 TABLET | Refills: 3 | Status: SHIPPED | OUTPATIENT
Start: 2022-12-20

## 2022-12-20 RX ORDER — PRAVASTATIN SODIUM 20 MG
20 TABLET ORAL DAILY
Qty: 90 TABLET | Refills: 1 | Status: SHIPPED | OUTPATIENT
Start: 2022-12-20

## 2022-12-20 NOTE — PROGRESS NOTES
Luma Cruz presents to [facility].    Chief Complaint:  Blood pressure, anxiety, HRT, cholesterol, GERD    Subjective       History of Present Illness:  Luma Grey is in today for follow up.  She has hypertension and remains on treatment for this as noted below.  Her blood pressure is fairly well controlled today.  She occasionally checks her blood pressure at home and states it is usually in a fairly good range.    In addition, she has elevated cholesterol and remains on pravastatin.  Her most recent lipid profile looked good.    She did have a recent flaring of bronchiectasis and was treated with oral Levaquin.  She is completing a course of antibiotics and is continuing to have some deep cough.  She denies fever or chills.    She also has chronic anxiety that we previously discussed.  She currently takes clonazepam on a regular basis for this.  Davi is reviewed.  We have again discussed the potential risk of this medication including altered mental status and fall risk.  I do not think dosage decrease is appropriate for the near term.  She has ongoing stressors.    Review of Systems:  Review of Systems   Constitutional: Positive for fatigue. Negative for chills and fever.   Respiratory: Positive for cough and shortness of breath (with exertion).    Cardiovascular: Negative for chest pain and palpitations.   Gastrointestinal: Negative for abdominal pain, nausea and vomiting.        Objective   Medical History:  Past Medical History:   • COVID-19   • Essential hypertension   • Generalized anxiety disorder   • GERD without esophagitis   • Menopausal and postmenopausal disorder   • Mixed hyperlipidemia     Past Surgical History:   • COLONOSCOPY   • HYSTERECTOMY    complete   • TONSILLECTOMY AND ADENOIDECTOMY      Family History   Problem Relation Age of Onset   • Stroke Mother    • Hypertension Mother    • Coronary artery disease Father      Social History     Tobacco Use   • Smoking  status: Never   • Smokeless tobacco: Never   Substance Use Topics   • Alcohol use: Not Currently       Health Maintenance Due   Topic Date Due   • TDAP/TD VACCINES (1 - Tdap) Never done        Immunization History   Administered Date(s) Administered   • COVID-19 (MODERNA) 1st, 2nd, 3rd Dose Only 01/27/2021, 02/24/2021, 10/30/2021, 04/13/2022   • COVID-19 (MODERNA) BIVALENT BOOSTER 12+YRS 10/19/2022   • Fluad Quad 65+ 09/29/2022   • Fluzone High Dose =>65 Years (Vaxcare ONLY) 10/16/2012, 09/25/2013, 09/28/2017, 09/24/2020   • Fluzone High-Dose 65+yrs 10/02/2021   • Hepatitis A 12/06/2018       Allergies   Allergen Reactions   • Fosamax [Alendronate] GI Intolerance   • Penicillins Unknown - Low Severity        Medications:  Current Outpatient Medications on File Prior to Visit   Medication Sig   • albuterol (2.5 MG/3ML) 0.083% nebulizer solution 3 mL, albuterol (5 MG/ML) 0.5% nebulizer solution 0.5 mL Inhale Every 4 (Four) Hours.   • azelastine (ASTELIN) 0.1 % nasal spray 1 spray into the nostril(s) as directed by provider 2 (Two) Times a Day.   • Brovana 15 MCG/2ML nebulizer solution USE 1 VIAL IN NEBULIZER EVERY 12 HOURS - Morning And Evening   • budesonide (PULMICORT) 0.5 MG/2ML nebulizer solution USE 1 VIAL  IN  NEBULIZER TWICE  DAILY - Rinse Mouth After Treatment   • fluticasone (FLONASE) 50 MCG/ACT nasal spray 2 sprays into the nostril(s) as directed by provider Daily.   • levoFLOXacin (Levaquin) 250 MG tablet Take 1 tablet by mouth Daily for 8 days. Do not take your multivitamin while on this medication   • multivitamin with minerals tablet tablet Take 1 tablet by mouth Daily.   • Nebusal 3 % nebulizer solution inhale 1 vial by nebulizer TWICE DAILY   • ondansetron ODT (ZOFRAN-ODT) 4 MG disintegrating tablet Place 1 tablet on the tongue Every 6 (Six) Hours As Needed for Nausea or Vomiting.   • [DISCONTINUED] amLODIPine (NORVASC) 2.5 MG tablet Take 1 tablet by mouth Every Night.   • [DISCONTINUED] clonazePAM  "(KlonoPIN) 0.5 MG tablet Take 1 tablet by mouth 2 (Two) Times a Day As Needed for Anxiety.   • [DISCONTINUED] estradiol (ESTRACE) 1 MG tablet Take 1 tablet by mouth Daily.   • [DISCONTINUED] levocetirizine (XYZAL) 5 MG tablet Take 1 tablet by mouth Every Evening.   • [DISCONTINUED] losartan (COZAAR) 50 MG tablet Take 1 tablet by mouth Daily.   • [DISCONTINUED] pantoprazole (PROTONIX) 40 MG EC tablet Take 1 tablet by mouth Daily.   • [DISCONTINUED] pravastatin (PRAVACHOL) 20 MG tablet Take 1 tablet by mouth Daily.     No current facility-administered medications on file prior to visit.       Vital Signs:   /65 (BP Location: Right arm, Patient Position: Sitting, Cuff Size: Adult)   Pulse 85   Resp 16   Ht 157.5 cm (62.01\")   Wt 49.9 kg (110 lb)   SpO2 100% Comment: room air  BMI 20.11 kg/m²         Physical Exam:  Physical Exam  Vitals and nursing note reviewed.   Constitutional:       General: She is not in acute distress.     Appearance: She is not ill-appearing.   HENT:      Right Ear: Tympanic membrane and ear canal normal.      Left Ear: Tympanic membrane and ear canal normal.      Mouth/Throat:      Mouth: Mucous membranes are moist.      Comments: Pharynx appears normal  Eyes:      Extraocular Movements: Extraocular movements intact.      Pupils: Pupils are equal, round, and reactive to light.   Neck:      Thyroid: No thyromegaly.   Cardiovascular:      Rate and Rhythm: Normal rate and regular rhythm.      Heart sounds: No murmur heard.  Pulmonary:      Effort: Pulmonary effort is normal.      Breath sounds: Normal breath sounds.   Abdominal:      General: There is no distension.      Palpations: Abdomen is soft. There is no mass.      Tenderness: There is no abdominal tenderness.   Musculoskeletal:      Cervical back: Normal range of motion.   Skin:     Findings: No lesion or rash.   Neurological:      General: No focal deficit present.      Mental Status: She is oriented to person, place, and " time.      Cranial Nerves: No cranial nerve deficit.   Psychiatric:         Mood and Affect: Mood normal.         Result Review      The following data was reviewed by Joseph Walker MD on 12/20/2022.  Lab Results   Component Value Date    WBC 5.89 06/09/2022    HGB 12.5 06/09/2022    HCT 40.2 06/09/2022    MCV 91.2 06/09/2022     06/09/2022     Lab Results   Component Value Date    GLUCOSE 88 06/09/2022    BUN 33 (H) 06/09/2022    CREATININE 0.91 06/09/2022     06/09/2022    K 4.3 06/09/2022     06/09/2022    CO2 26.0 06/09/2022    CALCIUM 9.7 06/09/2022    PROTEINTOT 7.0 06/09/2022    ALBUMIN 4.60 06/09/2022    ALT 12 06/09/2022    AST 23 06/09/2022    ALKPHOS 66 06/09/2022    BILITOT 0.2 06/09/2022    EGFRIFNONA 62 12/28/2021    GLOB 2.4 06/09/2022    AGRATIO 1.9 06/09/2022    BCR 36.3 (H) 06/09/2022    ANIONGAP 10.0 06/09/2022      Lab Results   Component Value Date    CHOL 168 06/09/2022    CHLPL 157 03/10/2020    TRIG 89 06/09/2022    HDL 76 (H) 06/09/2022    LDL 76 06/09/2022     Lab Results   Component Value Date    TSH 1.370 06/09/2022        ECG 12 Lead    Date/Time: 12/20/2022 1:11 PM  Performed by: Joseph Walker MD  Authorized by: Joseph Walker MD   Comparison: compared with previous ECG from 5/31/2011  Similar to previous ECG  Rhythm: sinus rhythm  Rate: normal  BPM: 68  Conduction: conduction normal  ST Segments: ST segments normal  T Waves: T waves normal  T flattening: III  QRS axis: normal  Other findings comments: Isolated Q in lead III    Clinical impression: normal ECG  Clinical impression comment: This is an essentially normal tracing with findings as above.  There is no significant change from previous exam.               Assessment and Plan:   Today, we have reviewed her care.  We will plan to update her medication refills and labs as noted below.  She has had some ongoing difficulty with fatigue.  There is not a clear explanation for this.  We  will move ahead with EKG as a precaution and consider whether some heart evaluation would be in order.  Otherwise, we will plan to see her back in about 6 months for recheck.       Diagnoses and all orders for this visit:    1. Essential hypertension (Primary)  -     Comprehensive Metabolic Panel; Future  -     amLODIPine (NORVASC) 2.5 MG tablet; Take 1 tablet by mouth Every Night.  Dispense: 90 tablet; Refill: 1  -     losartan (COZAAR) 50 MG tablet; Take 1 tablet by mouth Daily.  Dispense: 90 tablet; Refill: 1    2. Generalized anxiety disorder  -     clonazePAM (KlonoPIN) 0.5 MG tablet; Take 1 tablet by mouth 2 (Two) Times a Day As Needed for Anxiety.  Dispense: 90 tablet; Refill: 1    3. Menopausal and postmenopausal disorder  -     estradiol (ESTRACE) 1 MG tablet; Take 1 tablet by mouth Daily.  Dispense: 90 tablet; Refill: 1    4. Allergic rhinitis, unspecified seasonality, unspecified trigger  -     levocetirizine (XYZAL) 5 MG tablet; Take 1 tablet by mouth Every Evening.  Dispense: 90 tablet; Refill: 3    5. GERD without esophagitis  -     pantoprazole (PROTONIX) 40 MG EC tablet; Take 1 tablet by mouth Daily.  Dispense: 90 tablet; Refill: 1    6. Mixed hyperlipidemia  -     Comprehensive Metabolic Panel; Future  -     TSH; Future  -     CK; Future  -     pravastatin (PRAVACHOL) 20 MG tablet; Take 1 tablet by mouth Daily.  Dispense: 90 tablet; Refill: 1    7. Fatigue, unspecified type  -     CBC (No Diff); Future  -     TSH; Future  -     Vitamin B12 & Folate; Future  -     ECG 12 Lead    8. Dyspnea on exertion  -     CBC (No Diff); Future  -     ECG 12 Lead          Follow Up   Return in about 6 months (around 6/20/2023) for Recheck, Medicare Wellness.  Patient was given instructions and counseling regarding her condition or for health maintenance advice. Please see specific information pulled into the AVS if appropriate.

## 2022-12-21 LAB — HBA1C MFR BLD: 5.8 % (ref 4.8–5.6)

## 2022-12-29 ENCOUNTER — CLINICAL SUPPORT (OUTPATIENT)
Dept: FAMILY MEDICINE CLINIC | Age: 82
End: 2022-12-29

## 2022-12-29 DIAGNOSIS — J30.9 ALLERGIC RHINITIS, UNSPECIFIED SEASONALITY, UNSPECIFIED TRIGGER: Primary | ICD-10-CM

## 2022-12-29 PROCEDURE — 95115 IMMUNOTHERAPY ONE INJECTION: CPT | Performed by: FAMILY MEDICINE

## 2023-01-01 NOTE — PATIENT INSTRUCTIONS
Bronchiectasis  Bronchiectasis is a condition in which the airways in the lungs (bronchi) are damaged and widened. The condition makes it hard for the lungs to get rid of mucus, and it causes mucus to gather in the bronchi. This condition often leads to lung infections, which can make the condition worse.  What are the causes?  You can be born with this condition, or you can develop it later in life. Common causes of this condition include:  Cystic fibrosis.  Repeated lung infections, such as pneumonia or tuberculosis.  An object or other blockage in the lungs.  Breathing in fluid, food, or other objects (aspiration).  A problem with the body's defense system (immune system) and lung structure that is present at birth (congenital).  Sometimes the cause is not known.  What are the signs or symptoms?  Common symptoms of this condition include:  A daily cough that brings up mucus and lasts for more than 3 weeks.  Lung infections that happen often.  Shortness of breath and wheezing.  Weakness and feeling tired (fatigue).  How is this diagnosed?  This condition is diagnosed with tests, such as:  Chest X-rays or CT scans. These are done to check for changes in the lungs.  Breathing tests. These are done to check how well your lungs are working.  A test of a sample of your saliva (sputum culture). This test is done to check for infection.  Blood tests and other tests. These are done to check for related diseases or causes.  How is this treated?  Treatment for this condition depends on the severity of the illness and its cause. Treatment may include:  Medicines that loosen mucus so it can be coughed up (mucolytics).  Medicines that relax the muscles of the bronchi (bronchodilators).  Antibiotic medicines to prevent or treat infection.  Physical therapy to help clear mucus from the lungs. Techniques may include:  Postural drainage. This is when you sit or lie in certain positions so that mucus can drain by gravity.  Chest  percussion. This involves tapping the chest or back with a cupped hand.  Chest vibration. For this therapy, a hand or special equipment vibrates your chest and back.  Surgery to remove the affected part of the lung. This may be done in severe cases.  Follow these instructions at home:  Medicines  Take over-the-counter and prescription medicines only as told by your health care provider.  If you were prescribed an antibiotic medicine, take it as told by your health care provider. Do not stop taking the antibiotic even if you start to feel better.  Avoid taking sedatives and antihistamines unless your health care provider tells you to take them. These medicines tend to thicken the mucus in the lungs.  Managing symptoms  Do breathing exercises or techniques to clear your lungs as told by your health care provider.  Consider using a cold steam vaporizer or humidifier in your room or home to help loosen secretions.  If you have a cough that gets worse at night, try sleeping in a semi-upright position.  General instructions  Get plenty of rest.  Drink enough fluid to keep your urine pale yellow.  Stay inside when pollution and ozone levels are high.  Stay up to date with vaccinations and immunizations.  Avoid cigarette smoke and other lung irritants.  Do not use any products that contain nicotine or tobacco. These products include cigarettes, chewing tobacco, and vaping devices, such as e-cigarettes. If you need help quitting, ask your health care provider.  Keep all follow-up visits. This is important.  Contact a health care provider if:  You cough up more sputum than before and the sputum is yellow or green in color.  You have a fever or chills.  You cannot control your cough and are losing sleep.  Get help right away if:  You cough up blood.  You have chest pain.  You have increasing shortness of breath.  You have pain that gets worse or is not controlled with medicines.  You have a fever and your symptoms suddenly get  worse.  These symptoms may be an emergency. Get help right away. Call 911.  Do not wait to see if the symptoms will go away.  Do not drive yourself to the hospital.  Summary  Bronchiectasis is a condition in which the airways in the lungs (bronchi) are damaged and widened. The condition makes it hard for the lungs to get rid of mucus, and it causes mucus to gather in the bronchi.  Treatment usually includes therapy to help clear mucus from the lungs.  Avoid cigarette smoke and other lung irritants.  Stay up to date with vaccinations and immunizations.  This information is not intended to replace advice given to you by your health care provider. Make sure you discuss any questions you have with your health care provider.  Document Revised: 08/31/2022 Document Reviewed: 07/19/2022  Elsevier Patient Education © 2022 [employer]

## 2023-01-01 NOTE — PROGRESS NOTES
Primary Care Provider  Joseph Walker MD     Referring Provider  No ref. provider found     Chief Complaint  Cough (A few weeks ago. ) and Follow-up (6 month follow up)    Subjective          History of Presenting Illness  Patient is a 82-year-old  female, patient Dr. Forbes's who presents for management of bronchiectasis who presents for follow-up visit today.  Patient states that since last visit her breathing is at baseline.  Patient states that she does get short of breath that is worse with exertion, moderate severity, and improved with rest.  Patient states that she is taking Brovana and Pulmicort every day as prescribed and uses albuterol nebulizer treatments as needed.    Patient states that she does have a flutter valve with sodium chloride nebulizer treatments to use to assist with airway clearance.  Patient states that she is under the care of an allergist and is receiving allergy shots.  Patient states that she is taking Xyzal, Astelin nasal spray, and Flonase nasal spray. Patient denies fever, chills, night sweats, swollen glands in the head and neck, unintentional weight loss, hemoptysis, purulent sputum production, dysphagia, chest pain, palpitations, chest tightness, abdominal pain, nausea, vomiting, and diarrhea.  Patient also denies any myalgias, changes in sense of taste and/or smell, sore throat, any other coronavirus or flu-like symptoms.  Patient denies any leg swelling, orthopnea, paroxysmal nocturnal dyspnea.  Patient is able to perform activities of daily living.        Review of Systems   Constitutional: Negative for activity change, appetite change, chills, diaphoresis, fatigue, fever, unexpected weight gain and unexpected weight loss.        Negative for Insomnia   HENT: Negative for congestion (Nasal), mouth sores, nosebleeds, postnasal drip, sore throat, swollen glands and trouble swallowing.         Negative for Thrush  Negative for Hoarseness  Negative for  Allergies/Hay Fever  Negative for Recent Head injury  Negative for Ear Fullness  Negative for Nasal or Sinus pain  Negative for Dry lips  Negative for Nasal discharge   Respiratory: Positive for cough (intermittent, dry) and shortness of breath. Negative for apnea, chest tightness and wheezing.         Negative for Hemoptysis  Negative for Pleuritic pain   Cardiovascular: Negative for chest pain, palpitations and leg swelling.        Negative for Claudication  Negative for Cyanosis  Negative for Dyspnea on exertion   Gastrointestinal: Negative for abdominal pain, diarrhea, nausea, vomiting and GERD.   Musculoskeletal: Negative for joint swelling and myalgias.        Negative for Joint pain  Negative for Joint stiffness   Skin: Negative for color change, dry skin, pallor and rash.   Neurological: Negative for syncope, weakness and headache.   Hematological: Negative for adenopathy. Does not bruise/bleed easily.        Family History   Problem Relation Age of Onset   • Stroke Mother    • Hypertension Mother    • Coronary artery disease Father         Social History     Socioeconomic History   • Marital status:    • Number of children: 2   Tobacco Use   • Smoking status: Never   • Smokeless tobacco: Never   Vaping Use   • Vaping Use: Never used   Substance and Sexual Activity   • Alcohol use: Not Currently   • Drug use: Never   • Sexual activity: Defer        Past Medical History:   Diagnosis Date   • COVID-19 07/01/2022   • Essential hypertension    • Generalized anxiety disorder    • GERD without esophagitis    • Menopausal and postmenopausal disorder    • Mixed hyperlipidemia         Immunization History   Administered Date(s) Administered   • COVID-19 (MODERNA) 1st, 2nd, 3rd Dose Only 01/27/2021, 02/24/2021, 10/30/2021, 04/13/2022   • COVID-19 (MODERNA) BIVALENT BOOSTER 12+YRS 10/19/2022   • Fluad Quad 65+ 09/29/2022   • Fluzone High Dose =>65 Years ([employer] ONLY) 10/16/2012, 09/25/2013, 09/28/2017,  09/24/2020   • Fluzone High-Dose 65+yrs 10/02/2021   • Hepatitis A 12/06/2018       Allergies   Allergen Reactions   • Fosamax [Alendronate] GI Intolerance   • Penicillins Unknown - Low Severity          Current Outpatient Medications:   •  arformoterol (Brovana) 15 MCG/2ML nebulizer solution, Take 2 mL by nebulization 2 (Two) Times a Day for 30 days., Disp: 120 mL, Rfl: 11  •  azelastine (ASTELIN) 0.1 % nasal spray, 1 spray into the nostril(s) as directed by provider 2 (Two) Times a Day., Disp: , Rfl:   •  budesonide (PULMICORT) 0.5 MG/2ML nebulizer solution, Take 2 mL by nebulization 2 (Two) Times a Day. Rinse mouth out after each use, Disp: 60 each, Rfl: 11  •  fluticasone (FLONASE) 50 MCG/ACT nasal spray, 2 sprays into the nostril(s) as directed by provider Daily., Disp: , Rfl:   •  Nebusal 3 % nebulizer solution, inhale 1 vial by nebulizer TWICE DAILY, Disp: 240 mL, Rfl: 4  •  albuterol (2.5 MG/3ML) 0.083% nebulizer solution 3 mL, albuterol (5 MG/ML) 0.5% nebulizer solution 0.5 mL, Inhale Every 4 (Four) Hours., Disp: , Rfl:   •  amLODIPine (NORVASC) 2.5 MG tablet, Take 1 tablet by mouth Every Night., Disp: 90 tablet, Rfl: 1  •  clonazePAM (KlonoPIN) 0.5 MG tablet, Take 1 tablet by mouth 2 (Two) Times a Day As Needed for Anxiety., Disp: 90 tablet, Rfl: 1  •  estradiol (ESTRACE) 1 MG tablet, Take 1 tablet by mouth Daily., Disp: 90 tablet, Rfl: 1  •  levocetirizine (XYZAL) 5 MG tablet, Take 1 tablet by mouth Every Evening., Disp: 90 tablet, Rfl: 3  •  losartan (COZAAR) 50 MG tablet, Take 1 tablet by mouth Daily., Disp: 90 tablet, Rfl: 1  •  multivitamin with minerals tablet tablet, Take 1 tablet by mouth Daily., Disp: , Rfl:   •  ondansetron ODT (ZOFRAN-ODT) 4 MG disintegrating tablet, Place 1 tablet on the tongue Every 6 (Six) Hours As Needed for Nausea or Vomiting., Disp: 30 tablet, Rfl: 0  •  pantoprazole (PROTONIX) 40 MG EC tablet, Take 1 tablet by mouth Daily., Disp: 90 tablet, Rfl: 1  •  pravastatin  "(PRAVACHOL) 20 MG tablet, Take 1 tablet by mouth Daily., Disp: 90 tablet, Rfl: 1  •  sucralfate (Carafate) 1 g tablet, Take 1 tablet by mouth 3 (Three) Times a Day., Disp: 90 tablet, Rfl: 0  •  sucralfate (Carafate) 1 g tablet, Take 1 tablet by mouth 3 (Three) Times a Day., Disp: 90 tablet, Rfl: 0  No current facility-administered medications for this visit.     Objective     Physical Exam  Vital Signs:   WDWN, Alert, NAD.    HEENT:  PERRL, EOMI.  OP, nares clear, no sinus tenderness  Neck:  Supple, no JVD, no thyromegaly.  Lymph: no axillary, cervical, supraclavicular lymphadenopathy noted bilaterally  Chest:  good aeration, clear to auscultation bilaterally, tympanic to percussion bilaterally, no work of breathing noted  CV: RRR, no MGR, pulses 2+, equal.  Abd:  Soft, NT, ND, + BS, no HSM  EXT:  no clubbing, no cyanosis, no edema, no joint tenderness  Neuro:  A&Ox3, CN grossly intact, no focal deficits.  Skin: No rashes or lesions noted.    /46 (BP Location: Left arm, Patient Position: Sitting, Cuff Size: Small Adult)   Pulse 71   Resp 16   Ht 157.5 cm (62.01\")   Wt 50.3 kg (111 lb)   SpO2 99% Comment: room air  BMI 20.30 kg/m²         Result Review :   I have reviewed my last office visit note.    Procedures:         Assessment and Plan      Assessment:  1.  Bronchiectasis.   2.  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  The patient reports she is feeling better.  Last chest CT scan showed patchy groundglass basilar lower lobes has resolved.   3. History of fungal pneumonia, treated for two months of [place] with antibiotic associated issues.      4. GERD, patient on proton pump inhibitor.        5. Chronic cough.      6. Mucus plugging, patient on flutter valve and sodium chloride nebulizers twice a day.     7.  Airway clearance impairment.  8.  Seasonal allergies on allergy " immunotherapy.      9.  Never smoker.        Plan:  1. Continue Brovana and Pulmicort everyday as prescribed and rinse her mouth out after each use.      2. Continue albuterol nebulizers as needed.  3. Continue Astelin, Flonase, and Xyzal as prescribed.  4. Continue allergy immunotherapy and to follow up with allergist as scheduled.      5. Continue flutter valve with nebulizer treatments to assist with airway clearance.  6.  For GERD,  patient to continue PPI.   Patient is also advised to sleep with the head of the bed elevated and do not eat 3-4 hours prior to bedtime.  7.  Vaccination status: patient reports they are up-to-date with flu, pneumonia, and Covid vaccines.  Patient is advised to continue to follow CDC recommendations such as social distancing wearing a mask and washing hands for at least 20 seconds.  8.  Smoking status: never smoker.  9.  Patient to call the office, 911, or go to the ER with new or worsening symptoms.  10.  Follow-up 6 months, sooner if needed.          Follow Up   Return in about 6 months (around 7/12/2023) for in [place].  Patient was given instructions and counseling regarding her condition or for health maintenance advice. Please see specific information pulled into the AVS if appropriate.

## 2023-01-04 ENCOUNTER — CLINICAL SUPPORT (OUTPATIENT)
Dept: FAMILY MEDICINE CLINIC | Age: 83
End: 2023-01-04
Payer: MEDICARE

## 2023-01-04 DIAGNOSIS — J30.9 ALLERGIC RHINITIS, UNSPECIFIED SEASONALITY, UNSPECIFIED TRIGGER: Primary | ICD-10-CM

## 2023-01-04 PROCEDURE — 95115 IMMUNOTHERAPY ONE INJECTION: CPT | Performed by: FAMILY MEDICINE

## 2023-01-09 ENCOUNTER — TELEPHONE (OUTPATIENT)
Dept: FAMILY MEDICINE CLINIC | Age: 83
End: 2023-01-09

## 2023-01-09 NOTE — TELEPHONE ENCOUNTER
Caller: Lmua Cruz    Relationship: Self    Best call back number: [phone]    What is the best time to reach you: ANYTIME     Who are you requesting to speak with (clinical staff, provider,  specific staff member): NURSE         What was the call regarding: PATIENT IS HAVING SOME STOMACH PAIN AND SHE KNOWS SHE IS CONSTIPATED WANTED TO IS HE WOULD BE O.K. FOR HER TO TAKE THE DULCOLAX STOOL SOFTENER ALONG WITH ALL HER MEDICATION.  ALSO HER ACID REFLEX SEEMS TO BE GETTING WORSE  PLEASE CALL PATIENT AND ADVISE I THIS WILL BE O.K. FOR HER TO DO.      Do you require a callback: YES

## 2023-01-10 RX ORDER — SUCRALFATE 1 G/1
1 TABLET ORAL 3 TIMES DAILY
Qty: 90 TABLET | Refills: 0 | Status: SHIPPED | OUTPATIENT
Start: 2023-01-10 | End: 2023-02-09

## 2023-01-10 NOTE — TELEPHONE ENCOUNTER
Noted.  I have sent some sucralfate for her to take in the near term.  This is an additional reflux medicine that is somewhat similar to Pepto-Bismol.  I am hoping that it will help calm things down for her.  If she does not see improvement in how she feels, she should schedule follow-up visit as a precaution.  Thanks.

## 2023-01-12 ENCOUNTER — LAB (OUTPATIENT)
Dept: LAB | Facility: HOSPITAL | Age: 83
End: 2023-01-12
Payer: MEDICARE

## 2023-01-12 ENCOUNTER — HOSPITAL ENCOUNTER (OUTPATIENT)
Dept: MAMMOGRAPHY | Facility: HOSPITAL | Age: 83
Discharge: HOME | End: 2023-01-12
Admitting: FAMILY MEDICINE
Payer: MEDICARE

## 2023-01-12 ENCOUNTER — CLINICAL SUPPORT (OUTPATIENT)
Dept: FAMILY MEDICINE CLINIC | Age: 83
End: 2023-01-12
Payer: MEDICARE

## 2023-01-12 ENCOUNTER — OFFICE VISIT (OUTPATIENT)
Dept: PULMONOLOGY | Facility: CLINIC | Age: 83
End: 2023-01-12
Payer: MEDICARE

## 2023-01-12 ENCOUNTER — TELEPHONE (OUTPATIENT)
Dept: FAMILY MEDICINE CLINIC | Age: 83
End: 2023-01-12
Payer: MEDICARE

## 2023-01-12 VITALS
RESPIRATION RATE: 16 BRPM | WEIGHT: 111 LBS | DIASTOLIC BLOOD PRESSURE: 46 MMHG | SYSTOLIC BLOOD PRESSURE: 125 MMHG | HEIGHT: 62 IN | HEART RATE: 71 BPM | OXYGEN SATURATION: 99 % | BODY MASS INDEX: 20.43 KG/M2

## 2023-01-12 DIAGNOSIS — J30.9 ALLERGIC RHINITIS, UNSPECIFIED SEASONALITY, UNSPECIFIED TRIGGER: Primary | ICD-10-CM

## 2023-01-12 DIAGNOSIS — J47.9 BRONCHIECTASIS WITHOUT COMPLICATION: ICD-10-CM

## 2023-01-12 DIAGNOSIS — R79.9 ELEVATED BUN: ICD-10-CM

## 2023-01-12 DIAGNOSIS — I10 ESSENTIAL HYPERTENSION: ICD-10-CM

## 2023-01-12 DIAGNOSIS — T17.500A MUCUS PLUGGING OF BRONCHI: ICD-10-CM

## 2023-01-12 DIAGNOSIS — R06.89 AIRWAY CLEARANCE IMPAIRMENT: ICD-10-CM

## 2023-01-12 DIAGNOSIS — J30.2 SEASONAL ALLERGIES: Primary | ICD-10-CM

## 2023-01-12 DIAGNOSIS — B49 FUNGAL PNEUMONIA: ICD-10-CM

## 2023-01-12 DIAGNOSIS — J16.8 FUNGAL PNEUMONIA: ICD-10-CM

## 2023-01-12 DIAGNOSIS — I10 ESSENTIAL HYPERTENSION: Primary | ICD-10-CM

## 2023-01-12 DIAGNOSIS — Z12.31 SCREENING MAMMOGRAM FOR BREAST CANCER: ICD-10-CM

## 2023-01-12 DIAGNOSIS — R05.3 CHRONIC COUGH: ICD-10-CM

## 2023-01-12 LAB
ANION GAP SERPL CALCULATED.3IONS-SCNC: 8.4 MMOL/L (ref 5–15)
BUN SERPL-MCNC: 26 MG/DL (ref 8–23)
BUN/CREAT SERPL: 27.7 (ref 7–25)
CALCIUM SPEC-SCNC: 9.1 MG/DL (ref 8.6–10.5)
CHLORIDE SERPL-SCNC: 105 MMOL/L (ref 98–107)
CO2 SERPL-SCNC: 27.6 MMOL/L (ref 22–29)
CREAT SERPL-MCNC: 0.94 MG/DL (ref 0.57–1)
EGFRCR SERPLBLD CKD-EPI 2021: 61.5 ML/MIN/1.73
GLUCOSE SERPL-MCNC: 94 MG/DL (ref 65–99)
POTASSIUM SERPL-SCNC: 4.6 MMOL/L (ref 3.5–5.2)
SODIUM SERPL-SCNC: 141 MMOL/L (ref 136–145)

## 2023-01-12 PROCEDURE — 77063 BREAST TOMOSYNTHESIS BI: CPT

## 2023-01-12 PROCEDURE — 99214 OFFICE O/P EST MOD 30 MIN: CPT | Performed by: NURSE PRACTITIONER

## 2023-01-12 PROCEDURE — 80048 BASIC METABOLIC PNL TOTAL CA: CPT

## 2023-01-12 PROCEDURE — 77067 SCR MAMMO BI INCL CAD: CPT

## 2023-01-12 PROCEDURE — 95115 IMMUNOTHERAPY ONE INJECTION: CPT | Performed by: FAMILY MEDICINE

## 2023-01-12 PROCEDURE — 36415 COLL VENOUS BLD VENIPUNCTURE: CPT

## 2023-01-12 RX ORDER — BUDESONIDE 0.5 MG/2ML
0.5 INHALANT ORAL 2 TIMES DAILY
Qty: 60 EACH | Refills: 11 | Status: SHIPPED | OUTPATIENT
Start: 2023-01-12

## 2023-01-12 RX ORDER — ARFORMOTEROL TARTRATE 15 UG/2ML
15 SOLUTION RESPIRATORY (INHALATION)
Qty: 120 ML | Refills: 11 | Status: SHIPPED | OUTPATIENT
Start: 2023-01-12 | End: 2023-02-11

## 2023-01-12 NOTE — TELEPHONE ENCOUNTER
----- Message from Jaja Turpin LPN sent at 12/21/2022  9:02 AM EST -----  TICKLE basic metabolic panel 3 weeks for elevated BUN.  When we call about this, remind her to have plenty of fluid the day she comes for the repeat test.  Thanks.

## 2023-01-17 ENCOUNTER — CLINICAL SUPPORT (OUTPATIENT)
Dept: FAMILY MEDICINE CLINIC | Age: 83
End: 2023-01-17
Payer: MEDICARE

## 2023-01-17 DIAGNOSIS — J30.9 ALLERGIC RHINITIS, UNSPECIFIED SEASONALITY, UNSPECIFIED TRIGGER: Primary | ICD-10-CM

## 2023-01-17 PROCEDURE — 95115 IMMUNOTHERAPY ONE INJECTION: CPT | Performed by: FAMILY MEDICINE

## 2023-01-24 ENCOUNTER — TELEPHONE (OUTPATIENT)
Dept: FAMILY MEDICINE CLINIC | Age: 83
End: 2023-01-24
Payer: MEDICARE

## 2023-01-24 ENCOUNTER — CLINICAL SUPPORT (OUTPATIENT)
Dept: FAMILY MEDICINE CLINIC | Age: 83
End: 2023-01-24
Payer: MEDICARE

## 2023-01-24 DIAGNOSIS — R10.84 GENERALIZED ABDOMINAL PAIN: Primary | ICD-10-CM

## 2023-01-24 DIAGNOSIS — J30.9 ALLERGIC RHINITIS, UNSPECIFIED SEASONALITY, UNSPECIFIED TRIGGER: Primary | ICD-10-CM

## 2023-01-24 PROCEDURE — 95115 IMMUNOTHERAPY ONE INJECTION: CPT | Performed by: FAMILY MEDICINE

## 2023-01-24 NOTE — TELEPHONE ENCOUNTER
Pt said that the Carafate is not working for her, causing diarrhea and upset stomach. What is the next step, please advise

## 2023-01-25 NOTE — TELEPHONE ENCOUNTER
Noted.  Because this is ongoing, I would recommend moving ahead with a CT of the abdomen and pelvis.  I have placed orders for this, and we will be in touch regarding it.  Depending on what it shows, we may need to refer her for scope of the stomach, esophagus, and/or colon.  Let me know if she has other concerns.  Thanks.

## 2023-01-30 ENCOUNTER — CLINICAL SUPPORT (OUTPATIENT)
Dept: FAMILY MEDICINE CLINIC | Age: 83
End: 2023-01-30
Payer: MEDICARE

## 2023-01-30 ENCOUNTER — TELEPHONE (OUTPATIENT)
Dept: FAMILY MEDICINE CLINIC | Age: 83
End: 2023-01-30
Payer: MEDICARE

## 2023-01-30 DIAGNOSIS — J30.2 SEASONAL ALLERGIES: ICD-10-CM

## 2023-01-30 DIAGNOSIS — R11.0 NAUSEA: ICD-10-CM

## 2023-01-30 PROCEDURE — 95115 IMMUNOTHERAPY ONE INJECTION: CPT | Performed by: FAMILY MEDICINE

## 2023-01-30 RX ORDER — ONDANSETRON 4 MG/1
4 TABLET, ORALLY DISINTEGRATING ORAL EVERY 6 HOURS PRN
Qty: 30 TABLET | Refills: 0 | Status: SHIPPED | OUTPATIENT
Start: 2023-01-30

## 2023-01-30 NOTE — TELEPHONE ENCOUNTER
I have sent a refill on generic Zofran for her.  That may help with nausea.  I would recommend she use over-the-counter Imodium as needed for diarrhea in the short-term.  Thanks.

## 2023-01-30 NOTE — TELEPHONE ENCOUNTER
Pt states her CT isn't until 2/2/23 and she is still having nausea and diarrhea. Wondering if you can call in something to our pharmacy to help with these things. Please advise.

## 2023-02-02 ENCOUNTER — HOSPITAL ENCOUNTER (OUTPATIENT)
Dept: CT IMAGING | Facility: HOSPITAL | Age: 83
Discharge: HOME | End: 2023-02-02
Admitting: FAMILY MEDICINE
Payer: MEDICARE

## 2023-02-02 DIAGNOSIS — R10.84 GENERALIZED ABDOMINAL PAIN: ICD-10-CM

## 2023-02-02 PROCEDURE — 0 IOPAMIDOL PER 1 ML: Performed by: FAMILY MEDICINE

## 2023-02-02 PROCEDURE — 74177 CT ABD & PELVIS W/CONTRAST: CPT

## 2023-02-02 RX ADMIN — IOPAMIDOL 75 ML: 755 INJECTION, SOLUTION INTRAVENOUS at 12:44

## 2023-02-06 ENCOUNTER — CLINICAL SUPPORT (OUTPATIENT)
Dept: FAMILY MEDICINE CLINIC | Age: 83
End: 2023-02-06
Payer: MEDICARE

## 2023-02-06 DIAGNOSIS — J30.9 ALLERGIC RHINITIS, UNSPECIFIED SEASONALITY, UNSPECIFIED TRIGGER: Primary | ICD-10-CM

## 2023-02-06 PROCEDURE — 95115 IMMUNOTHERAPY ONE INJECTION: CPT | Performed by: FAMILY MEDICINE

## 2023-02-09 ENCOUNTER — OFFICE VISIT (OUTPATIENT)
Dept: FAMILY MEDICINE CLINIC | Age: 83
End: 2023-02-09
Payer: MEDICARE

## 2023-02-09 VITALS
TEMPERATURE: 98.4 F | WEIGHT: 110.4 LBS | SYSTOLIC BLOOD PRESSURE: 133 MMHG | HEART RATE: 94 BPM | HEIGHT: 62 IN | BODY MASS INDEX: 20.32 KG/M2 | DIASTOLIC BLOOD PRESSURE: 72 MMHG

## 2023-02-09 DIAGNOSIS — R19.8 ALTERNATING CONSTIPATION AND DIARRHEA: ICD-10-CM

## 2023-02-09 DIAGNOSIS — R10.84 GENERALIZED ABDOMINAL PAIN: Primary | ICD-10-CM

## 2023-02-09 DIAGNOSIS — K21.9 GERD WITHOUT ESOPHAGITIS: ICD-10-CM

## 2023-02-09 PROCEDURE — 99214 OFFICE O/P EST MOD 30 MIN: CPT | Performed by: FAMILY MEDICINE

## 2023-02-09 RX ORDER — ESOMEPRAZOLE MAGNESIUM 40 MG/1
40 CAPSULE, DELAYED RELEASE ORAL
Qty: 30 CAPSULE | Refills: 1 | Status: SHIPPED | OUTPATIENT
Start: 2023-02-09 | End: 2023-02-13

## 2023-02-09 NOTE — PROGRESS NOTES
Luma Cruz presents to [facility].    Chief Complaint:  'I'm still sick - my stomach'    Subjective       History of Present Illness:  Luma Grey is in today for follow up on how her stomach is doing.  She has been having difficulty for at least the last 6 weeks.  Her main symptoms include she has nausea as well as some epigastric pain.  She feels sick almost every time she eats at this point.  She is status postcholecystectomy.  She also feels constipated.  She says her stomach is just messed up at this point.  She does have known acid reflux issues.  She is currently taking pantoprazole.  She was given sucralfate, but she says it exacerbated the nausea and gave her diarrhea.    Review of Systems:  Review of Systems   Constitutional: Negative for chills and fever.   Respiratory: Negative for cough and shortness of breath.    Cardiovascular: Negative for chest pain and palpitations.   Gastrointestinal: Positive for abdominal pain and nausea. Negative for vomiting.        Objective   Medical History:  Past Medical History:   • COVID-19   • Essential hypertension   • Generalized anxiety disorder   • GERD without esophagitis   • Menopausal and postmenopausal disorder   • Mixed hyperlipidemia     Past Surgical History:   • COLONOSCOPY   • HYSTERECTOMY    complete   • TONSILLECTOMY AND ADENOIDECTOMY      Family History   Problem Relation Age of Onset   • Stroke Mother    • Hypertension Mother    • Coronary artery disease Father      Social History     Tobacco Use   • Smoking status: Never   • Smokeless tobacco: Never   Substance Use Topics   • Alcohol use: Not Currently       Health Maintenance Due   Topic Date Due   • TDAP/TD VACCINES (1 - Tdap) Never done        Immunization History   Administered Date(s) Administered   • COVID-19 (MODERNA) 1st, 2nd, 3rd Dose Only 01/27/2021, 02/24/2021, 10/30/2021, 04/13/2022   • COVID-19 (MODERNA) BIVALENT BOOSTER 12+YRS 10/19/2022   • Fluad Quad 65+  09/29/2022   • Fluzone High Dose =>65 Years ([facility] ONLY) 10/16/2012, 09/25/2013, 09/28/2017, 09/24/2020   • Fluzone High-Dose 65+yrs 10/02/2021   • Hepatitis A 12/06/2018       Allergies   Allergen Reactions   • Fosamax [Alendronate] GI Intolerance   • Penicillins Unknown - Low Severity        Medications:  Current Outpatient Medications on File Prior to Visit   Medication Sig   • albuterol (2.5 MG/3ML) 0.083% nebulizer solution 3 mL, albuterol (5 MG/ML) 0.5% nebulizer solution 0.5 mL Inhale Every 4 (Four) Hours.   • amLODIPine (NORVASC) 2.5 MG tablet Take 1 tablet by mouth Every Night.   • arformoterol (Brovana) 15 MCG/2ML nebulizer solution Take 2 mL by nebulization 2 (Two) Times a Day for 30 days.   • azelastine (ASTELIN) 0.1 % nasal spray 1 spray into the nostril(s) as directed by provider 2 (Two) Times a Day.   • budesonide (PULMICORT) 0.5 MG/2ML nebulizer solution Take 2 mL by nebulization 2 (Two) Times a Day. Rinse mouth out after each use   • clonazePAM (KlonoPIN) 0.5 MG tablet Take 1 tablet by mouth 2 (Two) Times a Day As Needed for Anxiety.   • estradiol (ESTRACE) 1 MG tablet Take 1 tablet by mouth Daily.   • fluticasone (FLONASE) 50 MCG/ACT nasal spray 2 sprays into the nostril(s) as directed by provider Daily.   • levocetirizine (XYZAL) 5 MG tablet Take 1 tablet by mouth Every Evening.   • losartan (COZAAR) 50 MG tablet Take 1 tablet by mouth Daily.   • multivitamin with minerals tablet tablet Take 1 tablet by mouth Daily.   • Nebusal 3 % nebulizer solution inhale 1 vial by nebulizer TWICE DAILY   • ondansetron ODT (ZOFRAN-ODT) 4 MG disintegrating tablet Place 1 tablet on the tongue Every 6 (Six) Hours As Needed for Nausea or Vomiting.   • pantoprazole (PROTONIX) 40 MG EC tablet Take 1 tablet by mouth Daily.   • pravastatin (PRAVACHOL) 20 MG tablet Take 1 tablet by mouth Daily.   • [DISCONTINUED] sucralfate (Carafate) 1 g tablet Take 1 tablet by mouth 3 (Three) Times a Day.   • [DISCONTINUED]  "sucralfate (Carafate) 1 g tablet Take 1 tablet by mouth 3 (Three) Times a Day.     No current facility-administered medications on file prior to visit.       Vital Signs:   /72 (BP Location: Right arm, Patient Position: Sitting)   Pulse 94   Temp 98.4 °F (36.9 °C) (Oral)   Ht 157.5 cm (62.01\")   Wt 50.1 kg (110 lb 6.4 oz)   BMI 20.19 kg/m²       Physical Exam:  Physical Exam  Vitals reviewed.   Constitutional:       General: She is not in acute distress.     Appearance: She is not ill-appearing.   Eyes:      Pupils: Pupils are equal, round, and reactive to light.   Neck:      Comments: No thyromegaly  Cardiovascular:      Rate and Rhythm: Normal rate and regular rhythm.   Pulmonary:      Effort: Pulmonary effort is normal.      Breath sounds: Normal breath sounds.   Abdominal:      General: There is no distension.      Palpations: Abdomen is soft.      Tenderness: There is abdominal tenderness (mild, lower abdomen). There is no guarding or rebound.   Musculoskeletal:      Cervical back: Neck supple.   Lymphadenopathy:      Cervical: No cervical adenopathy.   Skin:     Findings: No lesion or rash.   Neurological:      Mental Status: She is alert.         Result Review      The following data was reviewed by Joseph Walker MD on 02/09/2023.  Lab Results   Component Value Date    WBC 6.06 12/20/2022    HGB 12.3 12/20/2022    HCT 38.2 12/20/2022    MCV 89.9 12/20/2022     12/20/2022     Lab Results   Component Value Date    GLUCOSE 94 01/12/2023    BUN 26 (H) 01/12/2023    CREATININE 0.94 01/12/2023     01/12/2023    K 4.6 01/12/2023     01/12/2023    CO2 27.6 01/12/2023    CALCIUM 9.1 01/12/2023    PROTEINTOT 6.7 12/20/2022    ALBUMIN 4.30 12/20/2022    ALT 14 12/20/2022    AST 19 12/20/2022    ALKPHOS 57 12/20/2022    BILITOT 0.4 12/20/2022    EGFRIFNONA 62 12/28/2021    GLOB 2.4 12/20/2022    AGRATIO 1.8 12/20/2022    BCR 27.7 (H) 01/12/2023    ANIONGAP 8.4 01/12/2023      Lab " Results   Component Value Date    CHOL 168 06/09/2022    CHLPL 157 03/10/2020    TRIG 89 06/09/2022    HDL 76 (H) 06/09/2022    LDL 76 06/09/2022     Lab Results   Component Value Date    TSH 1.350 12/20/2022     Lab Results   Component Value Date    HGBA1C 5.80 (H) 12/20/2022             Assessment and Plan:   She has been dealing with this for 6 weeks or longer.  We will move ahead with laboratory evaluation as noted below in part related to this.  The recent CT scan has been reviewed with her.  Because of her ongoing symptoms, we will make referral to general surgery for consideration of EGD and colonoscopy.  I will change her to generic Nexium for the near term to see if that is of benefit.  I recommended she consider some over-the-counter Pepto-Bismol in the short-term as well.  We will see how things progress.       Diagnoses and all orders for this visit:    1. Generalized abdominal pain (Primary)  -     CBC Auto Differential; Future  -     Comprehensive Metabolic Panel; Future  -     Amylase; Future  -     Lipase; Future  -     H. Pylori Breath Test - Breath, Lung; Future  -     esomeprazole (nexIUM) 40 MG capsule; Take 1 capsule by mouth Every Morning Before Breakfast.  Dispense: 30 capsule; Refill: 1  -     Ambulatory Referral to General Surgery  -     Urinalysis With Microscopic - Urine, Clean Catch; Future    2. GERD without esophagitis  -     esomeprazole (nexIUM) 40 MG capsule; Take 1 capsule by mouth Every Morning Before Breakfast.  Dispense: 30 capsule; Refill: 1    3. Alternating constipation and diarrhea  -     CBC Auto Differential; Future      Follow Up   Return in about 7 months (around 8/28/2023) for Recheck as scheduled.  Patient was given instructions and counseling regarding her condition or for health maintenance advice. Please see specific information pulled into the AVS if appropriate.

## 2023-02-10 ENCOUNTER — LAB (OUTPATIENT)
Dept: LAB | Facility: HOSPITAL | Age: 83
End: 2023-02-10
Payer: MEDICARE

## 2023-02-10 ENCOUNTER — PRIOR AUTHORIZATION (OUTPATIENT)
Dept: FAMILY MEDICINE CLINIC | Age: 83
End: 2023-02-10
Payer: MEDICARE

## 2023-02-10 DIAGNOSIS — R10.84 GENERALIZED ABDOMINAL PAIN: ICD-10-CM

## 2023-02-10 DIAGNOSIS — R19.8 ALTERNATING CONSTIPATION AND DIARRHEA: ICD-10-CM

## 2023-02-10 DIAGNOSIS — R82.81 PYURIA: Primary | ICD-10-CM

## 2023-02-10 LAB
ALBUMIN SERPL-MCNC: 4.6 G/DL (ref 3.5–5.2)
ALBUMIN/GLOB SERPL: 1.8 G/DL
ALP SERPL-CCNC: 57 U/L (ref 39–117)
ALT SERPL W P-5'-P-CCNC: 13 U/L (ref 1–33)
AMYLASE SERPL-CCNC: 88 U/L (ref 28–100)
ANION GAP SERPL CALCULATED.3IONS-SCNC: 9 MMOL/L (ref 5–15)
AST SERPL-CCNC: 24 U/L (ref 1–32)
BACTERIA UR QL AUTO: ABNORMAL /HPF
BASOPHILS # BLD AUTO: 0.03 10*3/MM3 (ref 0–0.2)
BASOPHILS NFR BLD AUTO: 0.6 % (ref 0–1.5)
BILIRUB SERPL-MCNC: 0.4 MG/DL (ref 0–1.2)
BILIRUB UR QL STRIP: ABNORMAL
BUN SERPL-MCNC: 29 MG/DL (ref 8–23)
BUN/CREAT SERPL: 26.6 (ref 7–25)
CALCIUM SPEC-SCNC: 9.7 MG/DL (ref 8.6–10.5)
CHLORIDE SERPL-SCNC: 103 MMOL/L (ref 98–107)
CLARITY UR: ABNORMAL
CO2 SERPL-SCNC: 28 MMOL/L (ref 22–29)
COLOR UR: ABNORMAL
CREAT SERPL-MCNC: 1.09 MG/DL (ref 0.57–1)
DEPRECATED RDW RBC AUTO: 42.7 FL (ref 37–54)
EGFRCR SERPLBLD CKD-EPI 2021: 51.5 ML/MIN/1.73
EOSINOPHIL # BLD AUTO: 0.21 10*3/MM3 (ref 0–0.4)
EOSINOPHIL NFR BLD AUTO: 4.5 % (ref 0.3–6.2)
ERYTHROCYTE [DISTWIDTH] IN BLOOD BY AUTOMATED COUNT: 12.8 % (ref 12.3–15.4)
GLOBULIN UR ELPH-MCNC: 2.6 GM/DL
GLUCOSE SERPL-MCNC: 90 MG/DL (ref 65–99)
GLUCOSE UR STRIP-MCNC: NEGATIVE MG/DL
HCT VFR BLD AUTO: 39.3 % (ref 34–46.6)
HGB BLD-MCNC: 12.5 G/DL (ref 12–15.9)
HGB UR QL STRIP.AUTO: NEGATIVE
IMM GRANULOCYTES # BLD AUTO: 0 10*3/MM3 (ref 0–0.05)
IMM GRANULOCYTES NFR BLD AUTO: 0 % (ref 0–0.5)
KETONES UR QL STRIP: ABNORMAL
LEUKOCYTE ESTERASE UR QL STRIP.AUTO: NEGATIVE
LIPASE SERPL-CCNC: 37 U/L (ref 13–60)
LYMPHOCYTES # BLD AUTO: 1.7 10*3/MM3 (ref 0.7–3.1)
LYMPHOCYTES NFR BLD AUTO: 36.6 % (ref 19.6–45.3)
MCH RBC QN AUTO: 28.7 PG (ref 26.6–33)
MCHC RBC AUTO-ENTMCNC: 31.8 G/DL (ref 31.5–35.7)
MCV RBC AUTO: 90.3 FL (ref 79–97)
MONOCYTES # BLD AUTO: 0.51 10*3/MM3 (ref 0.1–0.9)
MONOCYTES NFR BLD AUTO: 11 % (ref 5–12)
MUCOUS THREADS URNS QL MICRO: ABNORMAL /HPF
NEUTROPHILS NFR BLD AUTO: 2.19 10*3/MM3 (ref 1.7–7)
NEUTROPHILS NFR BLD AUTO: 47.3 % (ref 42.7–76)
NITRITE UR QL STRIP: NEGATIVE
PH UR STRIP.AUTO: 5.5 [PH] (ref 5–8)
PLATELET # BLD AUTO: 235 10*3/MM3 (ref 140–450)
PMV BLD AUTO: 10.9 FL (ref 6–12)
POTASSIUM SERPL-SCNC: 4 MMOL/L (ref 3.5–5.2)
PROT SERPL-MCNC: 7.2 G/DL (ref 6–8.5)
PROT UR QL STRIP: ABNORMAL
RBC # BLD AUTO: 4.35 10*6/MM3 (ref 3.77–5.28)
RBC # UR STRIP: ABNORMAL /HPF
REF LAB TEST METHOD: ABNORMAL
SODIUM SERPL-SCNC: 140 MMOL/L (ref 136–145)
SP GR UR STRIP: >=1.03 (ref 1–1.03)
SQUAMOUS #/AREA URNS HPF: ABNORMAL /HPF
UREA BREATH TEST QL: NEGATIVE
UROBILINOGEN UR QL STRIP: ABNORMAL
WBC # UR STRIP: ABNORMAL /HPF
WBC NRBC COR # BLD: 4.64 10*3/MM3 (ref 3.4–10.8)

## 2023-02-10 PROCEDURE — 85025 COMPLETE CBC W/AUTO DIFF WBC: CPT

## 2023-02-10 PROCEDURE — 83013 H PYLORI (C-13) BREATH: CPT

## 2023-02-10 PROCEDURE — 83690 ASSAY OF LIPASE: CPT

## 2023-02-10 PROCEDURE — 82150 ASSAY OF AMYLASE: CPT

## 2023-02-10 PROCEDURE — 87086 URINE CULTURE/COLONY COUNT: CPT | Performed by: FAMILY MEDICINE

## 2023-02-10 PROCEDURE — 81001 URINALYSIS AUTO W/SCOPE: CPT

## 2023-02-10 PROCEDURE — 80053 COMPREHEN METABOLIC PANEL: CPT

## 2023-02-10 PROCEDURE — 36415 COLL VENOUS BLD VENIPUNCTURE: CPT

## 2023-02-11 ENCOUNTER — PATIENT MESSAGE (OUTPATIENT)
Dept: FAMILY MEDICINE CLINIC | Age: 83
End: 2023-02-11
Payer: MEDICARE

## 2023-02-11 LAB — BACTERIA SPEC AEROBE CULT: NO GROWTH

## 2023-02-13 ENCOUNTER — TELEPHONE (OUTPATIENT)
Dept: FAMILY MEDICINE CLINIC | Age: 83
End: 2023-02-13

## 2023-02-13 RX ORDER — FAMOTIDINE 20 MG/1
20 TABLET, FILM COATED ORAL 2 TIMES DAILY
Qty: 60 TABLET | Refills: 1 | Status: SHIPPED | OUTPATIENT
Start: 2023-02-13

## 2023-02-13 NOTE — TELEPHONE ENCOUNTER
From: Luma Cruz  To: Joseph Walker  Sent: 2/11/2023 6:43 AM EST  Subject: constipation    Dr. Walker;  Thank you for all your help but I am contipated again and I don't know what to take. The last time I took something I had severe stomach pains before I had a bowel movement and I don't want that to happen again. and suggestions what I should take?  Thanks

## 2023-02-13 NOTE — TELEPHONE ENCOUNTER
Caller: Luma Cruz    Relationship to patient: Self    Best call back number: [phone]    Patient is needing: PATIENT CALLED IN AND IS REQUESTING A CALL BACK REGARDING CALL THAT SHE RECENTLY RECEIVED. SHE SAID THAT SHE IS SUPPOSED TO BE TAKING PEPCID BUT WAS NOT SURE OF ANOTHER MEDICATION THAT SHE IS SUPPOSED TO TAKE AND IS REQUESTING A CALL BACK PLEASE

## 2023-02-13 NOTE — TELEPHONE ENCOUNTER
Please let her know that insurance is not wanting to cover generic Nexium.  I would recommend she continue pantoprazole as prescribed.  I have also sent a prescription for generic Pepcid for her to take in the near term.  Let me know if she has other concerns.  Thanks.

## 2023-02-15 ENCOUNTER — CLINICAL SUPPORT (OUTPATIENT)
Dept: FAMILY MEDICINE CLINIC | Age: 83
End: 2023-02-15
Payer: MEDICARE

## 2023-02-15 DIAGNOSIS — J30.9 ALLERGIC RHINITIS, UNSPECIFIED SEASONALITY, UNSPECIFIED TRIGGER: Primary | ICD-10-CM

## 2023-02-15 PROCEDURE — 95115 IMMUNOTHERAPY ONE INJECTION: CPT | Performed by: FAMILY MEDICINE

## 2023-02-20 ENCOUNTER — CLINICAL SUPPORT (OUTPATIENT)
Dept: FAMILY MEDICINE CLINIC | Age: 83
End: 2023-02-20
Payer: MEDICARE

## 2023-02-20 DIAGNOSIS — J30.9 ALLERGIC RHINITIS, UNSPECIFIED SEASONALITY, UNSPECIFIED TRIGGER: Primary | ICD-10-CM

## 2023-02-20 PROCEDURE — 95115 IMMUNOTHERAPY ONE INJECTION: CPT | Performed by: FAMILY MEDICINE

## 2023-02-27 ENCOUNTER — CLINICAL SUPPORT (OUTPATIENT)
Dept: FAMILY MEDICINE CLINIC | Age: 83
End: 2023-02-27
Payer: MEDICARE

## 2023-02-27 DIAGNOSIS — J30.9 ALLERGIC RHINITIS, UNSPECIFIED SEASONALITY, UNSPECIFIED TRIGGER: Primary | ICD-10-CM

## 2023-02-27 PROCEDURE — 95115 IMMUNOTHERAPY ONE INJECTION: CPT | Performed by: FAMILY MEDICINE

## 2023-03-08 ENCOUNTER — CLINICAL SUPPORT (OUTPATIENT)
Dept: FAMILY MEDICINE CLINIC | Age: 83
End: 2023-03-08
Payer: MEDICARE

## 2023-03-08 DIAGNOSIS — J30.9 ALLERGIC RHINITIS, UNSPECIFIED SEASONALITY, UNSPECIFIED TRIGGER: Primary | ICD-10-CM

## 2023-03-08 PROCEDURE — 95115 IMMUNOTHERAPY ONE INJECTION: CPT | Performed by: FAMILY MEDICINE

## 2023-03-14 ENCOUNTER — PREP FOR SURGERY (OUTPATIENT)
Dept: OTHER | Facility: HOSPITAL | Age: 83
End: 2023-03-14
Payer: MEDICARE

## 2023-03-14 ENCOUNTER — OFFICE VISIT (OUTPATIENT)
Dept: SURGERY | Facility: CLINIC | Age: 83
End: 2023-03-14
Payer: MEDICARE

## 2023-03-14 ENCOUNTER — CLINICAL SUPPORT (OUTPATIENT)
Dept: FAMILY MEDICINE CLINIC | Age: 83
End: 2023-03-14
Payer: MEDICARE

## 2023-03-14 VITALS — WEIGHT: 114 LBS | RESPIRATION RATE: 16 BRPM | BODY MASS INDEX: 20.98 KG/M2 | HEIGHT: 62 IN

## 2023-03-14 DIAGNOSIS — K59.00 CONSTIPATION, UNSPECIFIED CONSTIPATION TYPE: ICD-10-CM

## 2023-03-14 DIAGNOSIS — J30.9 ALLERGIC RHINITIS, UNSPECIFIED SEASONALITY, UNSPECIFIED TRIGGER: Primary | ICD-10-CM

## 2023-03-14 DIAGNOSIS — K21.9 GERD WITHOUT ESOPHAGITIS: Primary | ICD-10-CM

## 2023-03-14 PROCEDURE — 95115 IMMUNOTHERAPY ONE INJECTION: CPT | Performed by: FAMILY MEDICINE

## 2023-03-14 PROCEDURE — 99203 OFFICE O/P NEW LOW 30 MIN: CPT | Performed by: SURGERY

## 2023-03-14 RX ORDER — FLUTICASONE PROPIONATE 50 MCG
1 SPRAY, SUSPENSION (ML) NASAL DAILY
COMMUNITY

## 2023-03-14 RX ORDER — ESOMEPRAZOLE MAGNESIUM 40 MG/1
40 CAPSULE, DELAYED RELEASE ORAL DAILY
COMMUNITY

## 2023-03-14 RX ORDER — ARFORMOTEROL TARTRATE 15 UG/2ML
15 SOLUTION RESPIRATORY (INHALATION)
COMMUNITY

## 2023-03-14 NOTE — H&P (VIEW-ONLY)
Inpatient History and Physical Surgical Orders    Preadmission Location:   Preadmission Time:  Facility:  Surgery Date:  Surgery Time:  Preadmission Test date:     Chief Complaint  Outpatient History and Physical / Surgical Orders    Primary Care Provider: Joseph Walker MD    Referring Provider: Joseph Walker,*    Subjective      Patient Name: Luma Cruz : 1940    HPI  The patient is an 82-year-old female that was referred with chronic abdominal pain and worsening constipation complaints.  She has had a long-term problems with dyspepsia and difficulty having bowel movements.  She recently had a CT scan that did not show a definite cause for her abdominal pain but did question the presence some esophageal varices.  Her liver was noted to appear normal on that study and her liver function test are normal.  Its been many years since her last colonoscopy.    Past History:  Medical History: has a past medical history of COVID-19 (2022), Essential hypertension, Generalized anxiety disorder, GERD without esophagitis, Menopausal and postmenopausal disorder, and Mixed hyperlipidemia.   Surgical History: has a past surgical history that includes Colonoscopy (2011); Tonsillectomy and adenoidectomy; Hysterectomy; Appendectomy (10 years ago); and Breast biopsy (right side, 15 years ago).   Family History: family history includes Arthritis in her mother; Coronary artery disease in her father; Hearing loss in her mother; Hypertension in her mother; Stroke in her mother.   Social History: reports that she has never smoked. She has never been exposed to tobacco smoke. She has never used smokeless tobacco. She reports that she does not drink alcohol and does not use drugs.  Allergies: Fosamax [alendronate] and Penicillins       Current Outpatient Medications:   •  albuterol (2.5 MG/3ML) 0.083% nebulizer solution 3 mL, albuterol (5 MG/ML) 0.5% nebulizer solution 0.5 mL, Inhale Every 4 (Four)  Hours., Disp: , Rfl:   •  amLODIPine (NORVASC) 2.5 MG tablet, Take 1 tablet by mouth Every Night., Disp: 90 tablet, Rfl: 1  •  arformoterol (BROVANA) 15 MCG/2ML nebulizer solution, Inhale 2 mL., Disp: , Rfl:   •  azelastine (ASTELIN) 0.1 % nasal spray, 1 spray into the nostril(s) as directed by provider 2 (Two) Times a Day., Disp: , Rfl:   •  budesonide (PULMICORT) 0.5 MG/2ML nebulizer solution, Take 2 mL by nebulization 2 (Two) Times a Day. Rinse mouth out after each use, Disp: 60 each, Rfl: 11  •  clonazePAM (KlonoPIN) 0.5 MG tablet, Take 1 tablet by mouth 2 (Two) Times a Day As Needed for Anxiety., Disp: 90 tablet, Rfl: 1  •  esomeprazole (nexIUM) 40 MG capsule, Take 1 capsule by mouth Daily., Disp: , Rfl:   •  estradiol (ESTRACE) 1 MG tablet, Take 1 tablet by mouth Daily., Disp: 90 tablet, Rfl: 1  •  famotidine (Pepcid) 20 MG tablet, Take 1 tablet by mouth 2 (Two) Times a Day., Disp: 60 tablet, Rfl: 1  •  fluticasone (FLONASE) 50 MCG/ACT nasal spray, 2 sprays into the nostril(s) as directed by provider Daily., Disp: , Rfl:   •  levocetirizine (XYZAL) 5 MG tablet, Take 1 tablet by mouth Every Evening., Disp: 90 tablet, Rfl: 3  •  losartan (COZAAR) 50 MG tablet, Take 1 tablet by mouth Daily., Disp: 90 tablet, Rfl: 1  •  multivitamin with minerals tablet tablet, Take 1 tablet by mouth Daily., Disp: , Rfl:   •  Nebusal 3 % nebulizer solution, inhale 1 vial by nebulizer TWICE DAILY, Disp: 240 mL, Rfl: 4  •  ondansetron ODT (ZOFRAN-ODT) 4 MG disintegrating tablet, Place 1 tablet on the tongue Every 6 (Six) Hours As Needed for Nausea or Vomiting., Disp: 30 tablet, Rfl: 0  •  pravastatin (PRAVACHOL) 20 MG tablet, Take 1 tablet by mouth Daily., Disp: 90 tablet, Rfl: 1  •  fluticasone (FLONASE) 50 MCG/ACT nasal spray, 1 spray into the nostril(s) as directed by provider Daily. (Patient not taking: Reported on 3/14/2023), Disp: , Rfl:   •  pantoprazole (PROTONIX) 40 MG EC tablet, Take 1 tablet by mouth Daily. (Patient not  "taking: Reported on 3/14/2023), Disp: 90 tablet, Rfl: 1       Objective   Vital Signs:   Resp 16   Ht 157.5 cm (62\")   Wt 51.7 kg (114 lb)   BMI 20.85 kg/m²       Physical Exam  Vitals and nursing note reviewed.   Constitutional:       Appearance: Normal appearance. The patient is well-developed.   Cardiovascular:      Rate and Rhythm: Normal rate and regular rhythm.   Pulmonary:      Effort: Pulmonary effort is normal.      Breath sounds: Normal air entry.   Abdominal:      General: Bowel sounds are normal.      Palpations: Abdomen is soft.      Skin:     General: Skin is warm and dry.   Neurological:      Mental Status: The patient is alert and oriented to person, place, and time.      Motor: Motor function is intact.   Psychiatric:         Mood and Affect: Mood normal.       Result Review :               Assessment and Plan   Diagnoses and all orders for this visit:    1. GERD without esophagitis (Primary)    2. Constipation, unspecified constipation type    We will schedule her for an EGD and colonoscopy.  I have described the procedure to her as well as the risk and benefits and she is agreeable to proceeding.    I  Cory Lindsey MD  03/14/2023    "

## 2023-03-21 ENCOUNTER — CLINICAL SUPPORT (OUTPATIENT)
Dept: FAMILY MEDICINE CLINIC | Age: 83
End: 2023-03-21
Payer: MEDICARE

## 2023-03-21 DIAGNOSIS — J30.9 ALLERGIC RHINITIS, UNSPECIFIED SEASONALITY, UNSPECIFIED TRIGGER: Primary | ICD-10-CM

## 2023-03-21 PROCEDURE — 95115 IMMUNOTHERAPY ONE INJECTION: CPT | Performed by: FAMILY MEDICINE

## 2023-03-29 ENCOUNTER — CLINICAL SUPPORT (OUTPATIENT)
Dept: FAMILY MEDICINE CLINIC | Age: 83
End: 2023-03-29
Payer: MEDICARE

## 2023-03-29 DIAGNOSIS — J30.9 ALLERGIC RHINITIS, UNSPECIFIED SEASONALITY, UNSPECIFIED TRIGGER: Primary | ICD-10-CM

## 2023-03-29 PROCEDURE — 95115 IMMUNOTHERAPY ONE INJECTION: CPT | Performed by: FAMILY MEDICINE

## 2023-04-03 ENCOUNTER — ANESTHESIA EVENT (OUTPATIENT)
Dept: GASTROENTEROLOGY | Facility: HOSPITAL | Age: 83
End: 2023-04-03
Payer: MEDICARE

## 2023-04-03 ENCOUNTER — HOSPITAL ENCOUNTER (OUTPATIENT)
Facility: HOSPITAL | Age: 83
Setting detail: HOSPITAL OUTPATIENT SURGERY
Discharge: HOME | End: 2023-04-03
Attending: SURGERY | Admitting: SURGERY
Payer: MEDICARE

## 2023-04-03 ENCOUNTER — ANESTHESIA (OUTPATIENT)
Dept: GASTROENTEROLOGY | Facility: HOSPITAL | Age: 83
End: 2023-04-03
Payer: MEDICARE

## 2023-04-03 VITALS
DIASTOLIC BLOOD PRESSURE: 71 MMHG | HEIGHT: 62 IN | TEMPERATURE: 97.8 F | BODY MASS INDEX: 20.2 KG/M2 | WEIGHT: 109.79 LBS | OXYGEN SATURATION: 98 % | SYSTOLIC BLOOD PRESSURE: 147 MMHG | RESPIRATION RATE: 18 BRPM | HEART RATE: 74 BPM

## 2023-04-03 DIAGNOSIS — K21.9 GERD WITHOUT ESOPHAGITIS: ICD-10-CM

## 2023-04-03 PROCEDURE — 25010000002 PROPOFOL 10 MG/ML EMULSION: Performed by: NURSE ANESTHETIST, CERTIFIED REGISTERED

## 2023-04-03 PROCEDURE — 88305 TISSUE EXAM BY PATHOLOGIST: CPT | Performed by: SURGERY

## 2023-04-03 RX ORDER — ONDANSETRON 4 MG/1
4 TABLET, FILM COATED ORAL ONCE AS NEEDED
Status: DISCONTINUED | OUTPATIENT
Start: 2023-04-03 | End: 2023-04-03 | Stop reason: HOSPADM

## 2023-04-03 RX ORDER — ONDANSETRON 2 MG/ML
4 INJECTION INTRAMUSCULAR; INTRAVENOUS ONCE AS NEEDED
Status: DISCONTINUED | OUTPATIENT
Start: 2023-04-03 | End: 2023-04-03 | Stop reason: HOSPADM

## 2023-04-03 RX ORDER — LIDOCAINE HYDROCHLORIDE 20 MG/ML
INJECTION, SOLUTION EPIDURAL; INFILTRATION; INTRACAUDAL; PERINEURAL AS NEEDED
Status: DISCONTINUED | OUTPATIENT
Start: 2023-04-03 | End: 2023-04-03 | Stop reason: SURG

## 2023-04-03 RX ORDER — SODIUM CHLORIDE, SODIUM LACTATE, POTASSIUM CHLORIDE, CALCIUM CHLORIDE 600; 310; 30; 20 MG/100ML; MG/100ML; MG/100ML; MG/100ML
30 INJECTION, SOLUTION INTRAVENOUS CONTINUOUS
Status: DISCONTINUED | OUTPATIENT
Start: 2023-04-03 | End: 2023-04-03 | Stop reason: HOSPADM

## 2023-04-03 RX ORDER — PROPOFOL 10 MG/ML
VIAL (ML) INTRAVENOUS AS NEEDED
Status: DISCONTINUED | OUTPATIENT
Start: 2023-04-03 | End: 2023-04-03 | Stop reason: SURG

## 2023-04-03 RX ADMIN — PROPOFOL 50 MG: 10 INJECTION, EMULSION INTRAVENOUS at 11:49

## 2023-04-03 RX ADMIN — PROPOFOL 175 MCG/KG/MIN: 10 INJECTION, EMULSION INTRAVENOUS at 11:49

## 2023-04-03 RX ADMIN — LIDOCAINE HYDROCHLORIDE 50 MG: 20 INJECTION, SOLUTION EPIDURAL; INFILTRATION; INTRACAUDAL; PERINEURAL at 11:49

## 2023-04-03 RX ADMIN — SODIUM CHLORIDE, POTASSIUM CHLORIDE, SODIUM LACTATE AND CALCIUM CHLORIDE: 600; 310; 30; 20 INJECTION, SOLUTION INTRAVENOUS at 11:46

## 2023-04-03 NOTE — ANESTHESIA POSTPROCEDURE EVALUATION
Patient: Luma Cruz    Procedure Summary     Date: 04/03/23 Room / Location: [facility] ENDOSCOPY 5 / [facility]    Anesthesia Start: 1146 Anesthesia Stop: 1214    Procedures:       ESOPHAGOGASTRODUODENOSCOPY with biopsy      COLONOSCOPY Diagnosis:       GERD without esophagitis      (GERD without esophagitis [K21.9])    Surgeons: Cory Lindsey MD Provider: Fredrick Flannery MD    Anesthesia Type: general ASA Status: 3          Anesthesia Type: general    Vitals  Vitals Value Taken Time   /71 04/03/23 1233   Temp 36.6 °C (97.8 °F) 04/03/23 1233   Pulse 74 04/03/23 1233   Resp 18 04/03/23 1233   SpO2 98 % 04/03/23 1233           Post Anesthesia Care and Evaluation    Patient location during evaluation: bedside  Patient participation: complete - patient participated  Level of consciousness: awake  Pain management: adequate    Airway patency: patent  PONV Status: none  Cardiovascular status: acceptable and stable  Respiratory status: acceptable  Hydration status: acceptable    Comments: An Anesthesiologist personally participated in the most demanding procedures (including induction and emergence if applicable) in the anesthesia plan, monitored the course of anesthesia administration at frequent intervals and remained physically present and available for immediate diagnosis and treatment of emergencies.

## 2023-04-03 NOTE — ANESTHESIA PREPROCEDURE EVALUATION
Anesthesia Evaluation     Patient summary reviewed and Nursing notes reviewed   no history of anesthetic complications:  NPO Solid Status: > 8 hours  NPO Liquid Status: > 2 hours           Airway   Mallampati: II  TM distance: >3 FB  Neck ROM: full  No difficulty expected  Dental    (+) partials    Pulmonary - negative pulmonary ROS and normal exam    breath sounds clear to auscultation  Cardiovascular - normal exam  Exercise tolerance: good (4-7 METS)    Rhythm: regular  Rate: normal    (+) hypertension,       Neuro/Psych- negative ROS  GI/Hepatic/Renal/Endo    (+)  GERD well controlled,      Musculoskeletal (-) negative ROS    Abdominal    Substance History - negative use     OB/GYN negative ob/gyn ROS         Other - negative ROS       ROS/Med Hx Other: PAT Nursing Notes unavailable.                   Anesthesia Plan    ASA 3     general       Anesthetic plan, risks, benefits, and alternatives have been provided, discussed and informed consent has been obtained with: patient.        CODE STATUS:

## 2023-04-04 ENCOUNTER — CLINICAL SUPPORT (OUTPATIENT)
Dept: FAMILY MEDICINE CLINIC | Age: 83
End: 2023-04-04
Payer: MEDICARE

## 2023-04-04 DIAGNOSIS — J30.9 ALLERGIC RHINITIS, UNSPECIFIED SEASONALITY, UNSPECIFIED TRIGGER: Primary | ICD-10-CM

## 2023-04-04 LAB
CYTO UR: NORMAL
LAB AP CASE REPORT: NORMAL
LAB AP CLINICAL INFORMATION: NORMAL
PATH REPORT.FINAL DX SPEC: NORMAL
PATH REPORT.GROSS SPEC: NORMAL

## 2023-04-04 PROCEDURE — 95115 IMMUNOTHERAPY ONE INJECTION: CPT | Performed by: FAMILY MEDICINE

## 2023-04-07 ENCOUNTER — TELEPHONE (OUTPATIENT)
Dept: SURGERY | Facility: CLINIC | Age: 83
End: 2023-04-07
Payer: MEDICARE

## 2023-04-07 ENCOUNTER — TELEPHONE (OUTPATIENT)
Dept: FAMILY MEDICINE CLINIC | Age: 83
End: 2023-04-07

## 2023-04-07 NOTE — TELEPHONE ENCOUNTER
"  Caller: Luma Cruz \"Luma Grey\"    Relationship: Self    Best call back number: [phone]    Who are you requesting to speak with (clinical staff, provider,  specific staff member): MARCO    What was the call regarding: PATIENT STATES SHE WOULD LIKE FOR MARCO TO GO OVER HER TEST RESULTS WITH HER.    Do you require a callback: YES  "

## 2023-04-07 NOTE — TELEPHONE ENCOUNTER
"If she is talking about the upper GI endoscopy that she had recently, there was a \"mild reactive gastropathy\".  This sounds like some mild irritation of the stomach lining.  I would not normally consider this to be a worrisome finding, but I would normally recommend some kind of treatment such as famotidine.  Confirm with her if she is still taking that or if she is taking some other medication.  I think she has previously been on pantoprazole.  Thanks  "

## 2023-04-12 ENCOUNTER — CLINICAL SUPPORT (OUTPATIENT)
Dept: FAMILY MEDICINE CLINIC | Age: 83
End: 2023-04-12
Payer: MEDICARE

## 2023-04-12 DIAGNOSIS — J30.9 ALLERGIC RHINITIS, UNSPECIFIED SEASONALITY, UNSPECIFIED TRIGGER: Primary | ICD-10-CM

## 2023-04-12 PROCEDURE — 95115 IMMUNOTHERAPY ONE INJECTION: CPT | Performed by: FAMILY MEDICINE

## 2023-04-18 ENCOUNTER — OFFICE VISIT (OUTPATIENT)
Dept: SURGERY | Facility: CLINIC | Age: 83
End: 2023-04-18
Payer: MEDICARE

## 2023-04-18 VITALS — HEIGHT: 62 IN | WEIGHT: 116 LBS | RESPIRATION RATE: 16 BRPM | BODY MASS INDEX: 21.35 KG/M2

## 2023-04-18 DIAGNOSIS — K21.9 GERD WITHOUT ESOPHAGITIS: Primary | ICD-10-CM

## 2023-04-18 DIAGNOSIS — R11.0 NAUSEA: ICD-10-CM

## 2023-04-18 RX ORDER — ONDANSETRON 4 MG/1
4 TABLET, ORALLY DISINTEGRATING ORAL EVERY 6 HOURS PRN
Qty: 30 TABLET | Refills: 0 | Status: SHIPPED | OUTPATIENT
Start: 2023-04-18

## 2023-04-18 NOTE — PROGRESS NOTES
Chief Complaint  Post-op (EGD/Colonoscopy consult )    Subjective          Luma Cruz presents to [facility]  History of Present Illness    Luma Cruz is a 82 y.o. female  who presents today for a postoperative visit.     Patient is here for a follow-up after a recent EGD and colonoscopy.  Her colonoscopy revealed diverticulosis but was otherwise okay.  She did have a large hiatal hernia on upper endoscopy.  She did not have any distal esophagitis.  She is still having some problems with her reflux and it seems like her symptom control is not always very good.    Past History:  Medical History: has a past medical history of COVID-19 (07/01/2022), Essential hypertension, Generalized anxiety disorder, GERD without esophagitis, Menopausal and postmenopausal disorder, and Mixed hyperlipidemia.   Surgical History: has a past surgical history that includes Colonoscopy (06/2011); Tonsillectomy and adenoidectomy; Hysterectomy; Appendectomy (10 years ago); Breast biopsy (right side, 15 years ago); Esophagogastroduodenoscopy (N/A, 04/03/2023); and Colonoscopy (N/A, 04/03/2023).   Family History: family history includes Arthritis in her mother; Coronary artery disease in her father; Hearing loss in her mother; Hypertension in her mother; Stroke in her mother.   Social History: reports that she has never smoked. She has never been exposed to tobacco smoke. She has never used smokeless tobacco. She reports that she does not drink alcohol and does not use drugs.  Allergies: Fosamax [alendronate] and Penicillins       Current Outpatient Medications:   •  albuterol (2.5 MG/3ML) 0.083% nebulizer solution 3 mL, albuterol (5 MG/ML) 0.5% nebulizer solution 0.5 mL, Inhale Every 4 (Four) Hours., Disp: , Rfl:   •  amLODIPine (NORVASC) 2.5 MG tablet, Take 1 tablet by mouth Every Night., Disp: 90 tablet, Rfl: 1  •  arformoterol (BROVANA) 15 MCG/2ML nebulizer solution, Inhale 2 mL., Disp: , Rfl:   •   "azelastine (ASTELIN) 0.1 % nasal spray, 1 spray into the nostril(s) as directed by provider 2 (Two) Times a Day., Disp: , Rfl:   •  budesonide (PULMICORT) 0.5 MG/2ML nebulizer solution, Take 2 mL by nebulization 2 (Two) Times a Day. Rinse mouth out after each use, Disp: 60 each, Rfl: 11  •  clonazePAM (KlonoPIN) 0.5 MG tablet, Take 1 tablet by mouth 2 (Two) Times a Day As Needed for Anxiety., Disp: 90 tablet, Rfl: 1  •  estradiol (ESTRACE) 1 MG tablet, Take 1 tablet by mouth Daily., Disp: 90 tablet, Rfl: 1  •  famotidine (Pepcid) 20 MG tablet, Take 1 tablet by mouth 2 (Two) Times a Day., Disp: 60 tablet, Rfl: 1  •  fluticasone (FLONASE) 50 MCG/ACT nasal spray, 2 sprays into the nostril(s) as directed by provider Daily., Disp: , Rfl:   •  fluticasone (FLONASE) 50 MCG/ACT nasal spray, 1 spray into the nostril(s) as directed by provider Daily., Disp: , Rfl:   •  levocetirizine (XYZAL) 5 MG tablet, Take 1 tablet by mouth Every Evening., Disp: 90 tablet, Rfl: 3  •  losartan (COZAAR) 50 MG tablet, Take 1 tablet by mouth Daily., Disp: 90 tablet, Rfl: 1  •  multivitamin with minerals tablet tablet, Take 1 tablet by mouth Daily., Disp: , Rfl:   •  Nebusal 3 % nebulizer solution, inhale 1 vial by nebulizer TWICE DAILY, Disp: 240 mL, Rfl: 4  •  ondansetron ODT (ZOFRAN-ODT) 4 MG disintegrating tablet, Place 1 tablet on the tongue Every 6 (Six) Hours As Needed for Nausea or Vomiting., Disp: 30 tablet, Rfl: 0  •  pantoprazole (PROTONIX) 40 MG EC tablet, Take 1 tablet by mouth Daily., Disp: 90 tablet, Rfl: 1  •  pravastatin (PRAVACHOL) 20 MG tablet, Take 1 tablet by mouth Daily., Disp: 90 tablet, Rfl: 1  •  esomeprazole (nexIUM) 40 MG capsule, Take 1 capsule by mouth Daily. (Patient not taking: Reported on 4/18/2023), Disp: , Rfl:        Physical Exam  He appears well today and her abdomen is soft.  Objective     Vital Signs:   Resp 16   Ht 157.5 cm (62\")   Wt 52.6 kg (116 lb)   BMI 21.22 kg/m²              Assessment and Plan  "   Diagnoses and all orders for this visit:    1. GERD without esophagitis (Primary)    2. Nausea    We will go ahead and refill her Zofran.  I am going to refer her over to my partner Dr. Reeder for evaluation of potential antireflux surgery.

## 2023-04-18 NOTE — LETTER
April 18, 2023     Joseph Walker MD  [address] [facility]  [address]  [place] [address]    Patient: Luma Cruz   YOB: 1940   Date of Visit: 4/18/2023       Dear Dr. Aaron MD:    Thank you for referring Luma Cruz to me for evaluation. Below are the relevant portions of my assessment and plan of care.    If you have questions, please do not hesitate to call me. I look forward to following Luma along with you.         Sincerely,        Cory Lindsey MD        CC: No Recipients    Cory Lindsey MD  04/18/23 1501  Signed  Chief Complaint  Post-op (EGD/Colonoscopy consult )    Subjective           Luma Cruz presents to [facility] GENERAL SURGERY  History of Present Illness    Luma Cruz is a 82 y.o. female  who presents today for a postoperative visit.     Patient is here for a follow-up after a recent EGD and colonoscopy.  Her colonoscopy revealed diverticulosis but was otherwise okay.  She did have a large hiatal hernia on upper endoscopy.  She did not have any distal esophagitis.  She is still having some problems with her reflux and it seems like her symptom control is not always very good.    Past History:  Medical History: has a past medical history of COVID-19 (07/01/2022), Essential hypertension, Generalized anxiety disorder, GERD without esophagitis, Menopausal and postmenopausal disorder, and Mixed hyperlipidemia.   Surgical History: has a past surgical history that includes Colonoscopy (06/2011); Tonsillectomy and adenoidectomy; Hysterectomy; Appendectomy (10 years ago); Breast biopsy (right side, 15 years ago); Esophagogastroduodenoscopy (N/A, 04/03/2023); and Colonoscopy (N/A, 04/03/2023).   Family History: family history includes Arthritis in her mother; Coronary artery disease in her father; Hearing loss in her mother; Hypertension in her mother; Stroke in her mother.   Social History: reports that she has never smoked. She has never been exposed to  tobacco smoke. She has never used smokeless tobacco. She reports that she does not drink alcohol and does not use drugs.  Allergies: Fosamax [alendronate] and Penicillins       Current Outpatient Medications:   •  albuterol (2.5 MG/3ML) 0.083% nebulizer solution 3 mL, albuterol (5 MG/ML) 0.5% nebulizer solution 0.5 mL, Inhale Every 4 (Four) Hours., Disp: , Rfl:   •  amLODIPine (NORVASC) 2.5 MG tablet, Take 1 tablet by mouth Every Night., Disp: 90 tablet, Rfl: 1  •  arformoterol (BROVANA) 15 MCG/2ML nebulizer solution, Inhale 2 mL., Disp: , Rfl:   •  azelastine (ASTELIN) 0.1 % nasal spray, 1 spray into the nostril(s) as directed by provider 2 (Two) Times a Day., Disp: , Rfl:   •  budesonide (PULMICORT) 0.5 MG/2ML nebulizer solution, Take 2 mL by nebulization 2 (Two) Times a Day. Rinse mouth out after each use, Disp: 60 each, Rfl: 11  •  clonazePAM (KlonoPIN) 0.5 MG tablet, Take 1 tablet by mouth 2 (Two) Times a Day As Needed for Anxiety., Disp: 90 tablet, Rfl: 1  •  estradiol (ESTRACE) 1 MG tablet, Take 1 tablet by mouth Daily., Disp: 90 tablet, Rfl: 1  •  famotidine (Pepcid) 20 MG tablet, Take 1 tablet by mouth 2 (Two) Times a Day., Disp: 60 tablet, Rfl: 1  •  fluticasone (FLONASE) 50 MCG/ACT nasal spray, 2 sprays into the nostril(s) as directed by provider Daily., Disp: , Rfl:   •  fluticasone (FLONASE) 50 MCG/ACT nasal spray, 1 spray into the nostril(s) as directed by provider Daily., Disp: , Rfl:   •  levocetirizine (XYZAL) 5 MG tablet, Take 1 tablet by mouth Every Evening., Disp: 90 tablet, Rfl: 3  •  losartan (COZAAR) 50 MG tablet, Take 1 tablet by mouth Daily., Disp: 90 tablet, Rfl: 1  •  multivitamin with minerals tablet tablet, Take 1 tablet by mouth Daily., Disp: , Rfl:   •  Nebusal 3 % nebulizer solution, inhale 1 vial by nebulizer TWICE DAILY, Disp: 240 mL, Rfl: 4  •  ondansetron ODT (ZOFRAN-ODT) 4 MG disintegrating tablet, Place 1 tablet on the tongue Every 6 (Six) Hours As Needed for Nausea or  "Vomiting., Disp: 30 tablet, Rfl: 0  •  pantoprazole (PROTONIX) 40 MG EC tablet, Take 1 tablet by mouth Daily., Disp: 90 tablet, Rfl: 1  •  pravastatin (PRAVACHOL) 20 MG tablet, Take 1 tablet by mouth Daily., Disp: 90 tablet, Rfl: 1  •  esomeprazole (nexIUM) 40 MG capsule, Take 1 capsule by mouth Daily. (Patient not taking: Reported on 4/18/2023), Disp: , Rfl:        Physical Exam  He appears well today and her abdomen is soft.  Objective      Vital Signs:   Resp 16   Ht 157.5 cm (62\")   Wt 52.6 kg (116 lb)   BMI 21.22 kg/m²             Assessment and Plan    Diagnoses and all orders for this visit:    1. GERD without esophagitis (Primary)    2. Nausea    We will go ahead and refill her Zofran.  I am going to refer her over to my partner Dr. Reeder for evaluation of potential antireflux surgery.        "

## 2023-04-19 ENCOUNTER — CLINICAL SUPPORT (OUTPATIENT)
Dept: FAMILY MEDICINE CLINIC | Age: 83
End: 2023-04-19
Payer: MEDICARE

## 2023-04-19 DIAGNOSIS — J30.9 ALLERGIC RHINITIS, UNSPECIFIED SEASONALITY, UNSPECIFIED TRIGGER: Primary | ICD-10-CM

## 2023-04-19 PROCEDURE — 95115 IMMUNOTHERAPY ONE INJECTION: CPT | Performed by: FAMILY MEDICINE

## 2023-04-25 ENCOUNTER — CLINICAL SUPPORT (OUTPATIENT)
Dept: FAMILY MEDICINE CLINIC | Age: 83
End: 2023-04-25
Payer: MEDICARE

## 2023-04-25 DIAGNOSIS — J30.9 ALLERGIC RHINITIS, UNSPECIFIED SEASONALITY, UNSPECIFIED TRIGGER: Primary | ICD-10-CM

## 2023-04-25 PROCEDURE — 95115 IMMUNOTHERAPY ONE INJECTION: CPT | Performed by: FAMILY MEDICINE

## 2023-04-27 ENCOUNTER — OFFICE VISIT (OUTPATIENT)
Dept: SURGERY | Facility: CLINIC | Age: 83
End: 2023-04-27
Payer: MEDICARE

## 2023-04-27 VITALS — RESPIRATION RATE: 14 BRPM | HEIGHT: 62 IN | BODY MASS INDEX: 20.35 KG/M2 | WEIGHT: 110.6 LBS

## 2023-04-27 DIAGNOSIS — K21.9 HIATAL HERNIA WITH GASTROESOPHAGEAL REFLUX: Primary | ICD-10-CM

## 2023-04-27 DIAGNOSIS — K44.9 HIATAL HERNIA WITH GASTROESOPHAGEAL REFLUX: Primary | ICD-10-CM

## 2023-04-27 NOTE — LETTER
May 4, 2023       No Recipients    Patient: Luma Cruz   YOB: 1940   Date of Visit: 4/27/2023       Dear Dr. Walters Recipients:    Thank you for referring Luma Cruz to me for evaluation. Below are the relevant portions of my assessment and plan of care.    If you have questions, please do not hesitate to call me. I look forward to following Luma along with you.         Sincerely,        Hilario Reeder MD        CC:   No Recipients    Hilario Reeder MD  05/04/23 0805  Signed  General Surgery/Colorectal Surgery Note    Patient Name:  Luma Cruz  YOB: 1940  [id number]    Referring Provider: No ref. provider found      Patient Care Team:  Joseph Walker MD as PCP - General (Family Medicine)    Chief complaint hiatal hernia    Subjective .     History of present illness:    History of large hiatal hernia found by Dr. Lindsey in [facility] 4/3/2023.  Biopsy of antrum negative for H. pylori.  She has a 10-year history of reflux which has recently worsened despite Protonix use.  She has to sleep sitting up due to stomach acid.  Occasional acid taste in her mouth.  No dysphagia.  No painful swallowing.  No vomiting food.  She has a weird sensation in the back of her throat.  No recent chest pain.  No blood thinner use.  No tobacco use.  Previous laparoscopic cholecystectomy.      History:  Past Medical History:   Diagnosis Date   • COVID-19 07/01/2022   • Essential hypertension    • Generalized anxiety disorder    • GERD without esophagitis    • Menopausal and postmenopausal disorder    • Mixed hyperlipidemia        Past Surgical History:   Procedure Laterality Date   • APPENDECTOMY  10 years ago   • BREAST BIOPSY  right side, 15 years ago   • COLONOSCOPY  06/2011   • COLONOSCOPY N/A 04/03/2023    Procedure: COLONOSCOPY;  Surgeon: Cory Lindsey MD;  Location: [facility] ENDOSCOPY;  Service: General;  Laterality: N/A;  normal   • ENDOSCOPY N/A 04/03/2023    Hiatal hernia,  mild reactive gastropathy   • HYSTERECTOMY      complete   • TONSILLECTOMY AND ADENOIDECTOMY         Family History   Problem Relation Age of Onset   • Stroke Mother    • Hypertension Mother    • Arthritis Mother    • Hearing loss Mother    • Coronary artery disease Father        Social History     Tobacco Use   • Smoking status: Never     Passive exposure: Never   • Smokeless tobacco: Never   Vaping Use   • Vaping Use: Never used   Substance Use Topics   • Alcohol use: Never   • Drug use: Never       Review of Systems  All systems were reviewed and negative except for:   Review of Systems   Constitutional: Negative for chills, fever and unexpected weight loss.   HENT: Negative for congestion, nosebleeds and voice change.    Eyes: Negative for blurred vision, double vision and discharge.   Respiratory: Negative for apnea, chest tightness and shortness of breath.    Cardiovascular: Negative for chest pain and leg swelling.   Gastrointestinal:        See HPI   Endocrine: Negative for cold intolerance and heat intolerance.   Genitourinary: Negative for dysuria, hematuria and urgency.   Musculoskeletal: Negative for back pain, joint swelling and neck pain.   Skin: Negative for color change and dry skin.   Neurological: Negative for dizziness and confusion.   Hematological: Negative for adenopathy.   Psychiatric/Behavioral: Negative for agitation and behavioral problems.     MEDS:  Prior to Admission medications    Medication Sig Start Date End Date Taking? Authorizing Provider   albuterol (2.5 MG/3ML) 0.083% nebulizer solution 3 mL, albuterol (5 MG/ML) 0.5% nebulizer solution 0.5 mL Inhale Every 4 (Four) Hours.   Yes Janae Patel MD   amLODIPine (NORVASC) 2.5 MG tablet Take 1 tablet by mouth Every Night. 12/20/22  Yes Joseph Walker MD   arformoterol (BROVANA) 15 MCG/2ML nebulizer solution Inhale 2 mL.   Yes Janae Patel MD   azelastine (ASTELIN) 0.1 % nasal spray 1 spray into the nostril(s)  as directed by provider 2 (Two) Times a Day. 5/17/21  Yes Janae Patel MD   budesonide (PULMICORT) 0.5 MG/2ML nebulizer solution Take 2 mL by nebulization 2 (Two) Times a Day. Rinse mouth out after each use 1/12/23  Yes Michelle Grimm APRN   clonazePAM (KlonoPIN) 0.5 MG tablet Take 1 tablet by mouth 2 (Two) Times a Day As Needed for Anxiety. 12/20/22  Yes Joseph Walker MD   estradiol (ESTRACE) 1 MG tablet Take 1 tablet by mouth Daily. 12/20/22  Yes Joseph Walker MD   famotidine (Pepcid) 20 MG tablet Take 1 tablet by mouth 2 (Two) Times a Day. 2/13/23  Yes Joseph Walker MD   fluticasone (FLONASE) 50 MCG/ACT nasal spray 2 sprays into the nostril(s) as directed by provider Daily.   Yes Janae Patel MD   fluticasone (FLONASE) 50 MCG/ACT nasal spray 1 spray into the nostril(s) as directed by provider Daily.   Yes Janae Patel MD   levocetirizine (XYZAL) 5 MG tablet Take 1 tablet by mouth Every Evening. 12/20/22  Yes Joseph Walker MD   losartan (COZAAR) 50 MG tablet Take 1 tablet by mouth Daily. 12/20/22  Yes Joseph Walker MD   multivitamin with minerals tablet tablet Take 1 tablet by mouth Daily.   Yes Janae Patel MD   Nebusal 3 % nebulizer solution inhale 1 vial by nebulizer TWICE DAILY 2/24/22  Yes Michelle Grimm APRN   ondansetron ODT (ZOFRAN-ODT) 4 MG disintegrating tablet Place 1 tablet on the tongue Every 6 (Six) Hours As Needed for Nausea or Vomiting. 4/18/23  Yes Cory Lindsey MD   pantoprazole (PROTONIX) 40 MG EC tablet Take 1 tablet by mouth Daily. 12/20/22  Yes Joseph Walker MD   pravastatin (PRAVACHOL) 20 MG tablet Take 1 tablet by mouth Daily. 12/20/22  Yes Joseph Walker MD   esomeprazole (nexIUM) 40 MG capsule Take 1 capsule by mouth Daily.  Patient not taking: Reported on 4/18/2023    Provider, Historical, MD        Allergies:  Fosamax [alendronate] and Penicillins    Objective     Vital  "Signs        Physical Exam:     General Appearance:    Alert, cooperative, in no acute distress   Head:    Normocephalic, without obvious abnormality, atraumatic   Eyes:          Conjunctivae and sclerae normal, no icterus,     Ears:    Ears appear intact with no abnormalities noted   Throat:   No oral lesions, no thrush, oral mucosa moist   Neck:   No adenopathy, supple, trachea midline, no thyromegaly   Back:     No kyphosis present, no scoliosis present, no skin lesions,      erythema or scars, no tenderness to percussion or                   palpation,   range of motion normal   Lungs:     Clear to auscultation,respirations regular, even and                  unlabored    Heart:    Regular rhythm and normal rate, normal S1 and S2, no            murmur, no gallop, no rub, no click   Chest Wall:    No abnormalities observed   Abdomen:     Normal bowel sounds, no masses, no organomegaly, soft        non-tender, non-distended, no guarding, no rebound                tenderness   Rectal:        Extremities:   Moves all extremities well, no edema, no cyanosis, no             redness   Pulses:   Pulses palpable and equal bilaterally   Skin:   No bleeding, bruising or rash   Lymph nodes:   No palpable adenopathy   Neurologic:   A/o x 4 with no deficits       Results Review:   {Results Review:90877::\"I reviewed the patient's new clinical results.\"    LABS/IMAGING:  Results for orders placed or performed during the hospital encounter of 04/03/23   Tissue Pathology Exam    Specimen: Gastric, Antrum; Tissue   Result Value Ref Range    Case Report       Surgical Pathology Report                         Case: [id number]                                  Authorizing Provider:  Cory Lindsey MD       Collected:           04/03/2023 12:06 PM          Ordering Location:     [facility] Received:            04/03/2023 01:42 PM                                 SUITES                                                    " "                   Pathologist:           Beatriz Talley DO                                                       Specimen:    Gastric, Antrum, antrum biopsy                                                             Clinical Information       GERD without esophagitis      Final Diagnosis       Stomach, antrum, biopsy:    - Gastric antrum mucosa with mild reactive gastropathy    - Negative for Helicobacter pylori on routine H&E stain    - Negative for intestinal metaplasia, dysplasia and malignancy        Gross Description       1. Gastric, Antrum.  Received in formalin and labeled \" antrum\" is a 0.5 cm fragment of tan soft tissue. The specimen is entirely submitted in one cassette.   CAMRON      Microscopic Description          Result Review :     Assessment & Plan     Hiatal hernia with GERD    Discussion with patient.  I reviewed her endoscopy findings and pathology with the results mentioned above.  I recommended robotic possible open hiatal hernia repair with biologic mesh.  I discussed the procedure and recovery as well as risk benefits and alternatives.  She will think about it and let me know.  Continue PPI.  Follow-up me as needed.  All questions answered.  She agrees with the plan.  Thank for the consult.        This document has been electronically signed by Hilario Reeder MD  May 4, 2023 08:03 EDT    "

## 2023-05-01 ENCOUNTER — TELEPHONE (OUTPATIENT)
Dept: FAMILY MEDICINE CLINIC | Age: 83
End: 2023-05-01

## 2023-05-01 NOTE — TELEPHONE ENCOUNTER
"  Caller: Luma Cruz \"Luma Grey\"    Relationship: Self    Best call back number: [phone]    What was the call regarding: PATIENT IS REQUESTING A CALL BACK FROM [facility]    Do you require a callback: YES        "

## 2023-05-02 ENCOUNTER — CLINICAL SUPPORT (OUTPATIENT)
Dept: FAMILY MEDICINE CLINIC | Age: 83
End: 2023-05-02
Payer: MEDICARE

## 2023-05-02 ENCOUNTER — TELEPHONE (OUTPATIENT)
Dept: FAMILY MEDICINE CLINIC | Age: 83
End: 2023-05-02
Payer: MEDICARE

## 2023-05-02 DIAGNOSIS — J30.9 ALLERGIC RHINITIS, UNSPECIFIED SEASONALITY, UNSPECIFIED TRIGGER: Primary | ICD-10-CM

## 2023-05-02 PROCEDURE — 95115 IMMUNOTHERAPY ONE INJECTION: CPT | Performed by: FAMILY MEDICINE

## 2023-05-02 NOTE — TELEPHONE ENCOUNTER
Pt would like you to look at urine testing from ER visit on 4/30/23 and let her know if anything else needs to be done.

## 2023-05-02 NOTE — TELEPHONE ENCOUNTER
It was not the best quality specimen, but no, I would not recommend anything be done in response to it.  Thanks.

## 2023-05-04 NOTE — PROGRESS NOTES
General Surgery/Colorectal Surgery Note    Patient Name:  Luma Cruz  YOB: 1940  [id number]    Referring Provider: No ref. provider found      Patient Care Team:  Joseph Walker MD as PCP - General (Family Medicine)    Chief complaint hiatal hernia    Subjective .     History of present illness:    History of large hiatal hernia found by Dr. Lindsey in [facility] 4/3/2023.  Biopsy of antrum negative for H. pylori.  She has a 10-year history of reflux which has recently worsened despite Protonix use.  She has to sleep sitting up due to stomach acid.  Occasional acid taste in her mouth.  No dysphagia.  No painful swallowing.  No vomiting food.  She has a weird sensation in the back of her throat.  No recent chest pain.  No blood thinner use.  No tobacco use.  Previous laparoscopic cholecystectomy.      History:  Past Medical History:   Diagnosis Date   • COVID-19 07/01/2022   • Essential hypertension    • Generalized anxiety disorder    • GERD without esophagitis    • Menopausal and postmenopausal disorder    • Mixed hyperlipidemia        Past Surgical History:   Procedure Laterality Date   • APPENDECTOMY  10 years ago   • BREAST BIOPSY  right side, 15 years ago   • COLONOSCOPY  06/2011   • COLONOSCOPY N/A 04/03/2023    Procedure: COLONOSCOPY;  Surgeon: Cory Lindsey MD;  Location: [facility] ENDOSCOPY;  Service: General;  Laterality: N/A;  normal   • ENDOSCOPY N/A 04/03/2023    Hiatal hernia, mild reactive gastropathy   • HYSTERECTOMY      complete   • TONSILLECTOMY AND ADENOIDECTOMY         Family History   Problem Relation Age of Onset   • Stroke Mother    • Hypertension Mother    • Arthritis Mother    • Hearing loss Mother    • Coronary artery disease Father        Social History     Tobacco Use   • Smoking status: Never     Passive exposure: Never   • Smokeless tobacco: Never   Vaping Use   • Vaping Use: Never used   Substance Use Topics   • Alcohol use: Never   • Drug use: Never        Review of Systems  All systems were reviewed and negative except for:   Review of Systems   Constitutional: Negative for chills, fever and unexpected weight loss.   HENT: Negative for congestion, nosebleeds and voice change.    Eyes: Negative for blurred vision, double vision and discharge.   Respiratory: Negative for apnea, chest tightness and shortness of breath.    Cardiovascular: Negative for chest pain and leg swelling.   Gastrointestinal:        See HPI   Endocrine: Negative for cold intolerance and heat intolerance.   Genitourinary: Negative for dysuria, hematuria and urgency.   Musculoskeletal: Negative for back pain, joint swelling and neck pain.   Skin: Negative for color change and dry skin.   Neurological: Negative for dizziness and confusion.   Hematological: Negative for adenopathy.   Psychiatric/Behavioral: Negative for agitation and behavioral problems.     MEDS:  Prior to Admission medications    Medication Sig Start Date End Date Taking? Authorizing Provider   albuterol (2.5 MG/3ML) 0.083% nebulizer solution 3 mL, albuterol (5 MG/ML) 0.5% nebulizer solution 0.5 mL Inhale Every 4 (Four) Hours.   Yes Janae Patel MD   amLODIPine (NORVASC) 2.5 MG tablet Take 1 tablet by mouth Every Night. 12/20/22  Yes Joseph Walker MD   arformoterol (BROVANA) 15 MCG/2ML nebulizer solution Inhale 2 mL.   Yes Janae Patel MD   azelastine (ASTELIN) 0.1 % nasal spray 1 spray into the nostril(s) as directed by provider 2 (Two) Times a Day. 5/17/21  Yes Janae Patel MD   budesonide (PULMICORT) 0.5 MG/2ML nebulizer solution Take 2 mL by nebulization 2 (Two) Times a Day. Rinse mouth out after each use 1/12/23  Yes Michelle Grimm APRN   clonazePAM (KlonoPIN) 0.5 MG tablet Take 1 tablet by mouth 2 (Two) Times a Day As Needed for Anxiety. 12/20/22  Yes Joseph Walker MD   estradiol (ESTRACE) 1 MG tablet Take 1 tablet by mouth Daily. 12/20/22  Yes Joseph Walker MD    famotidine (Pepcid) 20 MG tablet Take 1 tablet by mouth 2 (Two) Times a Day. 2/13/23  Yes Joseph Walker MD   fluticasone (FLONASE) 50 MCG/ACT nasal spray 2 sprays into the nostril(s) as directed by provider Daily.   Yes Janae Patel MD   fluticasone (FLONASE) 50 MCG/ACT nasal spray 1 spray into the nostril(s) as directed by provider Daily.   Yes Janae Patel MD   levocetirizine (XYZAL) 5 MG tablet Take 1 tablet by mouth Every Evening. 12/20/22  Yes Joseph Walker MD   losartan (COZAAR) 50 MG tablet Take 1 tablet by mouth Daily. 12/20/22  Yes Joseph Walker MD   multivitamin with minerals tablet tablet Take 1 tablet by mouth Daily.   Yes Janae Patel MD   Nebusal 3 % nebulizer solution inhale 1 vial by nebulizer TWICE DAILY 2/24/22  Yes Michelle Grimm APRN   ondansetron ODT (ZOFRAN-ODT) 4 MG disintegrating tablet Place 1 tablet on the tongue Every 6 (Six) Hours As Needed for Nausea or Vomiting. 4/18/23  Yes Cory Lindsey MD   pantoprazole (PROTONIX) 40 MG EC tablet Take 1 tablet by mouth Daily. 12/20/22  Yes Joseph Walker MD   pravastatin (PRAVACHOL) 20 MG tablet Take 1 tablet by mouth Daily. 12/20/22  Yes Joseph Walker MD   esomeprazole (nexIUM) 40 MG capsule Take 1 capsule by mouth Daily.  Patient not taking: Reported on 4/18/2023    Janae aPtel MD        Allergies:  Fosamax [alendronate] and Penicillins    Objective     Vital Signs        Physical Exam:     General Appearance:    Alert, cooperative, in no acute distress   Head:    Normocephalic, without obvious abnormality, atraumatic   Eyes:          Conjunctivae and sclerae normal, no icterus,     Ears:    Ears appear intact with no abnormalities noted   Throat:   No oral lesions, no thrush, oral mucosa moist   Neck:   No adenopathy, supple, trachea midline, no thyromegaly   Back:     No kyphosis present, no scoliosis present, no skin lesions,      erythema or scars,  "no tenderness to percussion or                   palpation,   range of motion normal   Lungs:     Clear to auscultation,respirations regular, even and                  unlabored    Heart:    Regular rhythm and normal rate, normal S1 and S2, no            murmur, no gallop, no rub, no click   Chest Wall:    No abnormalities observed   Abdomen:     Normal bowel sounds, no masses, no organomegaly, soft        non-tender, non-distended, no guarding, no rebound                tenderness   Rectal:        Extremities:   Moves all extremities well, no edema, no cyanosis, no             redness   Pulses:   Pulses palpable and equal bilaterally   Skin:   No bleeding, bruising or rash   Lymph nodes:   No palpable adenopathy   Neurologic:   A/o x 4 with no deficits       Results Review:   {Results Review:71984::\"I reviewed the patient's new clinical results.\"    LABS/IMAGING:  Results for orders placed or performed during the hospital encounter of 04/03/23   Tissue Pathology Exam    Specimen: Gastric, Antrum; Tissue   Result Value Ref Range    Case Report       Surgical Pathology Report                         Case: [id number]                                  Authorizing Provider:  Cory Lindsey MD       Collected:           04/03/2023 12:06 PM          Ordering Location:     [facility] Received:            04/03/2023 01:42 PM                                 SUITES                                                                       Pathologist:           Beatriz Talley DO                                                       Specimen:    Gastric, Antrum, antrum biopsy                                                             Clinical Information       GERD without esophagitis      Final Diagnosis       Stomach, antrum, biopsy:    - Gastric antrum mucosa with mild reactive gastropathy    - Negative for Helicobacter pylori on routine H&E stain    - Negative for intestinal metaplasia, dysplasia and " "malignancy        Gross Description       1. Gastric, Antrum.  Received in formalin and labeled \" antrum\" is a 0.5 cm fragment of tan soft tissue. The specimen is entirely submitted in one cassette.   CAMRON      Microscopic Description          Result Review :     Assessment & Plan     Hiatal hernia with GERD    Discussion with patient.  I reviewed her endoscopy findings and pathology with the results mentioned above.  I recommended robotic possible open hiatal hernia repair with biologic mesh.  I discussed the procedure and recovery as well as risk benefits and alternatives.  She will think about it and let me know.  Continue PPI.  Follow-up me as needed.  All questions answered.  She agrees with the plan.  Thank for the consult.        This document has been electronically signed by Hilario Reeder MD  May 4, 2023 08:03 EDT  "

## 2023-05-05 ENCOUNTER — TELEPHONE (OUTPATIENT)
Dept: SURGERY | Facility: CLINIC | Age: 83
End: 2023-05-05
Payer: MEDICARE

## 2023-05-05 DIAGNOSIS — R11.0 NAUSEA: ICD-10-CM

## 2023-05-05 RX ORDER — ONDANSETRON 4 MG/1
TABLET, ORALLY DISINTEGRATING ORAL
Qty: 30 TABLET | Refills: 3 | OUTPATIENT
Start: 2023-05-05

## 2023-05-05 NOTE — TELEPHONE ENCOUNTER
"  Caller: Luma Cruz \"Luma Grey\"    Relationship: Self    Best call back number: [phone]    What form or medical record are you requesting: PROG NOTE FROM: 4/27    Who is requesting this form or medical record from you: NUTRITIONIST     How would you like to receive the form or medical records (pick-up, mail, fax): PICK-UP    Timeframe paperwork needed: PT STATED SHE WOULD COME BY THE OFFICE Monday: 5/8 TO PICK IT UP FROM THE OFFICE          "

## 2023-05-05 NOTE — TELEPHONE ENCOUNTER
Called patient to let her know her office notes were ready to  whenever she wanted to. Pt v/u and had no further questions or concerns.

## 2023-05-08 ENCOUNTER — CLINICAL SUPPORT (OUTPATIENT)
Dept: FAMILY MEDICINE CLINIC | Age: 83
End: 2023-05-08
Payer: MEDICARE

## 2023-05-08 ENCOUNTER — TELEPHONE (OUTPATIENT)
Dept: SURGERY | Facility: CLINIC | Age: 83
End: 2023-05-08
Payer: MEDICARE

## 2023-05-08 DIAGNOSIS — J30.9 ALLERGIC RHINITIS, UNSPECIFIED SEASONALITY, UNSPECIFIED TRIGGER: Primary | ICD-10-CM

## 2023-05-08 PROCEDURE — 95115 IMMUNOTHERAPY ONE INJECTION: CPT | Performed by: FAMILY MEDICINE

## 2023-05-09 NOTE — TELEPHONE ENCOUNTER
Called to let patient know I would be sending her information to her in the mail. She v/u and had no further questions.

## 2023-05-15 DIAGNOSIS — K21.9 GERD WITHOUT ESOPHAGITIS: ICD-10-CM

## 2023-05-15 DIAGNOSIS — R11.0 NAUSEA: ICD-10-CM

## 2023-05-15 RX ORDER — FAMOTIDINE 20 MG/1
20 TABLET, FILM COATED ORAL 2 TIMES DAILY
Qty: 180 TABLET | Refills: 1 | Status: SHIPPED | OUTPATIENT
Start: 2023-05-15

## 2023-05-15 RX ORDER — ONDANSETRON 4 MG/1
4 TABLET, ORALLY DISINTEGRATING ORAL EVERY 6 HOURS PRN
Qty: 30 TABLET | Refills: 0 | Status: SHIPPED | OUTPATIENT
Start: 2023-05-15

## 2023-05-15 NOTE — TELEPHONE ENCOUNTER
Pt states she is taking both famotidine and pantoprazole, states her insurance wouldn't pay for esomeprazole, removed from med list.

## 2023-05-15 NOTE — TELEPHONE ENCOUNTER
Please confirm with her if she is also taking esomeprazole.  It would be unusual to take both famotidine and use omeprazole at the same time.  Thanks

## 2023-05-15 NOTE — TELEPHONE ENCOUNTER
Pt inf, states she had scope done and has hiatal hernia, but has concerns about having surgery done.

## 2023-05-15 NOTE — TELEPHONE ENCOUNTER
Noted.  I have gone ahead and sent refills on these medications.  Again, it is unusual to need both medication like pantoprazole and famotidine.  Generally, I would recommend having a scope of the stomach and esophagus as a precaution.  I believe she has already seen general surgery.  Thanks.

## 2023-05-16 ENCOUNTER — CLINICAL SUPPORT (OUTPATIENT)
Dept: FAMILY MEDICINE CLINIC | Age: 83
End: 2023-05-16
Payer: MEDICARE

## 2023-05-16 DIAGNOSIS — J30.9 ALLERGIC RHINITIS, UNSPECIFIED SEASONALITY, UNSPECIFIED TRIGGER: Primary | ICD-10-CM

## 2023-05-16 PROCEDURE — 95115 IMMUNOTHERAPY ONE INJECTION: CPT | Performed by: FAMILY MEDICINE

## 2023-05-23 ENCOUNTER — CLINICAL SUPPORT (OUTPATIENT)
Dept: FAMILY MEDICINE CLINIC | Age: 83
End: 2023-05-23
Payer: MEDICARE

## 2023-05-23 DIAGNOSIS — J30.9 ALLERGIC RHINITIS, UNSPECIFIED SEASONALITY, UNSPECIFIED TRIGGER: Primary | ICD-10-CM

## 2023-05-23 PROCEDURE — 95115 IMMUNOTHERAPY ONE INJECTION: CPT | Performed by: FAMILY MEDICINE

## 2023-05-24 RX ORDER — BUDESONIDE 0.5 MG/2ML
INHALANT ORAL
Qty: 60 EACH | Refills: 11 | Status: SHIPPED | OUTPATIENT
Start: 2023-05-24

## 2023-05-24 RX ORDER — ARFORMOTEROL TARTRATE 15 UG/2ML
SOLUTION RESPIRATORY (INHALATION)
Qty: 120 ML | Refills: 11 | Status: SHIPPED | OUTPATIENT
Start: 2023-05-24

## 2023-05-30 ENCOUNTER — CLINICAL SUPPORT (OUTPATIENT)
Dept: FAMILY MEDICINE CLINIC | Age: 83
End: 2023-05-30

## 2023-05-30 DIAGNOSIS — J30.9 ALLERGIC RHINITIS, UNSPECIFIED SEASONALITY, UNSPECIFIED TRIGGER: Primary | ICD-10-CM

## 2023-05-30 PROCEDURE — 95115 IMMUNOTHERAPY ONE INJECTION: CPT | Performed by: FAMILY MEDICINE

## 2023-06-05 ENCOUNTER — CLINICAL SUPPORT (OUTPATIENT)
Dept: FAMILY MEDICINE CLINIC | Age: 83
End: 2023-06-05
Payer: MEDICARE

## 2023-06-05 DIAGNOSIS — J30.9 ALLERGIC RHINITIS, UNSPECIFIED SEASONALITY, UNSPECIFIED TRIGGER: Primary | ICD-10-CM

## 2023-06-05 PROCEDURE — 95115 IMMUNOTHERAPY ONE INJECTION: CPT | Performed by: FAMILY MEDICINE

## 2023-06-06 ENCOUNTER — TELEPHONE (OUTPATIENT)
Dept: FAMILY MEDICINE CLINIC | Age: 83
End: 2023-06-06
Payer: MEDICARE

## 2023-06-06 DIAGNOSIS — R10.13 EPIGASTRIC PAIN: Primary | ICD-10-CM

## 2023-06-06 DIAGNOSIS — K44.9 HIATAL HERNIA: ICD-10-CM

## 2023-06-06 DIAGNOSIS — R11.0 NAUSEA: ICD-10-CM

## 2023-06-06 NOTE — TELEPHONE ENCOUNTER
Per :Would be okay with me for her to see Dr. Humphrey or [place] for second opinion if she would like. Thanks.

## 2023-06-06 NOTE — TELEPHONE ENCOUNTER
"----- Message from Joseph Walker MD sent at 6/6/2023 12:21 PM EDT -----  Regarding: FW: second opinion referral  Contact: [phone]        ----- Message -----  From: Jaja Turpin LPN  Sent: 6/6/2023  10:47 AM EDT  To: Joseph Walker MD  Subject: FW: second opinion referral                        ----- Message -----  From: Shadia Grover  Sent: 6/6/2023  10:20 AM EDT  To: [facility]  Subject: FW: second opinion referral                        ----- Message -----  From: Luma Cruz \"Luma Grey\"  Sent: 6/6/2023   9:59 AM EDT  To: [facility]  Subject: second opinion referral                          Dr. Walker,  Dr. Cory Lindsey did a scope and said I had a rather larger hiatal hernia and referred me to Dr.Matthew Reeder.  Dr Reeder recommended surgery.   Can you  recommend a surgeon I can go to for a second opinion?  I am sick at my stomach a lot but I am trying to avoid surgery. I am working with a nutritionist and using a pillow wedge.  Thanks for your help.  Luma Cruz      "

## 2023-06-14 ENCOUNTER — CLINICAL SUPPORT (OUTPATIENT)
Dept: FAMILY MEDICINE CLINIC | Age: 83
End: 2023-06-14
Payer: MEDICARE

## 2023-06-14 DIAGNOSIS — J30.9 ALLERGIC RHINITIS, UNSPECIFIED SEASONALITY, UNSPECIFIED TRIGGER: Primary | ICD-10-CM

## 2023-06-14 PROCEDURE — 95115 IMMUNOTHERAPY ONE INJECTION: CPT | Performed by: FAMILY MEDICINE

## 2023-07-25 ENCOUNTER — CLINICAL SUPPORT (OUTPATIENT)
Dept: FAMILY MEDICINE CLINIC | Age: 83
End: 2023-07-25
Payer: MEDICARE

## 2023-07-25 DIAGNOSIS — J30.9 ALLERGIC RHINITIS, UNSPECIFIED SEASONALITY, UNSPECIFIED TRIGGER: Primary | ICD-10-CM

## 2023-07-25 PROCEDURE — 95115 IMMUNOTHERAPY ONE INJECTION: CPT | Performed by: FAMILY MEDICINE

## 2023-08-01 ENCOUNTER — CLINICAL SUPPORT (OUTPATIENT)
Dept: FAMILY MEDICINE CLINIC | Age: 83
End: 2023-08-01
Payer: MEDICARE

## 2023-08-01 DIAGNOSIS — F41.1 GENERALIZED ANXIETY DISORDER: ICD-10-CM

## 2023-08-01 DIAGNOSIS — E78.2 MIXED HYPERLIPIDEMIA: ICD-10-CM

## 2023-08-01 DIAGNOSIS — J30.9 ALLERGIC RHINITIS, UNSPECIFIED SEASONALITY, UNSPECIFIED TRIGGER: Primary | ICD-10-CM

## 2023-08-01 DIAGNOSIS — I10 ESSENTIAL HYPERTENSION: ICD-10-CM

## 2023-08-01 DIAGNOSIS — K21.9 GERD WITHOUT ESOPHAGITIS: ICD-10-CM

## 2023-08-01 DIAGNOSIS — N95.9 MENOPAUSAL AND POSTMENOPAUSAL DISORDER: ICD-10-CM

## 2023-08-01 PROCEDURE — 95115 IMMUNOTHERAPY ONE INJECTION: CPT | Performed by: FAMILY MEDICINE

## 2023-08-01 RX ORDER — PRAVASTATIN SODIUM 20 MG
20 TABLET ORAL DAILY
Qty: 90 TABLET | Refills: 3 | Status: SHIPPED | OUTPATIENT
Start: 2023-08-01

## 2023-08-01 RX ORDER — CLONAZEPAM 0.5 MG/1
0.5 TABLET ORAL 2 TIMES DAILY PRN
Qty: 60 TABLET | Refills: 0 | Status: SHIPPED | OUTPATIENT
Start: 2023-08-01

## 2023-08-01 RX ORDER — LOSARTAN POTASSIUM 50 MG/1
50 TABLET ORAL DAILY
Qty: 90 TABLET | Refills: 3 | Status: SHIPPED | OUTPATIENT
Start: 2023-08-01

## 2023-08-01 RX ORDER — PANTOPRAZOLE SODIUM 40 MG/1
40 TABLET, DELAYED RELEASE ORAL DAILY
Qty: 90 TABLET | Refills: 3 | Status: SHIPPED | OUTPATIENT
Start: 2023-08-01

## 2023-08-01 RX ORDER — ESTRADIOL 1 MG/1
1 TABLET ORAL DAILY
Qty: 90 TABLET | Refills: 3 | Status: SHIPPED | OUTPATIENT
Start: 2023-08-01

## 2023-08-07 ENCOUNTER — CLINICAL SUPPORT (OUTPATIENT)
Dept: FAMILY MEDICINE CLINIC | Age: 83
End: 2023-08-07
Payer: MEDICARE

## 2023-08-07 DIAGNOSIS — J30.9 ALLERGIC RHINITIS, UNSPECIFIED SEASONALITY, UNSPECIFIED TRIGGER: Primary | ICD-10-CM

## 2023-08-07 PROCEDURE — 95117 IMMUNOTHERAPY INJECTIONS: CPT | Performed by: FAMILY MEDICINE

## 2023-08-15 ENCOUNTER — CLINICAL SUPPORT (OUTPATIENT)
Dept: FAMILY MEDICINE CLINIC | Age: 83
End: 2023-08-15
Payer: MEDICARE

## 2023-08-15 DIAGNOSIS — J30.9 ALLERGIC RHINITIS, UNSPECIFIED SEASONALITY, UNSPECIFIED TRIGGER: Primary | ICD-10-CM

## 2023-08-15 PROCEDURE — 95115 IMMUNOTHERAPY ONE INJECTION: CPT | Performed by: FAMILY MEDICINE

## 2023-08-23 ENCOUNTER — CLINICAL SUPPORT (OUTPATIENT)
Dept: FAMILY MEDICINE CLINIC | Age: 83
End: 2023-08-23
Payer: MEDICARE

## 2023-08-23 DIAGNOSIS — J30.9 ALLERGIC RHINITIS, UNSPECIFIED SEASONALITY, UNSPECIFIED TRIGGER: Primary | ICD-10-CM

## 2023-08-23 DIAGNOSIS — R11.0 NAUSEA: ICD-10-CM

## 2023-08-23 PROCEDURE — 95115 IMMUNOTHERAPY ONE INJECTION: CPT | Performed by: FAMILY MEDICINE

## 2023-08-23 RX ORDER — ONDANSETRON 4 MG/1
4 TABLET, ORALLY DISINTEGRATING ORAL EVERY 6 HOURS PRN
Qty: 30 TABLET | Refills: 0 | Status: SHIPPED | OUTPATIENT
Start: 2023-08-23 | End: 2023-08-25 | Stop reason: SDUPTHER

## 2023-08-23 NOTE — TELEPHONE ENCOUNTER
"  Caller: Luma Cruz \"Luma Grey\"    Relationship: Self    Best call back number: [phone]    Requested Prescriptions:   Requested Prescriptions     Pending Prescriptions Disp Refills    ondansetron ODT (ZOFRAN-ODT) 4 MG disintegrating tablet 30 tablet 0     Sig: Place 1 tablet on the tongue Every 6 (Six) Hours As Needed for Nausea or Vomiting.        Pharmacy where request should be sent: [facility] - [address] [phone] [facility] [phone]      Last office visit with prescribing clinician: 2/9/2023   Last telemedicine visit with prescribing clinician: Visit date not found   Next office visit with prescribing clinician: 8/28/2023     Does the patient have less than a 3 day supply:  [x] Yes  [] No    Would you like a call back once the refill request has been completed: [] Yes [] No    If the office needs to give you a call back, can they leave a voicemail: [] Yes [] No    Jesse Rosenberg Rep   08/23/23 09:24 EDT         "

## 2023-08-24 ENCOUNTER — TELEPHONE (OUTPATIENT)
Dept: FAMILY MEDICINE CLINIC | Age: 83
End: 2023-08-24
Payer: MEDICARE

## 2023-08-24 DIAGNOSIS — K44.9 HIATAL HERNIA: ICD-10-CM

## 2023-08-24 DIAGNOSIS — K21.9 GERD WITHOUT ESOPHAGITIS: ICD-10-CM

## 2023-08-24 DIAGNOSIS — R11.0 NAUSEA: Primary | ICD-10-CM

## 2023-08-24 DIAGNOSIS — R10.13 EPIGASTRIC PAIN: ICD-10-CM

## 2023-08-24 NOTE — TELEPHONE ENCOUNTER
Caller: [facility]    Relationship to patient: Other    Best call back number: [phone]     FRANCIS FROM [facility] OFFICE WANTED TO RELY  A MESSAGE TO DOCTOR LIRA. PATIENT WAS SENT TO THEM FOR SURGICAL CONSULT FOR HERNIA REPAIR BUT AT THIS TIME SHE HAS NOTHING SURGICAL TO COMPLETE AND RECOMMENDED TO SEE A GASTROLOGIST.

## 2023-08-25 DIAGNOSIS — K21.9 GERD WITHOUT ESOPHAGITIS: ICD-10-CM

## 2023-08-25 DIAGNOSIS — I10 ESSENTIAL HYPERTENSION: ICD-10-CM

## 2023-08-25 DIAGNOSIS — N95.9 MENOPAUSAL AND POSTMENOPAUSAL DISORDER: ICD-10-CM

## 2023-08-25 DIAGNOSIS — R11.0 NAUSEA: ICD-10-CM

## 2023-08-25 DIAGNOSIS — E78.2 MIXED HYPERLIPIDEMIA: ICD-10-CM

## 2023-08-25 DIAGNOSIS — J30.9 ALLERGIC RHINITIS, UNSPECIFIED SEASONALITY, UNSPECIFIED TRIGGER: ICD-10-CM

## 2023-08-25 DIAGNOSIS — F41.1 GENERALIZED ANXIETY DISORDER: ICD-10-CM

## 2023-08-25 RX ORDER — PANTOPRAZOLE SODIUM 40 MG/1
40 TABLET, DELAYED RELEASE ORAL DAILY
Qty: 90 TABLET | Refills: 3 | Status: SHIPPED | OUTPATIENT
Start: 2023-08-25

## 2023-08-25 RX ORDER — FAMOTIDINE 20 MG/1
20 TABLET, FILM COATED ORAL 2 TIMES DAILY
Qty: 180 TABLET | Refills: 1 | Status: SHIPPED | OUTPATIENT
Start: 2023-08-25

## 2023-08-25 RX ORDER — ONDANSETRON 4 MG/1
4 TABLET, ORALLY DISINTEGRATING ORAL EVERY 6 HOURS PRN
Qty: 30 TABLET | Refills: 0 | Status: SHIPPED | OUTPATIENT
Start: 2023-08-25

## 2023-08-25 RX ORDER — AMLODIPINE BESYLATE 2.5 MG/1
2.5 TABLET ORAL DAILY
Qty: 90 TABLET | Refills: 3 | Status: SHIPPED | OUTPATIENT
Start: 2023-08-25

## 2023-08-25 RX ORDER — LEVOCETIRIZINE DIHYDROCHLORIDE 5 MG/1
5 TABLET, FILM COATED ORAL EVERY EVENING
Qty: 90 TABLET | Refills: 3 | Status: SHIPPED | OUTPATIENT
Start: 2023-08-25

## 2023-08-25 RX ORDER — LOSARTAN POTASSIUM 50 MG/1
50 TABLET ORAL DAILY
Qty: 90 TABLET | Refills: 3 | Status: SHIPPED | OUTPATIENT
Start: 2023-08-25

## 2023-08-25 RX ORDER — ESTRADIOL 1 MG/1
1 TABLET ORAL DAILY
Qty: 90 TABLET | Refills: 3 | Status: SHIPPED | OUTPATIENT
Start: 2023-08-25

## 2023-08-25 RX ORDER — PRAVASTATIN SODIUM 20 MG
20 TABLET ORAL DAILY
Qty: 90 TABLET | Refills: 3 | Status: SHIPPED | OUTPATIENT
Start: 2023-08-25

## 2023-08-25 RX ORDER — CLONAZEPAM 0.5 MG/1
0.5 TABLET ORAL 2 TIMES DAILY PRN
Qty: 60 TABLET | Refills: 0 | Status: SHIPPED | OUTPATIENT
Start: 2023-08-25

## 2023-08-25 NOTE — TELEPHONE ENCOUNTER
Please reach out to her and confirm the details.  If she needs a referral to gastroenterology, confirm the diagnosis and place the referral order.  Thanks.

## 2023-08-25 NOTE — TELEPHONE ENCOUNTER
"     Caller: Luma Cruz \"Luma Grey\"    Relationship: Self    Best call back number: [phone]    Requested Prescriptions:   Requested Prescriptions     Pending Prescriptions Disp Refills    amLODIPine (NORVASC) 2.5 MG tablet 90 tablet 1     Sig: Take 1 tablet by mouth Daily.    clonazePAM (KlonoPIN) 0.5 MG tablet 60 tablet 0     Sig: Take 1 tablet by mouth 2 (Two) Times a Day As Needed for Anxiety.    estradiol (ESTRACE) 1 MG tablet 90 tablet 3     Sig: Take 1 tablet by mouth Daily.    famotidine (Pepcid) 20 MG tablet 180 tablet 1     Sig: Take 1 tablet by mouth 2 (Two) Times a Day.    levocetirizine (XYZAL) 5 MG tablet 90 tablet 3     Sig: Take 1 tablet by mouth Every Evening.    losartan (COZAAR) 50 MG tablet 90 tablet 3     Sig: Take 1 tablet by mouth Daily.    ondansetron ODT (ZOFRAN-ODT) 4 MG disintegrating tablet 30 tablet 0     Sig: Place 1 tablet on the tongue Every 6 (Six) Hours As Needed for Nausea or Vomiting.    pantoprazole (PROTONIX) 40 MG EC tablet 90 tablet 3     Sig: Take 1 tablet by mouth Daily.    pravastatin (PRAVACHOL) 20 MG tablet 90 tablet 3     Sig: Take 1 tablet by mouth Daily.        Pharmacy where request should be sent: [address] - [phone]  - [phone]     Last office visit with prescribing clinician: 2/9/2023   Last telemedicine visit with prescribing clinician: Visit date not found   Next office visit with prescribing clinician: 10/2/2023     Additional details provided by patient:      Does the patient have less than a 3 day supply:  [] Yes  [x] No    Would you like a call back once the refill request has been completed: [] Yes [x] No    If the office needs to give you a call back, can they leave a voicemail: [] Yes [x] No    Jesse Hernandez   08/25/23 13:36 EDT      "

## 2023-08-28 ENCOUNTER — TELEPHONE (OUTPATIENT)
Dept: GASTROENTEROLOGY | Facility: CLINIC | Age: 83
End: 2023-08-28
Payer: MEDICARE

## 2023-08-28 NOTE — TELEPHONE ENCOUNTER
Contacted the patient to advise that I was needing to get her appointment rescheduled for today with Domenica Shannon due to her being out of office, patient verbalized understanding and has been rescheduled for 9/11 at 10:45 am.

## 2023-08-30 ENCOUNTER — CLINICAL SUPPORT (OUTPATIENT)
Dept: FAMILY MEDICINE CLINIC | Age: 83
End: 2023-08-30
Payer: MEDICARE

## 2023-08-30 DIAGNOSIS — J30.9 ALLERGIC RHINITIS, UNSPECIFIED SEASONALITY, UNSPECIFIED TRIGGER: Primary | ICD-10-CM

## 2023-08-30 PROCEDURE — 95115 IMMUNOTHERAPY ONE INJECTION: CPT | Performed by: FAMILY MEDICINE

## 2023-09-07 ENCOUNTER — CLINICAL SUPPORT (OUTPATIENT)
Dept: FAMILY MEDICINE CLINIC | Age: 83
End: 2023-09-07
Payer: MEDICARE

## 2023-09-07 DIAGNOSIS — J30.9 ALLERGIC RHINITIS, UNSPECIFIED SEASONALITY, UNSPECIFIED TRIGGER: Primary | ICD-10-CM

## 2023-09-07 PROCEDURE — 95115 IMMUNOTHERAPY ONE INJECTION: CPT | Performed by: FAMILY MEDICINE

## 2023-09-11 ENCOUNTER — OFFICE VISIT (OUTPATIENT)
Dept: GASTROENTEROLOGY | Facility: CLINIC | Age: 83
End: 2023-09-11
Payer: MEDICARE

## 2023-09-11 ENCOUNTER — CLINICAL SUPPORT (OUTPATIENT)
Dept: FAMILY MEDICINE CLINIC | Age: 83
End: 2023-09-11
Payer: MEDICARE

## 2023-09-11 VITALS
BODY MASS INDEX: 22.26 KG/M2 | HEART RATE: 78 BPM | WEIGHT: 113.4 LBS | SYSTOLIC BLOOD PRESSURE: 133 MMHG | DIASTOLIC BLOOD PRESSURE: 52 MMHG | HEIGHT: 60 IN

## 2023-09-11 DIAGNOSIS — J30.9 ALLERGIC RHINITIS, UNSPECIFIED SEASONALITY, UNSPECIFIED TRIGGER: Primary | ICD-10-CM

## 2023-09-11 DIAGNOSIS — R11.0 NAUSEA: Primary | ICD-10-CM

## 2023-09-11 DIAGNOSIS — K59.04 CHRONIC IDIOPATHIC CONSTIPATION: ICD-10-CM

## 2023-09-11 DIAGNOSIS — K44.9 HIATAL HERNIA: ICD-10-CM

## 2023-09-11 DIAGNOSIS — K21.9 GASTROESOPHAGEAL REFLUX DISEASE WITHOUT ESOPHAGITIS: ICD-10-CM

## 2023-09-11 PROCEDURE — 99204 OFFICE O/P NEW MOD 45 MIN: CPT | Performed by: NURSE PRACTITIONER

## 2023-09-11 PROCEDURE — 95115 IMMUNOTHERAPY ONE INJECTION: CPT | Performed by: FAMILY MEDICINE

## 2023-09-11 PROCEDURE — 1160F RVW MEDS BY RX/DR IN RCRD: CPT | Performed by: NURSE PRACTITIONER

## 2023-09-11 PROCEDURE — 3075F SYST BP GE 130 - 139MM HG: CPT | Performed by: NURSE PRACTITIONER

## 2023-09-11 PROCEDURE — 3078F DIAST BP <80 MM HG: CPT | Performed by: NURSE PRACTITIONER

## 2023-09-11 PROCEDURE — 1159F MED LIST DOCD IN RCRD: CPT | Performed by: NURSE PRACTITIONER

## 2023-09-11 RX ORDER — ALBUTEROL SULFATE 2.5 MG/3ML
2.5 SOLUTION RESPIRATORY (INHALATION)
COMMUNITY

## 2023-09-11 RX ORDER — ARFORMOTEROL TARTRATE 15 UG/2ML
15 SOLUTION RESPIRATORY (INHALATION)
COMMUNITY
Start: 2023-07-03

## 2023-09-11 NOTE — PATIENT INSTRUCTIONS
Continue protonix (pantoprazole) 40 mg every morning   Continue pepcid (famotidine) twice a day     Start taking miralax every other day for constipation     Call me or MYCHART in in 1-2 weeks with update

## 2023-09-11 NOTE — PROGRESS NOTES
Chief Complaint        Abdominal Pain, Heartburn, Nausea, and Hiatal Hernia    History of Present Illness      Luma Cruz is a 82 y.o. female who presents to [facility] as a new patient for GERD.    She underwent EGD and colonoscopy with Dr. Lindsey on 4/3/2023.  EGD showed a large hiatal hernia.  Otherwise normal exam.  Colonoscopy showed diverticulosis.  Otherwise normal.  No biopsies taken.  No recall colonoscopy due to age.    After the scopes she went to Dr. Reeder and he recommended surgery. At her age she didn't want to proceed with that and went to [facility] for a third opinion. She underwent EGD with manometry with Dr. Velez on 8/18/23. It was normal. Couldn't do BRAVO due to esophageal size. Esophageal manometry normal.     GERD/hiatal hernia--- Protonix 40 mg and Pepcid 20 mg. She admits she is nauseous and as soon as she starts eating or drinking she will start burping. BOOST makes it worse. She admits the nausea always comes after she eats or drinks. + globus sensation. She denies any dysphagia.     She admits her bowels are slow. She tells me she passes little balls most of the time. She admits she has been constipated all her life. She denies any rectal bleeding or melena.     GI family history---Denies any   Results       Result Review :   The following data was reviewed by: TOSHIA Leija on 09/11/2023     CMP          12/20/2022    14:02 1/12/2023    10:46 2/10/2023    08:15   CMP   Glucose 112  94  90    BUN 38  26  29    Creatinine 0.97  0.94  1.09    EGFR 59.2  61.5  51.5    Sodium 142  141  140    Potassium 3.7  4.6  4.0    Chloride 107  105  103    Calcium 9.6  9.1  9.7    Total Protein 6.7   7.2    Albumin 4.30   4.6    Globulin 2.4   2.6    Total Bilirubin 0.4   0.4    Alkaline Phosphatase 57   57    AST (SGOT) 19   24    ALT (SGPT) 14   13    Albumin/Globulin Ratio 1.8   1.8    BUN/Creatinine Ratio 39.2  27.7  26.6    Anion Gap 7.0  8.4  9.0       CBC          12/20/2022    14:02 2/10/2023    08:15   CBC   WBC 6.06  4.64    RBC 4.25  4.35    Hemoglobin 12.3  12.5    Hematocrit 38.2  39.3    MCV 89.9  90.3    MCH 28.9  28.7    MCHC 32.2  31.8    RDW 13.0  12.8    Platelets 254  235        TSH          12/20/2022    14:02   TSH   TSH 1.350                 Past Medical History       Past Medical History:   Diagnosis Date    COVID-19 07/01/2022    Essential hypertension     Generalized anxiety disorder     GERD without esophagitis     Hernia April, 2023    hiatal hernia    Menopausal and postmenopausal disorder     Mixed hyperlipidemia        Past Surgical History:   Procedure Laterality Date    APPENDECTOMY  10 years ago    BREAST BIOPSY  right side, 15 years ago    CHOLECYSTECTOMY  unknown    COLONOSCOPY  06/2011    COLONOSCOPY N/A 04/03/2023    Procedure: COLONOSCOPY;  Surgeon: Cory Lindsey MD;  Location: [facility] END[facility];  Service: General;  Laterality: N/A;  normal    ENDOSCOPY N/A 04/03/2023    Hiatal hernia, mild reactive gastropathy    HYSTERECTOMY      complete    TONSILLECTOMY AND ADENOIDECTOMY           Current Outpatient Medications:     albuterol (PROVENTIL) (2.5 MG/3ML) 0.083% nebulizer solution, Inhale 2.5 mg., Disp: , Rfl:     albuterol (PROVENTIL) 2 MG tablet, Take 2.5 mg by mouth 3 (Three) Times a Day., Disp: , Rfl:     amLODIPine (NORVASC) 2.5 MG tablet, Take 1 tablet by mouth Daily., Disp: 90 tablet, Rfl: 3    arformoterol (BROVANA) 15 MCG/2ML nebulizer solution, Inhale 2 mL., Disp: , Rfl:     azelastine (ASTELIN) 0.1 % nasal spray, 1 spray into the nostril(s) as directed by provider 2 (Two) Times a Day., Disp: , Rfl:     clonazePAM (KlonoPIN) 0.5 MG tablet, Take 1 tablet by mouth 2 (Two) Times a Day As Needed for Anxiety., Disp: 60 tablet, Rfl: 0    estradiol (ESTRACE) 1 MG tablet, Take 1 tablet by mouth Daily., Disp: 90 tablet, Rfl: 3    famotidine (Pepcid) 20 MG tablet, Take 1 tablet by mouth 2 (Two) Times a Day., Disp: 180 tablet,  "Rfl: 1    fluticasone (FLONASE) 50 MCG/ACT nasal spray, 2 sprays into the nostril(s) as directed by provider Daily., Disp: , Rfl:     levocetirizine (XYZAL) 5 MG tablet, Take 1 tablet by mouth Every Evening., Disp: 90 tablet, Rfl: 3    losartan (COZAAR) 50 MG tablet, Take 1 tablet by mouth Daily., Disp: 90 tablet, Rfl: 3    multivitamin with minerals tablet tablet, Take 1 tablet by mouth Daily., Disp: , Rfl:     ondansetron ODT (ZOFRAN-ODT) 4 MG disintegrating tablet, Place 1 tablet on the tongue Every 6 (Six) Hours As Needed for Nausea or Vomiting., Disp: 30 tablet, Rfl: 0    pantoprazole (PROTONIX) 40 MG EC tablet, Take 1 tablet by mouth Daily., Disp: 90 tablet, Rfl: 3    pravastatin (PRAVACHOL) 20 MG tablet, Take 1 tablet by mouth Daily., Disp: 90 tablet, Rfl: 3    sodium chloride 3 % nebulizer solution, Take 4 mL by nebulization 2 (Two) Times a Day., Disp: , Rfl:     budesonide (PULMICORT) 0.5 MG/2ML nebulizer solution, Take 2 mL by nebulization 2 (Two) Times a Day for 30 days. Rinse mouth out after each use, Disp: 60 each, Rfl: 11  No current facility-administered medications for this visit.     Allergies   Allergen Reactions    Fosamax [Alendronate] GI Intolerance    Penicillins Rash       Family History   Problem Relation Age of Onset    Stroke Mother     Hypertension Mother     Arthritis Mother     Hearing loss Mother     Coronary artery disease Father         Social History     Social History Narrative    Not on file       Objective       Objective     Vital Signs:   /52 (BP Location: Left arm, Patient Position: Sitting, Cuff Size: Small Adult)   Pulse 78   Ht 152.4 cm (60\")   Wt 51.4 kg (113 lb 6.4 oz)   BMI 22.15 kg/m²     Body mass index is 22.15 kg/m².    Review of Systems   Constitutional:  Negative for appetite change, chills, diaphoresis, fatigue, fever and unexpected weight change.   HENT:  Negative for nosebleeds, postnasal drip, sore throat, trouble swallowing and voice change.  "   Respiratory:  Negative for cough, choking, chest tightness, shortness of breath, wheezing and stridor.    Cardiovascular:  Negative for chest pain, palpitations and leg swelling.   Gastrointestinal:  Positive for abdominal distention, abdominal pain, constipation and nausea. Negative for anal bleeding, blood in stool, diarrhea, rectal pain and vomiting.   Endocrine: Negative for polydipsia, polyphagia and polyuria.   Musculoskeletal:  Negative for gait problem.   Skin:  Negative for rash and wound.   Allergic/Immunologic: Negative for food allergies.   Neurological:  Negative for dizziness, speech difficulty and light-headedness.   Psychiatric/Behavioral:  Negative for confusion, self-injury, sleep disturbance and suicidal ideas.       Physical Exam  Constitutional:       General: She is not in acute distress.     Appearance: She is well-developed. She is not ill-appearing.   HENT:      Head: Normocephalic.   Eyes:      Pupils: Pupils are equal, round, and reactive to light.   Cardiovascular:      Rate and Rhythm: Normal rate and regular rhythm.      Heart sounds: Normal heart sounds.   Pulmonary:      Effort: Pulmonary effort is normal.      Breath sounds: Normal breath sounds.   Abdominal:      General: Bowel sounds are normal. There is no distension.      Palpations: Abdomen is soft. There is no mass.      Tenderness: There is no abdominal tenderness. There is no guarding or rebound.      Hernia: No hernia is present.   Musculoskeletal:         General: Normal range of motion.   Skin:     General: Skin is warm and dry.   Neurological:      Mental Status: She is alert and oriented to person, place, and time.   Psychiatric:         Speech: Speech normal.         Behavior: Behavior normal.         Judgment: Judgment normal.            Assessment & Plan          Assessment and Plan    Diagnoses and all orders for this visit:    1. Nausea (Primary)    2. Chronic idiopathic constipation    3. Hiatal hernia    4.  Gastroesophageal reflux disease without esophagitis    Reviewed medical records with her today.  Reviewed most recent EGDs and testing done by Dr. Lindsey and at [facility].  Constipation does not sound well controlled at this time.  I think this is her biggest issue right now.  Would like her to start taking MiraLAX every other day.  Continue a high-fiber diet.  For now for reflux continue Protonix 40 mg daily and Pepcid 20 mg twice daily.  Continue GERD precautions.  Patient to continue a bland diet.  Patient to call the office in 1 to 2 weeks with an update.  Patient to follow-up with me in 1 month.  Patient is agreeable to the plan.            Follow Up       Follow Up   Return in about 4 weeks (around 10/9/2023) for CONSTIPATION, nausea.  Patient was given instructions and counseling regarding her condition or for health maintenance advice. Please see specific information pulled into the AVS if appropriate.

## 2023-09-13 ENCOUNTER — PATIENT ROUNDING (BHMG ONLY) (OUTPATIENT)
Dept: GASTROENTEROLOGY | Facility: CLINIC | Age: 83
End: 2023-09-13
Payer: MEDICARE

## 2023-09-13 NOTE — PROGRESS NOTES
"9/13/2023      [facility], may I speak with Luma Cruz     My name is Bo. I am calling from [facility]. I show that you had a recent visit with TOSHIA Cantu.    Before we get started may I verify your date of birth? 1940    I am calling to officially welcome you to our practice and ask about your recent visit. Is this a good time to talk? Yes.     Tell me about your visit with us. What things went well? \"I was really pleased with my visit with Domenica. She was great. She explained everything in a way that I could understand. I've seen three different surgeons and Domenica gave me answers that I really needed.\"    We strive to ensure that we protect your safety and privacy. Is there anything we could have done to improve this during your visit?   No     We're always looking for ways to make our patients' experiences even better. Do you have recommendations on ways we may improve? No    Overall were you satisfied with your first visit to our practice? Yes    I appreciate you taking the time to speak with me today. Is there anything else I can do for you? No    I am glad to hear that you had a very good visit and I appreciate you taking the time to provide feedback on this call. We would greatly appreciate you filling out a survey if you receive one in the mail, email or text. This is a great opportunity to provide any additional feedback that you may think of after this call as well.       Thank you, and have a great day.    "

## 2023-09-21 ENCOUNTER — CLINICAL SUPPORT (OUTPATIENT)
Dept: FAMILY MEDICINE CLINIC | Age: 83
End: 2023-09-21
Payer: MEDICARE

## 2023-09-21 DIAGNOSIS — J30.9 ALLERGIC RHINITIS, UNSPECIFIED SEASONALITY, UNSPECIFIED TRIGGER: Primary | ICD-10-CM

## 2023-09-21 PROCEDURE — 95115 IMMUNOTHERAPY ONE INJECTION: CPT | Performed by: FAMILY MEDICINE

## 2023-09-22 ENCOUNTER — TELEPHONE (OUTPATIENT)
Dept: GASTROENTEROLOGY | Facility: CLINIC | Age: 83
End: 2023-09-22

## 2023-09-22 NOTE — TELEPHONE ENCOUNTER
Provider: CHRISTINE FRANCOIS    Caller: ALON PENNINGTON    Relationship to Patient: SELF    Pharmacy: [facility] [place]    Who are you requesting to speak with (clinical staff, provider,  specific staff member): PROVIDER    Phone Number: [phone]    Reason for Call: PATIENT STILL HAVING STOMACH ISSUES    When was the patient last seen: 09/11/23    PATIENT TRIED THE HALF A DOSE MIRALAX EVERY OTHER DAY - SECOND DAY GOT DIARRHEA AGAIN. LAST NIGHT, SHE WOKE UP IN MIDDLE OF THE NIGHT HUNGRY AND ATE, AND GOT ACID REFLUX. THIS MORNING, SHE HAS TAKEN ZOFRAN, PEPCID, PROTONIX, AND PEPTO BISMOL AND STILL REPORTING NAUSEA. SHE IS WANTING TO KNOW IF SHE SHOULD CONTINUE TAKING MIRALAX AS RECOMMENDED, OR TO CUT IT DOWN EVEN MORE.    PLEASE REVIEW AND CALL HER BACK [phone] ANY TIME; OK TO LEAVE A VOICEMAIL.

## 2023-09-28 ENCOUNTER — CLINICAL SUPPORT (OUTPATIENT)
Dept: FAMILY MEDICINE CLINIC | Age: 83
End: 2023-09-28
Payer: MEDICARE

## 2023-09-28 DIAGNOSIS — J30.9 ALLERGIC RHINITIS, UNSPECIFIED SEASONALITY, UNSPECIFIED TRIGGER: Primary | ICD-10-CM

## 2023-09-28 PROCEDURE — 95115 IMMUNOTHERAPY ONE INJECTION: CPT | Performed by: FAMILY MEDICINE

## 2023-10-02 ENCOUNTER — LAB (OUTPATIENT)
Dept: LAB | Facility: HOSPITAL | Age: 83
End: 2023-10-02
Payer: MEDICARE

## 2023-10-02 ENCOUNTER — OFFICE VISIT (OUTPATIENT)
Dept: FAMILY MEDICINE CLINIC | Age: 83
End: 2023-10-02
Payer: MEDICARE

## 2023-10-02 VITALS
TEMPERATURE: 98.4 F | SYSTOLIC BLOOD PRESSURE: 136 MMHG | HEART RATE: 65 BPM | WEIGHT: 112.4 LBS | HEIGHT: 61 IN | BODY MASS INDEX: 21.22 KG/M2 | DIASTOLIC BLOOD PRESSURE: 71 MMHG

## 2023-10-02 DIAGNOSIS — Z78.0 ASYMPTOMATIC POSTMENOPAUSAL STATE: ICD-10-CM

## 2023-10-02 DIAGNOSIS — Z00.00 PHYSICAL EXAM: Primary | ICD-10-CM

## 2023-10-02 DIAGNOSIS — R11.0 NAUSEA: ICD-10-CM

## 2023-10-02 DIAGNOSIS — K21.9 GERD WITHOUT ESOPHAGITIS: ICD-10-CM

## 2023-10-02 DIAGNOSIS — E78.2 MIXED HYPERLIPIDEMIA: ICD-10-CM

## 2023-10-02 DIAGNOSIS — I10 ESSENTIAL HYPERTENSION: ICD-10-CM

## 2023-10-02 DIAGNOSIS — F41.1 GENERALIZED ANXIETY DISORDER: ICD-10-CM

## 2023-10-02 DIAGNOSIS — Z79.899 OTHER LONG TERM (CURRENT) DRUG THERAPY: ICD-10-CM

## 2023-10-02 LAB
ALBUMIN SERPL-MCNC: 4.3 G/DL (ref 3.5–5.2)
ALBUMIN/GLOB SERPL: 1.6 G/DL
ALP SERPL-CCNC: 60 U/L (ref 39–117)
ALT SERPL W P-5'-P-CCNC: 15 U/L (ref 1–33)
ANION GAP SERPL CALCULATED.3IONS-SCNC: 10.5 MMOL/L (ref 5–15)
AST SERPL-CCNC: 23 U/L (ref 1–32)
BILIRUB SERPL-MCNC: 0.4 MG/DL (ref 0–1.2)
BUN SERPL-MCNC: 22 MG/DL (ref 8–23)
BUN/CREAT SERPL: 22.4 (ref 7–25)
CALCIUM SPEC-SCNC: 9.8 MG/DL (ref 8.6–10.5)
CHLORIDE SERPL-SCNC: 103 MMOL/L (ref 98–107)
CHOLEST SERPL-MCNC: 186 MG/DL (ref 0–200)
CO2 SERPL-SCNC: 27.5 MMOL/L (ref 22–29)
CREAT SERPL-MCNC: 0.98 MG/DL (ref 0.57–1)
DEPRECATED RDW RBC AUTO: 43.6 FL (ref 37–54)
EGFRCR SERPLBLD CKD-EPI 2021: 58.5 ML/MIN/1.73
ERYTHROCYTE [DISTWIDTH] IN BLOOD BY AUTOMATED COUNT: 12.9 % (ref 12.3–15.4)
GLOBULIN UR ELPH-MCNC: 2.7 GM/DL
GLUCOSE SERPL-MCNC: 88 MG/DL (ref 65–99)
HCT VFR BLD AUTO: 40.3 % (ref 34–46.6)
HDLC SERPL-MCNC: 88 MG/DL (ref 40–60)
HGB BLD-MCNC: 12.8 G/DL (ref 12–15.9)
LDLC SERPL CALC-MCNC: 82 MG/DL (ref 0–100)
LDLC/HDLC SERPL: 0.91 {RATIO}
MCH RBC QN AUTO: 28.8 PG (ref 26.6–33)
MCHC RBC AUTO-ENTMCNC: 31.8 G/DL (ref 31.5–35.7)
MCV RBC AUTO: 90.6 FL (ref 79–97)
PLATELET # BLD AUTO: 247 10*3/MM3 (ref 140–450)
PMV BLD AUTO: 10.1 FL (ref 6–12)
POTASSIUM SERPL-SCNC: 4.4 MMOL/L (ref 3.5–5.2)
PROT SERPL-MCNC: 7 G/DL (ref 6–8.5)
RBC # BLD AUTO: 4.45 10*6/MM3 (ref 3.77–5.28)
SODIUM SERPL-SCNC: 141 MMOL/L (ref 136–145)
TRIGL SERPL-MCNC: 89 MG/DL (ref 0–150)
TSH SERPL DL<=0.05 MIU/L-ACNC: 2.05 UIU/ML (ref 0.27–4.2)
VLDLC SERPL-MCNC: 16 MG/DL (ref 5–40)
WBC NRBC COR # BLD: 7.45 10*3/MM3 (ref 3.4–10.8)

## 2023-10-02 PROCEDURE — 36415 COLL VENOUS BLD VENIPUNCTURE: CPT

## 2023-10-02 PROCEDURE — 84443 ASSAY THYROID STIM HORMONE: CPT

## 2023-10-02 PROCEDURE — 80061 LIPID PANEL: CPT

## 2023-10-02 PROCEDURE — 1160F RVW MEDS BY RX/DR IN RCRD: CPT | Performed by: FAMILY MEDICINE

## 2023-10-02 PROCEDURE — 85027 COMPLETE CBC AUTOMATED: CPT

## 2023-10-02 PROCEDURE — 80053 COMPREHEN METABOLIC PANEL: CPT

## 2023-10-02 PROCEDURE — 3075F SYST BP GE 130 - 139MM HG: CPT | Performed by: FAMILY MEDICINE

## 2023-10-02 PROCEDURE — G0439 PPPS, SUBSEQ VISIT: HCPCS | Performed by: FAMILY MEDICINE

## 2023-10-02 PROCEDURE — 1170F FXNL STATUS ASSESSED: CPT | Performed by: FAMILY MEDICINE

## 2023-10-02 PROCEDURE — 99214 OFFICE O/P EST MOD 30 MIN: CPT | Performed by: FAMILY MEDICINE

## 2023-10-02 PROCEDURE — 3078F DIAST BP <80 MM HG: CPT | Performed by: FAMILY MEDICINE

## 2023-10-02 PROCEDURE — 1159F MED LIST DOCD IN RCRD: CPT | Performed by: FAMILY MEDICINE

## 2023-10-02 RX ORDER — AMLODIPINE BESYLATE 2.5 MG/1
2.5 TABLET ORAL DAILY
Qty: 90 TABLET | Refills: 3 | Status: SHIPPED | OUTPATIENT
Start: 2023-10-02

## 2023-10-02 RX ORDER — CLONAZEPAM 0.5 MG/1
0.5 TABLET ORAL 2 TIMES DAILY PRN
Qty: 60 TABLET | Refills: 2 | Status: SHIPPED | OUTPATIENT
Start: 2023-10-02

## 2023-10-02 RX ORDER — ONDANSETRON 4 MG/1
4 TABLET, ORALLY DISINTEGRATING ORAL EVERY 6 HOURS PRN
Qty: 30 TABLET | Refills: 0 | Status: SHIPPED | OUTPATIENT
Start: 2023-10-02

## 2023-10-02 NOTE — PROGRESS NOTES
The ABCs of the Annual Wellness Visit  Subsequent Medicare Wellness Visit    Subjective    Luma Cruz is a 82 y.o. female who presents for a Subsequent Medicare Wellness Visit.    The following portions of the patient's history were reviewed and updated as appropriate: allergies, current medications, past family history, past medical history, past social history, past surgical history, and problem list.    Compared to one year ago, the patient feels her physical health is worse.  She says that she continues to struggle with nausea.  She is being evaluated for this.    Compared to one year ago, the patient feels her mental health is the same.    Recent Hospitalizations:  She was not admitted to the hospital during the last year.     Current Medical Providers:  Patient Care Team:  Joseph Walker MD as PCP - General (Family Medicine)  Domenica Shannon APRN as Nurse Practitioner (Nurse Practitioner)    Outpatient Medications Prior to Visit   Medication Sig Dispense Refill    albuterol (PROVENTIL) (2.5 MG/3ML) 0.083% nebulizer solution Inhale 2.5 mg.      albuterol (PROVENTIL) 2 MG tablet Take 2.5 mg by mouth 3 (Three) Times a Day.      arformoterol (BROVANA) 15 MCG/2ML nebulizer solution Inhale 2 mL.      azelastine (ASTELIN) 0.1 % nasal spray 1 spray into the nostril(s) as directed by provider 2 (Two) Times a Day.      estradiol (ESTRACE) 1 MG tablet Take 1 tablet by mouth Daily. 90 tablet 3    famotidine (Pepcid) 20 MG tablet Take 1 tablet by mouth 2 (Two) Times a Day. 180 tablet 1    fluticasone (FLONASE) 50 MCG/ACT nasal spray 2 sprays into the nostril(s) as directed by provider Daily.      levocetirizine (XYZAL) 5 MG tablet Take 1 tablet by mouth Every Evening. 90 tablet 3    losartan (COZAAR) 50 MG tablet Take 1 tablet by mouth Daily. 90 tablet 3    multivitamin with minerals tablet tablet Take 1 tablet by mouth Daily.      pantoprazole (PROTONIX) 40 MG EC tablet Take 1 tablet by mouth Daily. 90  tablet 3    pravastatin (PRAVACHOL) 20 MG tablet Take 1 tablet by mouth Daily. 90 tablet 3    sodium chloride 3 % nebulizer solution Take 4 mL by nebulization 2 (Two) Times a Day.      amLODIPine (NORVASC) 2.5 MG tablet Take 1 tablet by mouth Daily. 90 tablet 3    clonazePAM (KlonoPIN) 0.5 MG tablet Take 1 tablet by mouth 2 (Two) Times a Day As Needed for Anxiety. 60 tablet 0    ondansetron ODT (ZOFRAN-ODT) 4 MG disintegrating tablet Place 1 tablet on the tongue Every 6 (Six) Hours As Needed for Nausea or Vomiting. 30 tablet 0    budesonide (PULMICORT) 0.5 MG/2ML nebulizer solution Take 2 mL by nebulization 2 (Two) Times a Day for 30 days. Rinse mouth out after each use 60 each 11     No facility-administered medications prior to visit.       No opioid medication identified on active medication list. I have reviewed chart for other potential  high risk medication/s and harmful drug interactions in the elderly.      Aspirin is not on active medication list.  Aspirin use is not indicated based on review of current medical condition/s. Risk of harm outweighs potential benefits.  .    Patient Active Problem List   Diagnosis    Seasonal allergies    Mixed hyperlipidemia    Menopausal and postmenopausal disorder    GERD without esophagitis    Essential hypertension    Generalized anxiety disorder    Fungal pneumonia    Bronchiectasis without complication    Chronic cough    Mucus plugging of bronchi    Airway clearance impairment    Other long term (current) drug therapy    Constipation     Advance Care Planning  Advance Directive is not on file.  ACP discussion was held with the patient during this visit. Patient has an advance directive (not in EMR), copy requested.  Her children would make decisions if needed.     Objective    Vitals:    10/02/23 0857   BP: 136/71   BP Location: Left arm   Patient Position: Sitting   Pulse: 65   Temp: 98.4 °F (36.9 °C)   TempSrc: Oral   Weight: 51 kg (112 lb 6.4 oz)   Height: 154.4 cm  "(60.79\")   PainSc: 0-No pain     Estimated body mass index is 21.39 kg/m² as calculated from the following:    Height as of this encounter: 154.4 cm (60.79\").    Weight as of this encounter: 51 kg (112 lb 6.4 oz).    BMI is within normal parameters. No other follow-up for BMI required.    Does the patient have evidence of cognitive impairment? No.        HEALTH RISK ASSESSMENT    Smoking Status:  Social History     Tobacco Use   Smoking Status Never    Passive exposure: Never   Smokeless Tobacco Never     Alcohol Consumption:  Social History     Substance and Sexual Activity   Alcohol Use Never     Fall Risk Screen:    STEADI Fall Risk Assessment was completed, and patient is at LOW risk for falls.Assessment completed on:10/2/2023    Depression Screening:      10/2/2023     8:54 AM   PHQ-2/PHQ-9 Depression Screening   Little Interest or Pleasure in Doing Things 0-->not at all   Feeling Down, Depressed or Hopeless 0-->not at all   PHQ-9: Brief Depression Severity Measure Score 0       Health Habits and Functional and Cognitive Screening:      10/2/2023     8:56 AM   Functional & Cognitive Status   Do you have difficulty preparing food and eating? No   Do you have difficulty bathing yourself, getting dressed or grooming yourself? No   Do you have difficulty using the toilet? No   Do you have difficulty moving around from place to place? No   Do you have trouble with steps or getting out of a bed or a chair? No   Current Diet Well Balanced Diet   Dental Exam Up to date   Eye Exam Not up to date   Exercise (times per week) 0 times per week   Current Exercises Include No Regular Exercise   Do you need help using the phone?  No   Are you deaf or do you have serious difficulty hearing?  No   Do you need help to go to places out of walking distance? No   Do you need help shopping? No   Do you need help preparing meals?  No   Do you need help with housework?  No   Do you need help with laundry? No   Do you need help taking " your medications? No   Do you need help managing money? No   Do you ever drive or ride in a car without wearing a seat belt? No   Have you felt unusual stress, anger or loneliness in the last month? No   Who do you live with? Spouse   If you need help, do you have trouble finding someone available to you? No   Have you been bothered in the last four weeks by sexual problems? No   Do you have difficulty concentrating, remembering or making decisions? No     Age-appropriate Screening Schedule:  Refer to the list below for future screening recommendations based on patient's age, sex and/or medical conditions. Orders for these recommended tests are listed in the plan section. The patient has been provided with a written plan.    Health Maintenance   Topic Date Due    TDAP/TD VACCINES (1 - Tdap) Never done    DXA SCAN  05/03/2023    LIPID PANEL  06/09/2023    INFLUENZA VACCINE  08/01/2023    COVID-19 Vaccine (6 - 2023-24 season) 09/01/2023    ANNUAL WELLNESS VISIT  10/02/2024    Pneumococcal Vaccine 65+  Completed    ZOSTER VACCINE  Discontinued          CMS Preventative Services Quick Reference  Risk Factors Identified During Encounter  Depression/Dysphoria: Current medication is effective, no change recommended  Hearing Problem:  Continue wearing hearing aids, especially when driving.  Immunizations Discussed/Encouraged: Tdap, Influenza, and COVID19  The above risks/problems have been discussed with the patient.  Pertinent information has been shared with the patient in the After Visit Summary.  An After Visit Summary and PPPS were made available to the patient.    Follow Up:   Next Medicare Wellness visit to be scheduled in 1 year.     Additional E&M Note during same encounter follows:  Patient has multiple medical problems which are significant and separately identifiable that require additional work above and beyond the Medicare Wellness Visit.      Chief Complaint:  Blood pressure, cholesterol,  "anxiety    Subjective    History of Present Illness:  In addition to the Medicare wellness exam, she is also here for follow-up on her usual care.  She has hypertension and remains on multiple medications as noted.  Her blood pressure is fairly well controlled here.  She says her blood pressure tends to be in a good range at home.    She also remains on cholesterol-lowering medication as noted.    She also has chronic anxiety.  Her anxiety has been relatively stable the last few months, but she remains concerned about this nausea.  She currently takes clonazepam on a regular basis for this.  Davi is reviewed.  We have again discussed the potential risk of this medication including altered mental status and fall risk.  I do not think dosage decrease is appropriate for the near term.  She has ongoing stressors.     Review of Systems:  Review of Systems   Constitutional:  Negative for chills and fever.   Respiratory:  Negative for cough and shortness of breath.    Cardiovascular:  Negative for chest pain and palpitations.   Gastrointestinal:  Positive for nausea. Negative for abdominal pain and vomiting.      Objective   Vital Signs:  /71 (BP Location: Left arm, Patient Position: Sitting)   Pulse 65   Temp 98.4 °F (36.9 °C) (Oral)   Ht 154.4 cm (60.79\")   Wt 51 kg (112 lb 6.4 oz)   BMI 21.39 kg/m²     Physical Exam  Vitals and nursing note reviewed.   Constitutional:       General: She is not in acute distress.     Appearance: She is not ill-appearing.   HENT:      Right Ear: Tympanic membrane and ear canal normal.      Left Ear: Tympanic membrane and ear canal normal.      Ears:      Comments: Hearing is decreased with forced whisper bilaterally.     Mouth/Throat:      Mouth: Mucous membranes are moist.      Comments: Pharynx appears normal  Eyes:      Extraocular Movements: Extraocular movements intact.      Pupils: Pupils are equal, round, and reactive to light.      Comments: Binocular vision is 20/25 " with correction.   Neck:      Thyroid: No thyromegaly.   Cardiovascular:      Rate and Rhythm: Normal rate and regular rhythm.      Heart sounds: No murmur heard.  Pulmonary:      Effort: Pulmonary effort is normal.      Breath sounds: Normal breath sounds.   Abdominal:      General: There is no distension.      Palpations: Abdomen is soft. There is no mass.      Tenderness: There is no abdominal tenderness.   Musculoskeletal:      Cervical back: Normal range of motion.   Skin:     Findings: No lesion or rash.   Neurological:      General: No focal deficit present.      Mental Status: She is oriented to person, place, and time.      Cranial Nerves: No cranial nerve deficit.   Psychiatric:         Mood and Affect: Mood normal.     The following data was reviewed by Joseph Walker MD on 10/02/2023.  Lab Results   Component Value Date    WBC 4.64 02/10/2023    HGB 12.5 02/10/2023    HCT 39.3 02/10/2023    MCV 90.3 02/10/2023     02/10/2023     Lab Results   Component Value Date    GLUCOSE 90 02/10/2023    BUN 29 (H) 02/10/2023    CREATININE 1.09 (H) 02/10/2023     02/10/2023    K 4.0 02/10/2023     02/10/2023    CO2 28.0 02/10/2023    CALCIUM 9.7 02/10/2023    PROTEINTOT 7.2 02/10/2023    ALBUMIN 4.6 02/10/2023    ALT 13 02/10/2023    AST 24 02/10/2023    ALKPHOS 57 02/10/2023    BILITOT 0.4 02/10/2023    EGFR 51.5 (L) 02/10/2023    GLOB 2.6 02/10/2023    AGRATIO 1.8 02/10/2023    BCR 26.6 (H) 02/10/2023    ANIONGAP 9.0 02/10/2023      Lab Results   Component Value Date    CHOL 168 06/09/2022    CHLPL 157 03/10/2020    TRIG 89 06/09/2022    HDL 76 (H) 06/09/2022    LDL 76 06/09/2022     Lab Results   Component Value Date    TSH 1.350 12/20/2022     Lab Results   Component Value Date    HGBA1C 5.80 (H) 12/20/2022             Assessment and Plan:   Today, we have again reviewed her care.  Luma Grey continues to struggle with nausea.  We will refill generic Zofran for her and encouraged her to  follow back up with GI as scheduled in a few weeks.  He remains an open question how to move forward in this regard.  Her other problems seem relatively stable.  We will update blood work though as noted below.  Clonazepam continues to be of benefit and will be refilled as noted.  She will return at her convenience for flu shot and COVID-19 booster.    Regarding the Medicare wellness exam, please see above for recommendations.  She is up-to-date currently on cancer screenings.  We will arrange DEXA scan as noted.  Tentatively, we will plan to see her back in about 6 months.    Diagnoses and all orders for this visit:    1. Physical exam (Primary)    2. Generalized anxiety disorder  -     clonazePAM (KlonoPIN) 0.5 MG tablet; Take 1 tablet by mouth 2 (Two) Times a Day As Needed for Anxiety.  Dispense: 60 tablet; Refill: 2    3. Essential hypertension  -     amLODIPine (NORVASC) 2.5 MG tablet; Take 1 tablet by mouth Daily.  Dispense: 90 tablet; Refill: 3  -     Comprehensive Metabolic Panel; Future    4. Nausea  -     ondansetron ODT (ZOFRAN-ODT) 4 MG disintegrating tablet; Place 1 tablet on the tongue Every 6 (Six) Hours As Needed for Nausea or Vomiting.  Dispense: 30 tablet; Refill: 0    5. Mixed hyperlipidemia  -     Comprehensive Metabolic Panel; Future  -     Lipid Panel; Future  -     TSH; Future    6. GERD without esophagitis  -     Comprehensive Metabolic Panel; Future    7. Other long term (current) drug therapy  -     CBC (No Diff); Future    8. Asymptomatic postmenopausal state  -     DEXA Bone Density Axial; Future       Follow Up  Return in about 6 months (around 4/2/2024) for Recheck.  Patient was given instructions and counseling regarding her condition or for health maintenance advice. Please see specific information pulled into the AVS if appropriate.

## 2023-10-05 ENCOUNTER — CLINICAL SUPPORT (OUTPATIENT)
Dept: FAMILY MEDICINE CLINIC | Age: 83
End: 2023-10-05
Payer: MEDICARE

## 2023-10-05 DIAGNOSIS — J30.9 ALLERGIC RHINITIS, UNSPECIFIED SEASONALITY, UNSPECIFIED TRIGGER: Primary | ICD-10-CM

## 2023-10-05 PROCEDURE — 95115 IMMUNOTHERAPY ONE INJECTION: CPT | Performed by: FAMILY MEDICINE

## 2023-10-12 ENCOUNTER — CLINICAL SUPPORT (OUTPATIENT)
Dept: FAMILY MEDICINE CLINIC | Age: 83
End: 2023-10-12
Payer: MEDICARE

## 2023-10-12 DIAGNOSIS — J30.9 ALLERGIC RHINITIS, UNSPECIFIED SEASONALITY, UNSPECIFIED TRIGGER: Primary | ICD-10-CM

## 2023-10-12 PROCEDURE — 95115 IMMUNOTHERAPY ONE INJECTION: CPT | Performed by: FAMILY MEDICINE

## 2023-10-17 ENCOUNTER — CLINICAL SUPPORT (OUTPATIENT)
Dept: FAMILY MEDICINE CLINIC | Age: 83
End: 2023-10-17
Payer: MEDICARE

## 2023-10-17 DIAGNOSIS — Z23 NEED FOR VACCINATION: Primary | ICD-10-CM

## 2023-10-17 PROCEDURE — 90662 IIV NO PRSV INCREASED AG IM: CPT | Performed by: FAMILY MEDICINE

## 2023-10-17 PROCEDURE — G0008 ADMIN INFLUENZA VIRUS VAC: HCPCS | Performed by: FAMILY MEDICINE

## 2023-10-23 ENCOUNTER — OFFICE VISIT (OUTPATIENT)
Dept: GASTROENTEROLOGY | Facility: CLINIC | Age: 83
End: 2023-10-23
Payer: MEDICARE

## 2023-10-23 ENCOUNTER — CLINICAL SUPPORT (OUTPATIENT)
Dept: FAMILY MEDICINE CLINIC | Age: 83
End: 2023-10-23
Payer: MEDICARE

## 2023-10-23 VITALS
SYSTOLIC BLOOD PRESSURE: 125 MMHG | BODY MASS INDEX: 22.42 KG/M2 | DIASTOLIC BLOOD PRESSURE: 60 MMHG | WEIGHT: 114.2 LBS | HEIGHT: 60 IN | HEART RATE: 75 BPM

## 2023-10-23 DIAGNOSIS — J30.9 ALLERGIC RHINITIS, UNSPECIFIED SEASONALITY, UNSPECIFIED TRIGGER: Primary | ICD-10-CM

## 2023-10-23 DIAGNOSIS — K21.9 GASTROESOPHAGEAL REFLUX DISEASE WITHOUT ESOPHAGITIS: ICD-10-CM

## 2023-10-23 DIAGNOSIS — K59.04 CHRONIC IDIOPATHIC CONSTIPATION: Primary | ICD-10-CM

## 2023-10-23 DIAGNOSIS — R11.0 NAUSEA: ICD-10-CM

## 2023-10-23 DIAGNOSIS — K44.9 HIATAL HERNIA: ICD-10-CM

## 2023-10-23 PROCEDURE — 1159F MED LIST DOCD IN RCRD: CPT | Performed by: NURSE PRACTITIONER

## 2023-10-23 PROCEDURE — 99214 OFFICE O/P EST MOD 30 MIN: CPT | Performed by: NURSE PRACTITIONER

## 2023-10-23 PROCEDURE — 1160F RVW MEDS BY RX/DR IN RCRD: CPT | Performed by: NURSE PRACTITIONER

## 2023-10-23 PROCEDURE — 95115 IMMUNOTHERAPY ONE INJECTION: CPT | Performed by: FAMILY MEDICINE

## 2023-10-23 PROCEDURE — 3078F DIAST BP <80 MM HG: CPT | Performed by: NURSE PRACTITIONER

## 2023-10-23 PROCEDURE — 3074F SYST BP LT 130 MM HG: CPT | Performed by: NURSE PRACTITIONER

## 2023-10-23 NOTE — PROGRESS NOTES
Chief Complaint   Constipation and Nausea    History of Present Illness       Luma Cruz is a 82 y.o. female who presents to [facility] for follow-up for constipation.  She was last seen in the office by me on 9/11/2023.    She underwent EGD and colonoscopy with Dr. Lindsey on 4/3/2023.  EGD showed a large hiatal hernia.  Otherwise normal exam.  Colonoscopy showed diverticulosis.  Otherwise normal.  No biopsies taken.  No recall colonoscopy due to age.     After the scopes she went to Dr. Reeder and he recommended surgery. At her age she didn't want to proceed with that and went to [facility] for a third opinion. She underwent EGD with manometry with Dr. Velez on 8/18/23. It was normal. Couldn't do BRAVO due to esophageal size. Esophageal manometry normal.      GERD/hiatal hernia--- Protonix 40 mg and Pepcid 20 mg. She admits she is nauseous and as soon as she starts eating or drinking she will start burping. BOOST makes it worse. She admits the nausea always comes after she eats or drinks. + globus sensation. She denies any dysphagia.      She admits her bowels are slow. She tells me she passes little balls most of the time. She admits she has been constipated all her life. She denies any rectal bleeding or melena.      GI family history---Denies any     Still having issues with reflux and constipation. Miralax was too much. Fiber prunes didn't work well enough. Still having nausea. She's not sure when she is taking the reflux meds. Still having issues with appetite. She admits she is swallowing well.  She admits she is drinking a lot of green tea during the day.  Does not drink a lot of water.  She does admit she eats 3 meals a day most days.  Results       Result Review :       CMP          1/12/2023    10:46 2/10/2023    08:15 10/2/2023    09:46   CMP   Glucose 94  90  88    BUN 26  29  22    Creatinine 0.94  1.09  0.98    EGFR 61.5  51.5  58.5    Sodium 141  140  141     Potassium 4.6  4.0  4.4    Chloride 105  103  103    Calcium 9.1  9.7  9.8    Total Protein  7.2  7.0    Albumin  4.6  4.3    Globulin  2.6  2.7    Total Bilirubin  0.4  0.4    Alkaline Phosphatase  57  60    AST (SGOT)  24  23    ALT (SGPT)  13  15    Albumin/Globulin Ratio  1.8  1.6    BUN/Creatinine Ratio 27.7  26.6  22.4    Anion Gap 8.4  9.0  10.5      CBC          12/20/2022    14:02 2/10/2023    08:15 10/2/2023    09:46   CBC   WBC 6.06  4.64  7.45    RBC 4.25  4.35  4.45    Hemoglobin 12.3  12.5  12.8    Hematocrit 38.2  39.3  40.3    MCV 89.9  90.3  90.6    MCH 28.9  28.7  28.8    MCHC 32.2  31.8  31.8    RDW 13.0  12.8  12.9    Platelets 254  235  247      Lipid Panel          10/2/2023    09:46   Lipid Panel   Total Cholesterol 186    Triglycerides 89    HDL Cholesterol 88    VLDL Cholesterol 16    LDL Cholesterol  82    LDL/HDL Ratio 0.91      TSH          12/20/2022    14:02 10/2/2023    09:46   TSH   TSH 1.350  2.050        Lipase   Lipase   Date Value Ref Range Status   04/30/2023 219 73 - 393 U/L Final     Amylase   Amylase   Date Value Ref Range Status   02/10/2023 88 28 - 100 U/L Final               Past Medical History       Past Medical History:   Diagnosis Date    COVID-19 07/01/2022    Essential hypertension     Generalized anxiety disorder     GERD without esophagitis     Hernia April, 2023    hiatal hernia    Menopausal and postmenopausal disorder     Mixed hyperlipidemia        Past Surgical History:   Procedure Laterality Date    APPENDECTOMY  10 years ago    BREAST BIOPSY  right side, 15 years ago    CHOLECYSTECTOMY  unknown    COLONOSCOPY  06/2011    COLONOSCOPY N/A 04/03/2023    Procedure: COLONOSCOPY;  Surgeon: Cory Lindsey MD;  Location: [facility] ENDOSCOPY;  Service: General;  Laterality: N/A;  normal    ENDOSCOPY N/A 04/03/2023    Hiatal hernia, mild reactive gastropathy    HYSTERECTOMY      complete    TONSILLECTOMY AND ADENOIDECTOMY           Current Outpatient Medications:      albuterol (PROVENTIL) (2.5 MG/3ML) 0.083% nebulizer solution, Inhale 2.5 mg., Disp: , Rfl:     albuterol (PROVENTIL) 2 MG tablet, Take 2.5 mg by mouth 3 (Three) Times a Day., Disp: , Rfl:     amLODIPine (NORVASC) 2.5 MG tablet, Take 1 tablet by mouth Daily., Disp: 90 tablet, Rfl: 3    arformoterol (BROVANA) 15 MCG/2ML nebulizer solution, Inhale 2 mL., Disp: , Rfl:     azelastine (ASTELIN) 0.1 % nasal spray, 1 spray into the nostril(s) as directed by provider 2 (Two) Times a Day., Disp: , Rfl:     clonazePAM (KlonoPIN) 0.5 MG tablet, Take 1 tablet by mouth 2 (Two) Times a Day As Needed for Anxiety., Disp: 60 tablet, Rfl: 2    estradiol (ESTRACE) 1 MG tablet, Take 1 tablet by mouth Daily., Disp: 90 tablet, Rfl: 3    famotidine (Pepcid) 20 MG tablet, Take 1 tablet by mouth 2 (Two) Times a Day., Disp: 180 tablet, Rfl: 1    fluticasone (FLONASE) 50 MCG/ACT nasal spray, 2 sprays into the nostril(s) as directed by provider Daily., Disp: , Rfl:     levocetirizine (XYZAL) 5 MG tablet, Take 1 tablet by mouth Every Evening., Disp: 90 tablet, Rfl: 3    losartan (COZAAR) 50 MG tablet, Take 1 tablet by mouth Daily., Disp: 90 tablet, Rfl: 3    multivitamin with minerals tablet tablet, Take 1 tablet by mouth Daily., Disp: , Rfl:     ondansetron ODT (ZOFRAN-ODT) 4 MG disintegrating tablet, Place 1 tablet on the tongue Every 6 (Six) Hours As Needed for Nausea or Vomiting., Disp: 30 tablet, Rfl: 0    pantoprazole (PROTONIX) 40 MG EC tablet, Take 1 tablet by mouth Daily., Disp: 90 tablet, Rfl: 3    pravastatin (PRAVACHOL) 20 MG tablet, Take 1 tablet by mouth Daily., Disp: 90 tablet, Rfl: 3    sodium chloride 3 % nebulizer solution, Take 4 mL by nebulization 2 (Two) Times a Day., Disp: , Rfl:     budesonide (PULMICORT) 0.5 MG/2ML nebulizer solution, Take 2 mL by nebulization 2 (Two) Times a Day for 30 days. Rinse mouth out after each use, Disp: 60 each, Rfl: 11     Allergies   Allergen Reactions    Fosamax [Alendronate] GI Intolerance  "   Penicillins Rash       Family History   Problem Relation Age of Onset    Stroke Mother     Hypertension Mother     Arthritis Mother     Hearing loss Mother     Coronary artery disease Father         Social History     Social History Narrative    Not on file       Objective       Review of Systems   Constitutional:  Negative for appetite change, fatigue, fever, unexpected weight gain and unexpected weight loss.   HENT:  Negative for trouble swallowing.    Respiratory:  Negative for cough, choking, chest tightness, shortness of breath, wheezing and stridor.    Cardiovascular:  Negative for chest pain, palpitations and leg swelling.   Gastrointestinal:  Positive for constipation, nausea, GERD and indigestion. Negative for abdominal distention, abdominal pain, anal bleeding, blood in stool, diarrhea, rectal pain and vomiting.        Vital Signs:   /60 (BP Location: Left arm, Patient Position: Sitting, Cuff Size: Adult)   Pulse 75   Ht 152.4 cm (60\")   Wt 51.8 kg (114 lb 3.2 oz)   BMI 22.30 kg/m²       Physical Exam  Constitutional:       General: She is not in acute distress.     Appearance: She is well-developed. She is not ill-appearing.   HENT:      Head: Normocephalic.   Eyes:      Pupils: Pupils are equal, round, and reactive to light.   Cardiovascular:      Rate and Rhythm: Normal rate and regular rhythm.      Heart sounds: Normal heart sounds.   Pulmonary:      Effort: Pulmonary effort is normal.      Breath sounds: Normal breath sounds.   Abdominal:      General: Bowel sounds are normal. There is distension.      Palpations: Abdomen is soft. There is no mass.      Tenderness: There is no abdominal tenderness. There is no guarding or rebound.      Hernia: No hernia is present.   Musculoskeletal:         General: Normal range of motion.   Skin:     General: Skin is warm and dry.   Neurological:      Mental Status: She is alert and oriented to person, place, and time.   Psychiatric:         Speech: " Speech normal.         Behavior: Behavior normal.         Judgment: Judgment normal.           Assessment & Plan          Assessment and Plan    Diagnoses and all orders for this visit:    1. Chronic idiopathic constipation (Primary)    2. Gastroesophageal reflux disease without esophagitis    3. Nausea    4. Hiatal hernia      Reviewed medical records with her today. Still having issues with constipation. Miralax worked too well and caused diarrhea. Prunes didn't work at all. Recommend she start a nightly stool softener. Continue high fiber diet. Cut down on the green tea she is drinking. Drink more water. GERD doesn't seem well controlled. I think when her constipation is worse it makes her reflux worse as well. Continue Protonix every morning and pepcid BID. Continue GERD precautions. Patient to call the office in 1-2 weeks. Patient to follow up with me in 2 months. Patient is agreeable with the plan.           Follow Up       Follow Up   Return in about 2 months (around 12/23/2023) for CONSTIPATION, GERD.  Patient was given instructions and counseling regarding her condition or for health maintenance advice. Please see specific information pulled into the AVS if appropriate.

## 2023-10-23 NOTE — PATIENT INSTRUCTIONS
Continue high fiber diet  Continue to drink plenty of water     Start a nightly stool softener (colace or dulcolax) generic version is fine       Give me update in 1-2 weeks

## 2023-11-01 ENCOUNTER — CLINICAL SUPPORT (OUTPATIENT)
Dept: FAMILY MEDICINE CLINIC | Age: 83
End: 2023-11-01
Payer: MEDICARE

## 2023-11-01 DIAGNOSIS — J30.9 ALLERGIC RHINITIS, UNSPECIFIED SEASONALITY, UNSPECIFIED TRIGGER: Primary | ICD-10-CM

## 2023-11-01 PROCEDURE — 95115 IMMUNOTHERAPY ONE INJECTION: CPT | Performed by: FAMILY MEDICINE

## 2023-11-02 ENCOUNTER — HOSPITAL ENCOUNTER (OUTPATIENT)
Dept: BONE DENSITY | Facility: HOSPITAL | Age: 83
Discharge: HOME | End: 2023-11-02
Admitting: FAMILY MEDICINE
Payer: MEDICARE

## 2023-11-02 DIAGNOSIS — Z78.0 ASYMPTOMATIC POSTMENOPAUSAL STATE: ICD-10-CM

## 2023-11-02 PROCEDURE — 77080 DXA BONE DENSITY AXIAL: CPT

## 2023-11-09 ENCOUNTER — CLINICAL SUPPORT (OUTPATIENT)
Dept: FAMILY MEDICINE CLINIC | Age: 83
End: 2023-11-09
Payer: MEDICARE

## 2023-11-09 DIAGNOSIS — J30.89 ALLERGIC RHINITIS DUE TO OTHER ALLERGIC TRIGGER, UNSPECIFIED SEASONALITY: Primary | ICD-10-CM

## 2023-11-09 PROCEDURE — 95115 IMMUNOTHERAPY ONE INJECTION: CPT | Performed by: FAMILY MEDICINE

## 2023-11-14 ENCOUNTER — CLINICAL SUPPORT (OUTPATIENT)
Dept: FAMILY MEDICINE CLINIC | Age: 83
End: 2023-11-14
Payer: MEDICARE

## 2023-11-14 DIAGNOSIS — J30.89 ALLERGIC RHINITIS DUE TO OTHER ALLERGIC TRIGGER, UNSPECIFIED SEASONALITY: Primary | ICD-10-CM

## 2023-11-14 PROCEDURE — 95115 IMMUNOTHERAPY ONE INJECTION: CPT | Performed by: FAMILY MEDICINE

## 2023-11-22 ENCOUNTER — CLINICAL SUPPORT (OUTPATIENT)
Dept: FAMILY MEDICINE CLINIC | Age: 83
End: 2023-11-22
Payer: MEDICARE

## 2023-11-22 DIAGNOSIS — J30.89 ALLERGIC RHINITIS DUE TO OTHER ALLERGIC TRIGGER, UNSPECIFIED SEASONALITY: Primary | ICD-10-CM

## 2023-11-22 PROCEDURE — 95115 IMMUNOTHERAPY ONE INJECTION: CPT | Performed by: FAMILY MEDICINE

## 2023-11-29 ENCOUNTER — TRANSCRIBE ORDERS (OUTPATIENT)
Dept: ADMINISTRATIVE | Facility: HOSPITAL | Age: 83
End: 2023-11-29
Payer: MEDICARE

## 2023-11-29 ENCOUNTER — CLINICAL SUPPORT (OUTPATIENT)
Dept: FAMILY MEDICINE CLINIC | Age: 83
End: 2023-11-29
Payer: MEDICARE

## 2023-11-29 DIAGNOSIS — Z12.31 BREAST CANCER SCREENING BY MAMMOGRAM: Primary | ICD-10-CM

## 2023-11-29 DIAGNOSIS — J30.89 ALLERGIC RHINITIS DUE TO OTHER ALLERGIC TRIGGER, UNSPECIFIED SEASONALITY: Primary | ICD-10-CM

## 2023-11-29 PROCEDURE — 95115 IMMUNOTHERAPY ONE INJECTION: CPT | Performed by: FAMILY MEDICINE

## 2023-12-04 ENCOUNTER — PATIENT MESSAGE (OUTPATIENT)
Dept: FAMILY MEDICINE CLINIC | Age: 83
End: 2023-12-04
Payer: MEDICARE

## 2023-12-06 ENCOUNTER — CLINICAL SUPPORT (OUTPATIENT)
Dept: FAMILY MEDICINE CLINIC | Age: 83
End: 2023-12-06
Payer: MEDICARE

## 2023-12-06 DIAGNOSIS — J30.89 ALLERGIC RHINITIS DUE TO OTHER ALLERGIC TRIGGER, UNSPECIFIED SEASONALITY: Primary | ICD-10-CM

## 2023-12-06 DIAGNOSIS — K21.9 GERD WITHOUT ESOPHAGITIS: ICD-10-CM

## 2023-12-06 PROCEDURE — 95115 IMMUNOTHERAPY ONE INJECTION: CPT | Performed by: FAMILY MEDICINE

## 2023-12-06 RX ORDER — FAMOTIDINE 40 MG/1
40 TABLET, FILM COATED ORAL 2 TIMES DAILY
Qty: 60 TABLET | Refills: 3 | Status: SHIPPED | OUTPATIENT
Start: 2023-12-06

## 2023-12-12 ENCOUNTER — CLINICAL SUPPORT (OUTPATIENT)
Dept: FAMILY MEDICINE CLINIC | Age: 83
End: 2023-12-12
Payer: MEDICARE

## 2023-12-12 DIAGNOSIS — J30.89 ALLERGIC RHINITIS DUE TO OTHER ALLERGIC TRIGGER, UNSPECIFIED SEASONALITY: Primary | ICD-10-CM

## 2023-12-12 PROCEDURE — 95115 IMMUNOTHERAPY ONE INJECTION: CPT | Performed by: FAMILY MEDICINE

## 2023-12-15 ENCOUNTER — TRANSCRIBE ORDERS (OUTPATIENT)
Dept: ADMINISTRATIVE | Facility: HOSPITAL | Age: 83
End: 2023-12-15
Payer: MEDICARE

## 2023-12-15 DIAGNOSIS — M54.14 THORACIC RADICULOPATHY: Primary | ICD-10-CM

## 2023-12-27 ENCOUNTER — CLINICAL SUPPORT (OUTPATIENT)
Dept: FAMILY MEDICINE CLINIC | Age: 83
End: 2023-12-27
Payer: MEDICARE

## 2023-12-27 DIAGNOSIS — J30.89 ALLERGIC RHINITIS DUE TO OTHER ALLERGIC TRIGGER, UNSPECIFIED SEASONALITY: Primary | ICD-10-CM

## 2023-12-27 PROCEDURE — 95115 IMMUNOTHERAPY ONE INJECTION: CPT | Performed by: FAMILY MEDICINE

## 2023-12-31 NOTE — PROGRESS NOTES
Primary Care Provider  Joseph Walker MD     Referring Provider  No ref. provider found     Chief Complaint  Seasonal allergies and Follow-up (6 month follow up. )    Subjective          History of Presenting Illness  Patient is a 83-year-old  female, patient Dr. Forbes's who presents for management of bronchiectasis who presents for follow-up visit today.  Patient states that she does get short of breath that is worse with exertion, moderate in severity, and improved with rest.  Patient states at times she does have intermittent coughing and at times has lung pain.  Patient states that she does have chronic back pain and at times is not sure if she is experiencing lung pain versus back pain.  Patient states that she is taking Brovana and Pulmicort every day as prescribed and uses albuterol nebulizer treatments as needed.  Patient states that she does have a flutter valve with sodium chloride nebulizer treatments to use to assist with airway clearance.  Patient states that she is under the care of an allergist and is receiving allergy shots.  Patient states that she is taking Xyzal, Astelin nasal spray, and Flonase nasal spray.  Patient states that she is taking Protonix for reflux.  Patient denies any recent travel.  Patient denies any known exposure to any ill contacts.  Patient denies any history of any bleeding or blood clotting disorders.  Patient denies fever, chills, night sweats, swollen glands in the head and neck, unintentional weight loss, hemoptysis, purulent sputum production, dysphagia, chest pain, palpitations, chest tightness, abdominal pain, nausea, vomiting, and diarrhea.  Patient also denies any myalgias, changes in sense of taste and/or smell, sore throat, any other coronavirus or flu-like symptoms.  Patient denies any leg swelling, orthopnea, paroxysmal nocturnal dyspnea.  Patient is able to perform activities of daily living.        Review of Systems     Family History    Problem Relation Age of Onset    Stroke Mother     Hypertension Mother     Arthritis Mother     Hearing loss Mother     Coronary artery disease Father         Social History     Socioeconomic History    Marital status:     Number of children: 2   Tobacco Use    Smoking status: Never     Passive exposure: Never    Smokeless tobacco: Never   Vaping Use    Vaping Use: Never used   Substance and Sexual Activity    Alcohol use: Never    Drug use: Never    Sexual activity: Yes     Partners: Male     Birth control/protection: Hysterectomy        Past Medical History:   Diagnosis Date    COVID-19 07/01/2022    Essential hypertension     Generalized anxiety disorder     GERD without esophagitis     Hernia April, 2023    hiatal hernia    Menopausal and postmenopausal disorder     Mixed hyperlipidemia         Immunization History   Administered Date(s) Administered    Arexvy (RSV, Adults 60+ yrs) 11/10/2023    COVID-19 (MODERNA) 1st,2nd,3rd Dose Monovalent 01/27/2021, 02/24/2021, 10/30/2021, 04/13/2022    COVID-19 (MODERNA) BIVALENT 12+YRS 10/19/2022    COVID-19 F23 (PFIZER) 12YRS+ (COMIRNATY) 10/19/2023    Fluad Quad 65+ 09/29/2022    Fluzone High Dose =>65 Years ([employer] ONLY) 10/16/2012, 09/25/2013, 09/28/2017, 09/24/2020    Fluzone High-Dose 65+yrs 10/02/2021, 10/17/2023    Hepatitis A 12/06/2018       Allergies   Allergen Reactions    Fosamax [Alendronate] GI Intolerance    Penicillins Rash          Current Outpatient Medications:     albuterol (PROVENTIL) (2.5 MG/3ML) 0.083% nebulizer solution, Take 2.5 mg by nebulization Every 4 (Four) Hours As Needed for Wheezing or Shortness of Air for up to 30 days., Disp: 180 each, Rfl: 11    albuterol sulfate  (90 Base) MCG/ACT inhaler, Inhale 2 puffs Every 4 (Four) Hours As Needed for Wheezing., Disp: , Rfl:     amLODIPine (NORVASC) 2.5 MG tablet, Take 1 tablet by mouth Daily., Disp: 90 tablet, Rfl: 3    arformoterol (BROVANA) 15 MCG/2ML nebulizer solution, Take 2  mL by nebulization 2 (Two) Times a Day for 30 days. [place], Disp: 120 mL, Rfl: 11    azelastine (ASTELIN) 0.1 % nasal spray, 1 spray into the nostril(s) as directed by provider 2 (Two) Times a Day., Disp: , Rfl:     budesonide (PULMICORT) 0.5 MG/2ML nebulizer solution, Take 2 mL by nebulization 2 (Two) Times a Day for 30 days. Rinse mouth out after each use, Disp: 60 each, Rfl: 11    clonazePAM (KlonoPIN) 0.5 MG tablet, Take 1 tablet by mouth 2 (Two) Times a Day As Needed for Anxiety., Disp: 60 tablet, Rfl: 2    estradiol (ESTRACE) 1 MG tablet, Take 1 tablet by mouth Daily., Disp: 90 tablet, Rfl: 3    famotidine (Pepcid) 40 MG tablet, Take 1 tablet by mouth 2 (Two) Times a Day., Disp: 60 tablet, Rfl: 3    fluticasone (FLONASE) 50 MCG/ACT nasal spray, 2 sprays into the nostril(s) as directed by provider Daily., Disp: , Rfl:     levocetirizine (XYZAL) 5 MG tablet, Take 1 tablet by mouth Every Evening., Disp: 90 tablet, Rfl: 3    losartan (COZAAR) 50 MG tablet, Take 1 tablet by mouth Daily., Disp: 90 tablet, Rfl: 3    multivitamin with minerals tablet tablet, Take 1 tablet by mouth Daily., Disp: , Rfl:     naloxone (Narcan) 4 MG/0.1ML nasal spray, one spray in one nostril as needed for over sedation or respiratory depression from opioid use. Repeat one spray in alternate nostril every 2-3 minutes until responsive or EMS arrives, Disp: , Rfl:     ondansetron ODT (ZOFRAN-ODT) 4 MG disintegrating tablet, Place 1 tablet on the tongue Every 6 (Six) Hours As Needed for Nausea or Vomiting., Disp: 30 tablet, Rfl: 0    pantoprazole (PROTONIX) 40 MG EC tablet, Take 1 tablet by mouth Daily., Disp: 90 tablet, Rfl: 3    pravastatin (PRAVACHOL) 20 MG tablet, Take 1 tablet by mouth Daily., Disp: 90 tablet, Rfl: 3    sodium chloride 3 % nebulizer solution, Take 4 mL by nebulization 2 (Two) Times a Day., Disp: , Rfl:     HYDROcodone-acetaminophen (NORCO) 5-325 MG per tablet, , Disp: , Rfl:      Objective     Physical Exam  Vital  "Signs:   WDWN, Alert, NAD.    HEENT:  PERRL, EOMI.  OP, nares clear, no sinus tenderness  Neck:  Supple, no JVD, no thyromegaly.  Lymph: no axillary, cervical, supraclavicular lymphadenopathy noted bilaterally  Chest:  good aeration, clear to auscultation bilaterally, tympanic to percussion bilaterally, no work of breathing noted  CV: RRR, no MGR, pulses 2+, equal.  Abd:  Soft, NT, ND, + BS, no HSM  EXT:  no clubbing, no cyanosis, no edema, no joint tenderness  Neuro:  A&Ox3, CN grossly intact, no focal deficits.  Skin: No rashes or lesions noted.    /78 (BP Location: Left arm, Patient Position: Sitting, Cuff Size: Small Adult)   Pulse 68   Resp 16   Ht 152.4 cm (60\")   Wt 52.1 kg (114 lb 14.4 oz)   SpO2 97% Comment: room air  BMI 22.44 kg/m²         Result Review :   I have reviewed my last office visit note.    Procedures:           Assessment and Plan      Assessment:  1.  Bronchiectasis on chest CT scan dated from 5/11/2021.   2.  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  The patient reports she is feeling better.  Last chest CT scan showed patchy groundglass basilar lower lobes has resolved.   3. History of fungal pneumonia, treated for two months of [place] with antifungal associated issues.      4. GERD.  Patient is on a PPI.        5. Mucus plugging, patient has a flutter valve and sodium chloride nebulizer treatments.     6.  Airway clearance impairment.  7.  Seasonal allergies on allergy immunotherapy.      8.  Intermittent pain.  9.  Never smoker.          Plan:  1. Continue Brovana and Pulmicort everyday as prescribed and rinse her mouth out after each use.      2. Continue albuterol nebulizers as needed.  Patient is not wanting a rescue inhaler to have on hand at this time.  3. Continue Astelin, Flonase, and Xyzal as prescribed.  4. Continue allergy immunotherapy and to follow up " with allergist as scheduled.      5. Continue flutter valve with nebulizer treatments to assist with airway clearance.  6.  For GERD,  patient to continue PPI.   Patient is also advised to sleep with the head of the bed elevated and do not eat 3-4 hours prior to bedtime.  7.  Patient with bronchiectasis, intermittent cough, and intermittent lung pain.  Will order chest CT scan.  7.  Vaccination status: patient reports they are up-to-date with flu, pneumonia, and Covid vaccines.  Patient is advised to continue to follow CDC recommendations such as social distancing wearing a mask and washing hands for at least 20 seconds.  8.  Smoking status: never smoker.  9.  Patient to call the office, 911, or go to the ER with new or worsening symptoms.  10.  Follow-up in 3 months, sooner if needed.            Follow Up   Return for 3 months in [place].  Patient was given instructions and counseling regarding her condition or for health maintenance advice. Please see specific information pulled into the AVS if appropriate.

## 2024-01-02 ENCOUNTER — CLINICAL SUPPORT (OUTPATIENT)
Dept: FAMILY MEDICINE CLINIC | Age: 84
End: 2024-01-02
Payer: MEDICARE

## 2024-01-02 DIAGNOSIS — J30.89 ALLERGIC RHINITIS DUE TO OTHER ALLERGIC TRIGGER, UNSPECIFIED SEASONALITY: Primary | ICD-10-CM

## 2024-01-02 PROCEDURE — 95115 IMMUNOTHERAPY ONE INJECTION: CPT | Performed by: FAMILY MEDICINE

## 2024-01-09 ENCOUNTER — HOSPITAL ENCOUNTER (OUTPATIENT)
Dept: MRI IMAGING | Facility: HOSPITAL | Age: 84
Discharge: HOME | End: 2024-01-09
Admitting: PHYSICIAN ASSISTANT
Payer: MEDICARE

## 2024-01-09 ENCOUNTER — CLINICAL SUPPORT (OUTPATIENT)
Dept: FAMILY MEDICINE CLINIC | Age: 84
End: 2024-01-09
Payer: MEDICARE

## 2024-01-09 DIAGNOSIS — M54.14 THORACIC RADICULOPATHY: ICD-10-CM

## 2024-01-09 DIAGNOSIS — J30.89 ALLERGIC RHINITIS DUE TO OTHER ALLERGIC TRIGGER, UNSPECIFIED SEASONALITY: Primary | ICD-10-CM

## 2024-01-09 PROCEDURE — 72146 MRI CHEST SPINE W/O DYE: CPT

## 2024-01-09 PROCEDURE — 95115 IMMUNOTHERAPY ONE INJECTION: CPT | Performed by: FAMILY MEDICINE

## 2024-01-11 ENCOUNTER — OFFICE VISIT (OUTPATIENT)
Dept: PULMONOLOGY | Facility: CLINIC | Age: 84
End: 2024-01-11
Payer: MEDICARE

## 2024-01-11 VITALS
WEIGHT: 114.9 LBS | SYSTOLIC BLOOD PRESSURE: 121 MMHG | RESPIRATION RATE: 16 BRPM | OXYGEN SATURATION: 97 % | BODY MASS INDEX: 22.56 KG/M2 | HEART RATE: 68 BPM | HEIGHT: 60 IN | DIASTOLIC BLOOD PRESSURE: 78 MMHG

## 2024-01-11 DIAGNOSIS — Z87.01 HISTORY OF FUNGAL PNEUMONIA: ICD-10-CM

## 2024-01-11 DIAGNOSIS — R06.89 AIRWAY CLEARANCE IMPAIRMENT: ICD-10-CM

## 2024-01-11 DIAGNOSIS — J30.2 SEASONAL ALLERGIES: ICD-10-CM

## 2024-01-11 DIAGNOSIS — K21.9 GERD WITHOUT ESOPHAGITIS: ICD-10-CM

## 2024-01-11 DIAGNOSIS — T17.500A MUCUS PLUGGING OF BRONCHI: ICD-10-CM

## 2024-01-11 DIAGNOSIS — J47.9 BRONCHIECTASIS WITHOUT COMPLICATION: Primary | ICD-10-CM

## 2024-01-11 RX ORDER — NALOXONE HYDROCHLORIDE 4 MG/.1ML
SPRAY NASAL
COMMUNITY
Start: 2023-12-12

## 2024-01-11 RX ORDER — BUDESONIDE 0.5 MG/2ML
0.5 INHALANT ORAL 2 TIMES DAILY
Qty: 60 EACH | Refills: 11 | Status: SHIPPED | OUTPATIENT
Start: 2024-01-11 | End: 2024-02-10

## 2024-01-11 RX ORDER — ALBUTEROL SULFATE 2.5 MG/3ML
2.5 SOLUTION RESPIRATORY (INHALATION) EVERY 4 HOURS PRN
Qty: 180 EACH | Refills: 11 | Status: SHIPPED | OUTPATIENT
Start: 2024-01-11 | End: 2024-02-10

## 2024-01-11 RX ORDER — ARFORMOTEROL TARTRATE 15 UG/2ML
15 SOLUTION RESPIRATORY (INHALATION)
Qty: 120 ML | Refills: 11 | Status: SHIPPED | OUTPATIENT
Start: 2024-01-11 | End: 2024-02-10

## 2024-01-11 RX ORDER — HYDROCODONE BITARTRATE AND ACETAMINOPHEN 5; 325 MG/1; MG/1
TABLET ORAL
COMMUNITY
Start: 2023-12-18

## 2024-01-11 RX ORDER — ALBUTEROL SULFATE 90 UG/1
2 AEROSOL, METERED RESPIRATORY (INHALATION) EVERY 4 HOURS PRN
COMMUNITY

## 2024-01-15 ENCOUNTER — TELEPHONE (OUTPATIENT)
Dept: GASTROENTEROLOGY | Facility: CLINIC | Age: 84
End: 2024-01-15
Payer: MEDICARE

## 2024-01-17 ENCOUNTER — CLINICAL SUPPORT (OUTPATIENT)
Dept: FAMILY MEDICINE CLINIC | Age: 84
End: 2024-01-17
Payer: MEDICARE

## 2024-01-17 DIAGNOSIS — J30.89 ALLERGIC RHINITIS DUE TO OTHER ALLERGIC TRIGGER, UNSPECIFIED SEASONALITY: Primary | ICD-10-CM

## 2024-01-17 PROCEDURE — 95115 IMMUNOTHERAPY ONE INJECTION: CPT | Performed by: FAMILY MEDICINE

## 2024-01-22 ENCOUNTER — HOSPITAL ENCOUNTER (OUTPATIENT)
Dept: CT IMAGING | Facility: HOSPITAL | Age: 84
Discharge: HOME | End: 2024-01-22
Payer: MEDICARE

## 2024-01-22 ENCOUNTER — CLINICAL SUPPORT (OUTPATIENT)
Dept: FAMILY MEDICINE CLINIC | Age: 84
End: 2024-01-22
Payer: MEDICARE

## 2024-01-22 ENCOUNTER — HOSPITAL ENCOUNTER (OUTPATIENT)
Dept: MAMMOGRAPHY | Facility: HOSPITAL | Age: 84
Discharge: HOME | End: 2024-01-22
Payer: MEDICARE

## 2024-01-22 DIAGNOSIS — Z87.01 HISTORY OF FUNGAL PNEUMONIA: ICD-10-CM

## 2024-01-22 DIAGNOSIS — K21.9 GERD WITHOUT ESOPHAGITIS: ICD-10-CM

## 2024-01-22 DIAGNOSIS — J30.89 ALLERGIC RHINITIS DUE TO OTHER ALLERGIC TRIGGER, UNSPECIFIED SEASONALITY: Primary | ICD-10-CM

## 2024-01-22 DIAGNOSIS — J30.2 SEASONAL ALLERGIES: ICD-10-CM

## 2024-01-22 DIAGNOSIS — Z12.31 BREAST CANCER SCREENING BY MAMMOGRAM: ICD-10-CM

## 2024-01-22 DIAGNOSIS — T17.500A MUCUS PLUGGING OF BRONCHI: ICD-10-CM

## 2024-01-22 DIAGNOSIS — R06.89 AIRWAY CLEARANCE IMPAIRMENT: ICD-10-CM

## 2024-01-22 DIAGNOSIS — J47.9 BRONCHIECTASIS WITHOUT COMPLICATION: ICD-10-CM

## 2024-01-22 PROCEDURE — 71250 CT THORAX DX C-: CPT

## 2024-01-22 PROCEDURE — 77067 SCR MAMMO BI INCL CAD: CPT

## 2024-01-22 PROCEDURE — 95115 IMMUNOTHERAPY ONE INJECTION: CPT | Performed by: FAMILY MEDICINE

## 2024-01-22 PROCEDURE — 77063 BREAST TOMOSYNTHESIS BI: CPT

## 2024-01-24 DIAGNOSIS — R93.89 ABNORMAL CT SCAN, CHEST: Primary | ICD-10-CM

## 2024-01-24 DIAGNOSIS — J18.9 PNEUMONIA DUE TO INFECTIOUS ORGANISM, UNSPECIFIED LATERALITY, UNSPECIFIED PART OF LUNG: ICD-10-CM

## 2024-01-24 RX ORDER — DOXYCYCLINE HYCLATE 100 MG/1
100 CAPSULE ORAL 2 TIMES DAILY
Qty: 20 CAPSULE | Refills: 0 | Status: SHIPPED | OUTPATIENT
Start: 2024-01-24

## 2024-01-29 ENCOUNTER — TELEPHONE (OUTPATIENT)
Dept: PULMONOLOGY | Facility: CLINIC | Age: 84
End: 2024-01-29
Payer: MEDICARE

## 2024-01-29 ENCOUNTER — OFFICE VISIT (OUTPATIENT)
Dept: GASTROENTEROLOGY | Facility: CLINIC | Age: 84
End: 2024-01-29
Payer: MEDICARE

## 2024-01-29 ENCOUNTER — CLINICAL SUPPORT (OUTPATIENT)
Dept: FAMILY MEDICINE CLINIC | Age: 84
End: 2024-01-29
Payer: MEDICARE

## 2024-01-29 ENCOUNTER — LAB (OUTPATIENT)
Dept: LAB | Facility: HOSPITAL | Age: 84
End: 2024-01-29
Payer: MEDICARE

## 2024-01-29 VITALS
HEART RATE: 80 BPM | HEIGHT: 60 IN | DIASTOLIC BLOOD PRESSURE: 59 MMHG | WEIGHT: 116.2 LBS | SYSTOLIC BLOOD PRESSURE: 144 MMHG | BODY MASS INDEX: 22.81 KG/M2

## 2024-01-29 DIAGNOSIS — K21.9 GERD WITHOUT ESOPHAGITIS: Primary | ICD-10-CM

## 2024-01-29 DIAGNOSIS — R05.9 COUGH, UNSPECIFIED TYPE: ICD-10-CM

## 2024-01-29 DIAGNOSIS — R06.00 DYSPNEA, UNSPECIFIED TYPE: Primary | ICD-10-CM

## 2024-01-29 DIAGNOSIS — J30.89 ALLERGIC RHINITIS DUE TO OTHER ALLERGIC TRIGGER, UNSPECIFIED SEASONALITY: Primary | ICD-10-CM

## 2024-01-29 DIAGNOSIS — R06.00 DYSPNEA, UNSPECIFIED TYPE: ICD-10-CM

## 2024-01-29 DIAGNOSIS — K59.04 CHRONIC IDIOPATHIC CONSTIPATION: ICD-10-CM

## 2024-01-29 DIAGNOSIS — K44.9 HIATAL HERNIA: ICD-10-CM

## 2024-01-29 DIAGNOSIS — R11.0 NAUSEA: ICD-10-CM

## 2024-01-29 LAB
ALBUMIN SERPL-MCNC: 4.5 G/DL (ref 3.5–5.2)
ALBUMIN/GLOB SERPL: 2.1 G/DL
ALP SERPL-CCNC: 73 U/L (ref 39–117)
ALT SERPL W P-5'-P-CCNC: 13 U/L (ref 1–33)
ANION GAP SERPL CALCULATED.3IONS-SCNC: 11 MMOL/L (ref 5–15)
AST SERPL-CCNC: 27 U/L (ref 1–32)
BASOPHILS # BLD AUTO: 0.04 10*3/MM3 (ref 0–0.2)
BASOPHILS NFR BLD AUTO: 0.7 % (ref 0–1.5)
BILIRUB SERPL-MCNC: 0.3 MG/DL (ref 0–1.2)
BUN SERPL-MCNC: 30 MG/DL (ref 8–23)
BUN/CREAT SERPL: 25.4 (ref 7–25)
CALCIUM SPEC-SCNC: 9.8 MG/DL (ref 8.6–10.5)
CHLORIDE SERPL-SCNC: 105 MMOL/L (ref 98–107)
CO2 SERPL-SCNC: 27 MMOL/L (ref 22–29)
CREAT SERPL-MCNC: 1.18 MG/DL (ref 0.57–1)
DEPRECATED RDW RBC AUTO: 40.5 FL (ref 37–54)
EGFRCR SERPLBLD CKD-EPI 2021: 46.5 ML/MIN/1.73
EOSINOPHIL # BLD AUTO: 0.13 10*3/MM3 (ref 0–0.4)
EOSINOPHIL NFR BLD AUTO: 2.2 % (ref 0.3–6.2)
ERYTHROCYTE [DISTWIDTH] IN BLOOD BY AUTOMATED COUNT: 12.2 % (ref 12.3–15.4)
GLOBULIN UR ELPH-MCNC: 2.1 GM/DL
GLUCOSE SERPL-MCNC: 114 MG/DL (ref 65–99)
HCT VFR BLD AUTO: 38.7 % (ref 34–46.6)
HGB BLD-MCNC: 12.3 G/DL (ref 12–15.9)
IMM GRANULOCYTES # BLD AUTO: 0 10*3/MM3 (ref 0–0.05)
IMM GRANULOCYTES NFR BLD AUTO: 0 % (ref 0–0.5)
LYMPHOCYTES # BLD AUTO: 1.56 10*3/MM3 (ref 0.7–3.1)
LYMPHOCYTES NFR BLD AUTO: 26.8 % (ref 19.6–45.3)
MCH RBC QN AUTO: 28.3 PG (ref 26.6–33)
MCHC RBC AUTO-ENTMCNC: 31.8 G/DL (ref 31.5–35.7)
MCV RBC AUTO: 89.2 FL (ref 79–97)
MONOCYTES # BLD AUTO: 0.47 10*3/MM3 (ref 0.1–0.9)
MONOCYTES NFR BLD AUTO: 8.1 % (ref 5–12)
NEUTROPHILS NFR BLD AUTO: 3.62 10*3/MM3 (ref 1.7–7)
NEUTROPHILS NFR BLD AUTO: 62.2 % (ref 42.7–76)
PLATELET # BLD AUTO: 242 10*3/MM3 (ref 140–450)
PMV BLD AUTO: 10.5 FL (ref 6–12)
POTASSIUM SERPL-SCNC: 3.9 MMOL/L (ref 3.5–5.2)
PROT SERPL-MCNC: 6.6 G/DL (ref 6–8.5)
RBC # BLD AUTO: 4.34 10*6/MM3 (ref 3.77–5.28)
SODIUM SERPL-SCNC: 143 MMOL/L (ref 136–145)
WBC NRBC COR # BLD AUTO: 5.82 10*3/MM3 (ref 3.4–10.8)

## 2024-01-29 PROCEDURE — 3077F SYST BP >= 140 MM HG: CPT | Performed by: NURSE PRACTITIONER

## 2024-01-29 PROCEDURE — 3078F DIAST BP <80 MM HG: CPT | Performed by: NURSE PRACTITIONER

## 2024-01-29 PROCEDURE — 85025 COMPLETE CBC W/AUTO DIFF WBC: CPT

## 2024-01-29 PROCEDURE — 80053 COMPREHEN METABOLIC PANEL: CPT

## 2024-01-29 PROCEDURE — 1160F RVW MEDS BY RX/DR IN RCRD: CPT | Performed by: NURSE PRACTITIONER

## 2024-01-29 PROCEDURE — 99214 OFFICE O/P EST MOD 30 MIN: CPT | Performed by: NURSE PRACTITIONER

## 2024-01-29 PROCEDURE — 1159F MED LIST DOCD IN RCRD: CPT | Performed by: NURSE PRACTITIONER

## 2024-01-29 PROCEDURE — 95115 IMMUNOTHERAPY ONE INJECTION: CPT | Performed by: FAMILY MEDICINE

## 2024-01-29 PROCEDURE — 36415 COLL VENOUS BLD VENIPUNCTURE: CPT

## 2024-01-29 RX ORDER — ONDANSETRON 4 MG/1
4 TABLET, ORALLY DISINTEGRATING ORAL EVERY 6 HOURS PRN
Qty: 30 TABLET | Refills: 3 | Status: SHIPPED | OUTPATIENT
Start: 2024-01-29

## 2024-01-29 RX ORDER — FAMOTIDINE 40 MG/1
40 TABLET, FILM COATED ORAL 2 TIMES DAILY
Qty: 180 TABLET | Refills: 3 | Status: SHIPPED | OUTPATIENT
Start: 2024-01-29

## 2024-01-29 RX ORDER — PREDNISONE 20 MG/1
40 TABLET ORAL DAILY
Qty: 14 TABLET | Refills: 0 | Status: SHIPPED | OUTPATIENT
Start: 2024-01-29

## 2024-01-29 NOTE — PROGRESS NOTES
Chief Complaint   Heartburn, Constipation, and Nausea    History of Present Illness       Luma Cruz is a 83 y.o. female who presents to [facility] for follow-up for constipation. She was last seen in the office by me on 10/23/23.     She underwent EGD and colonoscopy with Dr. Lindsey on 4/3/2023.  EGD showed a large hiatal hernia.  Otherwise normal exam.  Colonoscopy showed diverticulosis.  Otherwise normal.  No biopsies taken.  No recall colonoscopy due to age.     After the scopes she went to Dr. Reeder and he recommended surgery. At her age she didn't want to proceed with that and went to [facility] for a third opinion. She underwent EGD with manometry with Dr. Velez on 8/18/23. It was normal. Couldn't do BRAVO due to esophageal size. Esophageal manometry normal.      GERD/hiatal hernia--- Protonix 40 mg and Pepcid 20 mg. She admits she is nauseous and as soon as she starts eating or drinking she will start burping. BOOST makes it worse. She admits the nausea always comes after she eats or drinks. + globus sensation. She denies any dysphagia.      She admits her bowels are slow. She tells me she passes little balls most of the time. She admits she has been constipated all her life. She denies any rectal bleeding or melena.      GI family history---Denies any      She admits she is feeling worse today. Went to her lung specialist and had CT done. It showed bilateral bronchiectasis. She is on ABX. Admits her constipation has been worse too. She has been taking more miralax. She admits she had small BM today but it was small balls. Plans on taking more miralax later today. Admits reflux is better on the pepcid BID. Would like refill.      Results       Result Review :       CMP          2/10/2023    08:15 10/2/2023    09:46   CMP   Glucose 90  88    BUN 29  22    Creatinine 1.09  0.98    EGFR 51.5  58.5    Sodium 140  141    Potassium 4.0  4.4    Chloride 103  103    Calcium 9.7   9.8    Total Protein 7.2  7.0    Albumin 4.6  4.3    Globulin 2.6  2.7    Total Bilirubin 0.4  0.4    Alkaline Phosphatase 57  60    AST (SGOT) 24  23    ALT (SGPT) 13  15    Albumin/Globulin Ratio 1.8  1.6    BUN/Creatinine Ratio 26.6  22.4    Anion Gap 9.0  10.5      CBC          2/10/2023    08:15 10/2/2023    09:46   CBC   WBC 4.64  7.45    RBC 4.35  4.45    Hemoglobin 12.5  12.8    Hematocrit 39.3  40.3    MCV 90.3  90.6    MCH 28.7  28.8    MCHC 31.8  31.8    RDW 12.8  12.9    Platelets 235  247      Lipid Panel          10/2/2023    09:46   Lipid Panel   Total Cholesterol 186    Triglycerides 89    HDL Cholesterol 88    VLDL Cholesterol 16    LDL Cholesterol  82    LDL/HDL Ratio 0.91      TSH          10/2/2023    09:46   TSH   TSH 2.050        Lipase   Lipase   Date Value Ref Range Status   04/30/2023 219 73 - 393 U/L Final     Amylase   Amylase   Date Value Ref Range Status   02/10/2023 88 28 - 100 U/L Final               Past Medical History       Past Medical History:   Diagnosis Date    COVID-19 07/01/2022    Essential hypertension     Generalized anxiety disorder     GERD without esophagitis     Hernia April, 2023    hiatal hernia    Menopausal and postmenopausal disorder     Mixed hyperlipidemia        Past Surgical History:   Procedure Laterality Date    APPENDECTOMY  10 years ago    BREAST BIOPSY  right side, 15 years ago    CHOLECYSTECTOMY  unknown    COLONOSCOPY  06/2011    COLONOSCOPY N/A 04/03/2023    Procedure: COLONOSCOPY;  Surgeon: Cory Lindsey MD;  Location: [facility];  Service: General;  Laterality: N/A;  normal    ENDOSCOPY N/A 04/03/2023    Hiatal hernia, mild reactive gastropathy    HYSTERECTOMY      complete    TONSILLECTOMY AND ADENOIDECTOMY           Current Outpatient Medications:     albuterol (PROVENTIL) (2.5 MG/3ML) 0.083% nebulizer solution, Take 2.5 mg by nebulization Every 4 (Four) Hours As Needed for Wheezing or Shortness of Air for up to 30 days., Disp: 180 each,  Rfl: 11    albuterol sulfate  (90 Base) MCG/ACT inhaler, Inhale 2 puffs Every 4 (Four) Hours As Needed for Wheezing., Disp: , Rfl:     amLODIPine (NORVASC) 2.5 MG tablet, Take 1 tablet by mouth Daily., Disp: 90 tablet, Rfl: 3    arformoterol (BROVANA) 15 MCG/2ML nebulizer solution, Take 2 mL by nebulization 2 (Two) Times a Day for 30 days. [employer], Disp: 120 mL, Rfl: 11    azelastine (ASTELIN) 0.1 % nasal spray, 1 spray into the nostril(s) as directed by provider 2 (Two) Times a Day., Disp: , Rfl:     budesonide (PULMICORT) 0.5 MG/2ML nebulizer solution, Take 2 mL by nebulization 2 (Two) Times a Day for 30 days. Rinse mouth out after each use, Disp: 60 each, Rfl: 11    clonazePAM (KlonoPIN) 0.5 MG tablet, Take 1 tablet by mouth 2 (Two) Times a Day As Needed for Anxiety., Disp: 60 tablet, Rfl: 2    doxycycline (VIBRAMYCIN) 100 MG capsule, Take 1 capsule by mouth 2 (Two) Times a Day., Disp: 20 capsule, Rfl: 0    estradiol (ESTRACE) 1 MG tablet, Take 1 tablet by mouth Daily., Disp: 90 tablet, Rfl: 3    famotidine (Pepcid) 40 MG tablet, Take 1 tablet by mouth 2 (Two) Times a Day., Disp: 180 tablet, Rfl: 3    fluticasone (FLONASE) 50 MCG/ACT nasal spray, 2 sprays into the nostril(s) as directed by provider Daily., Disp: , Rfl:     levocetirizine (XYZAL) 5 MG tablet, Take 1 tablet by mouth Every Evening., Disp: 90 tablet, Rfl: 3    losartan (COZAAR) 50 MG tablet, Take 1 tablet by mouth Daily., Disp: 90 tablet, Rfl: 3    multivitamin with minerals tablet tablet, Take 1 tablet by mouth Daily., Disp: , Rfl:     naloxone (Narcan) 4 MG/0.1ML nasal spray, one spray in one nostril as needed for over sedation or respiratory depression from opioid use. Repeat one spray in alternate nostril every 2-3 minutes until responsive or EMS arrives, Disp: , Rfl:     ondansetron ODT (ZOFRAN-ODT) 4 MG disintegrating tablet, Place 1 tablet on the tongue Every 6 (Six) Hours As Needed for Nausea or Vomiting., Disp: 30 tablet, Rfl: 3     "pantoprazole (PROTONIX) 40 MG EC tablet, Take 1 tablet by mouth Daily., Disp: 90 tablet, Rfl: 3    pravastatin (PRAVACHOL) 20 MG tablet, Take 1 tablet by mouth Daily., Disp: 90 tablet, Rfl: 3    sodium chloride 3 % nebulizer solution, Take 4 mL by nebulization 2 (Two) Times a Day., Disp: , Rfl:     HYDROcodone-acetaminophen (NORCO) 5-325 MG per tablet, , Disp: , Rfl:      Allergies   Allergen Reactions    Fosamax [Alendronate] GI Intolerance    Penicillins Rash       Family History   Problem Relation Age of Onset    Stroke Mother     Hypertension Mother     Arthritis Mother     Hearing loss Mother     Coronary artery disease Father         Social History     Social History Narrative    Not on file       Objective       Review of Systems   Constitutional:  Negative for appetite change, fatigue, fever, unexpected weight gain and unexpected weight loss.   HENT:  Negative for trouble swallowing.    Respiratory:  Positive for cough and chest tightness. Negative for choking, shortness of breath, wheezing and stridor.    Cardiovascular:  Negative for chest pain, palpitations and leg swelling.   Gastrointestinal:  Positive for constipation and nausea. Negative for abdominal distention, abdominal pain, anal bleeding, blood in stool, diarrhea, rectal pain, vomiting, GERD and indigestion.        Vital Signs:   /59 (BP Location: Left arm, Patient Position: Sitting, Cuff Size: Adult)   Pulse 80   Ht 152.4 cm (60\")   Wt 52.7 kg (116 lb 3.2 oz)   BMI 22.69 kg/m²       Physical Exam  Constitutional:       General: She is not in acute distress.     Appearance: She is well-developed. She is ill-appearing.   HENT:      Head: Normocephalic.   Eyes:      Pupils: Pupils are equal, round, and reactive to light.   Cardiovascular:      Rate and Rhythm: Normal rate and regular rhythm.      Heart sounds: Normal heart sounds.   Pulmonary:      Effort: Pulmonary effort is normal.   Abdominal:      General: Bowel sounds are normal. " There is no distension.      Palpations: Abdomen is soft. There is no mass.      Tenderness: There is no abdominal tenderness. There is no guarding or rebound.      Hernia: No hernia is present.   Musculoskeletal:         General: Normal range of motion.   Skin:     General: Skin is warm and dry.   Neurological:      Mental Status: She is alert and oriented to person, place, and time.   Psychiatric:         Speech: Speech normal.         Behavior: Behavior normal.         Judgment: Judgment normal.           Assessment & Plan          Assessment and Plan    Diagnoses and all orders for this visit:    1. GERD without esophagitis (Primary)  -     famotidine (Pepcid) 40 MG tablet; Take 1 tablet by mouth 2 (Two) Times a Day.  Dispense: 180 tablet; Refill: 3    2. Nausea  -     ondansetron ODT (ZOFRAN-ODT) 4 MG disintegrating tablet; Place 1 tablet on the tongue Every 6 (Six) Hours As Needed for Nausea or Vomiting.  Dispense: 30 tablet; Refill: 3    3. Hiatal hernia    4. Chronic idiopathic constipation    Reviewed medical history with her today.  GERD seems much better since increasing the Pepcid to 40 mg twice daily.  Continue Protonix every morning.  Still having some nausea but this could be secondary to the antibiotic she is on currently for her lung condition.  I did recommend the patient follow-up with pulmonary today since she is feeling worse since starting antibiotics.  Still struggling with constipation.  Especially now that she is on antibiotics.  Okay to use MiraLAX every day to keep her bowels moving better better.  Continue high-fiber diet.  Patient to increase water intake as well.  Patient to call the office in 1 to 2 weeks with an update.  Patient to follow-up with me in 3 months.  Patient is agreeable to the plan.            Follow Up       Follow Up   Return in about 3 months (around 4/29/2024) for CONSTIPATION, GERD.  Patient was given instructions and counseling regarding her condition or for health  maintenance advice. Please see specific information pulled into the AVS if appropriate.

## 2024-01-29 NOTE — TELEPHONE ENCOUNTER
Patient recently started on doxycycline for pneumonia and chest CT scan.  Patient called office on 1/29/2024 stating that she is still short of breath, coughing, and wheezing.  Patient was offered same-day appointment to be seen, however per front office staff patient declined.  Prednisone burst sent to the pharmacy.  Will order CBC, CMP, AFB culture, respiratory culture for further evaluation.  Orders placed today.  Patient is advised to have these test completed.  Patient was also advised to go to the ER with any new or worsening symptoms.  Patient was also advised to finish doxycycline as prescribed.  Will wait for respiratory culture and AFB results to return before switching antibiotic therapy.  Patient was also advised to continue sodium chloride nebulizer treatments with flutter valve to assist with airway clearance.

## 2024-01-29 NOTE — TELEPHONE ENCOUNTER
Patient calling stating she has been taking Doxycycline for 6 days. She stated it has not been helping she still feels very congested. I spoke with Michelle and she sent in blood work and prednisone to see if that would help.We offered a same day appointment, patient wanted to wait to see if prednisone helped so we scheduled an 02/07/24 to allow her to take prednisone for a couple days. I advised patient that if symptoms began to get worst to go to the ER.

## 2024-01-30 NOTE — H&P (VIEW-ONLY)
Primary Care Provider  Joseph Walker MD     Referring Provider  No ref. provider found     Chief Complaint  Follow-up    Subjective          History of Presenting Illness  Patient is a 83-year-old  female, patient Dr. Forbes's who presents for management of bronchiectasis who presents for follow-up visit today.  Patient states that she does get short of breath that is worse with exertion, moderate in severity, and improved with rest.  Since last office visit patient had chest CT scan completed on 1/20/2024 report states stable appearance of mild bilateral lower lobe bronchiectasis.  New mild subpleural bilateral lower lobe ground-glass opacities, which may be due to atelectasis.  Patient was experiencing symptoms and doxycycline was sent to the pharmacy.  Patient then called the office on 1/29/2024 as she was still not feeling better after being on antibiotics for 6 days and prednisone was sent to the pharmacy.  A CBC, CMP, respiratory culture, and AFB culture were ordered at that time.  CBC came back unremarkable.  CMP did not show any hypercapnia.  Patient did not have respiratory culture AFB culture completed.  Patient states that she is still not feeling better.  Patient states that she does get short of breath that is worse with exertion, moderate severity, and improved with rest.  Patient states that she also has intermittent coughing and wheezing.  Patient states that she does have a flutter valve with sodium chloride nebulizer treatments used to assist with airway clearance.  Patient states that she is taking Xyzal, Flonase nasal spray, and Astelin nasal spray for seasonal allergies.  Patient is on Protonix and Pepcid for reflux.  Patient is a never smoker.  Patient denies any recent travel.  Patient denies any known exposure to any ill contacts.  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  Patient also has a history of fungal pneumonia back in 2020 and only took Tolsura for 2 months due to side effects.  Patient denies fever, chills, night sweats, swollen glands in the head and neck, unintentional weight loss, hemoptysis, dysphagia, chest pain, palpitations, chest tightness, abdominal pain, nausea, vomiting, and diarrhea.  Patient also denies any myalgias, changes in sense of taste and/or smell, sore throat, any other coronavirus or flu-like symptoms.  Patient denies any leg swelling, orthopnea, paroxysmal nocturnal dyspnea.  Patient is able to perform activities of daily living.        Review of Systems   Constitutional:  Positive for fatigue. Negative for activity change, appetite change, chills, diaphoresis, fever, unexpected weight gain and unexpected weight loss.        Negative for Insomnia   HENT:  Negative for congestion (Nasal), mouth sores, nosebleeds, postnasal drip, sore throat, swollen glands and trouble swallowing.         Negative for Thrush  Negative for Hoarseness  Negative for Allergies/Hay Fever  Negative for Recent Head injury  Negative for Ear Fullness  Negative for Nasal or Sinus pain  Negative for Dry lips  Negative for Nasal discharge   Respiratory:  Positive for cough, shortness of breath and wheezing. Negative for apnea and chest tightness.         Negative for Hemoptysis  Negative for Pleuritic pain   Cardiovascular:  Negative for chest pain, palpitations and leg swelling.        Negative for Claudication  Negative for Cyanosis  Negative for Dyspnea on exertion   Gastrointestinal:  Negative for abdominal pain, diarrhea, nausea, vomiting and GERD.   Musculoskeletal:  Negative for joint swelling and myalgias.        Negative for Joint pain  Negative for Joint stiffness   Skin:  Negative for color change, dry skin, pallor and rash.   Neurological:  Negative for syncope, weakness and headache.   Hematological:  Negative for  adenopathy. Does not bruise/bleed easily.        Family History   Problem Relation Age of Onset    Stroke Mother     Hypertension Mother     Arthritis Mother     Hearing loss Mother     Coronary artery disease Father         Social History     Socioeconomic History    Marital status:     Number of children: 2   Tobacco Use    Smoking status: Never     Passive exposure: Never    Smokeless tobacco: Never   Vaping Use    Vaping Use: Never used   Substance and Sexual Activity    Alcohol use: Never    Drug use: Never    Sexual activity: Yes     Partners: Male     Birth control/protection: Hysterectomy        Past Medical History:   Diagnosis Date    COVID-19 07/01/2022    Essential hypertension     Generalized anxiety disorder     GERD without esophagitis     Hernia April, 2023    hiatal hernia    Menopausal and postmenopausal disorder     Mixed hyperlipidemia         Immunization History   Administered Date(s) Administered    Arexvy (RSV, Adults 60+ yrs) 11/10/2023    COVID-19 (MODERNA) 1st,2nd,3rd Dose Monovalent 01/27/2021, 02/24/2021, 10/30/2021, 04/13/2022    COVID-19 (MODERNA) BIVALENT 12+YRS 10/19/2022    COVID-19 F23 (PFIZER) 12YRS+ (COMIRNATY) 10/19/2023    Fluad Quad 65+ 09/29/2022    Fluzone High Dose =>65 Years ([facility] ONLY) 10/16/2012, 09/25/2013, 09/28/2017, 09/24/2020    Fluzone High-Dose 65+yrs 10/02/2021, 10/17/2023    Hepatitis A 12/06/2018       Allergies   Allergen Reactions    Fosamax [Alendronate] GI Intolerance    Penicillins Rash          Current Outpatient Medications:     albuterol (PROVENTIL) (2.5 MG/3ML) 0.083% nebulizer solution, Take 2.5 mg by nebulization Every 4 (Four) Hours As Needed for Wheezing or Shortness of Air for up to 30 days., Disp: 180 each, Rfl: 11    albuterol sulfate  (90 Base) MCG/ACT inhaler, Inhale 2 puffs Every 4 (Four) Hours As Needed for Wheezing., Disp: , Rfl:     amLODIPine (NORVASC) 2.5 MG tablet, Take 1 tablet by mouth Daily., Disp: 90 tablet, Rfl:  3    arformoterol (BROVANA) 15 MCG/2ML nebulizer solution, Take 2 mL by nebulization 2 (Two) Times a Day for 30 days. [facility], Disp: 120 mL, Rfl: 11    azelastine (ASTELIN) 0.1 % nasal spray, 1 spray into the nostril(s) as directed by provider 2 (Two) Times a Day., Disp: , Rfl:     budesonide (PULMICORT) 0.5 MG/2ML nebulizer solution, Take 2 mL by nebulization 2 (Two) Times a Day for 30 days. Rinse mouth out after each use, Disp: 60 each, Rfl: 11    clonazePAM (KlonoPIN) 0.5 MG tablet, Take 1 tablet by mouth 2 (Two) Times a Day As Needed for Anxiety., Disp: 60 tablet, Rfl: 2    estradiol (ESTRACE) 1 MG tablet, Take 1 tablet by mouth Daily., Disp: 90 tablet, Rfl: 3    fluticasone (FLONASE) 50 MCG/ACT nasal spray, 2 sprays into the nostril(s) as directed by provider Daily., Disp: , Rfl:     losartan (COZAAR) 50 MG tablet, Take 1 tablet by mouth Daily., Disp: 90 tablet, Rfl: 3    multivitamin with minerals tablet tablet, Take 1 tablet by mouth Daily., Disp: , Rfl:     naloxone (Narcan) 4 MG/0.1ML nasal spray, one spray in one nostril as needed for over sedation or respiratory depression from opioid use. Repeat one spray in alternate nostril every 2-3 minutes until responsive or EMS arrives, Disp: , Rfl:     ondansetron ODT (ZOFRAN-ODT) 4 MG disintegrating tablet, Place 1 tablet on the tongue Every 6 (Six) Hours As Needed for Nausea or Vomiting., Disp: 30 tablet, Rfl: 3    pantoprazole (PROTONIX) 40 MG EC tablet, Take 1 tablet by mouth Daily., Disp: 90 tablet, Rfl: 3    pravastatin (PRAVACHOL) 20 MG tablet, Take 1 tablet by mouth Daily., Disp: 90 tablet, Rfl: 3    sodium chloride 3 % nebulizer solution, Take 4 mL by nebulization 2 (Two) Times a Day., Disp: , Rfl:     doxycycline (VIBRAMYCIN) 100 MG capsule, Take 1 capsule by mouth 2 (Two) Times a Day. (Patient not taking: Reported on 2/6/2024), Disp: 20 capsule, Rfl: 0    famotidine (Pepcid) 40 MG tablet, Take 1 tablet by mouth 2 (Two) Times a Day., Disp: 180 tablet,  "Rfl: 3    HYDROcodone-acetaminophen (NORCO) 5-325 MG per tablet, , Disp: , Rfl:     levocetirizine (XYZAL) 5 MG tablet, Take 1 tablet by mouth Every Evening., Disp: 90 tablet, Rfl: 3    predniSONE (DELTASONE) 20 MG tablet, Take 2 tablets by mouth Daily. (Patient not taking: Reported on 2/6/2024), Disp: 14 tablet, Rfl: 0     Objective     Physical Exam  Vital Signs:   WDWN, Alert, NAD.    HEENT:  PERRL, EOMI.  OP, nares clear, no sinus tenderness  Neck:  Supple, no JVD, no thyromegaly.  Lymph: no axillary, cervical, supraclavicular lymphadenopathy noted bilaterally  Chest: Mildly decreased breath sounds throughout. No wheezes, rales, or rhonchi appreciated.  Normal work of breathing noted.  Patient is able speak full sentences without difficulty.  CV: RRR, no MGR, pulses 2+, equal.  Abd:  Soft, NT, ND, + BS, no HSM  EXT:  no clubbing, no cyanosis, no edema, no joint tenderness  Neuro:  A&Ox3, CN grossly intact, no focal deficits.  Skin: No rashes or lesions noted.    /63 (BP Location: Left arm, Patient Position: Sitting, Cuff Size: Small Adult)   Pulse 70   Resp 16   Ht 152.4 cm (60\")   Wt 53 kg (116 lb 14.4 oz)   SpO2 99% Comment: room air  BMI 22.83 kg/m²         Result Review :   I have reviewed my last office visit note. I also reviewed chest CT report dated from 1/24/2024. See scanned report.     Procedures:    CMP          10/2/2023    09:46 1/29/2024    14:11   CMP   Glucose 88  114    BUN 22  30    Creatinine 0.98  1.18    EGFR 58.5  46.5    Sodium 141  143    Potassium 4.4  3.9    Chloride 103  105    Calcium 9.8  9.8    Total Protein 7.0  6.6    Albumin 4.3  4.5    Globulin 2.7  2.1    Total Bilirubin 0.4  0.3    Alkaline Phosphatase 60  73    AST (SGOT) 23  27    ALT (SGPT) 15  13    Albumin/Globulin Ratio 1.6  2.1    BUN/Creatinine Ratio 22.4  25.4    Anion Gap 10.5  11.0      CBC          10/2/2023    09:46 1/29/2024    14:11   CBC   WBC 7.45  5.82    RBC 4.45  4.34    Hemoglobin 12.8  12.3  "   Hematocrit 40.3  38.7    MCV 90.6  89.2    MCH 28.8  28.3    MCHC 31.8  31.8    RDW 12.9  12.2    Platelets 247  242      CBC w/diff          2/10/2023    08:15 10/2/2023    09:46 1/29/2024    14:11   CBC w/Diff   WBC 4.64  7.45  5.82    RBC 4.35  4.45  4.34    Hemoglobin 12.5  12.8  12.3    Hematocrit 39.3  40.3  38.7    MCV 90.3  90.6  89.2    MCH 28.7  28.8  28.3    MCHC 31.8  31.8  31.8    RDW 12.8  12.9  12.2    Platelets 235  247  242    Neutrophil Rel % 47.3   62.2    Immature Granulocyte Rel % 0.0   0.0    Lymphocyte Rel % 36.6   26.8    Monocyte Rel % 11.0   8.1    Eosinophil Rel % 4.5   2.2    Basophil Rel % 0.6   0.7      CT Chest Without Contrast Diagnostic    Result Date: 1/24/2024    Stable appearance of mild bilateral lower lobe bronchiectasis.  New mild subpleural bilateral lower lobe ground-glass opacities, which may be due to atelectasis.     ALEXIS CARTER MD       Electronically Signed and Approved By: ALEXIS CARTER MD on 1/24/2024 at 5:30                     Assessment and Plan      Assessment:  1.  Bronchiectasis on chest CT scan dated from 5/11/2021.  Patient does have a flutter valve sodium chloride nebulizer treatments.  Patient declines a chest vest at this time.  2.  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   3.  History of fungal pneumonia, treated for two months of Tolsura with antifungal associated issues.      4.  GERD.  Patient is on a PPI.        5.  Mucus plugging, patient has a flutter valve and sodium chloride nebulizer treatments.     6.  Airway clearance impairment.  7.  Seasonal allergies on allergy immunotherapy.      8.  Dyspnea.  9.  Cough.  10.  Wheezing.  11.  Abnormal chest CT scan: New mild subpleural bilateral lobe groundglass opacities.  12.  Never smoker.          Plan:  1.  Chest CT scan showing new mild subpleural bilateral lobe groundglass  opacities.  Patient treated with antibiotics and steroids recently, however is not feeling better.  Patient with a history of bronchiectasis, Mycobacterium infection, and fungal infection in the past.  Will send patient for bronchoscopy with bronchoalveolar lavage, brushings, and  biopsy.  I have discussed the risks of the procedure with the patient including pneumothorax, hemothorax, bleeding, hypoxia, required mechanical ventilation and death. The patient recognizes these findings, acknowledges these findings and is agreeable to the procedure.  2.  Continue Brovana and Pulmicort everyday as prescribed and rinse her mouth out after each use.  Did offer to start patient on Spiriva, however patient declines at this time.  3. Continue albuterol inhaler and albuterol nebulizers as needed.    4. Continue Astelin, Flonase, and Xyzal as prescribed.  Continue allergy immunotherapy and to follow up with allergist as scheduled.      5. Continue flutter valve with nebulizer treatments to assist with airway clearance.  Did offer to start patient on a chest vest, however patient declines at this time.  6.  For GERD,  patient to continue PPI.   Patient is also advised to sleep with the head of the bed elevated and do not eat 3-4 hours prior to bedtime.  7.  Will repeat chest CT scan again in April 2024.  Order has been placed prior to office visit today.  8.  Vaccination status: patient reports they are up-to-date with flu, pneumonia, and Covid vaccines.  Patient is advised to continue to follow CDC recommendations such as social distancing wearing a mask and washing hands for at least 20 seconds.  9.  Smoking status: never smoker.  10.  Patient to call the office, 911, or go to the ER with new or worsening symptoms.  11.  Follow-up after bronchoscopy completed, sooner if needed.        I spent 40 minutes caring for Luma on this date of service. This time includes time spent by me in the following activities:preparing for the  visit, reviewing tests, obtaining and/or reviewing a separately obtained history, performing a medically appropriate examination and/or evaluation , counseling and educating the patient/family/caregiver, ordering medications, tests, or procedures, referring and communicating with other health care professionals , documenting information in the medical record, independently interpreting results and communicating that information with the patient/family/caregiver, and care coordination    Follow Up   Return for follow up after bronchoscopy.  Patient was given instructions and counseling regarding her condition or for health maintenance advice. Please see specific information pulled into the AVS if appropriate.

## 2024-02-05 ENCOUNTER — CLINICAL SUPPORT (OUTPATIENT)
Dept: FAMILY MEDICINE CLINIC | Age: 84
End: 2024-02-05
Payer: MEDICARE

## 2024-02-05 DIAGNOSIS — J30.9 ALLERGIC RHINITIS, UNSPECIFIED SEASONALITY, UNSPECIFIED TRIGGER: Primary | ICD-10-CM

## 2024-02-05 PROCEDURE — 95115 IMMUNOTHERAPY ONE INJECTION: CPT | Performed by: FAMILY MEDICINE

## 2024-02-06 ENCOUNTER — TELEPHONE (OUTPATIENT)
Dept: PULMONOLOGY | Facility: CLINIC | Age: 84
End: 2024-02-06

## 2024-02-06 ENCOUNTER — OFFICE VISIT (OUTPATIENT)
Dept: PULMONOLOGY | Facility: CLINIC | Age: 84
End: 2024-02-06
Payer: MEDICARE

## 2024-02-06 VITALS
OXYGEN SATURATION: 99 % | DIASTOLIC BLOOD PRESSURE: 63 MMHG | SYSTOLIC BLOOD PRESSURE: 154 MMHG | WEIGHT: 116.9 LBS | HEART RATE: 70 BPM | HEIGHT: 60 IN | RESPIRATION RATE: 16 BRPM | BODY MASS INDEX: 22.95 KG/M2

## 2024-02-06 DIAGNOSIS — J30.2 SEASONAL ALLERGIES: ICD-10-CM

## 2024-02-06 DIAGNOSIS — R06.2 WHEEZING: ICD-10-CM

## 2024-02-06 DIAGNOSIS — J47.9 BRONCHIECTASIS WITHOUT COMPLICATION: ICD-10-CM

## 2024-02-06 DIAGNOSIS — Z86.19 HISTORY OF FUNGAL INFECTION: ICD-10-CM

## 2024-02-06 DIAGNOSIS — T17.500A MUCUS PLUGGING OF BRONCHI: Primary | ICD-10-CM

## 2024-02-06 DIAGNOSIS — R06.89 AIRWAY CLEARANCE IMPAIRMENT: ICD-10-CM

## 2024-02-06 DIAGNOSIS — K21.9 GERD WITHOUT ESOPHAGITIS: ICD-10-CM

## 2024-02-06 DIAGNOSIS — R06.00 DYSPNEA, UNSPECIFIED TYPE: ICD-10-CM

## 2024-02-06 DIAGNOSIS — R93.89 ABNORMAL CT SCAN, CHEST: ICD-10-CM

## 2024-02-06 DIAGNOSIS — R05.9 COUGH, UNSPECIFIED TYPE: ICD-10-CM

## 2024-02-06 NOTE — TELEPHONE ENCOUNTER
Spoke with Cherise lock to get patient scheduled for 2/20 arrive at 10am. Patient received instruction sheet at check out.

## 2024-02-07 ENCOUNTER — OFFICE VISIT (OUTPATIENT)
Dept: FAMILY MEDICINE CLINIC | Age: 84
End: 2024-02-07
Payer: MEDICARE

## 2024-02-07 VITALS
WEIGHT: 117 LBS | BODY MASS INDEX: 22.97 KG/M2 | HEIGHT: 60 IN | HEART RATE: 79 BPM | TEMPERATURE: 98.1 F | DIASTOLIC BLOOD PRESSURE: 71 MMHG | SYSTOLIC BLOOD PRESSURE: 123 MMHG

## 2024-02-07 DIAGNOSIS — N90.89 VULVAR LESION: Primary | ICD-10-CM

## 2024-02-07 PROCEDURE — 99213 OFFICE O/P EST LOW 20 MIN: CPT | Performed by: NURSE PRACTITIONER

## 2024-02-07 PROCEDURE — 3074F SYST BP LT 130 MM HG: CPT | Performed by: NURSE PRACTITIONER

## 2024-02-07 PROCEDURE — 3078F DIAST BP <80 MM HG: CPT | Performed by: NURSE PRACTITIONER

## 2024-02-07 RX ORDER — CLONAZEPAM 0.5 MG/1
0.5 TABLET ORAL 2 TIMES DAILY PRN
COMMUNITY

## 2024-02-07 NOTE — PROGRESS NOTES
Luma Cruz presents to [facility].    Chief Complaint:  Cyst (Knot in vaginal area on the outside x 6 months )         History of Present Illness:  Swelling in vulva area   Symptoms started: 6 months ago   Area in her vulva burns when she goes to bathroom and wipes   Sexually active but it does not bother her then     Past Medical History changes :         HLD  HTN  GERD    Allergies  Bronchiectasis /pulm procedure upcoming in 2 weeks       anxiety              PREVENTIVE HEALTH MAINTENANCE               COLORECTAL CANCER SCREENING: Up to date (colonoscopy q10y; sigmoidoscopy q5y; Cologuard q3y) was last done 4-3-2023, diverticulosis & 06/2011, Results are in chart; colonoscopy with normal results           Surgical History:         Biopsy of breast; benign    Hysterectomy: Complete;     Tonsillectomy/Adenoidectomy Procedures: colonoscopy 2002, NEG         Family History:         Positive for Cerebrovascular Accident ( mother ), Coronary Artery Disease ( father ) and Hypertension ( mother ).          Social History:       Occupation: retired was Employed at [employer]     Marital Status:      Children: 2 children               Review of Systems:  Review of Systems   Constitutional:  Negative for fatigue and fever.   Respiratory:  Negative for cough and shortness of breath.    Cardiovascular:  Negative for chest pain, palpitations and leg swelling.   Genitourinary:  Negative for difficulty urinating and vaginal bleeding.          Current Outpatient Medications:     albuterol (PROVENTIL) (2.5 MG/3ML) 0.083% nebulizer solution, Take 2.5 mg by nebulization Every 4 (Four) Hours As Needed for Wheezing or Shortness of Air for up to 30 days., Disp: 180 each, Rfl: 11    albuterol sulfate  (90 Base) MCG/ACT inhaler, Inhale 2 puffs Every 4 (Four) Hours As Needed for Wheezing., Disp: , Rfl:     amLODIPine (NORVASC) 2.5 MG tablet, Take 1 tablet by mouth Daily., Disp: 90  tablet, Rfl: 3    arformoterol (BROVANA) 15 MCG/2ML nebulizer solution, Take 2 mL by nebulization 2 (Two) Times a Day for 30 days. [facility], Disp: 120 mL, Rfl: 11    azelastine (ASTELIN) 0.1 % nasal spray, 1 spray into the nostril(s) as directed by provider 2 (Two) Times a Day., Disp: , Rfl:     budesonide (PULMICORT) 0.5 MG/2ML nebulizer solution, Take 2 mL by nebulization 2 (Two) Times a Day for 30 days. Rinse mouth out after each use, Disp: 60 each, Rfl: 11    clonazePAM (KlonoPIN) 0.5 MG tablet, Take 1 tablet by mouth 2 (Two) Times a Day As Needed for Anxiety., Disp: , Rfl:     estradiol (ESTRACE) 1 MG tablet, Take 1 tablet by mouth Daily., Disp: 90 tablet, Rfl: 3    famotidine (Pepcid) 40 MG tablet, Take 1 tablet by mouth 2 (Two) Times a Day., Disp: 180 tablet, Rfl: 3    fluticasone (FLONASE) 50 MCG/ACT nasal spray, 2 sprays into the nostril(s) as directed by provider Daily., Disp: , Rfl:     levocetirizine (XYZAL) 5 MG tablet, Take 1 tablet by mouth Every Evening., Disp: 90 tablet, Rfl: 3    losartan (COZAAR) 50 MG tablet, Take 1 tablet by mouth Daily., Disp: 90 tablet, Rfl: 3    multivitamin with minerals tablet tablet, Take 1 tablet by mouth Daily., Disp: , Rfl:     naloxone (Narcan) 4 MG/0.1ML nasal spray, one spray in one nostril as needed for over sedation or respiratory depression from opioid use. Repeat one spray in alternate nostril every 2-3 minutes until responsive or EMS arrives, Disp: , Rfl:     ondansetron ODT (ZOFRAN-ODT) 4 MG disintegrating tablet, Place 1 tablet on the tongue Every 6 (Six) Hours As Needed for Nausea or Vomiting., Disp: 30 tablet, Rfl: 3    pantoprazole (PROTONIX) 40 MG EC tablet, Take 1 tablet by mouth Daily., Disp: 90 tablet, Rfl: 3    pravastatin (PRAVACHOL) 20 MG tablet, Take 1 tablet by mouth Daily., Disp: 90 tablet, Rfl: 3    sodium chloride 3 % nebulizer solution, Take 4 mL by nebulization 2 (Two) Times a Day., Disp: , Rfl:     Vital Signs:   Vitals:    02/07/24 1334   BP:  "123/71   BP Location: Left arm   Patient Position: Sitting   Cuff Size: Adult   Pulse: 79   Temp: 98.1 °F (36.7 °C)   TempSrc: Oral   Weight: 53.1 kg (117 lb)   Height: 152.4 cm (60\")              Physical Exam:  Physical Exam  Vitals reviewed.   Constitutional:       General: She is not in acute distress.     Appearance: Normal appearance.   Cardiovascular:      Rate and Rhythm: Normal rate and regular rhythm.      Heart sounds: Normal heart sounds. No murmur heard.  Pulmonary:      Effort: Pulmonary effort is normal. No respiratory distress.      Breath sounds: Normal breath sounds.   Genitourinary:     Labia:         Right: Lesion (approx 4 mm white raised lesion, round smooth, no exudate) present.        Neurological:      Mental Status: She is alert.   Psychiatric:         Mood and Affect: Mood normal.         Behavior: Behavior normal.         Result Review      The following data was reviewed by: TOSHIA Larry on 02/07/2024:    Results for orders placed or performed in visit on 01/29/24   Comprehensive Metabolic Panel    Specimen: Blood   Result Value Ref Range    Glucose 114 (H) 65 - 99 mg/dL    BUN 30 (H) 8 - 23 mg/dL    Creatinine 1.18 (H) 0.57 - 1.00 mg/dL    Sodium 143 136 - 145 mmol/L    Potassium 3.9 3.5 - 5.2 mmol/L    Chloride 105 98 - 107 mmol/L    CO2 27.0 22.0 - 29.0 mmol/L    Calcium 9.8 8.6 - 10.5 mg/dL    Total Protein 6.6 6.0 - 8.5 g/dL    Albumin 4.5 3.5 - 5.2 g/dL    ALT (SGPT) 13 1 - 33 U/L    AST (SGOT) 27 1 - 32 U/L    Alkaline Phosphatase 73 39 - 117 U/L    Total Bilirubin 0.3 0.0 - 1.2 mg/dL    Globulin 2.1 gm/dL    A/G Ratio 2.1 g/dL    BUN/Creatinine Ratio 25.4 (H) 7.0 - 25.0    Anion Gap 11.0 5.0 - 15.0 mmol/L    eGFR 46.5 (L) >60.0 mL/min/1.73   CBC Auto Differential    Specimen: Blood   Result Value Ref Range    WBC 5.82 3.40 - 10.80 10*3/mm3    RBC 4.34 3.77 - 5.28 10*6/mm3    Hemoglobin 12.3 12.0 - 15.9 g/dL    Hematocrit 38.7 34.0 - 46.6 %    MCV 89.2 79.0 - 97.0 fL "    MCH 28.3 26.6 - 33.0 pg    MCHC 31.8 31.5 - 35.7 g/dL    RDW 12.2 (L) 12.3 - 15.4 %    RDW-SD 40.5 37.0 - 54.0 fl    MPV 10.5 6.0 - 12.0 fL    Platelets 242 140 - 450 10*3/mm3    Neutrophil % 62.2 42.7 - 76.0 %    Lymphocyte % 26.8 19.6 - 45.3 %    Monocyte % 8.1 5.0 - 12.0 %    Eosinophil % 2.2 0.3 - 6.2 %    Basophil % 0.7 0.0 - 1.5 %    Immature Grans % 0.0 0.0 - 0.5 %    Neutrophils, Absolute 3.62 1.70 - 7.00 10*3/mm3    Lymphocytes, Absolute 1.56 0.70 - 3.10 10*3/mm3    Monocytes, Absolute 0.47 0.10 - 0.90 10*3/mm3    Eosinophils, Absolute 0.13 0.00 - 0.40 10*3/mm3    Basophils, Absolute 0.04 0.00 - 0.20 10*3/mm3    Immature Grans, Absolute 0.00 0.00 - 0.05 10*3/mm3               Assessment and Plan:          Diagnoses and all orders for this visit:    1. Vulvar lesion (Primary)  Assessment & Plan:  Recommend she use warm epsom soaks, observe, would recommend a GYN evaluate for possible removal if persist or changes, after her upcoming pulm procedure in a few weeks, can call back for the referral           BMI is within normal parameters. No other follow-up for BMI required.         Follow Up   Return if symptoms worsen or fail to improve.  Patient was given instructions and counseling regarding her condition or for health maintenance advice. Please see specific information pulled into the AVS if appropriate.

## 2024-02-07 NOTE — ASSESSMENT & PLAN NOTE
Recommend she use warm epsom soaks, observe, would recommend a GYN evaluate for possible removal if persist or changes, after her upcoming pulm procedure in a few weeks, can call back for the referral

## 2024-02-08 ENCOUNTER — PATIENT ROUNDING (BHMG ONLY) (OUTPATIENT)
Dept: NEUROSURGERY | Facility: CLINIC | Age: 84
End: 2024-02-08
Payer: MEDICARE

## 2024-02-08 ENCOUNTER — OFFICE VISIT (OUTPATIENT)
Dept: NEUROSURGERY | Facility: CLINIC | Age: 84
End: 2024-02-08
Payer: MEDICARE

## 2024-02-08 VITALS
DIASTOLIC BLOOD PRESSURE: 62 MMHG | HEIGHT: 60 IN | HEART RATE: 72 BPM | WEIGHT: 117.2 LBS | SYSTOLIC BLOOD PRESSURE: 162 MMHG | BODY MASS INDEX: 23.01 KG/M2

## 2024-02-08 DIAGNOSIS — M47.814 THORACIC SPONDYLOSIS WITHOUT MYELOPATHY: Primary | ICD-10-CM

## 2024-02-08 DIAGNOSIS — M40.205 KYPHOSIS OF THORACOLUMBAR REGION, UNSPECIFIED KYPHOSIS TYPE: ICD-10-CM

## 2024-02-08 NOTE — PROGRESS NOTES
"Chief Complaint  Back Pain (Mid back pain)    Subjective          Luma Cruz who is a 83 y.o. year old female who presents to [facility] for evaluation of thoracic spine.    The patient complains of pain located in the thoracic  spine.  Patients states the pain has been present for several years.  The pain came on gradually.  The pain scale level is 5.  The pain does not radiate. .  The pain is constant and waxing/waning and described as aching.  The pain is worse at no particular time of day. Patient states prolonged standing, bending, and twisting makes the pain worse.  Patient states rest makes the pain better.    Associated Symptoms Include: Denies numbness and tingling  Conservative Interventions Include:  She takes Tylenol for her pain, typically 500 mg twice daily. She has been followed by pain management for neck and low back pain. She was prescribed Hydrocodone though does not take it. She has had physical therapy within the past year which did not provide much benefit. She has received thoracic trigger point injections which did not provide much benefit.     Was this the result of an injury or accident? : No    History of Previous Spinal Surgery?: No    Nicotine use: non-smoker    BMI: Body mass index is 22.89 kg/m².      Review of Systems   Musculoskeletal:  Positive for arthralgias, back pain, myalgias, neck pain and neck stiffness.   All other systems reviewed and are negative.       Objective   Vital Signs:   /62 (BP Location: Left arm, Patient Position: Sitting, Cuff Size: Small Adult)   Pulse 72   Ht 152.4 cm (60\")   Wt 53.2 kg (117 lb 3.2 oz)   BMI 22.89 kg/m²       Physical Exam  Vitals reviewed.   Constitutional:       Appearance: Normal appearance.   Musculoskeletal:      Thoracic back: Deformity (kyphosis) and tenderness present.      Lumbar back: No tenderness. Negative right straight leg raise test and negative left straight leg raise test. "      Right hip: No tenderness. Normal range of motion.      Left hip: No tenderness. Normal range of motion.   Neurological:      Mental Status: She is alert and oriented to person, place, and time.      Motor: Motor strength is normal.     Gait: Gait is intact.      Deep Tendon Reflexes:      Reflex Scores:       Patellar reflexes are 2+ on the right side and 2+ on the left side.       Achilles reflexes are 2+ on the right side and 2+ on the left side.       Neurologic Exam     Mental Status   Oriented to person, place, and time.   Level of consciousness: alert    Motor Exam   Muscle bulk: normal  Overall muscle tone: normal    Strength   Strength 5/5 throughout.     Sensory Exam   Light touch normal.     Gait, Coordination, and Reflexes     Gait  Gait: normal    Reflexes   Right patellar: 2+  Left patellar: 2+  Right achilles: 2+  Left achilles: 2+  Right ankle clonus: absent  Left ankle clonus: absent       Result Review :       Data reviewed : Radiologic studies MRI Thoracic Spine on 1/9/24. Advanced spondylosis. Chronic retrolisthesis at T12/L1 and L1/L2. No significant spinal canal or foraminal stenosis.            Assessment and Plan    Diagnoses and all orders for this visit:    1. Thoracic spondylosis without myelopathy (Primary)    2. Kyphosis of thoracolumbar region, unspecified kyphosis type    Pt presenting for evaluation of thoracic spine pain. We reviewed her MRI Thoracic Spine, demonstrating multilevel spondylosis without significant spinal canal or foraminal stenosis. There is notable kyphosis due to the degenerative changes in her back.     We discussed the importance of core strengthening, avoidance of activities that worsen the pain, nicotine cessation and maintenance of healthy weight. Surgery for patients with multilevel degenerative disc disease is not typically successful with the risks outweighing the benefit.     She could consider thoracic RFA trial. She declines as lumbar RFA did not  provide her much benefit.     We discussed increasing her Tylenol to 1000 mg twice daily.     She will follow up as needed.       I spent 40 minutes caring for Luma on this date of service. This time includes time spent by me in the following activities:preparing for the visit, reviewing tests, obtaining and/or reviewing a separately obtained history, performing a medically appropriate examination and/or evaluation , counseling and educating the patient/family/caregiver, documenting information in the medical record, and independently interpreting results and communicating that information with the patient/family/caregiver.    Follow Up   Return if symptoms worsen or fail to improve.  Patient was given instructions and counseling regarding her condition or for health maintenance advice.

## 2024-02-14 ENCOUNTER — CLINICAL SUPPORT (OUTPATIENT)
Dept: FAMILY MEDICINE CLINIC | Age: 84
End: 2024-02-14
Payer: MEDICARE

## 2024-02-14 DIAGNOSIS — J30.9 ALLERGIC RHINITIS, UNSPECIFIED SEASONALITY, UNSPECIFIED TRIGGER: Primary | ICD-10-CM

## 2024-02-14 PROCEDURE — 95115 IMMUNOTHERAPY ONE INJECTION: CPT | Performed by: FAMILY MEDICINE

## 2024-02-19 ENCOUNTER — TELEPHONE (OUTPATIENT)
Dept: FAMILY MEDICINE CLINIC | Age: 84
End: 2024-02-19
Payer: MEDICARE

## 2024-02-19 ENCOUNTER — ANESTHESIA EVENT (OUTPATIENT)
Dept: GASTROENTEROLOGY | Facility: HOSPITAL | Age: 84
End: 2024-02-19
Payer: MEDICARE

## 2024-02-19 DIAGNOSIS — N90.89 VULVAR LESION: Primary | ICD-10-CM

## 2024-02-19 NOTE — TELEPHONE ENCOUNTER
"Caller: Luma Cruz \"Gwendolyn\"    Relationship: Self    Best call back number:     [phone]       What is the medical concern/diagnosis: KNOT IN VAGINAL AREA    What specialty or service is being requested: OB/GYN    What is the provider, practice or medical service name:     What is the office location:     What is the office phone number:     Any additional details: PATIENT SEEN HARJINDER OG ON 2/7/24. WOULD LIKE TO BE REFERRED TO FEMALE DOCTOR IN THE [facility]  "

## 2024-02-19 NOTE — ANESTHESIA PREPROCEDURE EVALUATION
Anesthesia Evaluation     Patient summary reviewed and Nursing notes reviewed   NPO Solid Status: > 8 hours  NPO Liquid Status: > 8 hours           Airway   Mallampati: II  TM distance: >3 FB  Neck ROM: full  No difficulty expected  Dental    (+) partials    Comment: Upper partials removed prior to procedure     Pulmonary - normal exam    breath sounds clear to auscultation  (+) pneumonia ,shortness of breath    ROS comment: Chronic bronchitis   Cardiovascular - normal exam  Exercise tolerance: poor (<4 METS)    ECG reviewed  Rhythm: regular  Rate: normal    (+) hypertension well controlled 2 medications or greater, hyperlipidemia      Neuro/Psych  (+) psychiatric history Anxiety  GI/Hepatic/Renal/Endo    (+) GERD well controlled    Musculoskeletal     Abdominal    Substance History      OB/GYN          Other        ROS/Med Hx Other: ECG 2/20/2022 HR 68 SR  Comparison: compared with previous ECG     ECHO 04/02/2010: EF 50-55%, no structural abnormality, no pericardial effusion           Phys Exam Other: Zofran 4 mg IV given in preop for PONV                   Anesthesia Plan    ASA 3     general   total IV anesthesia  (Total IV Anesthesia    Patient understands anesthesia not responsible for dental damage.  )  intravenous induction     Anesthetic plan, risks, benefits, and alternatives have been provided, discussed and informed consent has been obtained with: patient and other.  Pre-procedure education provided  Plan discussed with CRNA.        CODE STATUS:

## 2024-02-20 ENCOUNTER — ANESTHESIA (OUTPATIENT)
Dept: GASTROENTEROLOGY | Facility: HOSPITAL | Age: 84
End: 2024-02-20
Payer: MEDICARE

## 2024-02-20 ENCOUNTER — HOSPITAL ENCOUNTER (OUTPATIENT)
Facility: HOSPITAL | Age: 84
Setting detail: HOSPITAL OUTPATIENT SURGERY
Discharge: HOME | End: 2024-02-20
Attending: INTERNAL MEDICINE | Admitting: INTERNAL MEDICINE
Payer: MEDICARE

## 2024-02-20 VITALS
HEART RATE: 81 BPM | RESPIRATION RATE: 16 BRPM | DIASTOLIC BLOOD PRESSURE: 58 MMHG | BODY MASS INDEX: 22.56 KG/M2 | TEMPERATURE: 97.3 F | WEIGHT: 115.52 LBS | SYSTOLIC BLOOD PRESSURE: 136 MMHG | OXYGEN SATURATION: 97 %

## 2024-02-20 DIAGNOSIS — R06.89 AIRWAY CLEARANCE IMPAIRMENT: ICD-10-CM

## 2024-02-20 DIAGNOSIS — J47.9 BRONCHIECTASIS WITHOUT COMPLICATION: ICD-10-CM

## 2024-02-20 DIAGNOSIS — T17.500A MUCUS PLUGGING OF BRONCHI: ICD-10-CM

## 2024-02-20 LAB
ACB CMPLX DNA BAL NAA+NON-PRB-NCNCRNG: NOT DETECTED
BLACTX-M ISLT/SPM QL: NORMAL
BLAIMP ISLT/SPM QL: NORMAL
BLAKPC ISLT/SPM QL: NORMAL
BLAOXA-48-LIKE ISLT/SPM QL: NORMAL
BLAVIM ISLT/SPM QL: NORMAL
C PNEUM DNA NPH QL NAA+NON-PROBE: NOT DETECTED
CILIATED BAL QL: 38 %
E CLOAC COMP DNA BAL NAA+NON-PRB-NCNCRNG: NOT DETECTED
E COLI DNA BAL NAA+NON-PRB-NCNCRNG: NOT DETECTED
EOSINOPHIL NFR FLD MANUAL: 4 %
FLUAV SUBTYP SPEC NAA+PROBE: NOT DETECTED
FLUBV RNA ISLT QL NAA+PROBE: NOT DETECTED
GP B STREP DNA BAL NAA+NON-PRB-NCNCRNG: NOT DETECTED
HADV DNA SPEC NAA+PROBE: NOT DETECTED
HAEM INFLU DNA BAL NAA+NON-PRB-NCNCRNG: NOT DETECTED
HCOV RNA LOWER RESP QL NAA+NON-PROBE: NOT DETECTED
HMPV RNA NPH QL NAA+NON-PROBE: NOT DETECTED
HPIV RNA LOWER RESP QL NAA+NON-PROBE: NOT DETECTED
K AEROGENES DNA BAL NAA+NON-PRB-NCNCRNG: NOT DETECTED
K OXYTOCA DNA BAL NAA+NON-PRB-NCNCRNG: NOT DETECTED
K PNEU GRP DNA BAL NAA+NON-PRB-NCNCRNG: NOT DETECTED
L PNEUMO DNA LOWER RESP QL NAA+NON-PROBE: NOT DETECTED
LYMPHOCYTES NFR FLD MANUAL: 27 %
M CATARRHALIS DNA BAL NAA+NON-PRB-NCNCRNG: NOT DETECTED
M PNEUMO IGG SER IA-ACNC: NOT DETECTED
MACROPHAGE FLUID: 19 %
MECA+MECC ISLT/SPM QL: NORMAL
NDM GENE: NORMAL
NEUTROPHILS NFR FLD MANUAL: 12 %
P AERUGINOSA DNA BAL NAA+NON-PRB-NCNCRNG: NOT DETECTED
PROTEUS SP DNA BAL NAA+NON-PRB-NCNCRNG: NOT DETECTED
RHINOVIRUS RNA SPEC NAA+PROBE: NOT DETECTED
RSV RNA NPH QL NAA+NON-PROBE: NOT DETECTED
S AUREUS DNA BAL NAA+NON-PRB-NCNCRNG: NOT DETECTED
S MARCESCENS DNA BAL NAA+NON-PRB-NCNCRNG: NOT DETECTED
S PNEUM DNA BAL NAA+NON-PRB-NCNCRNG: NOT DETECTED
S PYO DNA BAL NAA+NON-PRB-NCNCRNG: NOT DETECTED
VISUAL PRESENCE OF BLOOD: NORMAL

## 2024-02-20 PROCEDURE — 87070 CULTURE OTHR SPECIMN AEROBIC: CPT | Performed by: INTERNAL MEDICINE

## 2024-02-20 PROCEDURE — 87102 FUNGUS ISOLATION CULTURE: CPT | Performed by: INTERNAL MEDICINE

## 2024-02-20 PROCEDURE — 87206 SMEAR FLUORESCENT/ACID STAI: CPT | Performed by: INTERNAL MEDICINE

## 2024-02-20 PROCEDURE — 87205 SMEAR GRAM STAIN: CPT | Performed by: INTERNAL MEDICINE

## 2024-02-20 PROCEDURE — 25010000002 ONDANSETRON PER 1 MG

## 2024-02-20 PROCEDURE — 87116 MYCOBACTERIA CULTURE: CPT | Performed by: INTERNAL MEDICINE

## 2024-02-20 PROCEDURE — 89051 BODY FLUID CELL COUNT: CPT | Performed by: INTERNAL MEDICINE

## 2024-02-20 PROCEDURE — 88108 CYTOPATH CONCENTRATE TECH: CPT | Performed by: INTERNAL MEDICINE

## 2024-02-20 PROCEDURE — 87071 CULTURE AEROBIC QUANT OTHER: CPT | Performed by: INTERNAL MEDICINE

## 2024-02-20 PROCEDURE — 87633 RESP VIRUS 12-25 TARGETS: CPT | Performed by: INTERNAL MEDICINE

## 2024-02-20 PROCEDURE — 25010000002 PROPOFOL 10 MG/ML EMULSION: Performed by: NURSE ANESTHETIST, CERTIFIED REGISTERED

## 2024-02-20 PROCEDURE — 25810000003 LACTATED RINGERS PER 1000 ML

## 2024-02-20 RX ORDER — SODIUM CHLORIDE, SODIUM LACTATE, POTASSIUM CHLORIDE, CALCIUM CHLORIDE 600; 310; 30; 20 MG/100ML; MG/100ML; MG/100ML; MG/100ML
30 INJECTION, SOLUTION INTRAVENOUS CONTINUOUS
Status: DISCONTINUED | OUTPATIENT
Start: 2024-02-20 | End: 2024-02-20 | Stop reason: HOSPADM

## 2024-02-20 RX ORDER — PROPOFOL 10 MG/ML
VIAL (ML) INTRAVENOUS AS NEEDED
Status: DISCONTINUED | OUTPATIENT
Start: 2024-02-20 | End: 2024-02-20 | Stop reason: SURG

## 2024-02-20 RX ORDER — ONDANSETRON 2 MG/ML
4 INJECTION INTRAMUSCULAR; INTRAVENOUS ONCE
Status: COMPLETED | OUTPATIENT
Start: 2024-02-20 | End: 2024-02-20

## 2024-02-20 RX ORDER — LIDOCAINE HYDROCHLORIDE 40 MG/ML
INJECTION, SOLUTION RETROBULBAR; TOPICAL AS NEEDED
Status: DISCONTINUED | OUTPATIENT
Start: 2024-02-20 | End: 2024-02-20 | Stop reason: HOSPADM

## 2024-02-20 RX ORDER — LIDOCAINE HYDROCHLORIDE 20 MG/ML
INJECTION, SOLUTION EPIDURAL; INFILTRATION; INTRACAUDAL; PERINEURAL AS NEEDED
Status: DISCONTINUED | OUTPATIENT
Start: 2024-02-20 | End: 2024-02-20 | Stop reason: SURG

## 2024-02-20 RX ADMIN — SODIUM CHLORIDE, POTASSIUM CHLORIDE, SODIUM LACTATE AND CALCIUM CHLORIDE 30 ML/HR: 600; 310; 30; 20 INJECTION, SOLUTION INTRAVENOUS at 10:57

## 2024-02-20 RX ADMIN — PROPOFOL 200 MCG/KG/MIN: 10 INJECTION, EMULSION INTRAVENOUS at 11:46

## 2024-02-20 RX ADMIN — PROPOFOL 80 MG: 10 INJECTION, EMULSION INTRAVENOUS at 11:46

## 2024-02-20 RX ADMIN — LIDOCAINE HYDROCHLORIDE 100 MG: 20 INJECTION, SOLUTION EPIDURAL; INFILTRATION; INTRACAUDAL; PERINEURAL at 11:46

## 2024-02-20 RX ADMIN — ONDANSETRON 4 MG: 2 INJECTION INTRAMUSCULAR; INTRAVENOUS at 11:05

## 2024-02-20 NOTE — OP NOTE
Procedure:  Bronchoscopy with clearance of airways, bronchoalveolar lavage, bronchial washings     Pre-Operative Diagnosis: Mucous plugging     Post-Operative Diagnosis: Same     Timeout performed     Anesthesia: MAC anesthesia     Procedure Details: The patient was consented for the procedure with all risk and benefit of the procedure explained in detail.  She was given the opportunity to ask questions and all concerns were answered. The bronchoscope was inserted into the main airway via the oral cavity. An anatomical survey was done of the main airways and the subsegmental bronchus.      Findings: The vocal cords were normal in appearance and movement with abduction and adduction without difficulty. The trachea was normal in caliber and had no mucosal abnormalities. The left tracheobronchial tree appeared anatomically normal with no mucosal abnormalities. The right tracheobronchial tree appeared anatomically normal with no mucosal abnormalities.  There was mucus plugging left greater than right lower lobe bronchi with thick viscous cloudy secretions.  A bronchoalveolar lavage was performed using 2 x 60 mL aliquots of normal saline  instilled into the left lower lobe bronchus then aspirated back. There was 30 mL of turbid and cloudy fluid in return.  Bronchial washings were collected.     Findings:  Mucous plugging left lower lobe bronchus greater than right lower lobe bronchus with thick viscous cloudy secretions     Estimated Blood Loss: 0 mL     Specimens:  Bronchoalveolar lavage left lower lobe bronchus  Bronchial washings     Complications: None; patient tolerated the procedure well.     Disposition: Stable to discharge home.  Follow-up test results.     Patient tolerated the procedure well.    Electronically signed by Enmanuel Forbes MD, 02/20/24, 1:12 PM EST.

## 2024-02-20 NOTE — INTERVAL H&P NOTE
H&P reviewed. The patient was examined and there are no changes to the H&P.      Electronically signed by Enmanuel Forbes MD, 02/20/24, 10:36 AM EST.

## 2024-02-20 NOTE — ANESTHESIA POSTPROCEDURE EVALUATION
Patient: Luma Cruz    Procedure Summary       Date: 02/20/24 Room / Location: [facility] ENDOSCOPY 3 / [facility] ENDOSCOPY    Anesthesia Start: 1141 Anesthesia Stop: 1201    Procedure: BRONCHOSCOPY WITH BRONCHOALVEOLAR LAVAGE AND WASHINGS (Bilateral: Bronchus) Diagnosis:       Mucus plugging of bronchi      Bronchiectasis without complication      Airway clearance impairment      (Mucus plugging of bronchi [T17.500A])      (Bronchiectasis without complication [J47.9])      (Airway clearance impairment [R06.89])    Surgeons: Enmanuel Forbes MD Provider: Oanh Noriega CRNA    Anesthesia Type: general ASA Status: 3            Anesthesia Type: general    Vitals  Vitals Value Taken Time   /52 02/20/24 1221   Temp 36.3 °C (97.3 °F) 02/20/24 1201   Pulse 77 02/20/24 1222   Resp 16 02/20/24 1215   SpO2 97 % 02/20/24 1215   Vitals shown include unfiled device data.        Post Anesthesia Care and Evaluation    Post-procedure mental status: acceptable.  Pain management: satisfactory to patient    Airway patency: patent  Anesthetic complications: No anesthetic complications    Cardiovascular status: acceptable  Respiratory status: acceptable    Comments: Per chart review

## 2024-02-21 DIAGNOSIS — F41.1 GENERALIZED ANXIETY DISORDER: Primary | ICD-10-CM

## 2024-02-21 DIAGNOSIS — Z79.899 OTHER LONG TERM (CURRENT) DRUG THERAPY: ICD-10-CM

## 2024-02-21 RX ORDER — CLONAZEPAM 0.5 MG/1
0.5 TABLET ORAL 2 TIMES DAILY PRN
Qty: 60 TABLET | Refills: 2 | Status: SHIPPED | OUTPATIENT
Start: 2024-02-21

## 2024-02-22 LAB
BACTERIA SPEC AEROBE CULT: NORMAL
BACTERIA SPEC RESP CULT: NORMAL
CYTO UR: NORMAL
GRAM STN SPEC: NORMAL
GRAM STN SPEC: NORMAL
LAB AP CASE REPORT: NORMAL
LAB AP CLINICAL INFORMATION: NORMAL
PATH REPORT.FINAL DX SPEC: NORMAL
PATH REPORT.GROSS SPEC: NORMAL

## 2024-02-23 ENCOUNTER — CLINICAL SUPPORT (OUTPATIENT)
Dept: FAMILY MEDICINE CLINIC | Age: 84
End: 2024-02-23
Payer: MEDICARE

## 2024-02-23 DIAGNOSIS — J30.9 ALLERGIC RHINITIS, UNSPECIFIED SEASONALITY, UNSPECIFIED TRIGGER: Primary | ICD-10-CM

## 2024-02-23 PROCEDURE — 95115 IMMUNOTHERAPY ONE INJECTION: CPT | Performed by: FAMILY MEDICINE

## 2024-02-25 LAB
FUNGUS WND CULT: NORMAL
FUNGUS WND CULT: NORMAL
MYCOBACTERIUM SPEC CULT: NORMAL
MYCOBACTERIUM SPEC CULT: NORMAL
NIGHT BLUE STAIN TISS: NORMAL

## 2024-02-27 LAB
MYCOBACTERIUM SPEC CULT: NORMAL
MYCOBACTERIUM SPEC CULT: NORMAL
NIGHT BLUE STAIN TISS: NORMAL

## 2024-02-28 LAB
FUNGUS WND CULT: ABNORMAL
FUNGUS WND CULT: ABNORMAL

## 2024-02-29 ENCOUNTER — CLINICAL SUPPORT (OUTPATIENT)
Dept: FAMILY MEDICINE CLINIC | Age: 84
End: 2024-02-29
Payer: MEDICARE

## 2024-02-29 DIAGNOSIS — J30.9 ALLERGIC RHINITIS, UNSPECIFIED SEASONALITY, UNSPECIFIED TRIGGER: Primary | ICD-10-CM

## 2024-02-29 PROCEDURE — 95115 IMMUNOTHERAPY ONE INJECTION: CPT | Performed by: FAMILY MEDICINE

## 2024-03-03 NOTE — PROGRESS NOTES
Primary Care Provider  Joseph Walker MD     Referring Provider  No ref. provider found     Chief Complaint  Follow-up (Post bronch. )    Subjective          History of Presenting Illness  Patient is a 83-year-old  female, patient Dr. Forbes's who presents for management of bronchiectasis who presents for follow-up visit today.  Patient had a chest CT scan completed on 1/20/2024 which showed stable appearance of mild bilateral lower lobe bronchiectasis.  There was new mild subpleural bilateral lower lobe groundglass opacities, which may be due to atelectasis.  Patient was experiencing symptoms and doxycycline was sent to the pharmacy.  Patient to call the office on 1/29/2024 as she was still not feeling better after being on antibiotics for 6 days and therefore prednisone was sent to the pharmacy at that time. A CBC, CMP, respiratory culture, and AFB culture were ordered at that time. CBC came back unremarkable. CMP did not show any hypercapnia. Patient did not have respiratory culture AFB culture completed. Patient reported last office visit that she was still not feeling better.  Due to chest CT scan showing new mild subpleural bilateral lobe groundglass opacities and patient reporting that she was treated with antibiotics and steroids recently, and was not feeling better and having a history of bronchiectasis, Mycobacterium infection, and fungal infection in the past patient was sent for bronchoscopy with bronchoalveolar lavage, brushings, and  biopsy.   Patient had procedure completed on 2/20/2024.  Pneumonia panel came back normal.  Respiratory culture came back showing normal respiratory cierra.  AFB culture negative to date.  Fungal culture came back showing Candida albicans, thought to be a mouth contaminant.  Cytology came back negative malignant cells.  Per op report, patient did have mucous plugging in left lower lobe bronchus greater than right lower lobe bronchus with thick viscous  cloudy secretions.  Patient states that since having bronchoscopy completed she is feeling much better.  Patient states that she is no longer wheezing.  Patient states that her shortness of breath and cough have improved.  Patient states that she is taking Brovana and Pulmicort every day as prescribed and uses albuterol inhaler as needed.  Patient states that she is using her flutter valve with sodium chloride nebulizer treatments to assist with airway clearance.  Patient states that she is taking Xyzal, Flonase nasal spray, and Astelin nasal spray for seasonal allergies. Patient is on Protonix and Pepcid for reflux.   Patient denies fever, chills, night sweats, swollen glands in the head and neck, unintentional weight loss, hemoptysis, purulent sputum production, dysphagia, chest pain, palpitations, chest tightness, abdominal pain, nausea, vomiting, and diarrhea.  Patient also denies any myalgias, changes in sense of taste and/or smell, sore throat, any other coronavirus or flu-like symptoms.  Patient denies any leg swelling, orthopnea, paroxysmal nocturnal dyspnea.  Patient is able to perform activities of daily living.        Review of Systems     Family History   Problem Relation Age of Onset    Stroke Mother     Hypertension Mother     Arthritis Mother     Hearing loss Mother     Coronary artery disease Father         Social History     Socioeconomic History    Marital status:     Number of children: 2   Tobacco Use    Smoking status: Never     Passive exposure: Never    Smokeless tobacco: Never   Vaping Use    Vaping status: Never Used   Substance and Sexual Activity    Alcohol use: Never    Drug use: Never    Sexual activity: Yes     Partners: Male     Birth control/protection: Hysterectomy        Past Medical History:   Diagnosis Date    COVID-19 07/01/2022    Essential hypertension     Generalized anxiety disorder     GERD without esophagitis     Hernia April, 2023    hiatal hernia    Menopausal and  postmenopausal disorder     Mixed hyperlipidemia         Immunization History   Administered Date(s) Administered    Arexvy (RSV, Adults 60+ yrs) 11/10/2023    COVID-19 (MODERNA) 1st,2nd,3rd Dose Monovalent 01/27/2021, 02/24/2021, 10/30/2021, 04/13/2022    COVID-19 (MODERNA) BIVALENT 12+YRS 10/19/2022    COVID-19 F23 (PFIZER) 12YRS+ (COMIRNATY) 10/19/2023    Fluad Quad 65+ 09/29/2022    Fluzone High Dose =>65 Years ([facility] ONLY) 10/16/2012, 09/25/2013, 09/28/2017, 09/24/2020    Fluzone High-Dose 65+yrs 10/02/2021, 10/17/2023    Hepatitis A 12/06/2018       Allergies   Allergen Reactions    Fosamax [Alendronate] GI Intolerance    Penicillins Rash          Current Outpatient Medications:     albuterol sulfate  (90 Base) MCG/ACT inhaler, Inhale 2 puffs Every 4 (Four) Hours As Needed for Wheezing., Disp: , Rfl:     amLODIPine (NORVASC) 2.5 MG tablet, Take 1 tablet by mouth Daily., Disp: 90 tablet, Rfl: 3    arformoterol (BROVANA) 15 MCG/2ML nebulizer solution, Take 2 mL by nebulization 2 (Two) Times a Day., Disp: , Rfl:     azelastine (ASTELIN) 0.1 % nasal spray, 1 spray into the nostril(s) as directed by provider 2 (Two) Times a Day., Disp: , Rfl:     budesonide (PULMICORT) 0.5 MG/2ML nebulizer solution, Take 2 mL by nebulization 2 (Two) Times a Day for 30 days. Rinse mouth out after each use, Disp: 60 each, Rfl: 11    clonazePAM (KlonoPIN) 0.5 MG tablet, Take 1 tablet by mouth 2 (Two) Times a Day As Needed for Anxiety., Disp: 60 tablet, Rfl: 2    estradiol (ESTRACE) 1 MG tablet, Take 1 tablet by mouth Daily., Disp: 90 tablet, Rfl: 3    famotidine (Pepcid) 40 MG tablet, Take 1 tablet by mouth 2 (Two) Times a Day., Disp: 180 tablet, Rfl: 3    fluticasone (FLONASE) 50 MCG/ACT nasal spray, 2 sprays into the nostril(s) as directed by provider Daily., Disp: , Rfl:     levocetirizine (XYZAL) 5 MG tablet, Take 1 tablet by mouth Every Evening., Disp: 90 tablet, Rfl: 3    losartan (COZAAR) 50 MG tablet, Take 1 tablet by  "mouth Daily., Disp: 90 tablet, Rfl: 3    multivitamin with minerals tablet tablet, Take 1 tablet by mouth Daily., Disp: , Rfl:     pantoprazole (PROTONIX) 40 MG EC tablet, Take 1 tablet by mouth Daily., Disp: 90 tablet, Rfl: 3    sodium chloride 3 % nebulizer solution, Take 4 mL by nebulization 2 (Two) Times a Day., Disp: , Rfl:     naloxone (Narcan) 4 MG/0.1ML nasal spray, one spray in one nostril as needed for over sedation or respiratory depression from opioid use. Repeat one spray in alternate nostril every 2-3 minutes until responsive or EMS arrives (Patient not taking: Reported on 2/8/2024), Disp: , Rfl:     ondansetron ODT (ZOFRAN-ODT) 4 MG disintegrating tablet, Place 1 tablet on the tongue Every 6 (Six) Hours As Needed for Nausea or Vomiting. (Patient not taking: Reported on 3/12/2024), Disp: 30 tablet, Rfl: 3    pravastatin (PRAVACHOL) 20 MG tablet, Take 1 tablet by mouth Daily. (Patient not taking: Reported on 3/12/2024), Disp: 90 tablet, Rfl: 3     Objective     Physical Exam  Vital Signs:   WDWN, Alert, NAD.    HEENT:  PERRL, EOMI.  OP, nares clear, no sinus tenderness  Neck:  Supple, no JVD, no thyromegaly.  Lymph: no axillary, cervical, supraclavicular lymphadenopathy noted bilaterally  Chest:  good aeration, clear to auscultation bilaterally, tympanic to percussion bilaterally, no work of breathing noted  CV: RRR, no MGR, pulses 2+, equal.  Abd:  Soft, NT, ND, + BS, no HSM  EXT:  no clubbing, no cyanosis, no edema, no joint tenderness  Neuro:  A&Ox3, CN grossly intact, no focal deficits.  Skin: No rashes or lesions noted.    /70 (BP Location: Left arm, Patient Position: Sitting, Cuff Size: Small Adult)   Pulse 71   Temp 97 °F (36.1 °C)   Resp 16   Ht 152.4 cm (60\")   Wt 52.8 kg (116 lb 6.4 oz)   SpO2 96% Comment: room air  BMI 22.73 kg/m²         Result Review :   I have reviewed my last office visit note. I also reviewed bronchoscopy results dated from 2/20/2024. See scanned " reports.    Procedures:                            Assessment and Plan      Assessment:  1.  Bronchiectasis on chest CT scan dated from 5/11/2021.  Patient does have a flutter valve sodium chloride nebulizer treatments.  Patient declines a chest vest at this time.  2.  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   3.  History of fungal pneumonia, treated for two months of Tolsura with antifungal associated issues.      4.  GERD.  Patient is on a PPI.        5.  Mucus plugging. Patient has a flutter valve and sodium chloride nebulizer treatments.     6.  Airway clearance impairment.  7.  Seasonal allergies on allergy immunotherapy.      8.  Dyspnea, improved since bronchoscopy per patient report.  9.  Cough, improved since bronchoscopy per patient report.  10.  Wheezing, resolved per patient report.  11.  Abnormal chest CT scan: New mild subpleural bilateral lobe groundglass opacities.  12.  Never smoker.            Plan:  1.  Due to chest CT scan showing new mild subpleural bilateral lobe groundglass opacities.  Patient treated with antibiotics and steroids recently, however is not feeling better.  Patient with a history of bronchiectasis, Mycobacterium infection, and fungal infection in the past.  Patient was sent for bronchoscopy with bronchoalveolar lavage, brushings, biopsy.  Patient had procedure completed on 2/20/2024.  Pneumonia panel came back normal.  Respiratory culture came back showing normal respiratory cierra.  AFB culture negative to date.  Fungal culture came back showing Candida albicans, thought to be a mouth contaminant.  Cytology came back negative malignant cells.  2.  Continue Brovana and Pulmicort everyday as prescribed and rinse her mouth out after each use.    3.  Continue albuterol inhaler as needed.    4.  Continue Astelin nasal spray, Flonase nasal spray, and Xyzal as prescribed.   Continue allergy immunotherapy and to follow up with allergist as scheduled. 5. Continue flutter valve with nebulizer treatments to assist with airway clearance.    6.  For GERD,  patient to continue PPI.   Patient is also advised to sleep with the head of the bed elevated and do not eat 3-4 hours prior to bedtime.  7.  Patient is scheduled to have a repeat chest CT scan on 4/24/2024 at 2:00 PM.  8.  Vaccination status: patient reports they are up-to-date with flu, pneumonia, and Covid vaccines.  Patient is advised to continue to follow CDC recommendations such as social distancing wearing a mask and washing hands for at least 20 seconds.  9.  Smoking status: never smoker.  10.  Patient to call the office, 911, or go to the ER with new or worsening symptoms.  11.  Follow-up in May 2024 scheduled, sooner if needed.          Follow Up   Return for keep appointment as scheduled.  Patient was given instructions and counseling regarding her condition or for health maintenance advice. Please see specific information pulled into the AVS if appropriate.

## 2024-03-05 LAB
MYCOBACTERIUM SPEC CULT: NORMAL
MYCOBACTERIUM SPEC CULT: NORMAL
NIGHT BLUE STAIN TISS: NORMAL

## 2024-03-08 ENCOUNTER — CLINICAL SUPPORT (OUTPATIENT)
Dept: FAMILY MEDICINE CLINIC | Age: 84
End: 2024-03-08
Payer: MEDICARE

## 2024-03-08 DIAGNOSIS — J30.9 ALLERGIC RHINITIS, UNSPECIFIED SEASONALITY, UNSPECIFIED TRIGGER: Primary | ICD-10-CM

## 2024-03-08 PROCEDURE — 95115 IMMUNOTHERAPY ONE INJECTION: CPT | Performed by: FAMILY MEDICINE

## 2024-03-12 ENCOUNTER — OFFICE VISIT (OUTPATIENT)
Dept: PULMONOLOGY | Facility: CLINIC | Age: 84
End: 2024-03-12
Payer: MEDICARE

## 2024-03-12 VITALS
WEIGHT: 116.4 LBS | HEIGHT: 60 IN | RESPIRATION RATE: 16 BRPM | TEMPERATURE: 97 F | DIASTOLIC BLOOD PRESSURE: 70 MMHG | OXYGEN SATURATION: 96 % | BODY MASS INDEX: 22.85 KG/M2 | HEART RATE: 71 BPM | SYSTOLIC BLOOD PRESSURE: 122 MMHG

## 2024-03-12 DIAGNOSIS — R06.2 WHEEZING: ICD-10-CM

## 2024-03-12 DIAGNOSIS — J30.2 SEASONAL ALLERGIES: Primary | ICD-10-CM

## 2024-03-12 DIAGNOSIS — Z87.01 HISTORY OF FUNGAL PNEUMONIA: ICD-10-CM

## 2024-03-12 DIAGNOSIS — J47.9 BRONCHIECTASIS WITHOUT COMPLICATION: ICD-10-CM

## 2024-03-12 DIAGNOSIS — R05.9 COUGH, UNSPECIFIED TYPE: ICD-10-CM

## 2024-03-12 DIAGNOSIS — K21.9 GERD WITHOUT ESOPHAGITIS: ICD-10-CM

## 2024-03-12 DIAGNOSIS — T17.500A MUCUS PLUGGING OF BRONCHI: ICD-10-CM

## 2024-03-12 DIAGNOSIS — R06.00 DYSPNEA, UNSPECIFIED TYPE: ICD-10-CM

## 2024-03-12 DIAGNOSIS — R06.89 AIRWAY CLEARANCE IMPAIRMENT: ICD-10-CM

## 2024-03-12 DIAGNOSIS — R93.89 ABNORMAL CT SCAN, CHEST: ICD-10-CM

## 2024-03-12 LAB
MYCOBACTERIUM SPEC CULT: NORMAL
MYCOBACTERIUM SPEC CULT: NORMAL
NIGHT BLUE STAIN TISS: NORMAL

## 2024-03-12 PROCEDURE — 3074F SYST BP LT 130 MM HG: CPT | Performed by: NURSE PRACTITIONER

## 2024-03-12 PROCEDURE — 1159F MED LIST DOCD IN RCRD: CPT | Performed by: NURSE PRACTITIONER

## 2024-03-12 PROCEDURE — 99214 OFFICE O/P EST MOD 30 MIN: CPT | Performed by: NURSE PRACTITIONER

## 2024-03-12 PROCEDURE — 3078F DIAST BP <80 MM HG: CPT | Performed by: NURSE PRACTITIONER

## 2024-03-12 PROCEDURE — 1160F RVW MEDS BY RX/DR IN RCRD: CPT | Performed by: NURSE PRACTITIONER

## 2024-03-12 RX ORDER — ARFORMOTEROL TARTRATE 15 UG/2ML
15 SOLUTION RESPIRATORY (INHALATION)
COMMUNITY

## 2024-03-13 ENCOUNTER — CLINICAL SUPPORT (OUTPATIENT)
Dept: FAMILY MEDICINE CLINIC | Age: 84
End: 2024-03-13
Payer: MEDICARE

## 2024-03-13 DIAGNOSIS — J30.9 ALLERGIC RHINITIS, UNSPECIFIED SEASONALITY, UNSPECIFIED TRIGGER: Primary | ICD-10-CM

## 2024-03-13 LAB
FUNGUS WND CULT: ABNORMAL
FUNGUS WND CULT: ABNORMAL

## 2024-03-13 PROCEDURE — 95115 IMMUNOTHERAPY ONE INJECTION: CPT | Performed by: FAMILY MEDICINE

## 2024-03-19 LAB
MYCOBACTERIUM SPEC CULT: NORMAL
MYCOBACTERIUM SPEC CULT: NORMAL
NIGHT BLUE STAIN TISS: NORMAL

## 2024-03-20 ENCOUNTER — CLINICAL SUPPORT (OUTPATIENT)
Dept: FAMILY MEDICINE CLINIC | Age: 84
End: 2024-03-20
Payer: MEDICARE

## 2024-03-20 DIAGNOSIS — J30.9 ALLERGIC RHINITIS, UNSPECIFIED SEASONALITY, UNSPECIFIED TRIGGER: Primary | ICD-10-CM

## 2024-03-20 PROCEDURE — 95115 IMMUNOTHERAPY ONE INJECTION: CPT | Performed by: FAMILY MEDICINE

## 2024-03-26 ENCOUNTER — CLINICAL SUPPORT (OUTPATIENT)
Dept: FAMILY MEDICINE CLINIC | Age: 84
End: 2024-03-26
Payer: MEDICARE

## 2024-03-26 DIAGNOSIS — J30.9 ALLERGIC RHINITIS, UNSPECIFIED SEASONALITY, UNSPECIFIED TRIGGER: Primary | ICD-10-CM

## 2024-03-26 LAB
MYCOBACTERIUM SPEC CULT: NORMAL
MYCOBACTERIUM SPEC CULT: NORMAL
NIGHT BLUE STAIN TISS: NORMAL

## 2024-03-26 PROCEDURE — 95115 IMMUNOTHERAPY ONE INJECTION: CPT | Performed by: FAMILY MEDICINE

## 2024-04-02 LAB
MYCOBACTERIUM SPEC CULT: NORMAL
MYCOBACTERIUM SPEC CULT: NORMAL
NIGHT BLUE STAIN TISS: NORMAL

## 2024-04-05 ENCOUNTER — CLINICAL SUPPORT (OUTPATIENT)
Dept: FAMILY MEDICINE CLINIC | Age: 84
End: 2024-04-05
Payer: MEDICARE

## 2024-04-05 DIAGNOSIS — J30.9 ALLERGIC RHINITIS, UNSPECIFIED SEASONALITY, UNSPECIFIED TRIGGER: Primary | ICD-10-CM

## 2024-04-05 PROCEDURE — 95115 IMMUNOTHERAPY ONE INJECTION: CPT | Performed by: FAMILY MEDICINE

## 2024-04-12 ENCOUNTER — OFFICE VISIT (OUTPATIENT)
Dept: FAMILY MEDICINE CLINIC | Age: 84
End: 2024-04-12
Payer: MEDICARE

## 2024-04-12 VITALS
HEIGHT: 60 IN | WEIGHT: 116.6 LBS | HEART RATE: 70 BPM | TEMPERATURE: 97.4 F | BODY MASS INDEX: 22.89 KG/M2 | DIASTOLIC BLOOD PRESSURE: 59 MMHG | SYSTOLIC BLOOD PRESSURE: 122 MMHG

## 2024-04-12 DIAGNOSIS — J30.9 ALLERGIC RHINITIS, UNSPECIFIED SEASONALITY, UNSPECIFIED TRIGGER: ICD-10-CM

## 2024-04-12 DIAGNOSIS — F41.1 GENERALIZED ANXIETY DISORDER: ICD-10-CM

## 2024-04-12 DIAGNOSIS — I10 ESSENTIAL HYPERTENSION: Primary | ICD-10-CM

## 2024-04-12 DIAGNOSIS — E78.2 MIXED HYPERLIPIDEMIA: ICD-10-CM

## 2024-04-12 DIAGNOSIS — Z79.899 OTHER LONG TERM (CURRENT) DRUG THERAPY: ICD-10-CM

## 2024-04-12 DIAGNOSIS — L57.0 ACTINIC KERATOSIS: ICD-10-CM

## 2024-04-12 DIAGNOSIS — K21.9 GERD WITHOUT ESOPHAGITIS: ICD-10-CM

## 2024-04-12 DIAGNOSIS — N95.9 MENOPAUSAL AND POSTMENOPAUSAL DISORDER: ICD-10-CM

## 2024-04-12 LAB
AMPHET+METHAMPHET UR QL: NEGATIVE
AMPHETAMINES UR QL: NEGATIVE
BARBITURATES UR QL SCN: NEGATIVE
BENZODIAZ UR QL SCN: POSITIVE
BUPRENORPHINE SERPL-MCNC: NEGATIVE NG/ML
CANNABINOIDS SERPL QL: NEGATIVE
COCAINE UR QL: NEGATIVE
EXPIRATION DATE: ABNORMAL
Lab: ABNORMAL
MDMA UR QL SCN: NEGATIVE
METHADONE UR QL SCN: NEGATIVE
MORPHINE/OPIATES SCREEN, URINE: NEGATIVE
OXYCODONE UR QL SCN: NEGATIVE
PCP UR QL SCN: NEGATIVE

## 2024-04-12 RX ORDER — CLONAZEPAM 0.5 MG/1
0.5 TABLET ORAL 2 TIMES DAILY PRN
Qty: 60 TABLET | Refills: 5 | Status: SHIPPED | OUTPATIENT
Start: 2024-04-12

## 2024-04-12 RX ORDER — ESTRADIOL 1 MG/1
1 TABLET ORAL DAILY
Qty: 90 TABLET | Refills: 3 | Status: SHIPPED | OUTPATIENT
Start: 2024-04-12

## 2024-04-12 RX ORDER — PANTOPRAZOLE SODIUM 40 MG/1
40 TABLET, DELAYED RELEASE ORAL DAILY
Qty: 90 TABLET | Refills: 3 | Status: SHIPPED | OUTPATIENT
Start: 2024-04-12

## 2024-04-12 RX ORDER — PRAVASTATIN SODIUM 20 MG
20 TABLET ORAL DAILY
Qty: 90 TABLET | Refills: 3 | Status: SHIPPED | OUTPATIENT
Start: 2024-04-12

## 2024-04-12 RX ORDER — LOSARTAN POTASSIUM 50 MG/1
50 TABLET ORAL DAILY
Qty: 90 TABLET | Refills: 3 | Status: SHIPPED | OUTPATIENT
Start: 2024-04-12

## 2024-04-12 RX ORDER — LEVOCETIRIZINE DIHYDROCHLORIDE 5 MG/1
5 TABLET, FILM COATED ORAL EVERY EVENING
Qty: 90 TABLET | Refills: 3 | Status: SHIPPED | OUTPATIENT
Start: 2024-04-12

## 2024-04-12 NOTE — PROGRESS NOTES
Luma Cruz presents to [facility].    Chief Complaint:  Blood pressure, cholesterol, anxiety    Subjective   History of Present Illness:  Luma Grey is being seen today for follow-up on her care.  She has hypertension and remains on multiple medications as noted.  Her blood pressure is fairly well controlled here.  She says her blood pressure tends to be in a good range at home.     She also remains on cholesterol-lowering medication.  She seems to tolerate that well.     She also has chronic anxiety.  She currently takes clonazepam on a nightly basis for this.  Davi is reviewed.  We have again discussed the potential risk of this medication including altered mental status and fall risk.  I do not think dosage decrease is appropriate for the near term.     She also remains on estradiol for postmenopausal symptoms.  We have again discussed potential risks of this medication.    She has a skin thickening on her right upper chest that she would like to have treated.  It may be an actinic keratosis.    Review of Systems:  Review of Systems   Constitutional:  Negative for chills and fever.   Respiratory:  Negative for cough and shortness of breath.    Cardiovascular:  Negative for chest pain and palpitations.   Gastrointestinal:  Negative for abdominal pain, nausea and vomiting.      Objective   Medical History:  Past Medical History:    COVID-19    Essential hypertension    Generalized anxiety disorder    GERD without esophagitis    Hernia    hiatal hernia    Menopausal and postmenopausal disorder    Mixed hyperlipidemia     Past Surgical History:    APPENDECTOMY    BREAST BIOPSY    BRONCHOSCOPY    Procedure: BRONCHOSCOPY WITH BRONCHOALVEOLAR LAVAGE AND WASHINGS;  Surgeon: Enmanuel Forbes MD;  Location: [facility] ENDOSCOPY;  Service: Pulmonary;  Laterality: Bilateral;  MUCUS PLUGGING    CHOLECYSTECTOMY    COLONOSCOPY    COLONOSCOPY    Procedure: COLONOSCOPY;  Surgeon: Cory Lindsey  MD;  Location: [facility] ENDOSCOPY;  Service: General;  Laterality: N/A;  normal    ENDOSCOPY    Hiatal hernia, mild reactive gastropathy    HYSTERECTOMY    complete    TONSILLECTOMY AND ADENOIDECTOMY      Family History   Problem Relation Age of Onset    Stroke Mother     Hypertension Mother     Arthritis Mother     Hearing loss Mother     Coronary artery disease Father      Social History     Tobacco Use    Smoking status: Never     Passive exposure: Never    Smokeless tobacco: Never   Substance Use Topics    Alcohol use: Never       Health Maintenance Due   Topic Date Due    TDAP/TD VACCINES (1 - Tdap) Never done        Immunization History   Administered Date(s) Administered    Arexvy (RSV, Adults 60+ yrs) 11/10/2023    COVID-19 (MODERNA) 1st,2nd,3rd Dose Monovalent 01/27/2021, 02/24/2021, 10/30/2021, 04/13/2022    COVID-19 (MODERNA) BIVALENT 12+YRS 10/19/2022    COVID-19 F23 (PFIZER) 12YRS+ (COMIRNATY) 10/19/2023    Fluad Quad 65+ 09/29/2022    Fluzone High Dose =>65 Years (Vaxcare ONLY) 10/16/2012, 09/25/2013, 09/28/2017, 09/24/2020    Fluzone High-Dose 65+yrs 10/02/2021, 10/17/2023    Hepatitis A 12/06/2018       Allergies   Allergen Reactions    Fosamax [Alendronate] GI Intolerance    Penicillins Rash        Medications:  Current Outpatient Medications on File Prior to Visit   Medication Sig    albuterol sulfate  (90 Base) MCG/ACT inhaler Inhale 2 puffs Every 4 (Four) Hours As Needed for Wheezing.    amLODIPine (NORVASC) 2.5 MG tablet Take 1 tablet by mouth Daily.    arformoterol (BROVANA) 15 MCG/2ML nebulizer solution Take 2 mL by nebulization 2 (Two) Times a Day.    azelastine (ASTELIN) 0.1 % nasal spray 1 spray into the nostril(s) as directed by provider 2 (Two) Times a Day.    famotidine (Pepcid) 40 MG tablet Take 1 tablet by mouth 2 (Two) Times a Day.    fluticasone (FLONASE) 50 MCG/ACT nasal spray 2 sprays into the nostril(s) as directed by provider Daily.    multivitamin with minerals tablet  "tablet Take 1 tablet by mouth Daily.    ondansetron ODT (ZOFRAN-ODT) 4 MG disintegrating tablet Place 1 tablet on the tongue Every 6 (Six) Hours As Needed for Nausea or Vomiting.    sodium chloride 3 % nebulizer solution Take 4 mL by nebulization 2 (Two) Times a Day.    [DISCONTINUED] clonazePAM (KlonoPIN) 0.5 MG tablet Take 1 tablet by mouth 2 (Two) Times a Day As Needed for Anxiety.    [DISCONTINUED] estradiol (ESTRACE) 1 MG tablet Take 1 tablet by mouth Daily.    [DISCONTINUED] levocetirizine (XYZAL) 5 MG tablet Take 1 tablet by mouth Every Evening.    [DISCONTINUED] losartan (COZAAR) 50 MG tablet Take 1 tablet by mouth Daily.    [DISCONTINUED] pantoprazole (PROTONIX) 40 MG EC tablet Take 1 tablet by mouth Daily.    [DISCONTINUED] pravastatin (PRAVACHOL) 20 MG tablet Take 1 tablet by mouth Daily.    budesonide (PULMICORT) 0.5 MG/2ML nebulizer solution Take 2 mL by nebulization 2 (Two) Times a Day for 30 days. Rinse mouth out after each use    [DISCONTINUED] naloxone (Narcan) 4 MG/0.1ML nasal spray one spray in one nostril as needed for over sedation or respiratory depression from opioid use. Repeat one spray in alternate nostril every 2-3 minutes until responsive or EMS arrives (Patient not taking: Reported on 2/8/2024)     No current facility-administered medications on file prior to visit.       Vital Signs:   /59 (BP Location: Left arm, Patient Position: Sitting, Cuff Size: Adult)   Pulse 70   Temp 97.4 °F (36.3 °C) (Oral)   Ht 152.4 cm (60\")   Wt 52.9 kg (116 lb 9.6 oz)   BMI 22.77 kg/m²       Physical Exam:  Physical Exam  Vitals reviewed.   Constitutional:       General: She is not in acute distress.     Appearance: She is not ill-appearing.   Eyes:      Pupils: Pupils are equal, round, and reactive to light.   Neck:      Comments: No thyromegaly  Cardiovascular:      Rate and Rhythm: Normal rate and regular rhythm.   Pulmonary:      Effort: Pulmonary effort is normal.      Breath sounds: Normal " breath sounds.   Abdominal:      General: There is no distension.      Palpations: Abdomen is soft.      Tenderness: There is no abdominal tenderness.   Musculoskeletal:      Cervical back: Neck supple.   Lymphadenopathy:      Cervical: No cervical adenopathy.   Skin:     Findings: Lesion (There is a skin thickening on the right upper chest) present. No rash.   Neurological:      Mental Status: She is alert.     Result Review   The following data was reviewed by Joseph Walker MD on 04/12/2024.  Lab Results   Component Value Date    WBC 5.82 01/29/2024    HGB 12.3 01/29/2024    HCT 38.7 01/29/2024    MCV 89.2 01/29/2024     01/29/2024     Lab Results   Component Value Date    GLUCOSE 114 (H) 01/29/2024    BUN 30 (H) 01/29/2024    CREATININE 1.18 (H) 01/29/2024     01/29/2024    K 3.9 01/29/2024     01/29/2024    CO2 27.0 01/29/2024    CALCIUM 9.8 01/29/2024    PROTEINTOT 6.6 01/29/2024    ALBUMIN 4.5 01/29/2024    ALT 13 01/29/2024    AST 27 01/29/2024    ALKPHOS 73 01/29/2024    BILITOT 0.3 01/29/2024    EGFR 46.5 (L) 01/29/2024    GLOB 2.1 01/29/2024    AGRATIO 2.1 01/29/2024    BCR 25.4 (H) 01/29/2024    ANIONGAP 11.0 01/29/2024      Lab Results   Component Value Date    CHOL 186 10/02/2023    CHLPL 157 03/10/2020    TRIG 89 10/02/2023    HDL 88 (H) 10/02/2023    LDL 82 10/02/2023     Lab Results   Component Value Date    TSH 2.050 10/02/2023     Lab Results   Component Value Date    HGBA1C 5.80 (H) 12/20/2022     BMI is within normal parameters. No other follow-up for BMI required.  Cryotherapy, Skin Lesion    Date/Time: 4/12/2024 11:10 AM    Performed by: Joseph Walker MD  Authorized by: Joseph Walker MD  Comments: Luma Grey gave verbal consent for this.  The area in the right upper chest was treated with liquid nitrogen with 2 freeze thaw cycles.  She tolerated this well and will follow-up as needed.            Assessment and Plan:   Today, we have reviewed her  care.  Overall, Luma Grey seems to be doing well.  We will refill her usual medications.  She did have blood work in the last couple of months that looked good.  No additional labs will be drawn today.  Regarding anxiety, clonazepam will be refilled for her.  Drug screen will be obtained today.  We will tentatively plan to see her back in about 6 months for recheck, sooner if needed.    Diagnoses and all orders for this visit:    1. Essential hypertension (Primary)  -     losartan (COZAAR) 50 MG tablet; Take 1 tablet by mouth Daily.  Dispense: 90 tablet; Refill: 3    2. Mixed hyperlipidemia  -     pravastatin (PRAVACHOL) 20 MG tablet; Take 1 tablet by mouth Daily.  Dispense: 90 tablet; Refill: 3    3. Allergic rhinitis, unspecified seasonality, unspecified trigger  -     Allergy Serum Injection  -     levocetirizine (XYZAL) 5 MG tablet; Take 1 tablet by mouth Every Evening.  Dispense: 90 tablet; Refill: 3    4. Generalized anxiety disorder  -     clonazePAM (KlonoPIN) 0.5 MG tablet; Take 1 tablet by mouth 2 (Two) Times a Day As Needed for Anxiety.  Dispense: 60 tablet; Refill: 5    5. Other long term (current) drug therapy  -     clonazePAM (KlonoPIN) 0.5 MG tablet; Take 1 tablet by mouth 2 (Two) Times a Day As Needed for Anxiety.  Dispense: 60 tablet; Refill: 5  -     POC Medline 12 Panel Urine Drug Screen    6. Menopausal and postmenopausal disorder  -     estradiol (ESTRACE) 1 MG tablet; Take 1 tablet by mouth Daily.  Dispense: 90 tablet; Refill: 3    7. GERD without esophagitis  -     pantoprazole (PROTONIX) 40 MG EC tablet; Take 1 tablet by mouth Daily.  Dispense: 90 tablet; Refill: 3    8. Actinic keratosis  -     Cryotherapy, Skin Lesion    Follow Up  Return in about 6 months (around 10/12/2024) for Recheck, Medicare Wellness.  Patient was given instructions and counseling regarding her condition or for health maintenance advice. Please see specific information pulled into the AVS if appropriate.

## 2024-04-15 NOTE — PROGRESS NOTES
Primary Care Provider  Joseph Walker MD     Referring Provider  No ref. provider found     Chief Complaint  Pneumonia, Bronchitis, and Follow-up (Pt here for 3-4 month follow up)    Subjective          History of Presenting Illness  Patient is a 83-year-old  female, patient Dr. Forbes's who presents for management of bronchiectasis who presents for follow-up visit today.  Since last office visit patient had chest CT scan completed on 4/24/2024.  Report states no acute intrathoracic process.  Patient states that since last visit her breathing is at baseline.  Patient states that she does get short of breath that is worse with exertion, mild to moderate in severity, and improved with rest. Patient states that she is taking Brovana and Pulmicort every day as prescribed and uses albuterol nebulizer treatments as needed.  Patient states that she did not  an albuterol inhaler due to the cost of medication.  Patient states that she is using her flutter valve with sodium chloride nebulizer treatments to assist with airway clearance.  Patient states that she is taking Xyzal, Flonase nasal spray, and Astelin nasal spray for seasonal allergies. Patient is on Protonix and Pepcid for reflux.  Patient denies fever, chills, night sweats, swollen glands in the head and neck, unintentional weight loss, hemoptysis, purulent sputum production, dysphagia, chest pain, palpitations, chest tightness, abdominal pain, nausea, vomiting, and diarrhea.  Patient also denies any myalgias, changes in sense of taste and/or smell, sore throat, any other coronavirus or flu-like symptoms.  Patient denies any leg swelling, orthopnea, paroxysmal nocturnal dyspnea.  Patient is able to perform activities of daily living.        Review of Systems     Family History   Problem Relation Age of Onset    Stroke Mother     Hypertension Mother     Arthritis Mother     Hearing loss Mother     Coronary artery disease Father          Social History     Socioeconomic History    Marital status:     Number of children: 2   Tobacco Use    Smoking status: Never     Passive exposure: Never    Smokeless tobacco: Never   Vaping Use    Vaping status: Never Used   Substance and Sexual Activity    Alcohol use: Never    Drug use: Never    Sexual activity: Yes     Partners: Male     Birth control/protection: Hysterectomy        Past Medical History:   Diagnosis Date    Congenital prolapsed rectum     COVID-19 07/01/2022    Essential hypertension     Generalized anxiety disorder     GERD without esophagitis     Hernia April, 2023    hiatal hernia    History of prolapse of bladder     Menopausal and postmenopausal disorder     Mixed hyperlipidemia         Immunization History   Administered Date(s) Administered    Arexvy (RSV, Adults 60+ yrs) 11/10/2023    COVID-19 (MODERNA) 1st,2nd,3rd Dose Monovalent 01/27/2021, 02/24/2021, 10/30/2021, 04/13/2022    COVID-19 (MODERNA) BIVALENT 12+YRS 10/19/2022    COVID-19 F23 (PFIZER) 12YRS+ (COMIRNATY) 10/19/2023    Fluad Quad 65+ 09/29/2022    Fluzone High Dose =>65 Years (Vaxcare ONLY) 10/16/2012, 09/25/2013, 09/28/2017, 09/24/2020    Fluzone High-Dose 65+yrs 10/02/2021, 10/17/2023    Hepatitis A 12/06/2018       Allergies   Allergen Reactions    Fosamax [Alendronate] GI Intolerance    Penicillins Rash          Current Outpatient Medications:     albuterol (PROVENTIL) (2.5 MG/3ML) 0.083% nebulizer solution, Take 2.5 mg by nebulization Every 4 (Four) Hours As Needed for Wheezing or Shortness of Air for up to 30 days., Disp: 180 each, Rfl: 5    amLODIPine (NORVASC) 2.5 MG tablet, Take 1 tablet by mouth Daily., Disp: 90 tablet, Rfl: 3    arformoterol (BROVANA) 15 MCG/2ML nebulizer solution, Take 2 mL by nebulization 2 (Two) Times a Day for 30 days., Disp: 120 mL, Rfl: 5    azelastine (ASTELIN) 0.1 % nasal spray, 1 spray into the nostril(s) as directed by provider 2 (Two) Times a Day., Disp: , Rfl:     budesonide  (PULMICORT) 0.5 MG/2ML nebulizer solution, Take 2 mL by nebulization 2 (Two) Times a Day for 30 days. Rinse mouth out after each use, Disp: 120 mL, Rfl: 5    clonazePAM (KlonoPIN) 0.5 MG tablet, Take 1 tablet by mouth 2 (Two) Times a Day As Needed for Anxiety., Disp: 60 tablet, Rfl: 5    estradiol (ESTRACE) 1 MG tablet, Take 1 tablet by mouth Daily., Disp: 90 tablet, Rfl: 3    famotidine (Pepcid) 40 MG tablet, Take 1 tablet by mouth 2 (Two) Times a Day., Disp: 180 tablet, Rfl: 3    fluticasone (FLONASE) 50 MCG/ACT nasal spray, 2 sprays into the nostril(s) as directed by provider Daily., Disp: , Rfl:     levocetirizine (XYZAL) 5 MG tablet, Take 1 tablet by mouth Every Evening., Disp: 90 tablet, Rfl: 3    losartan (COZAAR) 50 MG tablet, Take 1 tablet by mouth Daily., Disp: 90 tablet, Rfl: 3    multivitamin with minerals tablet tablet, Take 1 tablet by mouth Daily., Disp: , Rfl:     ondansetron ODT (ZOFRAN-ODT) 4 MG disintegrating tablet, Place 1 tablet on the tongue Every 6 (Six) Hours As Needed for Nausea or Vomiting., Disp: 30 tablet, Rfl: 3    pantoprazole (PROTONIX) 40 MG EC tablet, Take 1 tablet by mouth Daily., Disp: 90 tablet, Rfl: 3    pravastatin (PRAVACHOL) 20 MG tablet, Take 1 tablet by mouth Daily., Disp: 90 tablet, Rfl: 3    sodium chloride 3 % nebulizer solution, Take 4 mL by nebulization 2 (Two) Times a Day., Disp: , Rfl:      Objective     Physical Exam  Vital Signs:   WDWN, Alert, NAD.    HEENT:  PERRL, EOMI.  OP, nares clear, no sinus tenderness  Neck:  Supple, no JVD, no thyromegaly.  Lymph: no axillary, cervical, supraclavicular lymphadenopathy noted bilaterally  Chest:  good aeration, clear to auscultation bilaterally, tympanic to percussion bilaterally, no work of breathing noted  CV: RRR, no MGR, pulses 2+, equal.  Abd:  Soft, NT, ND, + BS, no HSM  EXT:  no clubbing, no cyanosis, no edema, no joint tenderness  Neuro:  A&Ox3, CN grossly intact, no focal deficits.  Skin: No rashes or lesions  "noted.    /54 (BP Location: Left arm, Patient Position: Sitting, Cuff Size: Adult)   Pulse 76   Temp 98.2 °F (36.8 °C) (Oral)   Resp 16   Ht 154.9 cm (61\")   Wt 52.7 kg (116 lb 3.2 oz)   SpO2 98% Comment: room air  BMI 21.96 kg/m²         Result Review :   I have reviewed my last office visit note.  I also reviewed chest CT report dated 4/24/2024.  See scanned report.      Procedures:            Assessment and Plan      Assessment:  1.  Bronchiectasis on chest CT scan dated from 5/11/2021.  Patient does have a flutter valve sodium chloride nebulizer treatments.  Patient declines a chest vest at this time.  2.  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   3.  History of fungal pneumonia, treated for two months of [place] with antifungal associated issues.      4.  GERD.  Patient is on a PPI.        5.  Mucus plugging. Patient has a flutter valve and sodium chloride nebulizer treatments.     6.  Airway clearance impairment.  7.  Seasonal allergies on allergy immunotherapy.      8.  Dyspnea, improved since bronchoscopy per patient report.  9. Abnormal chest CT scan: New mild subpleural bilateral lobe groundglass opacities.  10.  Never smoker.            Plan:  1. Continue Brovana and Pulmicort everyday as prescribed and rinse her mouth out after each use.    2.  Continue albuterol nebulizer treatments as needed.    3.  Continue Astelin nasal spray, Flonase nasal spray, and Xyzal as prescribed.  Continue allergy immunotherapy and to follow up with allergist as scheduled.   4. Continue flutter valve with nebulizer treatments to assist with airway clearance.  Patient declines a chest vest.  5.  For GERD,  patient to continue PPI.   Patient is also advised to sleep with the head of the bed elevated and do not eat 3-4 hours prior to bedtime.  6.  Vaccination status: patient reports they are up-to-date " with RSV, flu, pneumonia, and Covid vaccines.  Patient is advised to continue to follow CDC recommendations such as social distancing wearing a mask and washing hands for at least 20 seconds.  7.  Smoking status: never smoker.  8.  Patient to call the office, 911, or go to the ER with new or worsening symptoms.  9.  Follow-up in 6 months, sooner if needed.             Follow Up   Return in about 6 months (around 11/2/2024) for in [place].  Patient was given instructions and counseling regarding her condition or for health maintenance advice. Please see specific information pulled into the AVS if appropriate.

## 2024-04-19 ENCOUNTER — CLINICAL SUPPORT (OUTPATIENT)
Dept: FAMILY MEDICINE CLINIC | Age: 84
End: 2024-04-19
Payer: MEDICARE

## 2024-04-19 DIAGNOSIS — J30.9 ALLERGIC RHINITIS, UNSPECIFIED SEASONALITY, UNSPECIFIED TRIGGER: Primary | ICD-10-CM

## 2024-04-19 PROCEDURE — 95115 IMMUNOTHERAPY ONE INJECTION: CPT | Performed by: FAMILY MEDICINE

## 2024-04-24 ENCOUNTER — HOSPITAL ENCOUNTER (OUTPATIENT)
Dept: CT IMAGING | Facility: HOSPITAL | Age: 84
Discharge: HOME | End: 2024-04-24
Admitting: NURSE PRACTITIONER
Payer: MEDICARE

## 2024-04-24 ENCOUNTER — CLINICAL SUPPORT (OUTPATIENT)
Dept: FAMILY MEDICINE CLINIC | Age: 84
End: 2024-04-24
Payer: MEDICARE

## 2024-04-24 DIAGNOSIS — R93.89 ABNORMAL CT SCAN, CHEST: ICD-10-CM

## 2024-04-24 DIAGNOSIS — J18.9 PNEUMONIA DUE TO INFECTIOUS ORGANISM, UNSPECIFIED LATERALITY, UNSPECIFIED PART OF LUNG: ICD-10-CM

## 2024-04-24 DIAGNOSIS — J30.9 ALLERGIC RHINITIS, UNSPECIFIED SEASONALITY, UNSPECIFIED TRIGGER: Primary | ICD-10-CM

## 2024-04-24 PROCEDURE — 95115 IMMUNOTHERAPY ONE INJECTION: CPT | Performed by: FAMILY MEDICINE

## 2024-04-24 PROCEDURE — 71250 CT THORAX DX C-: CPT

## 2024-04-29 ENCOUNTER — CLINICAL SUPPORT (OUTPATIENT)
Dept: FAMILY MEDICINE CLINIC | Age: 84
End: 2024-04-29
Payer: MEDICARE

## 2024-04-29 ENCOUNTER — TELEPHONE (OUTPATIENT)
Dept: PULMONOLOGY | Facility: CLINIC | Age: 84
End: 2024-04-29
Payer: MEDICARE

## 2024-04-29 ENCOUNTER — OFFICE VISIT (OUTPATIENT)
Dept: GASTROENTEROLOGY | Facility: CLINIC | Age: 84
End: 2024-04-29
Payer: MEDICARE

## 2024-04-29 VITALS
DIASTOLIC BLOOD PRESSURE: 67 MMHG | HEIGHT: 61 IN | SYSTOLIC BLOOD PRESSURE: 128 MMHG | BODY MASS INDEX: 21.71 KG/M2 | WEIGHT: 115 LBS | HEART RATE: 73 BPM

## 2024-04-29 DIAGNOSIS — K44.9 HIATAL HERNIA: ICD-10-CM

## 2024-04-29 DIAGNOSIS — K59.04 CHRONIC IDIOPATHIC CONSTIPATION: ICD-10-CM

## 2024-04-29 DIAGNOSIS — R11.0 NAUSEA: ICD-10-CM

## 2024-04-29 DIAGNOSIS — K21.9 GERD WITHOUT ESOPHAGITIS: Primary | ICD-10-CM

## 2024-04-29 DIAGNOSIS — J30.9 ALLERGIC RHINITIS, UNSPECIFIED SEASONALITY, UNSPECIFIED TRIGGER: Primary | ICD-10-CM

## 2024-04-29 PROCEDURE — 3078F DIAST BP <80 MM HG: CPT | Performed by: NURSE PRACTITIONER

## 2024-04-29 PROCEDURE — 1160F RVW MEDS BY RX/DR IN RCRD: CPT | Performed by: NURSE PRACTITIONER

## 2024-04-29 PROCEDURE — 95115 IMMUNOTHERAPY ONE INJECTION: CPT | Performed by: FAMILY MEDICINE

## 2024-04-29 PROCEDURE — 3074F SYST BP LT 130 MM HG: CPT | Performed by: NURSE PRACTITIONER

## 2024-04-29 PROCEDURE — 99213 OFFICE O/P EST LOW 20 MIN: CPT | Performed by: NURSE PRACTITIONER

## 2024-04-29 PROCEDURE — 1159F MED LIST DOCD IN RCRD: CPT | Performed by: NURSE PRACTITIONER

## 2024-04-29 NOTE — TELEPHONE ENCOUNTER
"Caller: Luma Cruz \"Luma Grey\"      Relationship: self     Best call back number: [phone]      Who are you requesting to speak with (clinical staff, provider,  specific staff member): joshua     What was the call regarding: PT CALLED REQ TO SPEAK WITH JOSHUA REGARDING RESULTS, WOULD LIKE A CALL BACK. PT ALSO STATED IF NO CALL BACK IT IS OKAY TO LEAVE A DETAILED MESSAGE ON VOICEMAIL.   "

## 2024-04-29 NOTE — PROGRESS NOTES
Chief Complaint   Heartburn and Constipation    History of Present Illness       Luma Cruz is a 83 y.o. female who presents to [facility] for follow-up for constipation. She was last seen in the office by me on 1/29/24.     She underwent EGD and colonoscopy with Dr. Lindsey on 4/3/2023.  EGD showed a large hiatal hernia.  Otherwise normal exam.  Colonoscopy showed diverticulosis.  Otherwise normal.  No biopsies taken.  No recall colonoscopy due to age.     After the scopes she went to Dr. Reeder and he recommended surgery. At her age she didn't want to proceed with that and went to /[facility] for a third opinion. She underwent EGD with manometry with Dr. Velez on 8/18/23. It was normal. Couldn't do BRAVO due to esophageal size. Esophageal manometry normal.      GERD/hiatal hernia--- Protonix 40 mg and Pepcid 20 mg. She admits she is nauseous and as soon as she starts eating or drinking she will start burping. BOOST makes it worse. She admits the nausea always comes after she eats or drinks. + globus sensation. She denies any dysphagia.      She admits her bowels are slow. She tells me she passes little balls most of the time. She admits she has been constipated all her life. She denies any rectal bleeding or melena.      GI family history---Denies any      Doing better today. Bowels moving better with miralax. Now having issues with bladder and rectal prolapse. She is doing pelvic floor therapy and she thinks a pessary will help. She admits the nausea is better since finishing the antibiotics. Doing well with pepcid 40 mg BID.      Results       Result Review :       CMP          10/2/2023    09:46 1/29/2024    14:11   CMP   Glucose 88  114    BUN 22  30    Creatinine 0.98  1.18    EGFR 58.5  46.5    Sodium 141  143    Potassium 4.4  3.9    Chloride 103  105    Calcium 9.8  9.8    Total Protein 7.0  6.6    Albumin 4.3  4.5    Globulin 2.7  2.1    Total Bilirubin 0.4  0.3   "  Alkaline Phosphatase 60  73    AST (SGOT) 23  27    ALT (SGPT) 15  13    Albumin/Globulin Ratio 1.6  2.1    BUN/Creatinine Ratio 22.4  25.4    Anion Gap 10.5  11.0      CBC          10/2/2023    09:46 1/29/2024    14:11   CBC   WBC 7.45  5.82    RBC 4.45  4.34    Hemoglobin 12.8  12.3    Hematocrit 40.3  38.7    MCV 90.6  89.2    MCH 28.8  28.3    MCHC 31.8  31.8    RDW 12.9  12.2    Platelets 247  242      Lipid Panel          10/2/2023    09:46   Lipid Panel   Total Cholesterol 186    Triglycerides 89    HDL Cholesterol 88    VLDL Cholesterol 16    LDL Cholesterol  82    LDL/HDL Ratio 0.91      TSH          10/2/2023    09:46   TSH   TSH 2.050        Lipase   Lipase   Date Value Ref Range Status   04/30/2023 219 73 - 393 U/L Final     Amylase   Amylase   Date Value Ref Range Status   02/10/2023 88 28 - 100 U/L Final     Iron Profile No results found for: \"IRON\", \"TIBC\", \"LABIRON\", \"TRANSFERRIN\"  Ferritin No results found for: \"FERRITIN\"            Past Medical History       Past Medical History:   Diagnosis Date    COVID-19 07/01/2022    Essential hypertension     Generalized anxiety disorder     GERD without esophagitis     Hernia April, 2023    hiatal hernia    Menopausal and postmenopausal disorder     Mixed hyperlipidemia        Past Surgical History:   Procedure Laterality Date    APPENDECTOMY  10 years ago    BREAST BIOPSY  right side, 15 years ago    BRONCHOSCOPY Bilateral 2/20/2024    Procedure: BRONCHOSCOPY WITH BRONCHOALVEOLAR LAVAGE AND WASHINGS;  Surgeon: Enmanuel Forbes MD;  Location: [facility] ENDOSCOPY;  Service: Pulmonary;  Laterality: Bilateral;  MUCUS PLUGGING    CHOLECYSTECTOMY  unknown    COLONOSCOPY  06/2011    COLONOSCOPY N/A 04/03/2023    Procedure: COLONOSCOPY;  Surgeon: Cory Lindsey MD;  Location: [facility] ENDOSCOPY;  Service: General;  Laterality: N/A;  normal    ENDOSCOPY N/A 04/03/2023    Hiatal hernia, mild reactive gastropathy    HYSTERECTOMY      complete    TONSILLECTOMY AND " ADENOIDECTOMY           Current Outpatient Medications:     albuterol sulfate  (90 Base) MCG/ACT inhaler, Inhale 2 puffs Every 4 (Four) Hours As Needed for Wheezing., Disp: , Rfl:     amLODIPine (NORVASC) 2.5 MG tablet, Take 1 tablet by mouth Daily., Disp: 90 tablet, Rfl: 3    arformoterol (BROVANA) 15 MCG/2ML nebulizer solution, Take 2 mL by nebulization 2 (Two) Times a Day., Disp: , Rfl:     azelastine (ASTELIN) 0.1 % nasal spray, 1 spray into the nostril(s) as directed by provider 2 (Two) Times a Day., Disp: , Rfl:     clonazePAM (KlonoPIN) 0.5 MG tablet, Take 1 tablet by mouth 2 (Two) Times a Day As Needed for Anxiety., Disp: 60 tablet, Rfl: 5    estradiol (ESTRACE) 1 MG tablet, Take 1 tablet by mouth Daily., Disp: 90 tablet, Rfl: 3    famotidine (Pepcid) 40 MG tablet, Take 1 tablet by mouth 2 (Two) Times a Day., Disp: 180 tablet, Rfl: 3    fluticasone (FLONASE) 50 MCG/ACT nasal spray, 2 sprays into the nostril(s) as directed by provider Daily., Disp: , Rfl:     levocetirizine (XYZAL) 5 MG tablet, Take 1 tablet by mouth Every Evening., Disp: 90 tablet, Rfl: 3    losartan (COZAAR) 50 MG tablet, Take 1 tablet by mouth Daily., Disp: 90 tablet, Rfl: 3    multivitamin with minerals tablet tablet, Take 1 tablet by mouth Daily., Disp: , Rfl:     ondansetron ODT (ZOFRAN-ODT) 4 MG disintegrating tablet, Place 1 tablet on the tongue Every 6 (Six) Hours As Needed for Nausea or Vomiting., Disp: 30 tablet, Rfl: 3    pantoprazole (PROTONIX) 40 MG EC tablet, Take 1 tablet by mouth Daily., Disp: 90 tablet, Rfl: 3    pravastatin (PRAVACHOL) 20 MG tablet, Take 1 tablet by mouth Daily., Disp: 90 tablet, Rfl: 3    sodium chloride 3 % nebulizer solution, Take 4 mL by nebulization 2 (Two) Times a Day., Disp: , Rfl:     budesonide (PULMICORT) 0.5 MG/2ML nebulizer solution, Take 2 mL by nebulization 2 (Two) Times a Day for 30 days. Rinse mouth out after each use, Disp: 60 each, Rfl: 11     Allergies   Allergen Reactions     "Fosamax [Alendronate] GI Intolerance    Penicillins Rash       Family History   Problem Relation Age of Onset    Stroke Mother     Hypertension Mother     Arthritis Mother     Hearing loss Mother     Coronary artery disease Father         Social History     Social History Narrative    Not on file       Objective       Review of Systems   Constitutional:  Negative for appetite change, fatigue, fever, unexpected weight gain and unexpected weight loss.   HENT:  Negative for trouble swallowing.    Respiratory:  Negative for cough, choking, chest tightness, shortness of breath, wheezing and stridor.    Cardiovascular:  Negative for chest pain, palpitations and leg swelling.   Gastrointestinal:  Negative for abdominal distention, abdominal pain, anal bleeding, blood in stool, constipation, diarrhea, nausea, rectal pain, vomiting, GERD and indigestion.        Vital Signs:   /67 (BP Location: Right arm, Patient Position: Sitting, Cuff Size: Adult)   Pulse 73   Ht 154.9 cm (61\")   Wt 52.2 kg (115 lb)   BMI 21.73 kg/m²       Physical Exam  Constitutional:       General: She is not in acute distress.     Appearance: She is well-developed. She is not ill-appearing.   HENT:      Head: Normocephalic.   Eyes:      Pupils: Pupils are equal, round, and reactive to light.   Cardiovascular:      Rate and Rhythm: Normal rate and regular rhythm.      Heart sounds: Normal heart sounds.   Pulmonary:      Effort: Pulmonary effort is normal.      Breath sounds: Normal breath sounds.   Abdominal:      General: Bowel sounds are normal. There is no distension.      Palpations: Abdomen is soft. There is no mass.      Tenderness: There is no abdominal tenderness. There is no guarding or rebound.      Hernia: No hernia is present.   Musculoskeletal:         General: Normal range of motion.   Skin:     General: Skin is warm and dry.   Neurological:      Mental Status: She is alert and oriented to person, place, and time.   Psychiatric:   "       Speech: Speech normal.         Behavior: Behavior normal.         Judgment: Judgment normal.           Assessment & Plan          Assessment and Plan    Diagnoses and all orders for this visit:    1. GERD without esophagitis (Primary)    2. Chronic idiopathic constipation    3. Nausea    4. Hiatal hernia      Overall she seems to be doing so much better.  Reflux is well-controlled on Pepcid 40 mg twice daily.  Continue GERD precautions.  No longer having any nausea.  Constipation is much better now on MiraLAX as needed.  Continue high-fiber diet.  Patient to call the office with any issues.  Patient to follow-up with me in 1 year.  Patient is agreeable to the plan.          Follow Up       Follow Up   Return in about 1 year (around 4/29/2025) for CONSTIPATION, GERD.  Patient was given instructions and counseling regarding her condition or for health maintenance advice. Please see specific information pulled into the AVS if appropriate.

## 2024-05-02 ENCOUNTER — OFFICE VISIT (OUTPATIENT)
Dept: PULMONOLOGY | Facility: CLINIC | Age: 84
End: 2024-05-02
Payer: MEDICARE

## 2024-05-02 VITALS
OXYGEN SATURATION: 98 % | DIASTOLIC BLOOD PRESSURE: 54 MMHG | WEIGHT: 116.2 LBS | RESPIRATION RATE: 16 BRPM | BODY MASS INDEX: 21.94 KG/M2 | HEART RATE: 76 BPM | TEMPERATURE: 98.2 F | SYSTOLIC BLOOD PRESSURE: 131 MMHG | HEIGHT: 61 IN

## 2024-05-02 DIAGNOSIS — J47.9 BRONCHIECTASIS WITHOUT COMPLICATION: ICD-10-CM

## 2024-05-02 DIAGNOSIS — R93.89 ABNORMAL CT SCAN, CHEST: ICD-10-CM

## 2024-05-02 DIAGNOSIS — Z87.01 HISTORY OF FUNGAL PNEUMONIA: ICD-10-CM

## 2024-05-02 DIAGNOSIS — K21.9 GERD WITHOUT ESOPHAGITIS: ICD-10-CM

## 2024-05-02 DIAGNOSIS — T17.500A MUCUS PLUGGING OF BRONCHI: ICD-10-CM

## 2024-05-02 DIAGNOSIS — R06.89 AIRWAY CLEARANCE IMPAIRMENT: Primary | ICD-10-CM

## 2024-05-02 RX ORDER — ALBUTEROL SULFATE 2.5 MG/3ML
2.5 SOLUTION RESPIRATORY (INHALATION) EVERY 4 HOURS PRN
Qty: 180 EACH | Refills: 5 | Status: SHIPPED | OUTPATIENT
Start: 2024-05-02 | End: 2024-06-01

## 2024-05-02 RX ORDER — ARFORMOTEROL TARTRATE 15 UG/2ML
15 SOLUTION RESPIRATORY (INHALATION)
Qty: 120 ML | Refills: 5 | Status: SHIPPED | OUTPATIENT
Start: 2024-05-02 | End: 2024-06-01

## 2024-05-02 RX ORDER — ALBUTEROL SULFATE 2.5 MG/3ML
2.5 SOLUTION RESPIRATORY (INHALATION) EVERY 4 HOURS PRN
COMMUNITY
End: 2024-05-02 | Stop reason: SDUPTHER

## 2024-05-02 RX ORDER — BUDESONIDE 0.5 MG/2ML
0.5 INHALANT ORAL 2 TIMES DAILY
Qty: 120 ML | Refills: 5 | Status: SHIPPED | OUTPATIENT
Start: 2024-05-02 | End: 2024-06-01

## 2024-05-08 ENCOUNTER — CLINICAL SUPPORT (OUTPATIENT)
Dept: FAMILY MEDICINE CLINIC | Age: 84
End: 2024-05-08
Payer: MEDICARE

## 2024-05-08 DIAGNOSIS — J30.9 ALLERGIC RHINITIS, UNSPECIFIED SEASONALITY, UNSPECIFIED TRIGGER: Primary | ICD-10-CM

## 2024-05-08 PROCEDURE — 95115 IMMUNOTHERAPY ONE INJECTION: CPT | Performed by: FAMILY MEDICINE

## 2024-05-17 ENCOUNTER — TELEPHONE (OUTPATIENT)
Dept: PULMONOLOGY | Facility: CLINIC | Age: 84
End: 2024-05-17

## 2024-05-17 NOTE — TELEPHONE ENCOUNTER
"Caller: Luma Cruz \"Luma Grey\"    Relationship to patient: Self    Best call back number: [phone]     Patient is needing: USING FLUTTER VALVE AND STATES THAT SHE IS NO LONGER ABLE TO BLOW IN IT. GERARDO NEEDS A NEW SCRIPT SENT. PLEASE CALL BACK TO ADVISE.         "

## 2024-05-20 DIAGNOSIS — R11.0 NAUSEA: ICD-10-CM

## 2024-05-20 RX ORDER — ONDANSETRON 4 MG/1
4 TABLET, ORALLY DISINTEGRATING ORAL EVERY 6 HOURS PRN
Qty: 30 TABLET | Refills: 3 | Status: SHIPPED | OUTPATIENT
Start: 2024-05-20

## 2024-05-24 ENCOUNTER — CLINICAL SUPPORT (OUTPATIENT)
Dept: FAMILY MEDICINE CLINIC | Age: 84
End: 2024-05-24
Payer: MEDICARE

## 2024-05-24 DIAGNOSIS — J30.9 ALLERGIC RHINITIS, UNSPECIFIED SEASONALITY, UNSPECIFIED TRIGGER: Primary | ICD-10-CM

## 2024-05-24 PROCEDURE — 95115 IMMUNOTHERAPY ONE INJECTION: CPT | Performed by: FAMILY MEDICINE

## 2024-05-29 ENCOUNTER — CLINICAL SUPPORT (OUTPATIENT)
Dept: FAMILY MEDICINE CLINIC | Age: 84
End: 2024-05-29
Payer: MEDICARE

## 2024-05-29 DIAGNOSIS — J30.9 ALLERGIC RHINITIS, UNSPECIFIED SEASONALITY, UNSPECIFIED TRIGGER: Primary | ICD-10-CM

## 2024-05-29 PROCEDURE — 95115 IMMUNOTHERAPY ONE INJECTION: CPT | Performed by: FAMILY MEDICINE

## 2024-06-06 ENCOUNTER — CLINICAL SUPPORT (OUTPATIENT)
Dept: FAMILY MEDICINE CLINIC | Age: 84
End: 2024-06-06
Payer: MEDICARE

## 2024-06-06 DIAGNOSIS — J30.9 ALLERGIC RHINITIS, UNSPECIFIED SEASONALITY, UNSPECIFIED TRIGGER: Primary | ICD-10-CM

## 2024-06-06 PROCEDURE — 95115 IMMUNOTHERAPY ONE INJECTION: CPT | Performed by: FAMILY MEDICINE

## 2024-06-13 ENCOUNTER — CLINICAL SUPPORT (OUTPATIENT)
Dept: FAMILY MEDICINE CLINIC | Age: 84
End: 2024-06-13
Payer: MEDICARE

## 2024-06-13 DIAGNOSIS — J30.9 ALLERGIC RHINITIS, UNSPECIFIED SEASONALITY, UNSPECIFIED TRIGGER: Primary | ICD-10-CM

## 2024-06-13 PROCEDURE — 95115 IMMUNOTHERAPY ONE INJECTION: CPT | Performed by: FAMILY MEDICINE

## 2024-06-20 ENCOUNTER — CLINICAL SUPPORT (OUTPATIENT)
Dept: FAMILY MEDICINE CLINIC | Age: 84
End: 2024-06-20
Payer: MEDICARE

## 2024-06-20 DIAGNOSIS — J30.9 ALLERGIC RHINITIS, UNSPECIFIED SEASONALITY, UNSPECIFIED TRIGGER: Primary | ICD-10-CM

## 2024-06-20 PROCEDURE — 95115 IMMUNOTHERAPY ONE INJECTION: CPT | Performed by: FAMILY MEDICINE

## 2024-06-27 ENCOUNTER — CLINICAL SUPPORT (OUTPATIENT)
Dept: FAMILY MEDICINE CLINIC | Age: 84
End: 2024-06-27
Payer: MEDICARE

## 2024-06-27 DIAGNOSIS — J30.9 ALLERGIC RHINITIS, UNSPECIFIED SEASONALITY, UNSPECIFIED TRIGGER: Primary | ICD-10-CM

## 2024-06-27 PROCEDURE — 95115 IMMUNOTHERAPY ONE INJECTION: CPT | Performed by: FAMILY MEDICINE

## 2024-07-05 ENCOUNTER — CLINICAL SUPPORT (OUTPATIENT)
Dept: FAMILY MEDICINE CLINIC | Age: 84
End: 2024-07-05
Payer: MEDICARE

## 2024-07-05 DIAGNOSIS — J30.9 ALLERGIC RHINITIS, UNSPECIFIED SEASONALITY, UNSPECIFIED TRIGGER: Primary | ICD-10-CM

## 2024-07-05 PROCEDURE — 95115 IMMUNOTHERAPY ONE INJECTION: CPT | Performed by: FAMILY MEDICINE

## 2024-07-11 ENCOUNTER — CLINICAL SUPPORT (OUTPATIENT)
Dept: FAMILY MEDICINE CLINIC | Age: 84
End: 2024-07-11
Payer: MEDICARE

## 2024-07-11 DIAGNOSIS — J30.9 ALLERGIC RHINITIS, UNSPECIFIED SEASONALITY, UNSPECIFIED TRIGGER: Primary | ICD-10-CM

## 2024-07-11 PROCEDURE — 95115 IMMUNOTHERAPY ONE INJECTION: CPT | Performed by: FAMILY MEDICINE

## 2024-07-18 ENCOUNTER — CLINICAL SUPPORT (OUTPATIENT)
Dept: FAMILY MEDICINE CLINIC | Age: 84
End: 2024-07-18
Payer: MEDICARE

## 2024-07-18 DIAGNOSIS — J30.9 ALLERGIC RHINITIS, UNSPECIFIED SEASONALITY, UNSPECIFIED TRIGGER: Primary | ICD-10-CM

## 2024-07-18 PROCEDURE — 95115 IMMUNOTHERAPY ONE INJECTION: CPT | Performed by: FAMILY MEDICINE

## 2024-07-26 ENCOUNTER — OFFICE VISIT (OUTPATIENT)
Dept: FAMILY MEDICINE CLINIC | Age: 84
End: 2024-07-26
Payer: MEDICARE

## 2024-07-26 VITALS
HEIGHT: 61 IN | DIASTOLIC BLOOD PRESSURE: 62 MMHG | HEART RATE: 70 BPM | BODY MASS INDEX: 21.9 KG/M2 | SYSTOLIC BLOOD PRESSURE: 147 MMHG | WEIGHT: 116 LBS | OXYGEN SATURATION: 100 % | TEMPERATURE: 97.8 F

## 2024-07-26 DIAGNOSIS — M19.041 PRIMARY OSTEOARTHRITIS OF BOTH HANDS: Primary | ICD-10-CM

## 2024-07-26 DIAGNOSIS — J30.9 ALLERGIC RHINITIS, UNSPECIFIED SEASONALITY, UNSPECIFIED TRIGGER: ICD-10-CM

## 2024-07-26 DIAGNOSIS — M19.042 PRIMARY OSTEOARTHRITIS OF BOTH HANDS: Primary | ICD-10-CM

## 2024-07-26 PROCEDURE — 99213 OFFICE O/P EST LOW 20 MIN: CPT | Performed by: NURSE PRACTITIONER

## 2024-07-26 PROCEDURE — 3077F SYST BP >= 140 MM HG: CPT | Performed by: NURSE PRACTITIONER

## 2024-07-26 PROCEDURE — 95115 IMMUNOTHERAPY ONE INJECTION: CPT | Performed by: NURSE PRACTITIONER

## 2024-07-26 PROCEDURE — 1126F AMNT PAIN NOTED NONE PRSNT: CPT | Performed by: NURSE PRACTITIONER

## 2024-07-26 PROCEDURE — 3078F DIAST BP <80 MM HG: CPT | Performed by: NURSE PRACTITIONER

## 2024-07-26 RX ORDER — METHYLPREDNISOLONE 4 MG/1
TABLET ORAL DAILY
Qty: 21 TABLET | Refills: 0 | Status: SHIPPED | OUTPATIENT
Start: 2024-07-26

## 2024-07-26 RX ORDER — ARFORMOTEROL TARTRATE 15 UG/2ML
15 SOLUTION RESPIRATORY (INHALATION)
COMMUNITY

## 2024-07-26 RX ORDER — ESTRADIOL 0.1 MG/G
CREAM VAGINAL 2 TIMES WEEKLY
COMMUNITY
Start: 2024-07-08

## 2024-07-26 NOTE — PROGRESS NOTES
"Luma Cruz presents to [facility] with complaint of  Hand Pain (Left hand )    SUBJECTIVE  Hand Pain   There was no injury mechanism (knots on hands painful). The pain is present in the right fingers and left fingers (left thumb hurting the most). The quality of the pain is described as stabbing, shooting and aching. The pain does not radiate. The pain is severe. The pain has been Constant since the incident. Pertinent negatives include no chest pain, muscle weakness, numbness or tingling. The symptoms are aggravated by movement and palpation. She has tried acetaminophen (1000 mg bid) for the symptoms. The treatment provided no relief.       OBJECTIVE  Vital Signs:   /62 (BP Location: Right arm, Patient Position: Sitting)   Pulse 70   Temp 97.8 °F (36.6 °C) (Oral)   Ht 154.9 cm (60.98\")   Wt 52.6 kg (116 lb)   SpO2 100%   BMI 21.93 kg/m²       Physical Exam  Vitals reviewed.   Constitutional:       General: She is not in acute distress.     Appearance: Normal appearance. She is not ill-appearing.   HENT:      Head: Normocephalic and atraumatic.      Nose: Nose normal.      Mouth/Throat:      Mouth: Mucous membranes are moist.      Pharynx: Oropharynx is clear.   Cardiovascular:      Rate and Rhythm: Normal rate.   Pulmonary:      Effort: Pulmonary effort is normal.   Musculoskeletal:      Right hand: Deformity and bony tenderness present. No swelling.      Left hand: Deformity and bony tenderness present. No swelling.      Cervical back: Neck supple.      Comments: Blaire and heberden nodules present to both hands    Skin:     General: Skin is warm and dry.   Neurological:      General: No focal deficit present.      Mental Status: She is alert and oriented to person, place, and time. Mental status is at baseline.   Psychiatric:         Mood and Affect: Mood normal.         Behavior: Behavior normal.         Judgment: Judgment normal.              ASSESSMENT AND " PLAN:  Diagnoses and all orders for this visit:    1. Primary osteoarthritis of both hands (Primary)  -     methylPREDNISolone (MEDROL) 4 MG dose pack; Take by mouth as directed by package instructions.  Dispense: 21 tablet; Refill: 0  -     Diclofenac Sodium (Voltaren) 1 % gel gel; Apply 4 g topically to the appropriate area as directed 4 (Four) Times a Day As Needed (hand pain).  Dispense: 100 g; Refill: 1    2. Allergic rhinitis, unspecified seasonality, unspecified trigger  -     Allergy Serum Injection      Recommend taking 1,000 mg of tylenol in middle of day I addition to AM and PM dose. She is asking for pain medication or steroid. She was Rxd medrol pack but discussed that this may not be overly helpful given the severity of her OA. She is established with pain management, was encouraged to make apt there as she has received finger injections there in the past she says. She was also given voltaren gel to try.       Follow Up   No follow-ups on file. Patient to notify office with any acute concerns or issues.  Patient verbalizes understanding, agrees with plan of care and has no further questions upon discharge.     Patient was given instructions and counseling regarding her condition or for health maintenance advice. Please see specific information pulled into the AVS if appropriate.     Discussed the importance of following up with any needed screening tests/labs/specialist appointments and any requested follow-up recommended by me today. Importance of maintaining follow-up discussed and patient accepts that missed appointments can delay diagnosis and potentially lead to worsening of conditions.    Part of this note may be an electronic transcription/translation of spoken language to printed text using the Dragon Dictation System.

## 2024-08-02 ENCOUNTER — CLINICAL SUPPORT (OUTPATIENT)
Dept: FAMILY MEDICINE CLINIC | Age: 84
End: 2024-08-02
Payer: MEDICARE

## 2024-08-02 DIAGNOSIS — J30.9 ALLERGIC RHINITIS, UNSPECIFIED SEASONALITY, UNSPECIFIED TRIGGER: Primary | ICD-10-CM

## 2024-08-02 PROCEDURE — 95115 IMMUNOTHERAPY ONE INJECTION: CPT | Performed by: FAMILY MEDICINE

## 2024-08-05 DIAGNOSIS — F41.1 GENERALIZED ANXIETY DISORDER: ICD-10-CM

## 2024-08-05 DIAGNOSIS — Z79.899 OTHER LONG TERM (CURRENT) DRUG THERAPY: ICD-10-CM

## 2024-08-05 NOTE — TELEPHONE ENCOUNTER
"    Caller: Luma Cruz \"Luma Grey\"    Relationship: Self    Best call back number: [phone]    Requested Prescriptions:   Requested Prescriptions     Pending Prescriptions Disp Refills    clonazePAM (KlonoPIN) 0.5 MG tablet 60 tablet 5     Sig: Take 1 tablet by mouth 2 (Two) Times a Day As Needed for Anxiety.        Pharmacy where request should be sent: [facility] [address] [phone] [facility] [phone]      Last office visit with prescribing clinician: 4/12/2024   Last telemedicine visit with prescribing clinician: Visit date not found   Next office visit with prescribing clinician: 10/10/2024     Additional details provided by patient:     Does the patient have less than a 3 day supply:  [] Yes  [] No    Would you like a call back once the refill request has been completed: [] Yes [] No    If the office needs to give you a call back, can they leave a voicemail: [] Yes [] No    Jesse Apodaca   08/05/24 16:27 EDT         "

## 2024-08-06 RX ORDER — CLONAZEPAM 0.5 MG/1
0.5 TABLET ORAL 2 TIMES DAILY PRN
Qty: 60 TABLET | Refills: 1 | Status: SHIPPED | OUTPATIENT
Start: 2024-08-06

## 2024-08-08 ENCOUNTER — CLINICAL SUPPORT (OUTPATIENT)
Dept: FAMILY MEDICINE CLINIC | Age: 84
End: 2024-08-08
Payer: MEDICARE

## 2024-08-08 DIAGNOSIS — J30.9 ALLERGIC RHINITIS, UNSPECIFIED SEASONALITY, UNSPECIFIED TRIGGER: Primary | ICD-10-CM

## 2024-08-08 PROCEDURE — 95115 IMMUNOTHERAPY ONE INJECTION: CPT | Performed by: FAMILY MEDICINE

## 2024-08-16 ENCOUNTER — CLINICAL SUPPORT (OUTPATIENT)
Dept: FAMILY MEDICINE CLINIC | Age: 84
End: 2024-08-16
Payer: MEDICARE

## 2024-08-16 DIAGNOSIS — J30.9 ALLERGIC RHINITIS, UNSPECIFIED SEASONALITY, UNSPECIFIED TRIGGER: Primary | ICD-10-CM

## 2024-08-16 PROCEDURE — 95115 IMMUNOTHERAPY ONE INJECTION: CPT | Performed by: FAMILY MEDICINE

## 2024-08-22 ENCOUNTER — CLINICAL SUPPORT (OUTPATIENT)
Dept: FAMILY MEDICINE CLINIC | Age: 84
End: 2024-08-22
Payer: MEDICARE

## 2024-08-22 DIAGNOSIS — J30.9 ALLERGIC RHINITIS, UNSPECIFIED SEASONALITY, UNSPECIFIED TRIGGER: Primary | ICD-10-CM

## 2024-08-22 PROCEDURE — 95115 IMMUNOTHERAPY ONE INJECTION: CPT | Performed by: FAMILY MEDICINE

## 2024-08-28 DIAGNOSIS — R06.89 AIRWAY CLEARANCE IMPAIRMENT: Primary | ICD-10-CM

## 2024-08-29 ENCOUNTER — CLINICAL SUPPORT (OUTPATIENT)
Dept: FAMILY MEDICINE CLINIC | Age: 84
End: 2024-08-29
Payer: MEDICARE

## 2024-08-29 DIAGNOSIS — J30.9 ALLERGIC RHINITIS, UNSPECIFIED SEASONALITY, UNSPECIFIED TRIGGER: Primary | ICD-10-CM

## 2024-08-29 PROCEDURE — 95115 IMMUNOTHERAPY ONE INJECTION: CPT | Performed by: FAMILY MEDICINE

## 2024-09-05 ENCOUNTER — CLINICAL SUPPORT (OUTPATIENT)
Dept: FAMILY MEDICINE CLINIC | Age: 84
End: 2024-09-05
Payer: MEDICARE

## 2024-09-05 DIAGNOSIS — J30.9 ALLERGIC RHINITIS, UNSPECIFIED SEASONALITY, UNSPECIFIED TRIGGER: Primary | ICD-10-CM

## 2024-09-05 PROCEDURE — 95115 IMMUNOTHERAPY ONE INJECTION: CPT | Performed by: FAMILY MEDICINE

## 2024-09-11 ENCOUNTER — CLINICAL SUPPORT (OUTPATIENT)
Dept: FAMILY MEDICINE CLINIC | Age: 84
End: 2024-09-11
Payer: MEDICARE

## 2024-09-11 DIAGNOSIS — J30.9 ALLERGIC RHINITIS, UNSPECIFIED SEASONALITY, UNSPECIFIED TRIGGER: Primary | ICD-10-CM

## 2024-09-11 PROCEDURE — 95115 IMMUNOTHERAPY ONE INJECTION: CPT | Performed by: FAMILY MEDICINE

## 2024-09-17 ENCOUNTER — OFFICE VISIT (OUTPATIENT)
Dept: FAMILY MEDICINE CLINIC | Age: 84
End: 2024-09-17
Payer: MEDICARE

## 2024-09-17 VITALS
TEMPERATURE: 98 F | WEIGHT: 115.8 LBS | SYSTOLIC BLOOD PRESSURE: 137 MMHG | DIASTOLIC BLOOD PRESSURE: 61 MMHG | HEIGHT: 61 IN | HEART RATE: 61 BPM | OXYGEN SATURATION: 96 % | BODY MASS INDEX: 21.86 KG/M2

## 2024-09-17 DIAGNOSIS — R05.9 COUGH, UNSPECIFIED TYPE: ICD-10-CM

## 2024-09-17 DIAGNOSIS — J02.9 SORE THROAT: Primary | ICD-10-CM

## 2024-09-17 LAB
EXPIRATION DATE: NORMAL
EXPIRATION DATE: NORMAL
FLUAV AG UPPER RESP QL IA.RAPID: NOT DETECTED
FLUBV AG UPPER RESP QL IA.RAPID: NOT DETECTED
INTERNAL CONTROL: NORMAL
INTERNAL CONTROL: NORMAL
Lab: NORMAL
Lab: NORMAL
S PYO AG THROAT QL: NEGATIVE
SARS-COV-2 AG UPPER RESP QL IA.RAPID: NOT DETECTED

## 2024-09-17 PROCEDURE — 1126F AMNT PAIN NOTED NONE PRSNT: CPT | Performed by: NURSE PRACTITIONER

## 2024-09-17 PROCEDURE — 99213 OFFICE O/P EST LOW 20 MIN: CPT | Performed by: NURSE PRACTITIONER

## 2024-09-17 PROCEDURE — 3075F SYST BP GE 130 - 139MM HG: CPT | Performed by: NURSE PRACTITIONER

## 2024-09-17 PROCEDURE — 3078F DIAST BP <80 MM HG: CPT | Performed by: NURSE PRACTITIONER

## 2024-09-17 PROCEDURE — 87428 SARSCOV & INF VIR A&B AG IA: CPT | Performed by: NURSE PRACTITIONER

## 2024-09-17 PROCEDURE — 87081 CULTURE SCREEN ONLY: CPT | Performed by: NURSE PRACTITIONER

## 2024-09-17 PROCEDURE — 87880 STREP A ASSAY W/OPTIC: CPT | Performed by: NURSE PRACTITIONER

## 2024-09-19 ENCOUNTER — CLINICAL SUPPORT (OUTPATIENT)
Dept: FAMILY MEDICINE CLINIC | Age: 84
End: 2024-09-19
Payer: MEDICARE

## 2024-09-19 DIAGNOSIS — J30.9 ALLERGIC RHINITIS, UNSPECIFIED SEASONALITY, UNSPECIFIED TRIGGER: Primary | ICD-10-CM

## 2024-09-19 LAB — BACTERIA SPEC AEROBE CULT: NORMAL

## 2024-09-19 PROCEDURE — 95115 IMMUNOTHERAPY ONE INJECTION: CPT | Performed by: FAMILY MEDICINE

## 2024-09-26 ENCOUNTER — CLINICAL SUPPORT (OUTPATIENT)
Dept: FAMILY MEDICINE CLINIC | Age: 84
End: 2024-09-26
Payer: MEDICARE

## 2024-09-26 DIAGNOSIS — J30.9 ALLERGIC RHINITIS, UNSPECIFIED SEASONALITY, UNSPECIFIED TRIGGER: Primary | ICD-10-CM

## 2024-09-26 PROCEDURE — 95115 IMMUNOTHERAPY ONE INJECTION: CPT | Performed by: FAMILY MEDICINE

## 2024-10-03 ENCOUNTER — CLINICAL SUPPORT (OUTPATIENT)
Dept: FAMILY MEDICINE CLINIC | Age: 84
End: 2024-10-03
Payer: MEDICARE

## 2024-10-03 DIAGNOSIS — J30.9 ALLERGIC RHINITIS, UNSPECIFIED SEASONALITY, UNSPECIFIED TRIGGER: Primary | ICD-10-CM

## 2024-10-03 PROCEDURE — 95115 IMMUNOTHERAPY ONE INJECTION: CPT | Performed by: FAMILY MEDICINE

## 2024-10-08 DIAGNOSIS — I10 ESSENTIAL HYPERTENSION: ICD-10-CM

## 2024-10-08 RX ORDER — AMLODIPINE BESYLATE 2.5 MG/1
2.5 TABLET ORAL DAILY
Qty: 90 TABLET | Refills: 0 | Status: SHIPPED | OUTPATIENT
Start: 2024-10-08 | End: 2024-10-10 | Stop reason: SDUPTHER

## 2024-10-10 ENCOUNTER — OFFICE VISIT (OUTPATIENT)
Dept: FAMILY MEDICINE CLINIC | Age: 84
End: 2024-10-10
Payer: MEDICARE

## 2024-10-10 ENCOUNTER — LAB (OUTPATIENT)
Dept: LAB | Facility: HOSPITAL | Age: 84
End: 2024-10-10
Payer: MEDICARE

## 2024-10-10 VITALS
HEIGHT: 61 IN | DIASTOLIC BLOOD PRESSURE: 69 MMHG | SYSTOLIC BLOOD PRESSURE: 109 MMHG | WEIGHT: 114.4 LBS | TEMPERATURE: 98.1 F | OXYGEN SATURATION: 97 % | HEART RATE: 71 BPM | BODY MASS INDEX: 21.6 KG/M2

## 2024-10-10 DIAGNOSIS — Z00.00 PHYSICAL EXAM: Primary | ICD-10-CM

## 2024-10-10 DIAGNOSIS — E78.2 MIXED HYPERLIPIDEMIA: ICD-10-CM

## 2024-10-10 DIAGNOSIS — F41.1 GENERALIZED ANXIETY DISORDER: ICD-10-CM

## 2024-10-10 DIAGNOSIS — J30.9 ALLERGIC RHINITIS, UNSPECIFIED SEASONALITY, UNSPECIFIED TRIGGER: ICD-10-CM

## 2024-10-10 DIAGNOSIS — Z79.899 OTHER LONG TERM (CURRENT) DRUG THERAPY: ICD-10-CM

## 2024-10-10 DIAGNOSIS — I10 ESSENTIAL HYPERTENSION: ICD-10-CM

## 2024-10-10 DIAGNOSIS — R73.9 HYPERGLYCEMIA: ICD-10-CM

## 2024-10-10 DIAGNOSIS — N95.9 MENOPAUSAL AND POSTMENOPAUSAL DISORDER: ICD-10-CM

## 2024-10-10 DIAGNOSIS — K21.9 GERD WITHOUT ESOPHAGITIS: ICD-10-CM

## 2024-10-10 LAB
ALBUMIN SERPL-MCNC: 4.3 G/DL (ref 3.5–5.2)
ALBUMIN/GLOB SERPL: 1.9 G/DL
ALP SERPL-CCNC: 61 U/L (ref 39–117)
ALT SERPL W P-5'-P-CCNC: 14 U/L (ref 1–33)
ANION GAP SERPL CALCULATED.3IONS-SCNC: 12.6 MMOL/L (ref 5–15)
AST SERPL-CCNC: 27 U/L (ref 1–32)
BACTERIA UR QL AUTO: ABNORMAL /HPF
BILIRUB SERPL-MCNC: 0.3 MG/DL (ref 0–1.2)
BILIRUB UR QL STRIP: NEGATIVE
BUN SERPL-MCNC: 22 MG/DL (ref 8–23)
BUN/CREAT SERPL: 25.9 (ref 7–25)
CALCIUM SPEC-SCNC: 9.5 MG/DL (ref 8.6–10.5)
CHLORIDE SERPL-SCNC: 105 MMOL/L (ref 98–107)
CHOLEST SERPL-MCNC: 169 MG/DL (ref 0–200)
CLARITY UR: ABNORMAL
CO2 SERPL-SCNC: 26.4 MMOL/L (ref 22–29)
COLOR UR: YELLOW
CREAT SERPL-MCNC: 0.85 MG/DL (ref 0.57–1)
DEPRECATED RDW RBC AUTO: 42.7 FL (ref 37–54)
EGFRCR SERPLBLD CKD-EPI 2021: 68.9 ML/MIN/1.73
ERYTHROCYTE [DISTWIDTH] IN BLOOD BY AUTOMATED COUNT: 12.6 % (ref 12.3–15.4)
GLOBULIN UR ELPH-MCNC: 2.3 GM/DL
GLUCOSE SERPL-MCNC: 92 MG/DL (ref 65–99)
GLUCOSE UR STRIP-MCNC: NEGATIVE MG/DL
HBA1C MFR BLD: 5.5 % (ref 4.8–5.6)
HCT VFR BLD AUTO: 39.1 % (ref 34–46.6)
HDLC SERPL-MCNC: 68 MG/DL (ref 40–60)
HGB BLD-MCNC: 12.4 G/DL (ref 12–15.9)
HGB UR QL STRIP.AUTO: ABNORMAL
KETONES UR QL STRIP: ABNORMAL
LDLC SERPL CALC-MCNC: 82 MG/DL (ref 0–100)
LDLC/HDLC SERPL: 1.18 {RATIO}
LEUKOCYTE ESTERASE UR QL STRIP.AUTO: ABNORMAL
MCH RBC QN AUTO: 28.8 PG (ref 26.6–33)
MCHC RBC AUTO-ENTMCNC: 31.7 G/DL (ref 31.5–35.7)
MCV RBC AUTO: 90.9 FL (ref 79–97)
MUCOUS THREADS URNS QL MICRO: ABNORMAL /HPF
NITRITE UR QL STRIP: NEGATIVE
PH UR STRIP.AUTO: <=5 [PH] (ref 5–8)
PLATELET # BLD AUTO: 199 10*3/MM3 (ref 140–450)
PMV BLD AUTO: 10.8 FL (ref 6–12)
POTASSIUM SERPL-SCNC: 4.3 MMOL/L (ref 3.5–5.2)
PROT SERPL-MCNC: 6.6 G/DL (ref 6–8.5)
PROT UR QL STRIP: ABNORMAL
RBC # BLD AUTO: 4.3 10*6/MM3 (ref 3.77–5.28)
RBC # UR STRIP: ABNORMAL /HPF
REF LAB TEST METHOD: ABNORMAL
SODIUM SERPL-SCNC: 144 MMOL/L (ref 136–145)
SP GR UR STRIP: >=1.03 (ref 1–1.03)
SQUAMOUS #/AREA URNS HPF: ABNORMAL /HPF
TRIGL SERPL-MCNC: 105 MG/DL (ref 0–150)
TSH SERPL DL<=0.05 MIU/L-ACNC: 1.86 UIU/ML (ref 0.27–4.2)
UROBILINOGEN UR QL STRIP: ABNORMAL
VLDLC SERPL-MCNC: 19 MG/DL (ref 5–40)
WBC # UR STRIP: ABNORMAL /HPF
WBC NRBC COR # BLD AUTO: 5 10*3/MM3 (ref 3.4–10.8)

## 2024-10-10 PROCEDURE — 36415 COLL VENOUS BLD VENIPUNCTURE: CPT

## 2024-10-10 PROCEDURE — 83036 HEMOGLOBIN GLYCOSYLATED A1C: CPT

## 2024-10-10 PROCEDURE — 80053 COMPREHEN METABOLIC PANEL: CPT

## 2024-10-10 PROCEDURE — 99214 OFFICE O/P EST MOD 30 MIN: CPT | Performed by: FAMILY MEDICINE

## 2024-10-10 PROCEDURE — 3074F SYST BP LT 130 MM HG: CPT | Performed by: FAMILY MEDICINE

## 2024-10-10 PROCEDURE — G0439 PPPS, SUBSEQ VISIT: HCPCS | Performed by: FAMILY MEDICINE

## 2024-10-10 PROCEDURE — 85027 COMPLETE CBC AUTOMATED: CPT

## 2024-10-10 PROCEDURE — 1160F RVW MEDS BY RX/DR IN RCRD: CPT | Performed by: FAMILY MEDICINE

## 2024-10-10 PROCEDURE — 1126F AMNT PAIN NOTED NONE PRSNT: CPT | Performed by: FAMILY MEDICINE

## 2024-10-10 PROCEDURE — 1159F MED LIST DOCD IN RCRD: CPT | Performed by: FAMILY MEDICINE

## 2024-10-10 PROCEDURE — 1170F FXNL STATUS ASSESSED: CPT | Performed by: FAMILY MEDICINE

## 2024-10-10 PROCEDURE — 3078F DIAST BP <80 MM HG: CPT | Performed by: FAMILY MEDICINE

## 2024-10-10 PROCEDURE — 80061 LIPID PANEL: CPT

## 2024-10-10 PROCEDURE — 84443 ASSAY THYROID STIM HORMONE: CPT

## 2024-10-10 PROCEDURE — 81001 URINALYSIS AUTO W/SCOPE: CPT

## 2024-10-10 RX ORDER — PANTOPRAZOLE SODIUM 40 MG/1
40 TABLET, DELAYED RELEASE ORAL DAILY
Qty: 90 TABLET | Refills: 3 | Status: SHIPPED | OUTPATIENT
Start: 2024-10-10

## 2024-10-10 RX ORDER — ESTRADIOL 1 MG/1
1 TABLET ORAL DAILY
Qty: 90 TABLET | Refills: 3 | Status: SHIPPED | OUTPATIENT
Start: 2024-10-10

## 2024-10-10 RX ORDER — LOSARTAN POTASSIUM 50 MG/1
50 TABLET ORAL DAILY
Qty: 90 TABLET | Refills: 3 | Status: SHIPPED | OUTPATIENT
Start: 2024-10-10

## 2024-10-10 RX ORDER — PRAVASTATIN SODIUM 20 MG
20 TABLET ORAL DAILY
Qty: 90 TABLET | Refills: 3 | Status: SHIPPED | OUTPATIENT
Start: 2024-10-10

## 2024-10-10 RX ORDER — LEVOCETIRIZINE DIHYDROCHLORIDE 5 MG/1
5 TABLET, FILM COATED ORAL EVERY EVENING
Qty: 90 TABLET | Refills: 3 | Status: SHIPPED | OUTPATIENT
Start: 2024-10-10

## 2024-10-10 RX ORDER — AMLODIPINE BESYLATE 2.5 MG/1
2.5 TABLET ORAL DAILY
Qty: 90 TABLET | Refills: 3 | Status: SHIPPED | OUTPATIENT
Start: 2024-10-10

## 2024-10-10 RX ORDER — CLONAZEPAM 0.5 MG/1
0.5 TABLET ORAL 2 TIMES DAILY PRN
Qty: 60 TABLET | Refills: 5 | Status: SHIPPED | OUTPATIENT
Start: 2024-10-10

## 2024-10-10 NOTE — PROGRESS NOTES
The ABCs of the Annual Wellness Visit  Subsequent Medicare Wellness Visit    Subjective    Luma Cruz is a 83 y.o. patient who presents for a Subsequent Medicare Wellness Visit.    The following portions of the patient's history were reviewed and updated as appropriate: allergies, current medications, past family history, past medical history, past social history, past surgical history, and problem list.    Compared to one year ago, the patient feels her physical health is worse.  She is not as steady on her feet.    Compared to one year ago, the patient feels her mental health is the same.    Recent Hospitalizations:  She was not admitted to the hospital during the last year.     Current Medical Providers:  Patient Care Team:  Joseph Walker MD as PCP - General (Family Medicine)  Domenica Shannon APRN as Nurse Practitioner (Nurse Practitioner)  Michelle Grimm APRN as Nurse Practitioner (Pulmonary Disease)  Enmanuel Forbes MD as Consulting Physician (Pulmonary Disease)    Outpatient Medications Prior to Visit   Medication Sig Dispense Refill    arformoterol (BROVANA) 15 MCG/2ML nebulizer solution Inhale 2 mL 2 (Two) Times a Day.      azelastine (ASTELIN) 0.1 % nasal spray Administer 1 spray into the nostril(s) as directed by provider 2 (Two) Times a Day.      Diclofenac Sodium (Voltaren) 1 % gel gel Apply 4 g topically to the appropriate area as directed 4 (Four) Times a Day As Needed (hand pain). 100 g 1    estradiol (ESTRACE) 0.1 MG/GM vaginal cream Insert  into the vagina 2 (Two) Times a Week.      famotidine (Pepcid) 40 MG tablet Take 1 tablet by mouth 2 (Two) Times a Day. 180 tablet 3    fluticasone (FLONASE) 50 MCG/ACT nasal spray Administer 2 sprays into the nostril(s) as directed by provider Daily.      multivitamin with minerals tablet tablet Take 1 tablet by mouth Daily.      ondansetron ODT (ZOFRAN-ODT) 4 MG disintegrating tablet Place 1 tablet on the tongue Every 6 (Six) Hours As  Needed for Nausea or Vomiting. 30 tablet 3    sodium chloride 3 % nebulizer solution Take 4 mL by nebulization 2 (Two) Times a Day.      amLODIPine (NORVASC) 2.5 MG tablet Take 1 tablet by mouth Daily. 90 tablet 0    clonazePAM (KlonoPIN) 0.5 MG tablet Take 1 tablet by mouth 2 (Two) Times a Day As Needed for Anxiety. 60 tablet 1    estradiol (ESTRACE) 1 MG tablet Take 1 tablet by mouth Daily. 90 tablet 3    levocetirizine (XYZAL) 5 MG tablet Take 1 tablet by mouth Every Evening. 90 tablet 3    losartan (COZAAR) 50 MG tablet Take 1 tablet by mouth Daily. 90 tablet 3    pantoprazole (PROTONIX) 40 MG EC tablet Take 1 tablet by mouth Daily. 90 tablet 3    pravastatin (PRAVACHOL) 20 MG tablet Take 1 tablet by mouth Daily. 90 tablet 3    budesonide (PULMICORT) 0.5 MG/2ML nebulizer solution Take 2 mL by nebulization 2 (Two) Times a Day for 30 days. Rinse mouth out after each use 120 mL 5     No facility-administered medications prior to visit.       No opioid medication identified on active medication list. I have reviewed chart for other potential  high risk medication/s and harmful drug interactions in the elderly.      Aspirin is not on active medication list.  Aspirin use is not indicated based on review of current medical condition/s. Risk of harm outweighs potential benefits.      Patient Active Problem List   Diagnosis    Seasonal allergies    Mixed hyperlipidemia    Menopausal and postmenopausal disorder    GERD without esophagitis    Essential hypertension    Generalized anxiety disorder    Fungal pneumonia    Bronchiectasis without complication    Cough    Mucus plugging of bronchi    Airway clearance impairment    Other long term (current) drug therapy    Constipation    History of fungal pneumonia    Dyspnea    Wheezing    History of fungal infection    Abnormal CT scan, chest    Vulvar lesion    Sore throat     Advance Care Planning  Advance Directive is not on file.  ACP discussion was held with the patient  "during this visit. Patient has an advance directive (not in EMR), copy requested.  Her children would make decisions if needed.     Objective    Vitals:    10/10/24 1347   BP: 109/69   BP Location: Left arm   Patient Position: Sitting   Pulse: 71   Temp: 98.1 °F (36.7 °C)   TempSrc: Oral   SpO2: 97%   Weight: 51.9 kg (114 lb 6.4 oz)   Height: 154.9 cm (60.98\")   PainSc: 0-No pain     Estimated body mass index is 21.63 kg/m² as calculated from the following:    Height as of this encounter: 154.9 cm (60.98\").    Weight as of this encounter: 51.9 kg (114 lb 6.4 oz).    BMI is within normal parameters. No other follow-up for BMI required.    Does the patient have evidence of cognitive impairment? No        HEALTH RISK ASSESSMENT    Smoking Status:  Social History     Tobacco Use   Smoking Status Never    Passive exposure: Never   Smokeless Tobacco Never     Alcohol Consumption:  Social History     Substance and Sexual Activity   Alcohol Use Never     Fall Risk Screen:    STEADI Fall Risk Assessment was completed, and patient is at LOW risk for falls.Assessment completed on:10/10/2024    Depression Screening:      10/10/2024     1:47 PM   PHQ-2/PHQ-9 Depression Screening   Little Interest or Pleasure in Doing Things 0-->not at all   Feeling Down, Depressed or Hopeless 0-->not at all   PHQ-9: Brief Depression Severity Measure Score 0       Health Habits and Functional and Cognitive Screening:      10/10/2024     1:47 PM   Functional & Cognitive Status   Do you have difficulty preparing food and eating? No   Do you have difficulty bathing yourself, getting dressed or grooming yourself? No   Do you have difficulty using the toilet? No   Do you have difficulty moving around from place to place? No   Do you have trouble with steps or getting out of a bed or a chair? Yes   Current Diet Well Balanced Diet   Dental Exam Not up to date   Eye Exam Not up to date   Exercise (times per week) 0 times per week   Current Exercises " Include No Regular Exercise   Do you need help using the phone?  No   Are you deaf or do you have serious difficulty hearing?  No   Do you need help to go to places out of walking distance? No   Do you need help shopping? No   Do you need help preparing meals?  No   Do you need help with housework?  No   Do you need help with laundry? No   Do you need help taking your medications? No   Do you need help managing money? No   Do you ever drive or ride in a car without wearing a seat belt? No   Have you felt unusual stress, anger or loneliness in the last month? No   Who do you live with? Spouse   If you need help, do you have trouble finding someone available to you? No   Have you been bothered in the last four weeks by sexual problems? No   Do you have difficulty concentrating, remembering or making decisions? Yes       Age-appropriate Screening Schedule:  Refer to the list below for future screening recommendations based on patient's age, sex and/or medical conditions. Orders for these recommended tests are listed in the plan section. The patient has been provided with a written plan.    Health Maintenance   Topic Date Due    TDAP/TD VACCINES (1 - Tdap) Never done    LIPID PANEL  10/02/2024    ANNUAL WELLNESS VISIT  10/10/2025    DXA SCAN  11/02/2025    COVID-19 Vaccine  Completed    RSV Vaccine - Adults  Completed    INFLUENZA VACCINE  Completed    Pneumococcal Vaccine 65+  Completed    ZOSTER VACCINE  Discontinued          CMS Preventative Services Quick Reference  Risk Factors Identified During Encounter  Depression/Dysphoria:  Continue current medications.  Hearing Problem:  Continue using hearing aids especially with driving.  Immunizations Discussed/Encouraged: Tdap  The above risks/problems have been discussed with the patient.  Pertinent information has been shared with the patient in the After Visit Summary.  An After Visit Summary and PPPS were made available to the patient.    Follow Up:   Next Medicare  "Wellness visit to be scheduled in 1 year.     Additional E&M Note during same encounter follows:  Patient has multiple medical problems which are significant and separately identifiable that require additional work above and beyond the Medicare Wellness Visit.      Chief Complaint:  Blood pressure, cholesterol, GERD, anxiety    Subjective    History of Present Illness:  In addition to the Medicare wellness exam, Luma Grey is being seen today for follow up on her usual care.  She has hypertension and elevated cholesterol.  She remains on treatment for these issues as noted.  Her blood pressure is well-controlled on initial check.  Her pressure is typically well-controlled at home as well.    She also has issues with chronic acid reflux and remains on treatment for this as noted.  Her symptoms are relatively well-controlled.    Regarding anxiety, this has been a longstanding problem for which she currently takes clonazepam at a low dose.  She takes the medication mostly at night, and she does feel that it is necessary.  We have discussed potential risks including sleepiness, fall, and impact on memory.  Davi is reviewed.    She also remains on oral estradiol.  She has been on this for a long period of time.  It does help her with postmenopausal symptoms.  Risks are again reviewed.    Review of Systems:  Review of Systems   Constitutional:  Negative for chills and fever.   Respiratory:  Negative for cough and shortness of breath.    Cardiovascular:  Negative for chest pain and palpitations.   Gastrointestinal:  Negative for abdominal pain, nausea and vomiting.      Objective   Vital Signs:  /69 (BP Location: Left arm, Patient Position: Sitting)   Pulse 71   Temp 98.1 °F (36.7 °C) (Oral)   Ht 154.9 cm (60.98\")   Wt 51.9 kg (114 lb 6.4 oz)   SpO2 97%   BMI 21.63 kg/m²     Physical Exam  Vitals and nursing note reviewed.   Constitutional:       General: She is not in acute distress.     Appearance: She is " not ill-appearing.   HENT:      Right Ear: Tympanic membrane and ear canal normal.      Left Ear: Tympanic membrane and ear canal normal.      Ears:      Comments: Hearing is normal with forced whisper bilaterally with hearing aids in place.     Mouth/Throat:      Mouth: Mucous membranes are moist.      Comments: Pharynx appears normal  Eyes:      Extraocular Movements: Extraocular movements intact.      Pupils: Pupils are equal, round, and reactive to light.      Comments: Binocular vision is 20/20 with correction.   Neck:      Thyroid: No thyromegaly.   Cardiovascular:      Rate and Rhythm: Normal rate and regular rhythm.      Heart sounds: No murmur heard.  Pulmonary:      Effort: Pulmonary effort is normal.      Breath sounds: Normal breath sounds.   Abdominal:      General: There is no distension.      Palpations: Abdomen is soft. There is no mass.      Tenderness: There is no abdominal tenderness.   Musculoskeletal:      Cervical back: Normal range of motion.   Skin:     Findings: No lesion or rash.   Neurological:      General: No focal deficit present.      Mental Status: She is oriented to person, place, and time.      Cranial Nerves: No cranial nerve deficit.      Motor: No weakness.      Coordination: Coordination normal.      Gait: Gait normal.   Psychiatric:         Mood and Affect: Mood normal.     The following data was reviewed by Joseph Walker MD on 10/10/2024.  Lab Results   Component Value Date    WBC 5.82 01/29/2024    HGB 12.3 01/29/2024    HCT 38.7 01/29/2024    MCV 89.2 01/29/2024     01/29/2024     Lab Results   Component Value Date    GLUCOSE 114 (H) 01/29/2024    BUN 30 (H) 01/29/2024    CREATININE 1.18 (H) 01/29/2024     01/29/2024    K 3.9 01/29/2024     01/29/2024    CO2 27.0 01/29/2024    CALCIUM 9.8 01/29/2024    PROTEINTOT 6.6 01/29/2024    ALBUMIN 4.5 01/29/2024    ALT 13 01/29/2024    AST 27 01/29/2024    ALKPHOS 73 01/29/2024    BILITOT 0.3 01/29/2024     EGFR 46.5 (L) 01/29/2024    GLOB 2.1 01/29/2024    AGRATIO 2.1 01/29/2024    BCR 25.4 (H) 01/29/2024    ANIONGAP 11.0 01/29/2024      Lab Results   Component Value Date    CHOL 186 10/02/2023    CHLPL 157 03/10/2020    TRIG 89 10/02/2023    HDL 88 (H) 10/02/2023    LDL 82 10/02/2023     Lab Results   Component Value Date    TSH 2.050 10/02/2023     Lab Results   Component Value Date    HGBA1C 5.80 (H) 12/20/2022             Assessment and Plan:   Today, we have reviewed Luma Grey's care.  She seems well today.  Regarding the Medicare wellness exam, she is currently up-to-date on recommended cancer screenings.  We have also reviewed vaccines.    Regarding her usual care, she seems to be tolerating her medications, and her problems are fairly well-controlled.  For now, we will refill her medications and tentatively plan to see her back in about 6 months.  Labs will be updated.  Regarding anxiety, clonazepam continues to be of benefit for her, and we will refill that this time.  No other short-term change is anticipated.    Diagnoses and all orders for this visit:    1. Physical exam (Primary)    2. Essential hypertension  -     amLODIPine (NORVASC) 2.5 MG tablet; Take 1 tablet by mouth Daily.  Dispense: 90 tablet; Refill: 3  -     losartan (COZAAR) 50 MG tablet; Take 1 tablet by mouth Daily.  Dispense: 90 tablet; Refill: 3  -     Comprehensive Metabolic Panel; Future  -     Urinalysis With Microscopic - Urine, Clean Catch; Future    3. Mixed hyperlipidemia  -     pravastatin (PRAVACHOL) 20 MG tablet; Take 1 tablet by mouth Daily.  Dispense: 90 tablet; Refill: 3  -     Comprehensive Metabolic Panel; Future  -     Lipid Panel; Future  -     TSH; Future    4. Generalized anxiety disorder  -     clonazePAM (KlonoPIN) 0.5 MG tablet; Take 1 tablet by mouth 2 (Two) Times a Day As Needed for Anxiety.  Dispense: 60 tablet; Refill: 5    5. Other long term (current) drug therapy  -     clonazePAM (KlonoPIN) 0.5 MG tablet; Take 1  tablet by mouth 2 (Two) Times a Day As Needed for Anxiety.  Dispense: 60 tablet; Refill: 5  -     CBC (No Diff); Future  -     Urinalysis With Microscopic - Urine, Clean Catch; Future    6. Menopausal and postmenopausal disorder  -     estradiol (ESTRACE) 1 MG tablet; Take 1 tablet by mouth Daily.  Dispense: 90 tablet; Refill: 3    7. Allergic rhinitis, unspecified seasonality, unspecified trigger  -     levocetirizine (XYZAL) 5 MG tablet; Take 1 tablet by mouth Every Evening.  Dispense: 90 tablet; Refill: 3    8. GERD without esophagitis  -     pantoprazole (PROTONIX) 40 MG EC tablet; Take 1 tablet by mouth Daily.  Dispense: 90 tablet; Refill: 3    9. Hyperglycemia  -     Hemoglobin A1c; Future       Follow Up  Return in about 6 months (around 4/10/2025) for Recheck.  Patient was given instructions and counseling regarding her condition or for health maintenance advice. Please see specific information pulled into the AVS if appropriate.

## 2024-10-14 ENCOUNTER — CLINICAL SUPPORT (OUTPATIENT)
Dept: FAMILY MEDICINE CLINIC | Age: 84
End: 2024-10-14
Payer: MEDICARE

## 2024-10-14 DIAGNOSIS — J30.9 ALLERGIC RHINITIS, UNSPECIFIED SEASONALITY, UNSPECIFIED TRIGGER: Primary | ICD-10-CM

## 2024-10-14 PROCEDURE — 95115 IMMUNOTHERAPY ONE INJECTION: CPT | Performed by: FAMILY MEDICINE

## 2024-10-22 ENCOUNTER — CLINICAL SUPPORT (OUTPATIENT)
Dept: FAMILY MEDICINE CLINIC | Age: 84
End: 2024-10-22
Payer: MEDICARE

## 2024-10-22 DIAGNOSIS — J30.9 ALLERGIC RHINITIS, UNSPECIFIED SEASONALITY, UNSPECIFIED TRIGGER: Primary | ICD-10-CM

## 2024-10-22 PROCEDURE — 95115 IMMUNOTHERAPY ONE INJECTION: CPT | Performed by: FAMILY MEDICINE

## 2024-10-28 ENCOUNTER — CLINICAL SUPPORT (OUTPATIENT)
Dept: FAMILY MEDICINE CLINIC | Age: 84
End: 2024-10-28
Payer: MEDICARE

## 2024-10-28 DIAGNOSIS — J30.9 ALLERGIC RHINITIS, UNSPECIFIED SEASONALITY, UNSPECIFIED TRIGGER: Primary | ICD-10-CM

## 2024-10-28 PROCEDURE — 95115 IMMUNOTHERAPY ONE INJECTION: CPT | Performed by: FAMILY MEDICINE

## 2024-11-03 NOTE — PROGRESS NOTES
Primary Care Provider  Joseph Walker MD     Referring Provider  No ref. provider found     Chief Complaint  Seasonal allergies and Follow-up (6 month follow up. )    Subjective          History of Presenting Illness  Patient is a 83-year-old  female, patient Dr. Forbes's who presents for management of bronchiectasis who presents for follow-up visit today.  Patient states that since last visit her breathing is at baseline.  Patient states that she does get short of breath that is worse with exertion, mild to moderate in severity, and improved with rest. Patient states that she is taking Brovana and Pulmicort every day as prescribed and uses albuterol nebulizer treatments as needed.  Patient states that she did not  an albuterol inhaler due to the cost of medication.  Patient states that she is using her flutter valve with sodium chloride nebulizer treatments to assist with airway clearance.  Patient states that she is taking Xyzal, Flonase nasal spray, and Astelin nasal spray for seasonal allergies. Patient is on Protonix and Pepcid for reflux.   Patient denies fever, chills, night sweats, swollen glands in the head and neck, unintentional weight loss, hemoptysis, purulent sputum production, dysphagia, chest pain, palpitations, chest tightness, abdominal pain, nausea, vomiting, and diarrhea.  Patient also denies any myalgias, changes in sense of taste and/or smell, sore throat, any other coronavirus or flu-like symptoms.  Patient denies any leg swelling, orthopnea, paroxysmal nocturnal dyspnea.  Patient is able to perform activities of daily living.        Review of Systems     Family History   Problem Relation Age of Onset    Stroke Mother     Hypertension Mother     Arthritis Mother     Hearing loss Mother     Coronary artery disease Father         Social History     Socioeconomic History    Marital status:     Number of children: 2   Tobacco Use    Smoking status: Never      Passive exposure: Never    Smokeless tobacco: Never   Vaping Use    Vaping status: Never Used   Substance and Sexual Activity    Alcohol use: Never    Drug use: Never    Sexual activity: Yes     Partners: Male     Birth control/protection: Hysterectomy        Past Medical History:   Diagnosis Date    Congenital prolapsed rectum     COVID-19 07/01/2022    Essential hypertension     Generalized anxiety disorder     GERD without esophagitis     Hernia April, 2023    hiatal hernia    History of prolapse of bladder     Menopausal and postmenopausal disorder     Mixed hyperlipidemia         Immunization History   Administered Date(s) Administered    Arexvy (RSV, Adults 60+ yrs) 11/10/2023    COVID-19 (MODERNA) 1st,2nd,3rd Dose Monovalent 01/27/2021, 02/24/2021, 10/30/2021, 04/13/2022    COVID-19 (MODERNA) BIVALENT 12+YRS 10/19/2022    COVID-19 (PFIZER) 12YRS+ (COMIRNATY) 10/19/2023, 09/30/2024    FLUAD TRI 65YR+ 09/30/2024    Fluad Quad 65+ 09/29/2022    Fluzone High-Dose 65+YRS 10/16/2012, 09/25/2013, 09/28/2017, 09/24/2020    Fluzone High-Dose 65+yrs 10/02/2021, 10/17/2023    Hepatitis A 12/06/2018       Allergies   Allergen Reactions    Fosamax [Alendronate] GI Intolerance    Penicillins Rash          Current Outpatient Medications:     amLODIPine (NORVASC) 2.5 MG tablet, Take 1 tablet by mouth Daily., Disp: 90 tablet, Rfl: 3    arformoterol (BROVANA) 15 MCG/2ML nebulizer solution, Take 2 mL by nebulization 2 (Two) Times a Day for 30 days., Disp: 120 mL, Rfl: 5    azelastine (ASTELIN) 0.1 % nasal spray, Administer 1 spray into the nostril(s) as directed by provider 2 (Two) Times a Day., Disp: , Rfl:     budesonide (PULMICORT) 0.5 MG/2ML nebulizer solution, Take 2 mL by nebulization 2 (Two) Times a Day for 30 days. Rinse mouth out after each use, Disp: 120 mL, Rfl: 5    clonazePAM (KlonoPIN) 0.5 MG tablet, Take 1 tablet by mouth 2 (Two) Times a Day As Needed for Anxiety., Disp: 60 tablet, Rfl: 5    Diclofenac Sodium  (Voltaren) 1 % gel gel, Apply 4 g topically to the appropriate area as directed 4 (Four) Times a Day As Needed (hand pain)., Disp: 100 g, Rfl: 1    estradiol (ESTRACE) 0.1 MG/GM vaginal cream, Insert  into the vagina 2 (Two) Times a Week., Disp: , Rfl:     estradiol (ESTRACE) 1 MG tablet, Take 1 tablet by mouth Daily., Disp: 90 tablet, Rfl: 3    famotidine (Pepcid) 40 MG tablet, Take 1 tablet by mouth 2 (Two) Times a Day., Disp: 180 tablet, Rfl: 3    fluticasone (FLONASE) 50 MCG/ACT nasal spray, Administer 2 sprays into the nostril(s) as directed by provider Daily., Disp: , Rfl:     levocetirizine (XYZAL) 5 MG tablet, Take 1 tablet by mouth Every Evening., Disp: 90 tablet, Rfl: 3    losartan (COZAAR) 50 MG tablet, Take 1 tablet by mouth Daily., Disp: 90 tablet, Rfl: 3    multivitamin with minerals tablet tablet, Take 1 tablet by mouth Daily., Disp: , Rfl:     ondansetron ODT (ZOFRAN-ODT) 4 MG disintegrating tablet, Place 1 tablet on the tongue Every 6 (Six) Hours As Needed for Nausea or Vomiting., Disp: 30 tablet, Rfl: 3    pantoprazole (PROTONIX) 40 MG EC tablet, Take 1 tablet by mouth Daily., Disp: 90 tablet, Rfl: 3    pravastatin (PRAVACHOL) 20 MG tablet, Take 1 tablet by mouth Daily., Disp: 90 tablet, Rfl: 3    sodium chloride 3 % nebulizer solution, Take 4 mL by nebulization 2 (Two) Times a Day., Disp: , Rfl:     albuterol (PROVENTIL) (2.5 MG/3ML) 0.083% nebulizer solution, Take 2.5 mg by nebulization Every 4 (Four) Hours As Needed for Wheezing., Disp: , Rfl:   No current facility-administered medications for this visit.     Objective     Physical Exam  Vital Signs:   WDWN, Alert, NAD.    HEENT:  PERRL, EOMI.  OP, nares clear, no sinus tenderness  Neck:  Supple, no JVD, no thyromegaly.  Lymph: no axillary, cervical, supraclavicular lymphadenopathy noted bilaterally  Chest:  good aeration, clear to auscultation bilaterally, tympanic to percussion bilaterally, no work of breathing noted  CV: RRR, no MGR, pulses  2+, equal.  Abd:  Soft, NT, ND, + BS, no HSM  EXT:  no clubbing, no cyanosis, no edema, no joint tenderness  Neuro:  A&Ox3, CN grossly intact, no focal deficits.  Skin: No rashes or lesions noted.    There were no vitals taken for this visit.        Result Review :   I have reviewed my last office visit note.     Procedures:           Assessment and Plan      Assessment:  1.  Bronchiectasis on chest CT scan dated from 5/11/2021.  Patient does have a flutter valve sodium chloride nebulizer treatments.  Patient declines a chest vest at this time.  2.  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   3.  History of fungal pneumonia, treated for two months of [place] with antifungal associated issues.      4.  GERD.  Patient is on a PPI.        5.  Mucus plugging. Patient has a flutter valve and sodium chloride nebulizer treatments.     6.  Airway clearance impairment.  7.  Seasonal allergies on allergy immunotherapy.      8.  Dyspnea, improved since bronchoscopy per patient report.  9. Abnormal chest CT scan: New mild subpleural bilateral lobe groundglass opacities.  10.  Never smoker.            Plan:  1. Continue Brovana and Pulmicort everyday as prescribed and rinse her mouth out after each use.    2.  Continue albuterol nebulizer treatments as needed.    3.  Continue Astelin nasal spray, Flonase nasal spray, and Xyzal as prescribed.  Continue allergy immunotherapy and to follow up with allergist as scheduled.   4. Continue flutter valve with nebulizer treatments to assist with airway clearance.  Patient declines a chest vest.  5.  For GERD,  patient to continue PPI.   Patient is also advised to sleep with the head of the bed elevated and do not eat 3-4 hours prior to bedtime.  6.  Vaccination status: patient reports they are up-to-date with RSV, flu, pneumonia, and Covid vaccines.  Patient is advised to  continue to follow CDC recommendations such as social distancing wearing a mask and washing hands for at least 20 seconds.  7.  Smoking status: never smoker.  8.  Patient to call the office, 911, or go to the ER with new or worsening symptoms.  9.  Follow-up in 6 months, sooner if needed.           Follow Up   Return in about 6 months (around 5/14/2025) for in [place].  Patient was given instructions and counseling regarding her condition or for health maintenance advice. Please see specific information pulled into the AVS if appropriate.

## 2024-11-04 ENCOUNTER — CLINICAL SUPPORT (OUTPATIENT)
Dept: FAMILY MEDICINE CLINIC | Age: 84
End: 2024-11-04
Payer: MEDICARE

## 2024-11-04 DIAGNOSIS — J30.9 ALLERGIC RHINITIS, UNSPECIFIED SEASONALITY, UNSPECIFIED TRIGGER: Primary | ICD-10-CM

## 2024-11-04 PROCEDURE — 95115 IMMUNOTHERAPY ONE INJECTION: CPT | Performed by: FAMILY MEDICINE

## 2024-11-13 ENCOUNTER — CLINICAL SUPPORT (OUTPATIENT)
Dept: FAMILY MEDICINE CLINIC | Age: 84
End: 2024-11-13
Payer: MEDICARE

## 2024-11-13 DIAGNOSIS — J30.9 ALLERGIC RHINITIS, UNSPECIFIED SEASONALITY, UNSPECIFIED TRIGGER: Primary | ICD-10-CM

## 2024-11-13 PROCEDURE — 95115 IMMUNOTHERAPY ONE INJECTION: CPT | Performed by: FAMILY MEDICINE

## 2024-11-14 ENCOUNTER — OFFICE VISIT (OUTPATIENT)
Dept: PULMONOLOGY | Facility: CLINIC | Age: 84
End: 2024-11-14
Payer: MEDICARE

## 2024-11-14 DIAGNOSIS — R06.00 DYSPNEA, UNSPECIFIED TYPE: ICD-10-CM

## 2024-11-14 DIAGNOSIS — R93.89 ABNORMAL CT SCAN, CHEST: ICD-10-CM

## 2024-11-14 DIAGNOSIS — Z87.01 HISTORY OF FUNGAL PNEUMONIA: ICD-10-CM

## 2024-11-14 DIAGNOSIS — K21.9 GERD WITHOUT ESOPHAGITIS: ICD-10-CM

## 2024-11-14 DIAGNOSIS — R06.89 AIRWAY CLEARANCE IMPAIRMENT: ICD-10-CM

## 2024-11-14 DIAGNOSIS — J47.9 BRONCHIECTASIS WITHOUT COMPLICATION: ICD-10-CM

## 2024-11-14 DIAGNOSIS — J30.2 SEASONAL ALLERGIES: Primary | ICD-10-CM

## 2024-11-14 DIAGNOSIS — T17.500A MUCUS PLUGGING OF BRONCHI: ICD-10-CM

## 2024-11-14 RX ORDER — BUDESONIDE 0.5 MG/2ML
0.5 INHALANT ORAL 2 TIMES DAILY
Qty: 120 ML | Refills: 5 | Status: SHIPPED | OUTPATIENT
Start: 2024-11-14 | End: 2024-12-14

## 2024-11-14 RX ORDER — ALBUTEROL SULFATE 90 UG/1
2 INHALANT RESPIRATORY (INHALATION) EVERY 4 HOURS PRN
COMMUNITY
End: 2024-11-14

## 2024-11-14 RX ORDER — ALBUTEROL SULFATE 0.83 MG/ML
2.5 SOLUTION RESPIRATORY (INHALATION) EVERY 4 HOURS PRN
COMMUNITY

## 2024-11-14 RX ORDER — ARFORMOTEROL TARTRATE 15 UG/2ML
15 SOLUTION RESPIRATORY (INHALATION)
Qty: 120 ML | Refills: 5 | Status: SHIPPED | OUTPATIENT
Start: 2024-11-14 | End: 2024-12-14

## 2024-11-20 ENCOUNTER — CLINICAL SUPPORT (OUTPATIENT)
Dept: FAMILY MEDICINE CLINIC | Age: 84
End: 2024-11-20
Payer: MEDICARE

## 2024-11-20 DIAGNOSIS — J30.9 ALLERGIC RHINITIS, UNSPECIFIED SEASONALITY, UNSPECIFIED TRIGGER: Primary | ICD-10-CM

## 2024-11-20 PROCEDURE — 95115 IMMUNOTHERAPY ONE INJECTION: CPT | Performed by: FAMILY MEDICINE

## 2024-11-25 ENCOUNTER — CLINICAL SUPPORT (OUTPATIENT)
Dept: FAMILY MEDICINE CLINIC | Age: 84
End: 2024-11-25
Payer: MEDICARE

## 2024-11-25 DIAGNOSIS — J30.9 ALLERGIC RHINITIS, UNSPECIFIED SEASONALITY, UNSPECIFIED TRIGGER: Primary | ICD-10-CM

## 2024-11-25 PROCEDURE — 95115 IMMUNOTHERAPY ONE INJECTION: CPT | Performed by: FAMILY MEDICINE

## 2024-12-03 ENCOUNTER — CLINICAL SUPPORT (OUTPATIENT)
Dept: FAMILY MEDICINE CLINIC | Age: 84
End: 2024-12-03
Payer: MEDICARE

## 2024-12-03 DIAGNOSIS — J30.9 ALLERGIC RHINITIS, UNSPECIFIED SEASONALITY, UNSPECIFIED TRIGGER: Primary | ICD-10-CM

## 2024-12-03 PROCEDURE — 95115 IMMUNOTHERAPY ONE INJECTION: CPT | Performed by: FAMILY MEDICINE

## 2024-12-10 ENCOUNTER — CLINICAL SUPPORT (OUTPATIENT)
Dept: FAMILY MEDICINE CLINIC | Age: 84
End: 2024-12-10
Payer: MEDICARE

## 2024-12-10 DIAGNOSIS — J30.9 ALLERGIC RHINITIS, UNSPECIFIED SEASONALITY, UNSPECIFIED TRIGGER: Primary | ICD-10-CM

## 2024-12-10 PROCEDURE — 95115 IMMUNOTHERAPY ONE INJECTION: CPT | Performed by: FAMILY MEDICINE

## 2024-12-16 ENCOUNTER — CLINICAL SUPPORT (OUTPATIENT)
Dept: FAMILY MEDICINE CLINIC | Age: 84
End: 2024-12-16
Payer: MEDICARE

## 2024-12-16 DIAGNOSIS — J30.9 ALLERGIC RHINITIS, UNSPECIFIED SEASONALITY, UNSPECIFIED TRIGGER: Primary | ICD-10-CM

## 2024-12-16 PROCEDURE — 95115 IMMUNOTHERAPY ONE INJECTION: CPT | Performed by: INTERNAL MEDICINE

## 2024-12-23 ENCOUNTER — CLINICAL SUPPORT (OUTPATIENT)
Dept: FAMILY MEDICINE CLINIC | Age: 84
End: 2024-12-23
Payer: MEDICARE

## 2024-12-23 DIAGNOSIS — J30.2 SEASONAL ALLERGIES: Primary | ICD-10-CM

## 2024-12-23 PROCEDURE — 95115 IMMUNOTHERAPY ONE INJECTION: CPT | Performed by: FAMILY MEDICINE

## 2024-12-30 RX ORDER — SODIUM CHLORIDE FOR INHALATION 3 %
4 VIAL, NEBULIZER (ML) INHALATION 2 TIMES DAILY
Qty: 240 ML | Refills: 1 | Status: SHIPPED | OUTPATIENT
Start: 2024-12-30

## 2024-12-30 NOTE — TELEPHONE ENCOUNTER
"    Caller: Luma Cruz \"Luma Grey\"    Relationship: Self    Best call back number: [phone] (home)       Requested Prescriptions:   Requested Prescriptions     Pending Prescriptions Disp Refills    sodium chloride 3 % nebulizer solution       Sig: Take 4 mL by nebulization 2 (Two) Times a Day.    [facility] ONLY HAS 3ML AND [facility] IS OUT.     [facility]arm[facility] where request [facility] be sent:  [facility]    Last office visit with prescribing clinician: 11/14/2024   Last telemedicine visit with prescribing clinician: Visit date not found   Next office visit with prescribing clinician: 5/1/2025     Additional details provided by patient: CAN THE DOSAGE BE CHANGED SO PT CAN GET THIS FILLED? PLEASE CALL PT TO ADVISE.     Does the patient have less than a 3 day supply:  [] Yes  [] No    Would you like a call back once the refill request has been completed: [] Yes [] No    If the office needs to give you a call back, can they leave a voicemail: [] Yes [] No    Jesse Osei Rep   12/30/24 10:08 EST           "

## 2024-12-31 ENCOUNTER — CLINICAL SUPPORT (OUTPATIENT)
Dept: FAMILY MEDICINE CLINIC | Age: 84
End: 2024-12-31
Payer: MEDICARE

## 2024-12-31 DIAGNOSIS — J30.9 ALLERGIC RHINITIS, UNSPECIFIED SEASONALITY, UNSPECIFIED TRIGGER: Primary | ICD-10-CM

## 2025-01-02 ENCOUNTER — TELEPHONE (OUTPATIENT)
Dept: PULMONOLOGY | Facility: CLINIC | Age: 85
End: 2025-01-02

## 2025-01-02 NOTE — TELEPHONE ENCOUNTER
"    Caller: Luma Cruz \"Luma Grey\"    Relationship to patient: Self    Best call back number:[phone]    Patient is needing: PTS INSURANCE IS NOT WANTING TO COVER PTS SODIUM CHLORIDE. PT IS NEEDING SOMETHING PRESCRIBED. PLEASE CALL PT TO ADVISE        "

## 2025-01-09 ENCOUNTER — CLINICAL SUPPORT (OUTPATIENT)
Dept: FAMILY MEDICINE CLINIC | Age: 85
End: 2025-01-09
Payer: MEDICARE

## 2025-01-09 DIAGNOSIS — J30.9 ALLERGIC RHINITIS, UNSPECIFIED SEASONALITY, UNSPECIFIED TRIGGER: Primary | ICD-10-CM

## 2025-01-09 PROCEDURE — 95115 IMMUNOTHERAPY ONE INJECTION: CPT | Performed by: FAMILY MEDICINE

## 2025-01-14 ENCOUNTER — PATIENT MESSAGE (OUTPATIENT)
Dept: FAMILY MEDICINE CLINIC | Age: 85
End: 2025-01-14
Payer: MEDICARE

## 2025-01-14 DIAGNOSIS — K21.9 GERD WITHOUT ESOPHAGITIS: ICD-10-CM

## 2025-01-14 DIAGNOSIS — E78.2 MIXED HYPERLIPIDEMIA: ICD-10-CM

## 2025-01-14 DIAGNOSIS — F41.1 GENERALIZED ANXIETY DISORDER: ICD-10-CM

## 2025-01-14 DIAGNOSIS — J30.9 ALLERGIC RHINITIS, UNSPECIFIED SEASONALITY, UNSPECIFIED TRIGGER: ICD-10-CM

## 2025-01-14 DIAGNOSIS — N95.9 MENOPAUSAL AND POSTMENOPAUSAL DISORDER: ICD-10-CM

## 2025-01-14 DIAGNOSIS — Z79.899 OTHER LONG TERM (CURRENT) DRUG THERAPY: ICD-10-CM

## 2025-01-14 DIAGNOSIS — I10 ESSENTIAL HYPERTENSION: ICD-10-CM

## 2025-01-14 RX ORDER — CLONAZEPAM 0.5 MG/1
0.5 TABLET ORAL 2 TIMES DAILY PRN
Qty: 60 TABLET | Refills: 2 | Status: SHIPPED | OUTPATIENT
Start: 2025-01-14

## 2025-01-14 RX ORDER — LOSARTAN POTASSIUM 50 MG/1
50 TABLET ORAL DAILY
Qty: 90 TABLET | Refills: 1 | Status: SHIPPED | OUTPATIENT
Start: 2025-01-14

## 2025-01-14 RX ORDER — PRAVASTATIN SODIUM 20 MG
20 TABLET ORAL DAILY
Qty: 90 TABLET | Refills: 1 | Status: SHIPPED | OUTPATIENT
Start: 2025-01-14

## 2025-01-14 RX ORDER — AMLODIPINE BESYLATE 2.5 MG/1
2.5 TABLET ORAL DAILY
Qty: 90 TABLET | Refills: 1 | Status: SHIPPED | OUTPATIENT
Start: 2025-01-14

## 2025-01-14 RX ORDER — PANTOPRAZOLE SODIUM 40 MG/1
40 TABLET, DELAYED RELEASE ORAL DAILY
Qty: 90 TABLET | Refills: 1 | Status: SHIPPED | OUTPATIENT
Start: 2025-01-14 | End: 2025-01-14 | Stop reason: SDUPTHER

## 2025-01-14 RX ORDER — LEVOCETIRIZINE DIHYDROCHLORIDE 5 MG/1
5 TABLET, FILM COATED ORAL EVERY EVENING
Qty: 90 TABLET | Refills: 1 | Status: SHIPPED | OUTPATIENT
Start: 2025-01-14

## 2025-01-14 RX ORDER — ESTRADIOL 1 MG/1
1 TABLET ORAL DAILY
Qty: 90 TABLET | Refills: 3 | Status: SHIPPED | OUTPATIENT
Start: 2025-01-14

## 2025-01-14 RX ORDER — FAMOTIDINE 40 MG/1
40 TABLET, FILM COATED ORAL 2 TIMES DAILY
Qty: 180 TABLET | Refills: 3 | Status: SHIPPED | OUTPATIENT
Start: 2025-01-14

## 2025-01-14 RX ORDER — PANTOPRAZOLE SODIUM 40 MG/1
40 TABLET, DELAYED RELEASE ORAL DAILY
Qty: 90 TABLET | Refills: 3 | Status: SHIPPED | OUTPATIENT
Start: 2025-01-14

## 2025-01-15 ENCOUNTER — CLINICAL SUPPORT (OUTPATIENT)
Dept: FAMILY MEDICINE CLINIC | Age: 85
End: 2025-01-15
Payer: MEDICARE

## 2025-01-15 DIAGNOSIS — J30.9 ALLERGIC RHINITIS, UNSPECIFIED SEASONALITY, UNSPECIFIED TRIGGER: Primary | ICD-10-CM

## 2025-01-15 PROCEDURE — 95115 IMMUNOTHERAPY ONE INJECTION: CPT | Performed by: INTERNAL MEDICINE

## 2025-01-23 ENCOUNTER — TELEPHONE (OUTPATIENT)
Dept: FAMILY MEDICINE CLINIC | Age: 85
End: 2025-01-23
Payer: MEDICARE

## 2025-01-23 ENCOUNTER — CLINICAL SUPPORT (OUTPATIENT)
Dept: FAMILY MEDICINE CLINIC | Age: 85
End: 2025-01-23
Payer: MEDICARE

## 2025-01-23 DIAGNOSIS — Z12.31 ENCOUNTER FOR SCREENING MAMMOGRAM FOR MALIGNANT NEOPLASM OF BREAST: Primary | ICD-10-CM

## 2025-01-23 DIAGNOSIS — J30.9 ALLERGIC RHINITIS, UNSPECIFIED SEASONALITY, UNSPECIFIED TRIGGER: Primary | ICD-10-CM

## 2025-01-23 PROCEDURE — 95115 IMMUNOTHERAPY ONE INJECTION: CPT | Performed by: INTERNAL MEDICINE

## 2025-01-29 ENCOUNTER — CLINICAL SUPPORT (OUTPATIENT)
Dept: FAMILY MEDICINE CLINIC | Age: 85
End: 2025-01-29
Payer: MEDICARE

## 2025-01-29 DIAGNOSIS — J30.9 ALLERGIC RHINITIS, UNSPECIFIED SEASONALITY, UNSPECIFIED TRIGGER: Primary | ICD-10-CM

## 2025-01-29 PROCEDURE — 95115 IMMUNOTHERAPY ONE INJECTION: CPT | Performed by: FAMILY MEDICINE

## 2025-02-06 ENCOUNTER — CLINICAL SUPPORT (OUTPATIENT)
Dept: FAMILY MEDICINE CLINIC | Age: 85
End: 2025-02-06
Payer: MEDICARE

## 2025-02-06 DIAGNOSIS — J30.9 ALLERGIC RHINITIS, UNSPECIFIED SEASONALITY, UNSPECIFIED TRIGGER: Primary | ICD-10-CM

## 2025-02-06 PROCEDURE — 95115 IMMUNOTHERAPY ONE INJECTION: CPT | Performed by: FAMILY MEDICINE

## 2025-02-13 RX ORDER — BUDESONIDE 0.5 MG/2ML
INHALANT ORAL
Qty: 60 EACH | Refills: 11 | Status: SHIPPED | OUTPATIENT
Start: 2025-02-13

## 2025-02-14 ENCOUNTER — CLINICAL SUPPORT (OUTPATIENT)
Dept: FAMILY MEDICINE CLINIC | Age: 85
End: 2025-02-14
Payer: MEDICARE

## 2025-02-14 DIAGNOSIS — J30.9 ALLERGIC RHINITIS, UNSPECIFIED SEASONALITY, UNSPECIFIED TRIGGER: Primary | ICD-10-CM

## 2025-02-14 PROCEDURE — 95115 IMMUNOTHERAPY ONE INJECTION: CPT | Performed by: INTERNAL MEDICINE

## 2025-02-21 ENCOUNTER — OFFICE VISIT (OUTPATIENT)
Dept: FAMILY MEDICINE CLINIC | Age: 85
End: 2025-02-21
Payer: MEDICARE

## 2025-02-21 ENCOUNTER — HOSPITAL ENCOUNTER (OUTPATIENT)
Dept: GENERAL RADIOLOGY | Facility: HOSPITAL | Age: 85
Discharge: HOME | End: 2025-02-21
Payer: MEDICARE

## 2025-02-21 VITALS
TEMPERATURE: 97.8 F | DIASTOLIC BLOOD PRESSURE: 55 MMHG | HEART RATE: 84 BPM | WEIGHT: 114 LBS | OXYGEN SATURATION: 97 % | HEIGHT: 61 IN | SYSTOLIC BLOOD PRESSURE: 126 MMHG | BODY MASS INDEX: 21.52 KG/M2

## 2025-02-21 DIAGNOSIS — M25.511 ACUTE PAIN OF RIGHT SHOULDER: ICD-10-CM

## 2025-02-21 DIAGNOSIS — J30.9 ALLERGIC RHINITIS, UNSPECIFIED SEASONALITY, UNSPECIFIED TRIGGER: ICD-10-CM

## 2025-02-21 DIAGNOSIS — M54.2 NECK PAIN: ICD-10-CM

## 2025-02-21 DIAGNOSIS — M54.2 NECK PAIN: Primary | ICD-10-CM

## 2025-02-21 PROCEDURE — 72040 X-RAY EXAM NECK SPINE 2-3 VW: CPT

## 2025-02-21 PROCEDURE — 73030 X-RAY EXAM OF SHOULDER: CPT

## 2025-02-21 NOTE — PROGRESS NOTES
Chief Complaint  Shoulder Injury (Fell on ice 2/19/25 and hurt right shoulder) and Neck Pain (Back of neck pain )    Subjective        Luma Cruz presents to [facility] today for       History of Present Illness  The patient is an 84-year-old female who presents for evaluation of right shoulder and neck pain following a fall on the ice on 02/19/2025.    She recounts a recent incident where she slipped on ice while en route to her mailbox, resulting in a fall that impacted her buttocks, head, and right side of her neck. She reports persistent pain extending from her neck to her back, accompanied by a general feeling of malaise. She also experiences intermittent headaches, which she attributes to her fall. She has been applying ice to the affected area and has not used Voltaren gel. She has been managing her pain with Extra Strength Tylenol, and declines any new prescriptions.    She reports persistent pain extending from her neck to her back, accompanied by a general feeling of malaise. She has been applying ice to the affected area and has not used Voltaren gel. She has previously sought treatment from a pain management specialist. She has been managing her pain with Extra Strength Tylenol.    Supplemental Information  She mentions a recent bout of diarrhea and vomiting, which she believes was contracted from her 's hospital stay approximately 2 weeks ago. This episode was severe enough to warrant an emergency room visit, although the symptoms have since subsided. She reports no fever, cough, or sore throat, aches chills or nausea vomiting diarrhea.    MEDICATIONS  Current: Extra strength Tylenol, Voltaren gel          Current Outpatient Medications:   •  albuterol (PROVENTIL) (2.5 MG/3ML) 0.083% nebulizer solution, Take 2.5 mg by nebulization Every 4 (Four) Hours As Needed for Wheezing., Disp: , Rfl:   •  amLODIPine (NORVASC) 2.5 MG tablet, Take 1 tablet by mouth Daily.,  Disp: 90 tablet, Rfl: 1  •  azelastine (ASTELIN) 0.1 % nasal spray, Administer 1 spray into the nostril(s) as directed by provider 2 (Two) Times a Day., Disp: , Rfl:   •  budesonide (PULMICORT) 0.5 MG/2ML nebulizer solution, USE 1 VIAL  IN  NEBULIZER TWICE  DAILY - Rinse Mouth After Treatment, Disp: 60 each, Rfl: 11  •  clonazePAM (KlonoPIN) 0.5 MG tablet, Take 1 tablet by mouth 2 (Two) Times a Day As Needed for Anxiety., Disp: 60 tablet, Rfl: 2  •  Diclofenac Sodium (Voltaren) 1 % gel gel, Apply 4 g topically to the appropriate area as directed 4 (Four) Times a Day As Needed (hand pain)., Disp: 100 g, Rfl: 1  •  estradiol (ESTRACE) 0.1 MG/GM vaginal cream, Insert  into the vagina 2 (Two) Times a Week., Disp: , Rfl:   •  estradiol (ESTRACE) 1 MG tablet, Take 1 tablet by mouth Daily., Disp: 90 tablet, Rfl: 3  •  famotidine (Pepcid) 40 MG tablet, Take 1 tablet by mouth 2 (Two) Times a Day., Disp: 180 tablet, Rfl: 3  •  fluticasone (FLONASE) 50 MCG/ACT nasal spray, Administer 2 sprays into the nostril(s) as directed by provider Daily., Disp: , Rfl:   •  levocetirizine (XYZAL) 5 MG tablet, Take 1 tablet by mouth Every Evening., Disp: 90 tablet, Rfl: 1  •  losartan (COZAAR) 50 MG tablet, Take 1 tablet by mouth Daily., Disp: 90 tablet, Rfl: 1  •  multivitamin with minerals tablet tablet, Take 1 tablet by mouth Daily., Disp: , Rfl:   •  ondansetron ODT (ZOFRAN-ODT) 4 MG disintegrating tablet, Place 1 tablet on the tongue Every 6 (Six) Hours As Needed for Nausea or Vomiting., Disp: 30 tablet, Rfl: 3  •  pantoprazole (PROTONIX) 40 MG EC tablet, Take 1 tablet by mouth Daily., Disp: 90 tablet, Rfl: 3  •  pravastatin (PRAVACHOL) 20 MG tablet, Take 1 tablet by mouth Daily., Disp: 90 tablet, Rfl: 1  •  sodium chloride 3 % nebulizer solution, Take 4 mL by nebulization 2 (Two) Times a Day., Disp: 240 mL, Rfl: 1  No current facility-administered medications for this visit.  There are no discontinued  "medications.      Allergies:  Fosamax [alendronate] and Penicillins      Objective   Vital Signs:   Vitals:    02/21/25 1122   BP: 126/55   BP Location: Left arm   Patient Position: Sitting   Cuff Size: Adult   Pulse: 84   Temp: 97.8 °F (36.6 °C)   TempSrc: Oral   SpO2: 97%   Weight: 51.7 kg (114 lb)   Height: 154.9 cm (60.98\")     Body mass index is 21.55 kg/m².  BMI is within normal parameters. No other follow-up for BMI required.  COMPREHENSIVE METABOLIC PANEL (02/10/2025 12:01)       Physical Exam  Constitutional:       Appearance: Normal appearance.   Neck:      Vascular: No carotid bruit.   Cardiovascular:      Rate and Rhythm: Normal rate and regular rhythm.      Heart sounds: Normal heart sounds.   Pulmonary:      Effort: Pulmonary effort is normal.      Breath sounds: Normal breath sounds.   Musculoskeletal:         General: Tenderness (bilateral neck tenderness , LROM , limited side to side rotation,  right trapezious muscle area tenderness and deep right shoulder tenderness around the capsule area) present.   Skin:     General: Skin is warm and dry.   Neurological:      General: No focal deficit present.      Mental Status: She is alert.   Psychiatric:         Mood and Affect: Mood normal.         Behavior: Behavior normal.           Lab Results   Component Value Date    GLUCOSE 92 10/10/2024    BUN 22 10/10/2024    CREATININE 0.85 10/10/2024    EGFRIFNONA 62 12/28/2021    BCR 25.9 (H) 10/10/2024    K 4.3 10/10/2024    CO2 26.4 10/10/2024    CALCIUM 9.5 10/10/2024    ALBUMIN 4.3 10/10/2024    AST 27 10/10/2024    ALT 14 10/10/2024       Lab Results   Component Value Date    CHOL 169 10/10/2024    TRIG 105 10/10/2024    HDL 68 (H) 10/10/2024    LDL 82 10/10/2024       Lab Results   Component Value Date    WBC 5.00 10/10/2024    HGB 12.4 10/10/2024    HCT 39.1 10/10/2024    MCV 90.9 10/10/2024     10/10/2024                     Procedures         Diagnoses and all orders for this visit:    1. Neck " pain (Primary)  -     XR Spine Cervical 2 or 3 View; Future  -     Ambulatory Referral to Physical Therapy for Evaluation & Treatment    2. Acute pain of right shoulder  -     XR Shoulder 2+ View Right  -     Ambulatory Referral to Physical Therapy for Evaluation & Treatment    3. Allergic rhinitis, unspecified seasonality, unspecified trigger  -     Allergy Serum Injection          Assessment & Plan  1. Right shoulder pain.  The patient reports persistent pain in the right shoulder following a fall on the ice on 02/19/2025. She experiences pain when lifting her arm and moving it behind her back or head. Physical examination reveals pain at about 35 degrees of arm elevation. An x-ray of the right shoulder will be ordered to rule out any fractures or other significant injuries. She is advised to continue taking Extra Strength Tylenol for pain management and to apply ice to the affected area for the first week, followed by heat application thereafter. A 10-day supply of pain medication, similar to ibuprofen, will be provided, but she prefers to stick with Tylenol due to gastrointestinal issues with other medications.    2. Neck pain.  The patient reports neck pain radiating to her back following the fall. Physical examination indicates pain with neck movement, especially when turning her head to the right. An x-ray of the neck will be ordered to assess for any potential injuries. She is advised to continue using ice and heat as described for her shoulder pain. A referral for physical therapy will be made to help alleviate her neck pain.              Follow Up  Return if symptoms worsen or fail to improve, for will call with imaging results , next steps.  Patient was given instructions and counseling regarding her condition or for health maintenance advice. Please see specific information pulled into the AVS if appropriate.           Thuy Valencia, APRN  02/21/2025    Please note that portions of this document  were completed using a voice recognition program.     Patient or patient representative verbalized consent for the use of Ambient Listening during the visit with  TOSHIA Yoon for chart documentation. 2/21/2025  11:35 EST

## 2025-02-26 ENCOUNTER — CLINICAL SUPPORT (OUTPATIENT)
Dept: FAMILY MEDICINE CLINIC | Age: 85
End: 2025-02-26
Payer: MEDICARE

## 2025-02-26 DIAGNOSIS — J30.9 ALLERGIC RHINITIS, UNSPECIFIED SEASONALITY, UNSPECIFIED TRIGGER: Primary | ICD-10-CM

## 2025-03-03 RX ORDER — ARFORMOTEROL TARTRATE 15 UG/2ML
SOLUTION RESPIRATORY (INHALATION)
Refills: 11 | OUTPATIENT
Start: 2025-03-03

## 2025-03-03 RX ORDER — BUDESONIDE 0.5 MG/2ML
INHALANT ORAL
Qty: 60 EACH | Refills: 11 | OUTPATIENT
Start: 2025-03-03

## 2025-03-07 ENCOUNTER — CLINICAL SUPPORT (OUTPATIENT)
Dept: FAMILY MEDICINE CLINIC | Age: 85
End: 2025-03-07
Payer: MEDICARE

## 2025-03-07 DIAGNOSIS — J30.9 ALLERGIC RHINITIS, UNSPECIFIED SEASONALITY, UNSPECIFIED TRIGGER: Primary | ICD-10-CM

## 2025-03-07 PROCEDURE — 95115 IMMUNOTHERAPY ONE INJECTION: CPT | Performed by: FAMILY MEDICINE

## 2025-03-14 ENCOUNTER — CLINICAL SUPPORT (OUTPATIENT)
Dept: FAMILY MEDICINE CLINIC | Age: 85
End: 2025-03-14
Payer: MEDICARE

## 2025-03-14 DIAGNOSIS — J30.9 ALLERGIC RHINITIS, UNSPECIFIED SEASONALITY, UNSPECIFIED TRIGGER: Primary | ICD-10-CM

## 2025-03-14 PROCEDURE — 95115 IMMUNOTHERAPY ONE INJECTION: CPT | Performed by: FAMILY MEDICINE

## 2025-03-21 ENCOUNTER — CLINICAL SUPPORT (OUTPATIENT)
Dept: FAMILY MEDICINE CLINIC | Age: 85
End: 2025-03-21
Payer: MEDICARE

## 2025-03-21 DIAGNOSIS — J30.9 ALLERGIC RHINITIS, UNSPECIFIED SEASONALITY, UNSPECIFIED TRIGGER: Primary | ICD-10-CM

## 2025-03-21 PROCEDURE — 95115 IMMUNOTHERAPY ONE INJECTION: CPT | Performed by: FAMILY MEDICINE

## 2025-03-28 ENCOUNTER — HOSPITAL ENCOUNTER (OUTPATIENT)
Dept: MAMMOGRAPHY | Facility: HOSPITAL | Age: 85
Discharge: HOME | End: 2025-03-28
Admitting: FAMILY MEDICINE
Payer: MEDICARE

## 2025-03-28 ENCOUNTER — CLINICAL SUPPORT (OUTPATIENT)
Dept: FAMILY MEDICINE CLINIC | Age: 85
End: 2025-03-28
Payer: MEDICARE

## 2025-03-28 DIAGNOSIS — Z12.31 ENCOUNTER FOR SCREENING MAMMOGRAM FOR MALIGNANT NEOPLASM OF BREAST: ICD-10-CM

## 2025-03-28 DIAGNOSIS — J30.9 ALLERGIC RHINITIS, UNSPECIFIED SEASONALITY, UNSPECIFIED TRIGGER: Primary | ICD-10-CM

## 2025-03-28 PROCEDURE — 77067 SCR MAMMO BI INCL CAD: CPT

## 2025-03-28 PROCEDURE — 95115 IMMUNOTHERAPY ONE INJECTION: CPT | Performed by: INTERNAL MEDICINE

## 2025-03-28 PROCEDURE — 77063 BREAST TOMOSYNTHESIS BI: CPT

## 2025-04-02 ENCOUNTER — CLINICAL SUPPORT (OUTPATIENT)
Dept: FAMILY MEDICINE CLINIC | Age: 85
End: 2025-04-02
Payer: MEDICARE

## 2025-04-02 DIAGNOSIS — J30.2 SEASONAL ALLERGIES: Primary | ICD-10-CM

## 2025-04-02 PROCEDURE — 95115 IMMUNOTHERAPY ONE INJECTION: CPT | Performed by: FAMILY MEDICINE

## 2025-04-07 ENCOUNTER — HOSPITAL ENCOUNTER (OUTPATIENT)
Dept: GENERAL RADIOLOGY | Facility: HOSPITAL | Age: 85
Discharge: HOME | End: 2025-04-07
Admitting: FAMILY MEDICINE
Payer: MEDICARE

## 2025-04-07 ENCOUNTER — RESULTS FOLLOW-UP (OUTPATIENT)
Dept: FAMILY MEDICINE CLINIC | Age: 85
End: 2025-04-07

## 2025-04-07 ENCOUNTER — OFFICE VISIT (OUTPATIENT)
Dept: FAMILY MEDICINE CLINIC | Age: 85
End: 2025-04-07
Payer: MEDICARE

## 2025-04-07 VITALS
HEIGHT: 61 IN | BODY MASS INDEX: 21.34 KG/M2 | SYSTOLIC BLOOD PRESSURE: 115 MMHG | WEIGHT: 113 LBS | DIASTOLIC BLOOD PRESSURE: 62 MMHG | OXYGEN SATURATION: 96 % | HEART RATE: 69 BPM | TEMPERATURE: 98 F

## 2025-04-07 DIAGNOSIS — J30.9 CHRONIC ALLERGIC RHINITIS: ICD-10-CM

## 2025-04-07 DIAGNOSIS — R51.9 SINUS HEADACHE: ICD-10-CM

## 2025-04-07 DIAGNOSIS — Z79.899 OTHER LONG TERM (CURRENT) DRUG THERAPY: ICD-10-CM

## 2025-04-07 DIAGNOSIS — I10 ESSENTIAL HYPERTENSION: Primary | ICD-10-CM

## 2025-04-07 DIAGNOSIS — N95.9 MENOPAUSAL AND POSTMENOPAUSAL DISORDER: ICD-10-CM

## 2025-04-07 DIAGNOSIS — E78.2 MIXED HYPERLIPIDEMIA: ICD-10-CM

## 2025-04-07 DIAGNOSIS — F41.1 GENERALIZED ANXIETY DISORDER: ICD-10-CM

## 2025-04-07 DIAGNOSIS — J30.9 ALLERGIC RHINITIS, UNSPECIFIED SEASONALITY, UNSPECIFIED TRIGGER: ICD-10-CM

## 2025-04-07 PROCEDURE — 70220 X-RAY EXAM OF SINUSES: CPT

## 2025-04-07 RX ORDER — ESTRADIOL 1 MG/1
1 TABLET ORAL DAILY
Qty: 90 TABLET | Refills: 3 | Status: SHIPPED | OUTPATIENT
Start: 2025-04-07

## 2025-04-07 RX ORDER — AZELASTINE 1 MG/ML
1 SPRAY, METERED NASAL 2 TIMES DAILY
Qty: 90 ML | Refills: 3 | Status: SHIPPED | OUTPATIENT
Start: 2025-04-07

## 2025-04-07 RX ORDER — LOSARTAN POTASSIUM 50 MG/1
50 TABLET ORAL DAILY
Qty: 90 TABLET | Refills: 3 | Status: SHIPPED | OUTPATIENT
Start: 2025-04-07

## 2025-04-07 RX ORDER — LEVOCETIRIZINE DIHYDROCHLORIDE 5 MG/1
5 TABLET, FILM COATED ORAL EVERY EVENING
Qty: 90 TABLET | Refills: 3 | Status: SHIPPED | OUTPATIENT
Start: 2025-04-07

## 2025-04-07 RX ORDER — PRAVASTATIN SODIUM 20 MG
20 TABLET ORAL DAILY
Qty: 90 TABLET | Refills: 3 | Status: SHIPPED | OUTPATIENT
Start: 2025-04-07

## 2025-04-07 RX ORDER — LOSARTAN POTASSIUM 50 MG/1
50 TABLET ORAL DAILY
Qty: 90 TABLET | Refills: 1 | Status: SHIPPED | OUTPATIENT
Start: 2025-04-07 | End: 2025-04-07

## 2025-04-07 RX ORDER — FLUTICASONE PROPIONATE 50 MCG
2 SPRAY, SUSPENSION (ML) NASAL DAILY
Qty: 48 G | Refills: 3 | Status: SHIPPED | OUTPATIENT
Start: 2025-04-07

## 2025-04-07 RX ORDER — CLONAZEPAM 0.5 MG/1
0.5 TABLET ORAL 2 TIMES DAILY PRN
Qty: 60 TABLET | Refills: 2 | Status: SHIPPED | OUTPATIENT
Start: 2025-04-07

## 2025-04-07 RX ORDER — AMLODIPINE BESYLATE 2.5 MG/1
2.5 TABLET ORAL DAILY
Qty: 90 TABLET | Refills: 3 | Status: SHIPPED | OUTPATIENT
Start: 2025-04-07

## 2025-04-07 NOTE — PROGRESS NOTES
Luma Cruz presents to [facility].    Chief Complaint:  Hypertension, cholesterol, GERD, anxiety    Subjective   History of Present Illness:  Luma Grey is being seen today for follow up on her usual care.  She has hypertension and elevated cholesterol.  She remains on treatment for these issues as noted.  Her blood pressure is well-controlled here.  It usually runs in a good range at home as well.     She also has issues with chronic acid reflux and remains on treatment for this as noted.  Her symptoms are relatively well-controlled.     Regarding anxiety, this has been a longstanding problem for which she currently takes clonazepam at night.  She uses a low-dose of this.  She continues to see benefit from the medication and would like to continue it.  We have discussed potential risks including sleepiness, fall, and impact on memory.  Davi is reviewed.     She also remains on oral estradiol for postmenopausal symptoms.  She has been on this medication for a long period of time.  Risks are again reviewed.    Review of Systems:  Review of Systems   Constitutional:  Negative for chills and fever.   Respiratory:  Negative for cough and shortness of breath.    Cardiovascular:  Negative for chest pain and palpitations.   Gastrointestinal:  Negative for abdominal pain, nausea and vomiting.      Objective   Medical History:  Past Medical History:    Congenital prolapsed rectum    COVID-19    Essential hypertension    Generalized anxiety disorder    GERD without esophagitis    Hernia    hiatal hernia    History of prolapse of bladder    Menopausal and postmenopausal disorder    Mixed hyperlipidemia     Past Surgical History:    APPENDECTOMY    BREAST BIOPSY    BRONCHOSCOPY    Procedure: BRONCHOSCOPY WITH BRONCHOALVEOLAR LAVAGE AND WASHINGS;  Surgeon: Enmanuel Forbes MD;  Location: [facility] ENDOSCOPY;  Service: Pulmonary;  Laterality: Bilateral;  MUCUS PLUGGING    CHOLECYSTECTOMY     COLONOSCOPY    COLONOSCOPY    Procedure: COLONOSCOPY;  Surgeon: Cory Lindsey MD;  Location: [facility] ENDOSCOPY;  Service: General;  Laterality: N/A;  normal    ENDOSCOPY    Hiatal hernia, mild reactive gastropathy    HYSTERECTOMY    complete    TONSILLECTOMY AND ADENOIDECTOMY      Family History   Problem Relation Age of Onset    Stroke Mother     Hypertension Mother     Arthritis Mother     Hearing loss Mother     Coronary artery disease Father      Social History     Tobacco Use    Smoking status: Never     Passive exposure: Never    Smokeless tobacco: Never   Substance Use Topics    Alcohol use: Never       Health Maintenance Due   Topic Date Due    TDAP/TD VACCINES (1 - Tdap) Never done        Immunization History   Administered Date(s) Administered    Arexvy (RSV, Adults 60+ yrs) 11/10/2023    COVID-19 (MODERNA) 1st,2nd,3rd Dose Monovalent 01/27/2021, 02/24/2021, 10/30/2021, 04/13/2022    COVID-19 (MODERNA) BIVALENT 12+YRS 10/19/2022    COVID-19 (PFIZER) 12YRS+ (COMIRNATY) 10/19/2023, 09/30/2024    FLUAD TRI 65YR+ 09/30/2024    Fluad Quad 65+ 09/29/2022    Fluzone High-Dose 65+YRS 10/16/2012, 09/25/2013, 09/28/2017, 09/24/2020    Fluzone High-Dose 65+yrs 10/02/2021, 10/17/2023    Hepatitis A 12/06/2018       Allergies   Allergen Reactions    Fosamax [Alendronate] GI Intolerance    Penicillins Rash        Medications:    Current Outpatient Medications:     albuterol (PROVENTIL) (2.5 MG/3ML) 0.083% nebulizer solution, Take 2.5 mg by nebulization Every 4 (Four) Hours As Needed for Wheezing., Disp: , Rfl:     amLODIPine (NORVASC) 2.5 MG tablet, Take 1 tablet by mouth Daily., Disp: 90 tablet, Rfl: 3    azelastine (ASTELIN) 0.1 % nasal spray, Administer 1 spray into the nostril(s) as directed by provider 2 (Two) Times a Day., Disp: 90 mL, Rfl: 3    budesonide (PULMICORT) 0.5 MG/2ML nebulizer solution, USE 1 VIAL  IN  NEBULIZER TWICE  DAILY - Rinse Mouth After Treatment, Disp: 60 each, Rfl: 11    clonazePAM  "(KlonoPIN) 0.5 MG tablet, Take 1 tablet by mouth 2 (Two) Times a Day As Needed for Anxiety., Disp: 60 tablet, Rfl: 2    Diclofenac Sodium (Voltaren) 1 % gel gel, Apply 4 g topically to the appropriate area as directed 4 (Four) Times a Day As Needed (hand pain)., Disp: 100 g, Rfl: 1    estradiol (ESTRACE) 0.1 MG/GM vaginal cream, Insert  into the vagina 2 (Two) Times a Week., Disp: , Rfl:     estradiol (ESTRACE) 1 MG tablet, Take 1 tablet by mouth Daily., Disp: 90 tablet, Rfl: 3    famotidine (Pepcid) 40 MG tablet, Take 1 tablet by mouth 2 (Two) Times a Day., Disp: 180 tablet, Rfl: 3    fluticasone (FLONASE) 50 MCG/ACT nasal spray, Administer 2 sprays into the nostril(s) as directed by provider Daily., Disp: 48 g, Rfl: 3    levocetirizine (XYZAL) 5 MG tablet, Take 1 tablet by mouth Every Evening., Disp: 90 tablet, Rfl: 3    losartan (COZAAR) 50 MG tablet, Take 1 tablet by mouth Daily., Disp: 90 tablet, Rfl: 3    multivitamin with minerals tablet tablet, Take 1 tablet by mouth Daily., Disp: , Rfl:     ondansetron ODT (ZOFRAN-ODT) 4 MG disintegrating tablet, Place 1 tablet on the tongue Every 6 (Six) Hours As Needed for Nausea or Vomiting., Disp: 30 tablet, Rfl: 3    pantoprazole (PROTONIX) 40 MG EC tablet, Take 1 tablet by mouth Daily., Disp: 90 tablet, Rfl: 3    pravastatin (PRAVACHOL) 20 MG tablet, Take 1 tablet by mouth Daily., Disp: 90 tablet, Rfl: 3    sodium chloride 3 % nebulizer solution, Take 4 mL by nebulization 2 (Two) Times a Day., Disp: 240 mL, Rfl: 1    Vital Signs:   /62 (BP Location: Left arm, Patient Position: Sitting)   Pulse 69   Temp 98 °F (36.7 °C) (Oral)   Ht 154.9 cm (60.98\")   Wt 51.3 kg (113 lb)   SpO2 96%   BMI 21.36 kg/m²       Physical Exam:  Physical Exam  Vitals reviewed.   Constitutional:       General: She is not in acute distress.     Appearance: She is not ill-appearing.   Eyes:      Pupils: Pupils are equal, round, and reactive to light.   Neck:      Comments: No " thyromegaly  Cardiovascular:      Rate and Rhythm: Normal rate and regular rhythm.   Pulmonary:      Effort: Pulmonary effort is normal.      Breath sounds: Normal breath sounds.   Abdominal:      General: There is no distension.      Palpations: Abdomen is soft.      Tenderness: There is no abdominal tenderness.   Musculoskeletal:      Cervical back: Neck supple.   Lymphadenopathy:      Cervical: No cervical adenopathy.   Skin:     Findings: No lesion or rash.   Neurological:      Mental Status: She is alert.     Result Review   The following data was reviewed by Joseph Walker MD on 04/07/2025.  Lab Results   Component Value Date    WBC 5.00 10/10/2024    HGB 12.4 10/10/2024    HCT 39.1 10/10/2024    MCV 90.9 10/10/2024     10/10/2024     Lab Results   Component Value Date    GLUCOSE 92 10/10/2024    BUN 22 10/10/2024    CREATININE 0.85 10/10/2024     10/10/2024    K 4.3 10/10/2024     10/10/2024    CALCIUM 9.5 10/10/2024    PROTEINTOT 6.6 10/10/2024    ALBUMIN 4.3 10/10/2024    ALT 14 10/10/2024    AST 27 10/10/2024    ALKPHOS 61 10/10/2024    BILITOT 0.3 10/10/2024    GLOB 2.3 10/10/2024    AGRATIO 1.9 10/10/2024    BCR 25.9 (H) 10/10/2024    ANIONGAP 12.6 10/10/2024    EGFR 68.9 10/10/2024     Lab Results   Component Value Date    CHOL 169 10/10/2024    CHLPL 157 03/10/2020    TRIG 105 10/10/2024    HDL 68 (H) 10/10/2024    LDL 82 10/10/2024     Lab Results   Component Value Date    TSH 1.860 10/10/2024     Lab Results   Component Value Date    HGBA1C 5.50 10/10/2024     Urinalysis With Microscopic - (02/10/2025 13:06)  CBC AND DIFFERENTIAL (02/10/2025 12:01)  COMPREHENSIVE METABOLIC PANEL (02/10/2025 12:01)  LIPASE (02/10/2025 12:01)  MAGNESIUM (02/10/2025 12:01)    BMI is within normal parameters. No other follow-up for BMI required.         Assessment and Plan:   Today, we have reviewed her care.  Overall, Luma Grey seems well.  Her blood pressure is in a good range, and most of  her care seems stable.  For now, we will refill needed medications.  I did review her recent labs which were done in the emergency department at [place].  We will hold off on blood work at this time.  Regarding the use of clonazepam, it continues to be of benefit for her, it will be refilled today.  Urine tox will be obtained.  We will tentatively plan to see her back in about 6 months, sooner if needed.    Diagnoses and all orders for this visit:    1. Essential hypertension (Primary)  -     amLODIPine (NORVASC) 2.5 MG tablet; Take 1 tablet by mouth Daily.  Dispense: 90 tablet; Refill: 3  -     Discontinue: losartan (COZAAR) 50 MG tablet; Take 1 tablet by mouth Daily.  Dispense: 90 tablet; Refill: 1  -     losartan (COZAAR) 50 MG tablet; Take 1 tablet by mouth Daily.  Dispense: 90 tablet; Refill: 3    2. Mixed hyperlipidemia  -     pravastatin (PRAVACHOL) 20 MG tablet; Take 1 tablet by mouth Daily.  Dispense: 90 tablet; Refill: 3    3. Generalized anxiety disorder  -     clonazePAM (KlonoPIN) 0.5 MG tablet; Take 1 tablet by mouth 2 (Two) Times a Day As Needed for Anxiety.  Dispense: 60 tablet; Refill: 2    4. Menopausal and postmenopausal disorder  -     estradiol (ESTRACE) 1 MG tablet; Take 1 tablet by mouth Daily.  Dispense: 90 tablet; Refill: 3    5. Chronic allergic rhinitis  -     azelastine (ASTELIN) 0.1 % nasal spray; Administer 1 spray into the nostril(s) as directed by provider 2 (Two) Times a Day.  Dispense: 90 mL; Refill: 3  -     fluticasone (FLONASE) 50 MCG/ACT nasal spray; Administer 2 sprays into the nostril(s) as directed by provider Daily.  Dispense: 48 g; Refill: 3    6. Allergic rhinitis, unspecified seasonality, unspecified trigger  -     levocetirizine (XYZAL) 5 MG tablet; Take 1 tablet by mouth Every Evening.  Dispense: 90 tablet; Refill: 3    7. Other long term (current) drug therapy  -     POC Medline 12 Panel Urine Drug Screen  -     clonazePAM (KlonoPIN) 0.5 MG tablet; Take 1 tablet by  mouth 2 (Two) Times a Day As Needed for Anxiety.  Dispense: 60 tablet; Refill: 2    8. Sinus headache  -     XR Sinuses 3+ View; Future    Follow Up  Return in about 27 weeks (around 10/13/2025) for Recheck.  Patient was given instructions and counseling regarding her condition or for health maintenance advice. Please see specific information pulled into the AVS if appropriate.

## 2025-04-16 ENCOUNTER — CLINICAL SUPPORT (OUTPATIENT)
Dept: FAMILY MEDICINE CLINIC | Age: 85
End: 2025-04-16
Payer: MEDICARE

## 2025-04-16 DIAGNOSIS — J30.9 ALLERGIC RHINITIS, UNSPECIFIED SEASONALITY, UNSPECIFIED TRIGGER: Primary | ICD-10-CM

## 2025-04-23 ENCOUNTER — CLINICAL SUPPORT (OUTPATIENT)
Dept: FAMILY MEDICINE CLINIC | Age: 85
End: 2025-04-23
Payer: MEDICARE

## 2025-04-23 DIAGNOSIS — J30.89 ALLERGIC RHINITIS DUE TO OTHER ALLERGIC TRIGGER, UNSPECIFIED SEASONALITY: Primary | ICD-10-CM

## 2025-04-30 NOTE — PROGRESS NOTES
Primary Care Provider  Joseph Walker MD     Referring Provider  No ref. provider found     Chief Complaint  Seasonal allergies and Follow-up (6 month. )    Subjective          History of Presenting Illness  Patient is a 84-year-old  female, patient of Dr. Sal who presents for management of bronchiectasis who presents for a follow-up visit today.  Patient states that since last visit her breathing is at baseline.  Patient states that she does get short of breath that is worse with exertion, mild to moderate in severity, and improved with rest. Patient states that she is taking Brovana and Pulmicort every day as prescribed and uses albuterol nebulizer treatments as needed.  Patient states that she did not  an albuterol inhaler due to the cost of medication.  Patient states that she is using her flutter valve with sodium chloride nebulizer treatments to assist with airway clearance.  Patient continues to decline a chest vest.  Patient states that she is taking Xyzal, Flonase nasal spray, and Astelin nasal spray for seasonal allergies. Patient is on Protonix and Pepcid for reflux.  Patient states that she has not had to take any antibiotics or steroids for any respiratory issues since last office visit and denies any hospitalizations since last office visit.  Patient denies fever, chills, night sweats, swollen glands in the head and neck, unintentional weight loss, hemoptysis, purulent sputum production, dysphagia, chest pain, palpitations, chest tightness, abdominal pain, nausea, vomiting, and diarrhea.  Patient also denies any myalgias, changes in sense of taste and/or smell, sore throat, any other coronavirus or flu-like symptoms.  Patient denies any leg swelling, orthopnea, paroxysmal nocturnal dyspnea.  Patient is able to perform activities of daily living.        Review of Systems     Family History   Problem Relation Age of Onset    Stroke Mother     Hypertension Mother      Arthritis Mother     Hearing loss Mother     Coronary artery disease Father         Social History     Socioeconomic History    Marital status:     Number of children: 2   Tobacco Use    Smoking status: Never     Passive exposure: Never    Smokeless tobacco: Never   Vaping Use    Vaping status: Never Used   Substance and Sexual Activity    Alcohol use: Never    Drug use: Never    Sexual activity: Yes     Partners: Male     Birth control/protection: Hysterectomy        Past Medical History:   Diagnosis Date    Congenital prolapsed rectum     COVID-19 07/01/2022    Essential hypertension     Generalized anxiety disorder     GERD without esophagitis     Hernia April, 2023    hiatal hernia    History of prolapse of bladder     Menopausal and postmenopausal disorder     Mixed hyperlipidemia         Immunization History   Administered Date(s) Administered    Arexvy (RSV, Adults 60+ yrs) 11/10/2023    COVID-19 (MODERNA) 1st,2nd,3rd Dose Monovalent 01/27/2021, 02/24/2021, 10/30/2021, 04/13/2022    COVID-19 (MODERNA) BIVALENT 12+YRS 10/19/2022    COVID-19 (PFIZER) 12YRS+ (COMIRNATY) 10/19/2023, 09/30/2024    FLUAD TRI 65YR+ 09/30/2024    Fluad Quad 65+ 09/29/2022    Fluzone High-Dose 65+YRS 10/16/2012, 09/25/2013, 09/28/2017, 09/24/2020    Fluzone High-Dose 65+yrs 10/02/2021, 10/17/2023    Hepatitis A 12/06/2018       Allergies   Allergen Reactions    Fosamax [Alendronate] GI Intolerance    Penicillins Rash          Current Outpatient Medications:     albuterol (PROVENTIL) (2.5 MG/3ML) 0.083% nebulizer solution, Take 2.5 mg by nebulization Every 4 (Four) Hours As Needed for Wheezing for up to 30 days., Disp: 180 each, Rfl: 5    albuterol sulfate  (90 Base) MCG/ACT inhaler, Inhale 2 puffs Every 4 (Four) Hours As Needed for Wheezing., Disp: , Rfl:     amLODIPine (NORVASC) 2.5 MG tablet, Take 1 tablet by mouth Daily., Disp: 90 tablet, Rfl: 3    arformoterol (BROVANA) 15 MCG/2ML nebulizer solution, Take 2 mL by  nebulization 2 (Two) Times a Day. [facility], Disp: 120 mL, Rfl: 5    azelastine (ASTELIN) 0.1 % nasal spray, Administer 1 spray into the nostril(s) as directed by provider 2 (Two) Times a Day., Disp: 90 mL, Rfl: 3    budesonide (PULMICORT) 0.5 MG/2ML nebulizer solution, Take 2 mL by nebulization 2 (Two) Times a Day for 30 days. [facility], Disp: 120 mL, Rfl: 5    clonazePAM (KlonoPIN) 0.5 MG tablet, Take 1 tablet by mouth 2 (Two) Times a Day As Needed for Anxiety., Disp: 60 tablet, Rfl: 2    estradiol (ESTRACE) 0.1 MG/GM vaginal cream, Insert  into the vagina 2 (Two) Times a Week., Disp: , Rfl:     estradiol (ESTRACE) 1 MG tablet, Take 1 tablet by mouth Daily., Disp: 90 tablet, Rfl: 3    famotidine (Pepcid) 40 MG tablet, Take 1 tablet by mouth 2 (Two) Times a Day., Disp: 180 tablet, Rfl: 3    fluticasone (FLONASE) 50 MCG/ACT nasal spray, Administer 2 sprays into the nostril(s) as directed by provider Daily., Disp: 48 g, Rfl: 3    levocetirizine (XYZAL) 5 MG tablet, Take 1 tablet by mouth Every Evening., Disp: 90 tablet, Rfl: 3    losartan (COZAAR) 50 MG tablet, Take 1 tablet by mouth Daily., Disp: 90 tablet, Rfl: 3    pantoprazole (PROTONIX) 40 MG EC tablet, Take 1 tablet by mouth Daily., Disp: 90 tablet, Rfl: 3    pravastatin (PRAVACHOL) 20 MG tablet, Take 1 tablet by mouth Daily., Disp: 90 tablet, Rfl: 3    sodium chloride 3 % nebulizer solution, Take 4 mL by nebulization 2 (Two) Times a Day for 30 days. [facility], Disp: 240 mL, Rfl: 5    Diclofenac Sodium (Voltaren) 1 % gel gel, Apply 4 g topically to the appropriate area as directed 4 (Four) Times a Day As Needed (hand pain). (Patient not taking: Reported on 5/1/2025), Disp: 100 g, Rfl: 1    multivitamin with minerals tablet tablet, Take 1 tablet by mouth Daily. (Patient not taking: Reported on 5/1/2025), Disp: , Rfl:     ondansetron ODT (ZOFRAN-ODT) 4 MG disintegrating tablet, Place 1 tablet on the tongue Every 6 (Six) Hours As Needed for  "Nausea or Vomiting. (Patient not taking: Reported on 5/1/2025), Disp: 30 tablet, Rfl: 3     Objective     Physical Exam  Vital Signs:   WDWN, Alert, NAD.    HEENT:  PERRL, EOMI.  OP, nares clear, no sinus tenderness  Neck:  Supple, no JVD, no thyromegaly.  Lymph: no axillary, cervical, supraclavicular lymphadenopathy noted bilaterally  Chest:  good aeration, clear to auscultation bilaterally, tympanic to percussion bilaterally, no work of breathing noted  CV: RRR, no MGR, pulses 2+, equal.  Abd:  Soft, NT, ND, + BS, no HSM  EXT:  no clubbing, no cyanosis, no edema, no joint tenderness  Neuro:  A&Ox3, CN grossly intact, no focal deficits.  Skin: No rashes or lesions noted.    /50 (BP Location: Left arm, Patient Position: Sitting, Cuff Size: Small Adult)   Pulse 71   Temp 97.8 °F (36.6 °C) (Oral)   Resp 16   Ht 154.9 cm (60.98\")   Wt 50.8 kg (112 lb)   SpO2 97% Comment: room air  BMI 21.17 kg/m²         Result Review :   I have reviewed my last office visit note.     Procedures:              Assessment and Plan      Assessment:  1.  Bronchiectasis on chest CT scan dated from 5/11/2021.  Patient does have a flutter valve sodium chloride nebulizer treatments.  Patient declines a chest vest at this time.  2.  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   3.  History of fungal pneumonia, treated for two months of [place] with antifungal associated issues.      4.  GERD.  Patient is on a PPI.        5.  Mucus plugging. Patient has a flutter valve and sodium chloride nebulizer treatments.     6.  Airway clearance impairment.  7.  Seasonal allergies on allergy immunotherapy.      8.  Dyspnea.  9. Abnormal chest CT scan: New mild subpleural bilateral lobe groundglass opacities, resolved.  Chest CT scan report dated from 4/24/2024 states no acute intrathoracic process.  10.  Never smoker.  "         Plan:  1. Continue Brovana and Pulmicort everyday as prescribed and rinse her mouth out after each use.    2.  Continue albuterol nebulizer treatments as needed.    3.  Continue Astelin nasal spray, Flonase nasal spray, and Xyzal as prescribed.  Continue allergy immunotherapy and to follow up with allergist as scheduled.   4. Continue flutter valve with nebulizer treatments to assist with airway clearance.  Patient declines a chest vest.  5.  For GERD,  patient to continue PPI.   Patient is also advised to sleep with the head of the bed elevated and do not eat 3-4 hours prior to bedtime.  6.  Vaccination status: patient reports they are up-to-date with RSV, flu, pneumonia, and Covid vaccines.  Patient is advised to continue to follow CDC recommendations such as social distancing wearing a mask and washing hands for at least 20 seconds.  7.  Smoking status: never smoker.  8.  Patient to call the office, 911, or go to the ER with new or worsening symptoms.  9.  Follow-up in 6 months, sooner if needed.          Follow Up   Return in about 6 months (around 11/1/2025) for in [place].  Patient was given instructions and counseling regarding her condition or for health maintenance advice. Please see specific information pulled into the AVS if appropriate.

## 2025-05-01 ENCOUNTER — CLINICAL SUPPORT (OUTPATIENT)
Dept: FAMILY MEDICINE CLINIC | Age: 85
End: 2025-05-01
Payer: MEDICARE

## 2025-05-01 ENCOUNTER — OFFICE VISIT (OUTPATIENT)
Dept: PULMONOLOGY | Facility: CLINIC | Age: 85
End: 2025-05-01
Payer: MEDICARE

## 2025-05-01 VITALS
RESPIRATION RATE: 16 BRPM | WEIGHT: 112 LBS | DIASTOLIC BLOOD PRESSURE: 50 MMHG | OXYGEN SATURATION: 97 % | BODY MASS INDEX: 21.14 KG/M2 | TEMPERATURE: 97.8 F | HEART RATE: 71 BPM | SYSTOLIC BLOOD PRESSURE: 106 MMHG | HEIGHT: 61 IN

## 2025-05-01 DIAGNOSIS — J30.89 ALLERGIC RHINITIS DUE TO OTHER ALLERGIC TRIGGER, UNSPECIFIED SEASONALITY: Primary | ICD-10-CM

## 2025-05-01 DIAGNOSIS — J30.2 SEASONAL ALLERGIES: ICD-10-CM

## 2025-05-01 DIAGNOSIS — Z86.19 HISTORY OF FUNGAL INFECTION: ICD-10-CM

## 2025-05-01 DIAGNOSIS — T17.500A MUCUS PLUGGING OF BRONCHI: Primary | ICD-10-CM

## 2025-05-01 DIAGNOSIS — R93.89 ABNORMAL CT SCAN, CHEST: ICD-10-CM

## 2025-05-01 DIAGNOSIS — J47.9 BRONCHIECTASIS WITHOUT COMPLICATION: ICD-10-CM

## 2025-05-01 DIAGNOSIS — R06.00 DYSPNEA, UNSPECIFIED TYPE: ICD-10-CM

## 2025-05-01 DIAGNOSIS — R06.89 AIRWAY CLEARANCE IMPAIRMENT: ICD-10-CM

## 2025-05-01 DIAGNOSIS — K21.9 GERD WITHOUT ESOPHAGITIS: ICD-10-CM

## 2025-05-01 PROCEDURE — 95117 IMMUNOTHERAPY INJECTIONS: CPT | Performed by: FAMILY MEDICINE

## 2025-05-01 RX ORDER — SODIUM CHLORIDE FOR INHALATION 3 %
4 VIAL, NEBULIZER (ML) INHALATION 2 TIMES DAILY
Qty: 240 ML | Refills: 5 | Status: SHIPPED | OUTPATIENT
Start: 2025-05-01 | End: 2025-05-31

## 2025-05-01 RX ORDER — ARFORMOTEROL TARTRATE 15 UG/2ML
15 SOLUTION RESPIRATORY (INHALATION)
Qty: 120 ML | Refills: 5 | Status: SHIPPED | OUTPATIENT
Start: 2025-05-01

## 2025-05-01 RX ORDER — ARFORMOTEROL TARTRATE 15 UG/2ML
15 SOLUTION RESPIRATORY (INHALATION)
COMMUNITY
End: 2025-05-01 | Stop reason: SDUPTHER

## 2025-05-01 RX ORDER — ALBUTEROL SULFATE 0.83 MG/ML
2.5 SOLUTION RESPIRATORY (INHALATION) EVERY 4 HOURS PRN
Qty: 180 EACH | Refills: 5 | Status: SHIPPED | OUTPATIENT
Start: 2025-05-01 | End: 2025-05-31

## 2025-05-01 RX ORDER — ALBUTEROL SULFATE 90 UG/1
2 INHALANT RESPIRATORY (INHALATION) EVERY 4 HOURS PRN
COMMUNITY

## 2025-05-01 RX ORDER — BUDESONIDE 0.5 MG/2ML
0.5 INHALANT ORAL 2 TIMES DAILY
Qty: 120 ML | Refills: 5 | Status: SHIPPED | OUTPATIENT
Start: 2025-05-01 | End: 2025-05-31

## 2025-05-05 ENCOUNTER — CLINICAL SUPPORT (OUTPATIENT)
Dept: FAMILY MEDICINE CLINIC | Age: 85
End: 2025-05-05
Payer: MEDICARE

## 2025-05-05 ENCOUNTER — OFFICE VISIT (OUTPATIENT)
Dept: GASTROENTEROLOGY | Facility: CLINIC | Age: 85
End: 2025-05-05
Payer: MEDICARE

## 2025-05-05 VITALS
DIASTOLIC BLOOD PRESSURE: 51 MMHG | SYSTOLIC BLOOD PRESSURE: 132 MMHG | HEART RATE: 66 BPM | BODY MASS INDEX: 21.34 KG/M2 | WEIGHT: 113 LBS | HEIGHT: 61 IN | OXYGEN SATURATION: 98 %

## 2025-05-05 DIAGNOSIS — J30.89 ALLERGIC RHINITIS DUE TO OTHER ALLERGIC TRIGGER, UNSPECIFIED SEASONALITY: Primary | ICD-10-CM

## 2025-05-05 DIAGNOSIS — K59.04 CHRONIC IDIOPATHIC CONSTIPATION: ICD-10-CM

## 2025-05-05 DIAGNOSIS — K44.9 HIATAL HERNIA: ICD-10-CM

## 2025-05-05 DIAGNOSIS — R11.0 NAUSEA: ICD-10-CM

## 2025-05-05 DIAGNOSIS — K21.9 GERD WITHOUT ESOPHAGITIS: Primary | ICD-10-CM

## 2025-05-05 PROCEDURE — 1159F MED LIST DOCD IN RCRD: CPT | Performed by: NURSE PRACTITIONER

## 2025-05-05 PROCEDURE — 1160F RVW MEDS BY RX/DR IN RCRD: CPT | Performed by: NURSE PRACTITIONER

## 2025-05-05 PROCEDURE — 3078F DIAST BP <80 MM HG: CPT | Performed by: NURSE PRACTITIONER

## 2025-05-05 PROCEDURE — 3075F SYST BP GE 130 - 139MM HG: CPT | Performed by: NURSE PRACTITIONER

## 2025-05-05 PROCEDURE — 95115 IMMUNOTHERAPY ONE INJECTION: CPT | Performed by: FAMILY MEDICINE

## 2025-05-05 PROCEDURE — 99214 OFFICE O/P EST MOD 30 MIN: CPT | Performed by: NURSE PRACTITIONER

## 2025-05-05 RX ORDER — PANTOPRAZOLE SODIUM 40 MG/1
40 TABLET, DELAYED RELEASE ORAL DAILY
Qty: 90 TABLET | Refills: 3 | Status: SHIPPED | OUTPATIENT
Start: 2025-05-05

## 2025-05-05 RX ORDER — FAMOTIDINE 40 MG/1
40 TABLET, FILM COATED ORAL 2 TIMES DAILY
Qty: 180 TABLET | Refills: 3 | Status: SHIPPED | OUTPATIENT
Start: 2025-05-05

## 2025-05-05 NOTE — PROGRESS NOTES
Chief Complaint   Heartburn    History of Present Illness       Luma Cruz is a 84 y.o. female who presents to [facility] for follow-up 4/29/2024.    She underwent EGD and colonoscopy with Dr. Lindsey on 4/3/2023.  EGD showed a large hiatal hernia.  Otherwise normal exam.  Colonoscopy showed diverticulosis.  Otherwise normal.  No biopsies taken.  No recall colonoscopy due to age.     After the scopes she went to Dr. Reeder and he recommended surgery. At her age she didn't want to proceed with that and went to [facility] for a third opinion. She underwent EGD with manometry with Dr. Velez on 8/18/23. It was normal. Couldn't do BRAVO due to esophageal size. Esophageal manometry normal.      GERD/hiatal hernia--- Protonix 40 mg and Pepcid 20 mg. She admits she is nauseous and as soon as she starts eating or drinking she will start burping. BOOST makes it worse. She admits the nausea always comes after she eats or drinks. + globus sensation. She denies any dysphagia.      She admits her bowels are slow. She tells me she passes little balls most of the time. She admits she has been constipated all her life. She denies any rectal bleeding or melena.      GI family history---Denies any      Doing well with pepcid 40 mg BID. Needs refills. Not needing miralax or ZOFRAN. Appetite is good. Weight is stable. Doing well. Denies any issues.       Results       Result Review :       CMP          10/10/2024    14:35   CMP   Glucose 92    BUN 22    Creatinine 0.85    EGFR 68.9    Sodium 144    Potassium 4.3    Chloride 105    Calcium 9.5    Total Protein 6.6    Albumin 4.3    Globulin 2.3    Total Bilirubin 0.3    Alkaline Phosphatase 61    AST (SGOT) 27    ALT (SGPT) 14    Albumin/Globulin Ratio 1.9    BUN/Creatinine Ratio 25.9    Anion Gap 12.6      CBC          10/10/2024    14:35   CBC   WBC 5.00    RBC 4.30    Hemoglobin 12.4    Hematocrit 39.1    MCV 90.9    MCH 28.8    MCHC 31.7    RDW  "12.6    Platelets 199      CBC w/diff          10/10/2024    14:35   CBC w/Diff   WBC 5.00    RBC 4.30    Hemoglobin 12.4    Hematocrit 39.1    MCV 90.9    MCH 28.8    MCHC 31.7    RDW 12.6    Platelets 199      Lipid Panel          10/10/2024    14:35   Lipid Panel   Total Cholesterol 169    Triglycerides 105    HDL Cholesterol 68    VLDL Cholesterol 19    LDL Cholesterol  82    LDL/HDL Ratio 1.18      TSH          10/10/2024    14:35   TSH   TSH 1.860        Lipase   Lipase   Date Value Ref Range Status   04/30/2023 219 73 - 393 U/L Final     Amylase   Amylase   Date Value Ref Range Status   02/10/2023 88 28 - 100 U/L Final     Iron Profile No results found for: \"IRON\", \"TIBC\", \"LABIRON\", \"TRANSFERRIN\"  Ferritin No results found for: \"FERRITIN\"  ESR (Sed Rate)   Sed Rate   Date Value Ref Range Status   06/09/2022 12 0 - 30 mm/hr Final     CRP (C-Reactive)   C-Reactive Protein   Date Value Ref Range Status   09/17/2019 1.50 0.00 - 5.00 mg/L Final     Comment:     In very rare cases, gammopathy, in particular type IgM  (Waldenstrom's macroglobulinemia), may cause unreliable results.       Liver Workup No results found for: \"AFPTM\", \"DSDNA\", \"EXPANDEDENA\", \"SMOOTHMUSCAB\", \"CERULOPLSM\", \"FERRITIN\", \"LABIMMURE\", \"TOTIGGREF\", \"IGA\", \"IGM\", \"IRON\", \"TIBC\", \"LABIRON\", \"TRANSFERRIN\", \"MITOAB\", \"PROTIME\", \"INR\", \"AFP\"            Past Medical History       Past Medical History:   Diagnosis Date    Congenital prolapsed rectum     COVID-19 07/01/2022    Essential hypertension     Generalized anxiety disorder     GERD without esophagitis     Hernia April, 2023    hiatal hernia    History of prolapse of bladder     Menopausal and postmenopausal disorder     Mixed hyperlipidemia        Past Surgical History:   Procedure Laterality Date    APPENDECTOMY  10 years ago    BREAST BIOPSY  right side, 15 years ago    BRONCHOSCOPY Bilateral 2/20/2024    Procedure: BRONCHOSCOPY WITH BRONCHOALVEOLAR LAVAGE AND WASHINGS;  Surgeon: Andrei, " Enmanuel CAT MD;  Location: [facility];  Service: Pulmonary;  Laterality: Bilateral;  MUCUS PLUGGING    CHOLECYSTECTOMY  unknown    COLONOSCOPY  06/2011    COLONOSCOPY N/A 04/03/2023    Procedure: COLONOSCOPY;  Surgeon: Cory Lindsey MD;  Location: [facility] END[facility];  Service: General;  Laterality: N/A;  normal    ENDOSCOPY N/A 04/03/2023    Hiatal hernia, mild reactive gastropathy    HYSTERECTOMY      complete    TONSILLECTOMY AND ADENOIDECTOMY           Current Outpatient Medications:     albuterol (PROVENTIL) (2.5 MG/3ML) 0.083% nebulizer solution, Take 2.5 mg by nebulization Every 4 (Four) Hours As Needed for Wheezing for up to 30 days., Disp: 180 each, Rfl: 5    albuterol sulfate  (90 Base) MCG/ACT inhaler, Inhale 2 puffs Every 4 (Four) Hours As Needed for Wheezing., Disp: , Rfl:     amLODIPine (NORVASC) 2.5 MG tablet, Take 1 tablet by mouth Daily., Disp: 90 tablet, Rfl: 3    arformoterol (BROVANA) 15 MCG/2ML nebulizer solution, Take 2 mL by nebulization 2 (Two) Times a Day. [facility], Disp: 120 mL, Rfl: 5    azelastine (ASTELIN) 0.1 % nasal spray, Administer 1 spray into the nostril(s) as directed by provider 2 (Two) Times a Day., Disp: 90 mL, Rfl: 3    budesonide (PULMICORT) 0.5 MG/2ML nebulizer solution, Take 2 mL by nebulization 2 (Two) Times a Day for 30 days. [facility] pharmacy, Disp: 120 mL, Rfl: 5    clonazePAM (KlonoPIN) 0.5 MG tablet, Take 1 tablet by mouth 2 (Two) Times a Day As Needed for Anxiety., Disp: 60 tablet, Rfl: 2    Diclofenac Sodium (Voltaren) 1 % gel gel, Apply 4 g topically to the appropriate area as directed 4 (Four) Times a Day As Needed (hand pain)., Disp: 100 g, Rfl: 1    estradiol (ESTRACE) 0.1 MG/GM vaginal cream, Insert  into the vagina 2 (Two) Times a Week., Disp: , Rfl:     estradiol (ESTRACE) 1 MG tablet, Take 1 tablet by mouth Daily., Disp: 90 tablet, Rfl: 3    famotidine (Pepcid) 40 MG tablet, Take 1 tablet by mouth 2 (Two) Times a Day., Disp: 180 tablet,  Rfl: 3    fluticasone (FLONASE) 50 MCG/ACT nasal spray, Administer 2 sprays into the nostril(s) as directed by provider Daily., Disp: 48 g, Rfl: 3    levocetirizine (XYZAL) 5 MG tablet, Take 1 tablet by mouth Every Evening., Disp: 90 tablet, Rfl: 3    losartan (COZAAR) 50 MG tablet, Take 1 tablet by mouth Daily., Disp: 90 tablet, Rfl: 3    multivitamin with minerals tablet tablet, Take 1 tablet by mouth Daily., Disp: , Rfl:     ondansetron ODT (ZOFRAN-ODT) 4 MG disintegrating tablet, Place 1 tablet on the tongue Every 6 (Six) Hours As Needed for Nausea or Vomiting., Disp: 30 tablet, Rfl: 3    pantoprazole (PROTONIX) 40 MG EC tablet, Take 1 tablet by mouth Daily., Disp: 90 tablet, Rfl: 3    pravastatin (PRAVACHOL) 20 MG tablet, Take 1 tablet by mouth Daily., Disp: 90 tablet, Rfl: 3    sodium chloride 3 % nebulizer solution, Take 4 mL by nebulization 2 (Two) Times a Day for 30 days. [facility], Disp: 240 mL, Rfl: 5     Allergies   Allergen Reactions    Fosamax [Alendronate] GI Intolerance    Penicillins Rash       Family History   Problem Relation Age of Onset    Stroke Mother     Hypertension Mother     Arthritis Mother     Hearing loss Mother     Coronary artery disease Father         Social History     Social History Narrative    Not on file       Objective       Review of Systems   Constitutional:  Negative for appetite change, fatigue, fever, unexpected weight gain and unexpected weight loss.   HENT:  Negative for trouble swallowing.    Respiratory:  Negative for cough, choking, chest tightness, shortness of breath, wheezing and stridor.    Cardiovascular:  Negative for chest pain, palpitations and leg swelling.   Gastrointestinal:  Negative for abdominal distention, abdominal pain, anal bleeding, blood in stool, constipation, diarrhea, nausea, rectal pain, vomiting, GERD and indigestion.        Vital Signs:   /51 (BP Location: Left arm, Patient Position: Sitting, Cuff Size: Adult)   Pulse 66   Ht  "154.9 cm (60.98\")   Wt 51.3 kg (113 lb)   SpO2 98%   BMI 21.36 kg/m²       Physical Exam  Constitutional:       General: She is not in acute distress.     Appearance: She is well-developed. She is not ill-appearing.   HENT:      Head: Normocephalic.   Eyes:      Pupils: Pupils are equal, round, and reactive to light.   Cardiovascular:      Rate and Rhythm: Normal rate and regular rhythm.      Heart sounds: Normal heart sounds.   Pulmonary:      Effort: Pulmonary effort is normal.      Breath sounds: Normal breath sounds.   Abdominal:      General: Bowel sounds are normal. There is no distension.      Palpations: Abdomen is soft. There is no mass.      Tenderness: There is no abdominal tenderness. There is no guarding or rebound.      Hernia: No hernia is present.   Musculoskeletal:         General: Normal range of motion.   Skin:     General: Skin is warm and dry.   Neurological:      Mental Status: She is alert and oriented to person, place, and time.   Psychiatric:         Speech: Speech normal.         Behavior: Behavior normal.         Judgment: Judgment normal.           Assessment & Plan          Assessment and Plan    Diagnoses and all orders for this visit:    1. GERD without esophagitis (Primary)  -     famotidine (Pepcid) 40 MG tablet; Take 1 tablet by mouth 2 (Two) Times a Day.  Dispense: 180 tablet; Refill: 3  -     pantoprazole (PROTONIX) 40 MG EC tablet; Take 1 tablet by mouth Daily.  Dispense: 90 tablet; Refill: 3    2. Chronic idiopathic constipation    3. Nausea    4. Hiatal hernia      GERD is well-controlled with Protonix 40 mg in the morning and Pepcid 40 mg twice daily.  We talked about lowering the Protonix to 20 mg daily but the patient is apprehensive to do this.  She tells me she does not want to rock the boat and make things worse.  She has been doing so good lately.  So we will hold off for now.  I will refill both medications.  Continue GERD precautions.  Bowels moving really well.  " Okay to use MiraLAX OTC as needed.  Continue to increase fluid intake and eat a high-fiber diet.  Overall doing really well.  Patient to call the office with any issues.  Patient to follow-up with me in 1 year.  Patient is agreeable to the plan.          Follow Up       Follow Up   Return in about 1 year (around 5/5/2026) for CONSTIPATION, GERD.  Patient was given instructions and counseling regarding her condition or for health maintenance advice. Please see specific information pulled into the AVS if appropriate.

## 2025-05-12 ENCOUNTER — CLINICAL SUPPORT (OUTPATIENT)
Dept: FAMILY MEDICINE CLINIC | Age: 85
End: 2025-05-12
Payer: MEDICARE

## 2025-05-12 DIAGNOSIS — J30.89 ALLERGIC RHINITIS DUE TO OTHER ALLERGIC TRIGGER, UNSPECIFIED SEASONALITY: Primary | ICD-10-CM

## 2025-05-12 PROCEDURE — 95115 IMMUNOTHERAPY ONE INJECTION: CPT | Performed by: FAMILY MEDICINE

## 2025-05-21 ENCOUNTER — CLINICAL SUPPORT (OUTPATIENT)
Dept: FAMILY MEDICINE CLINIC | Age: 85
End: 2025-05-21
Payer: MEDICARE

## 2025-05-21 DIAGNOSIS — J30.89 ALLERGIC RHINITIS DUE TO OTHER ALLERGIC TRIGGER, UNSPECIFIED SEASONALITY: Primary | ICD-10-CM

## 2025-05-21 PROCEDURE — 95115 IMMUNOTHERAPY ONE INJECTION: CPT | Performed by: FAMILY MEDICINE

## 2025-05-27 ENCOUNTER — CLINICAL SUPPORT (OUTPATIENT)
Dept: FAMILY MEDICINE CLINIC | Age: 85
End: 2025-05-27
Payer: MEDICARE

## 2025-05-27 DIAGNOSIS — J30.89 ALLERGIC RHINITIS DUE TO OTHER ALLERGIC TRIGGER, UNSPECIFIED SEASONALITY: Primary | ICD-10-CM

## 2025-05-27 PROCEDURE — 95115 IMMUNOTHERAPY ONE INJECTION: CPT | Performed by: FAMILY MEDICINE

## 2025-06-05 ENCOUNTER — CLINICAL SUPPORT (OUTPATIENT)
Dept: FAMILY MEDICINE CLINIC | Age: 85
End: 2025-06-05
Payer: MEDICARE

## 2025-06-05 DIAGNOSIS — J30.89 ALLERGIC RHINITIS DUE TO OTHER ALLERGIC TRIGGER, UNSPECIFIED SEASONALITY: Primary | ICD-10-CM

## 2025-06-12 ENCOUNTER — CLINICAL SUPPORT (OUTPATIENT)
Dept: FAMILY MEDICINE CLINIC | Age: 85
End: 2025-06-12
Payer: MEDICARE

## 2025-06-12 DIAGNOSIS — J30.89 ALLERGIC RHINITIS DUE TO OTHER ALLERGIC TRIGGER, UNSPECIFIED SEASONALITY: Primary | ICD-10-CM

## 2025-06-12 PROCEDURE — 95115 IMMUNOTHERAPY ONE INJECTION: CPT | Performed by: FAMILY MEDICINE

## 2025-06-17 ENCOUNTER — CLINICAL SUPPORT (OUTPATIENT)
Dept: FAMILY MEDICINE CLINIC | Age: 85
End: 2025-06-17
Payer: MEDICARE

## 2025-06-17 DIAGNOSIS — J30.89 ALLERGIC RHINITIS DUE TO OTHER ALLERGIC TRIGGER, UNSPECIFIED SEASONALITY: Primary | ICD-10-CM

## 2025-06-17 PROCEDURE — 95115 IMMUNOTHERAPY ONE INJECTION: CPT | Performed by: FAMILY MEDICINE

## 2025-06-23 ENCOUNTER — CLINICAL SUPPORT (OUTPATIENT)
Dept: FAMILY MEDICINE CLINIC | Age: 85
End: 2025-06-23
Payer: MEDICARE

## 2025-06-23 DIAGNOSIS — J30.89 ALLERGIC RHINITIS DUE TO OTHER ALLERGIC TRIGGER, UNSPECIFIED SEASONALITY: Primary | ICD-10-CM

## 2025-06-23 PROCEDURE — 95115 IMMUNOTHERAPY ONE INJECTION: CPT | Performed by: FAMILY MEDICINE

## 2025-06-30 ENCOUNTER — CLINICAL SUPPORT (OUTPATIENT)
Dept: FAMILY MEDICINE CLINIC | Age: 85
End: 2025-06-30
Payer: MEDICARE

## 2025-06-30 DIAGNOSIS — J30.89 ALLERGIC RHINITIS DUE TO OTHER ALLERGIC TRIGGER, UNSPECIFIED SEASONALITY: Primary | ICD-10-CM

## 2025-06-30 PROCEDURE — 95115 IMMUNOTHERAPY ONE INJECTION: CPT | Performed by: FAMILY MEDICINE

## 2025-07-08 ENCOUNTER — CLINICAL SUPPORT (OUTPATIENT)
Dept: FAMILY MEDICINE CLINIC | Age: 85
End: 2025-07-08
Payer: MEDICARE

## 2025-07-08 DIAGNOSIS — J30.89 ALLERGIC RHINITIS DUE TO OTHER ALLERGIC TRIGGER, UNSPECIFIED SEASONALITY: Primary | ICD-10-CM

## 2025-07-08 PROCEDURE — 95115 IMMUNOTHERAPY ONE INJECTION: CPT | Performed by: FAMILY MEDICINE

## 2025-07-16 ENCOUNTER — CLINICAL SUPPORT (OUTPATIENT)
Dept: FAMILY MEDICINE CLINIC | Age: 85
End: 2025-07-16
Payer: MEDICARE

## 2025-07-16 DIAGNOSIS — J30.89 ALLERGIC RHINITIS DUE TO OTHER ALLERGIC TRIGGER, UNSPECIFIED SEASONALITY: Primary | ICD-10-CM

## 2025-07-16 PROCEDURE — 95115 IMMUNOTHERAPY ONE INJECTION: CPT | Performed by: FAMILY MEDICINE

## 2025-07-17 ENCOUNTER — TELEPHONE (OUTPATIENT)
Dept: PULMONOLOGY | Facility: CLINIC | Age: 85
End: 2025-07-17
Payer: MEDICARE

## 2025-07-17 RX ORDER — SODIUM CHLORIDE FOR INHALATION 3 %
4 VIAL, NEBULIZER (ML) INHALATION 2 TIMES DAILY
Qty: 240 ML | Refills: 12 | Status: SHIPPED | OUTPATIENT
Start: 2025-07-17

## 2025-07-17 NOTE — TELEPHONE ENCOUNTER
"Caller: Luma Cruz \"Luma Grey\"    Relationship to patient: Self    Best call back number: [phone]    Patient is needing: PT SAID SHE STOPPED GETTING THE SODIUM CHLORIDE FROM [facility] BECAUSE IT WAS SO EXPENSIVE. SHE CAN GET IT FROM [facility] BUT THEY ONLY HAVE THE 3MG OR 5MG AND PT TAKES 4MG. PLEASE ADVISE.     "

## 2025-07-17 NOTE — TELEPHONE ENCOUNTER
Spoke with patient she requested that I send Sodium chloride to [facility] to see if the cost is cheaper.

## 2025-07-22 ENCOUNTER — TELEPHONE (OUTPATIENT)
Dept: PULMONOLOGY | Facility: CLINIC | Age: 85
End: 2025-07-22
Payer: MEDICARE

## 2025-07-22 NOTE — TELEPHONE ENCOUNTER
Spoke with patient she states that the pharmacy gave her a larger vial than a 4ML. I explained to patient to only use 4ML and discard the rest. Patient voiced understanding.

## 2025-07-22 NOTE — TELEPHONE ENCOUNTER
"    Hub staff attempted to follow warm transfer process and was unsuccessful     Caller: Luma Cruz \"Luma Grey\"    Relationship to patient: Self    Best call back number: [phone] (home)       Patient is needing: TO KNOW ABOUT DOSAGE ON NEW SODIUM CHLORIDE, SHE GOT IT FROM A DIFFERENT PHARMACY AND THEY DISPENSED IT DIFFERENTLY. PLEASE CALL PT TO ADVISE.    "

## 2025-07-25 ENCOUNTER — TRANSCRIBE ORDERS (OUTPATIENT)
Dept: ADMINISTRATIVE | Facility: HOSPITAL | Age: 85
End: 2025-07-25
Payer: MEDICARE

## 2025-07-25 ENCOUNTER — HOSPITAL ENCOUNTER (OUTPATIENT)
Dept: GENERAL RADIOLOGY | Facility: HOSPITAL | Age: 85
Discharge: HOME | End: 2025-07-25
Payer: MEDICARE

## 2025-07-25 ENCOUNTER — LAB (OUTPATIENT)
Dept: LAB | Facility: HOSPITAL | Age: 85
End: 2025-07-25
Payer: MEDICARE

## 2025-07-25 DIAGNOSIS — M21.949 ACQUIRED DEFORMITY OF HAND, UNSPECIFIED LATERALITY: Primary | ICD-10-CM

## 2025-07-25 DIAGNOSIS — M21.949 ACQUIRED DEFORMITY OF HAND, UNSPECIFIED LATERALITY: ICD-10-CM

## 2025-07-25 LAB — CHROMATIN AB SERPL-ACNC: 10 IU/ML (ref 0–14)

## 2025-07-25 PROCEDURE — 36415 COLL VENOUS BLD VENIPUNCTURE: CPT

## 2025-07-25 PROCEDURE — 86431 RHEUMATOID FACTOR QUANT: CPT

## 2025-07-25 PROCEDURE — 73120 X-RAY EXAM OF HAND: CPT

## 2025-07-28 ENCOUNTER — CLINICAL SUPPORT (OUTPATIENT)
Dept: FAMILY MEDICINE CLINIC | Age: 85
End: 2025-07-28
Payer: MEDICARE

## 2025-07-28 DIAGNOSIS — J30.89 ALLERGIC RHINITIS DUE TO OTHER ALLERGIC TRIGGER, UNSPECIFIED SEASONALITY: Primary | ICD-10-CM

## 2025-07-28 PROCEDURE — 95115 IMMUNOTHERAPY ONE INJECTION: CPT | Performed by: FAMILY MEDICINE

## 2025-08-04 ENCOUNTER — CLINICAL SUPPORT (OUTPATIENT)
Dept: FAMILY MEDICINE CLINIC | Age: 85
End: 2025-08-04
Payer: MEDICARE

## 2025-08-04 DIAGNOSIS — J30.89 ALLERGIC RHINITIS DUE TO OTHER ALLERGIC TRIGGER, UNSPECIFIED SEASONALITY: Primary | ICD-10-CM

## 2025-08-04 PROCEDURE — 95115 IMMUNOTHERAPY ONE INJECTION: CPT | Performed by: FAMILY MEDICINE

## 2025-08-14 ENCOUNTER — CLINICAL SUPPORT (OUTPATIENT)
Dept: FAMILY MEDICINE CLINIC | Age: 85
End: 2025-08-14
Payer: MEDICARE

## 2025-08-14 DIAGNOSIS — J30.89 ALLERGIC RHINITIS DUE TO OTHER ALLERGIC TRIGGER, UNSPECIFIED SEASONALITY: Primary | ICD-10-CM

## 2025-08-14 PROCEDURE — 95115 IMMUNOTHERAPY ONE INJECTION: CPT | Performed by: FAMILY MEDICINE

## 2025-08-18 ENCOUNTER — TELEPHONE (OUTPATIENT)
Dept: PULMONOLOGY | Facility: CLINIC | Age: 85
End: 2025-08-18
Payer: MEDICARE

## 2025-08-18 ENCOUNTER — RESULTS FOLLOW-UP (OUTPATIENT)
Dept: FAMILY MEDICINE CLINIC | Age: 85
End: 2025-08-18

## 2025-08-18 ENCOUNTER — OFFICE VISIT (OUTPATIENT)
Dept: FAMILY MEDICINE CLINIC | Age: 85
End: 2025-08-18
Payer: MEDICARE

## 2025-08-18 VITALS
WEIGHT: 110.2 LBS | HEART RATE: 68 BPM | HEIGHT: 61 IN | SYSTOLIC BLOOD PRESSURE: 130 MMHG | TEMPERATURE: 98 F | OXYGEN SATURATION: 96 % | BODY MASS INDEX: 20.81 KG/M2 | DIASTOLIC BLOOD PRESSURE: 69 MMHG

## 2025-08-18 DIAGNOSIS — R05.1 ACUTE COUGH: ICD-10-CM

## 2025-08-18 DIAGNOSIS — U07.1 COVID-19: Primary | ICD-10-CM

## 2025-08-18 LAB
EXPIRATION DATE: ABNORMAL
FLUAV AG UPPER RESP QL IA.RAPID: NOT DETECTED
FLUBV AG UPPER RESP QL IA.RAPID: NOT DETECTED
INTERNAL CONTROL: ABNORMAL
Lab: ABNORMAL
SARS-COV-2 AG UPPER RESP QL IA.RAPID: DETECTED

## 2025-08-18 PROCEDURE — 87428 SARSCOV & INF VIR A&B AG IA: CPT

## 2025-08-18 PROCEDURE — 1159F MED LIST DOCD IN RCRD: CPT

## 2025-08-18 PROCEDURE — 99213 OFFICE O/P EST LOW 20 MIN: CPT

## 2025-08-18 PROCEDURE — 1126F AMNT PAIN NOTED NONE PRSNT: CPT

## 2025-08-18 PROCEDURE — 1160F RVW MEDS BY RX/DR IN RCRD: CPT

## 2025-08-18 PROCEDURE — 3075F SYST BP GE 130 - 139MM HG: CPT

## 2025-08-18 PROCEDURE — 3078F DIAST BP <80 MM HG: CPT

## 2025-08-22 ENCOUNTER — CLINICAL SUPPORT (OUTPATIENT)
Dept: FAMILY MEDICINE CLINIC | Age: 85
End: 2025-08-22
Payer: MEDICARE

## 2025-08-22 DIAGNOSIS — J30.89 ALLERGIC RHINITIS DUE TO OTHER ALLERGIC TRIGGER, UNSPECIFIED SEASONALITY: Primary | ICD-10-CM

## (undated) DEVICE — LINER SURG CANSTR SXN S/RIGD 1500CC

## (undated) DEVICE — GOWN,REINFRCE,POLY,SIRUS,BREATH SLV,XXLG: Brand: MEDLINE

## (undated) DEVICE — ADAPT VLV BIOP MAJ210 EA/20

## (undated) DEVICE — CUP SPECI 4OZ LF STRL

## (undated) DEVICE — VLV BRONCH

## (undated) DEVICE — BLCK/BITE BLOX WO/DENTL/RIM W/STRAP 54F

## (undated) DEVICE — SOL IRR NACL 0.9PCT BT 1000ML

## (undated) DEVICE — GLV SURG SENSICARE PI ORTHO SZ8 LF STRL

## (undated) DEVICE — Device: Brand: DEFENDO AIR/WATER/SUCTION AND BIOPSY VALVE

## (undated) DEVICE — CONN JET HYDRA H20 AUXILIARY DISP

## (undated) DEVICE — SINGLE-USE BIOPSY FORCEPS: Brand: RADIAL JAW 4

## (undated) DEVICE — SOL IRRG H2O PL/BG 1000ML STRL

## (undated) DEVICE — Device

## (undated) DEVICE — TRAP SPECI MUCUS LF 40CC

## (undated) DEVICE — SOLIDIFIER LIQLOC PLS 1500CC BT

## (undated) DEVICE — DEV ATOMIZATION MUCOSAL/NASALTRACH

## (undated) DEVICE — STERILE POLYISOPRENE POWDER-FREE SURGICAL GLOVES WITH EMOLLIENT COATING: Brand: PROTEXIS

## (undated) DEVICE — SYR LUER SLPTP 50ML